# Patient Record
Sex: MALE | Race: WHITE | Employment: OTHER | RURAL
[De-identification: names, ages, dates, MRNs, and addresses within clinical notes are randomized per-mention and may not be internally consistent; named-entity substitution may affect disease eponyms.]

---

## 2017-01-20 DIAGNOSIS — K21.00 GASTROESOPHAGEAL REFLUX DISEASE WITH ESOPHAGITIS: ICD-10-CM

## 2017-01-20 RX ORDER — PANTOPRAZOLE SODIUM 40 MG/1
TABLET, DELAYED RELEASE ORAL
Qty: 30 TAB | Refills: 11 | Status: SHIPPED | OUTPATIENT
Start: 2017-01-20 | End: 2017-01-24 | Stop reason: SDUPTHER

## 2017-01-24 DIAGNOSIS — K21.00 GASTROESOPHAGEAL REFLUX DISEASE WITH ESOPHAGITIS: ICD-10-CM

## 2017-01-24 RX ORDER — PANTOPRAZOLE SODIUM 40 MG/1
TABLET, DELAYED RELEASE ORAL
Qty: 30 TAB | Refills: 11 | Status: SHIPPED | OUTPATIENT
Start: 2017-01-24 | End: 2018-02-20 | Stop reason: SDUPTHER

## 2017-02-23 ENCOUNTER — TELEPHONE (OUTPATIENT)
Dept: FAMILY MEDICINE CLINIC | Age: 72
End: 2017-02-23

## 2017-02-23 NOTE — TELEPHONE ENCOUNTER
Attempted to reach patient on mobile(3 times) and home number(1 time), no answer. With Blood Pressure readings that high he needs to be seen. Unfortunately, we do not have any openings today, he can go to the ER or to an urgent care facility. With BP readings that high he is at risk of having a stroke.

## 2017-02-23 NOTE — TELEPHONE ENCOUNTER
Patient called back. Patient states that his BP was 188/100 when he went to the dentist with his wife. Advised patient that I could double book an afternoon slot with the nurse practiconer, patient stated he was still in [place] and would not be able to make it in time. The only opening that we have for tomorrow is at 1:25pm. ASked patient if he has any headaches, SOB, chest pain, numbness -- patient avoided the question & replied, \"those things usually go hand in hand with high blood pressure. \" Advised patient that I did not think it would be wise to wait until tomorrow afternoon to be seen since his pressure has been elevated for 3 days. Advised patient to go to the ER. Patient refused and asked that I let Dr. Misa Earl know about his elevated pressure since that is usually who he sees.

## 2017-02-24 ENCOUNTER — HOSPITAL ENCOUNTER (EMERGENCY)
Age: 72
Discharge: HOME | End: 2017-02-24
Attending: EMERGENCY MEDICINE
Payer: MEDICARE

## 2017-02-24 VITALS
RESPIRATION RATE: 14 BRPM | SYSTOLIC BLOOD PRESSURE: 162 MMHG | WEIGHT: 209.44 LBS | BODY MASS INDEX: 28.37 KG/M2 | TEMPERATURE: 97.2 F | DIASTOLIC BLOOD PRESSURE: 70 MMHG | HEART RATE: 51 BPM | OXYGEN SATURATION: 97 % | HEIGHT: 72 IN

## 2017-02-24 DIAGNOSIS — I10 ESSENTIAL HYPERTENSION: Primary | ICD-10-CM

## 2017-02-24 LAB
ANION GAP BLD CALC-SCNC: 17 MMOL/L (ref 5–15)
ATRIAL RATE: 47 BPM
BUN BLD-MCNC: 21 MG/DL (ref 9–20)
CA-I BLD-MCNC: 1.22 MMOL/L (ref 1.12–1.32)
CALCULATED P AXIS, ECG09: 23 DEGREES
CALCULATED R AXIS, ECG10: 4 DEGREES
CALCULATED T AXIS, ECG11: 61 DEGREES
CHLORIDE BLD-SCNC: 101 MMOL/L (ref 98–107)
CO2 BLD-SCNC: 27 MMOL/L (ref 21–32)
CREAT BLD-MCNC: 1 MG/DL (ref 0.6–1.3)
DIAGNOSIS, 93000: NORMAL
GLUCOSE BLD STRIP.AUTO-MCNC: 102 MG/DL (ref 65–100)
GLUCOSE BLD-MCNC: 112 MG/DL (ref 75–110)
HCT VFR BLD CALC: 38 % (ref 36.6–50.3)
HGB BLD-MCNC: 12.9 GM/DL (ref 12.1–17)
P-R INTERVAL, ECG05: 166 MS
POTASSIUM BLD-SCNC: 4.1 MMOL/L (ref 3.5–5.1)
Q-T INTERVAL, ECG07: 520 MS
QRS DURATION, ECG06: 100 MS
QTC CALCULATION (BEZET), ECG08: 460 MS
SERVICE CMNT-IMP: ABNORMAL
SERVICE CMNT-IMP: ABNORMAL
SODIUM BLD-SCNC: 139 MMOL/L (ref 136–145)
TROPONIN I BLD-MCNC: <0.04 NG/ML (ref 0–0.08)
VENTRICULAR RATE, ECG03: 47 BPM

## 2017-02-24 PROCEDURE — 82962 GLUCOSE BLOOD TEST: CPT

## 2017-02-24 PROCEDURE — 84484 ASSAY OF TROPONIN QUANT: CPT

## 2017-02-24 PROCEDURE — 74011250636 HC RX REV CODE- 250/636: Performed by: EMERGENCY MEDICINE

## 2017-02-24 PROCEDURE — 99285 EMERGENCY DEPT VISIT HI MDM: CPT

## 2017-02-24 PROCEDURE — 96374 THER/PROPH/DIAG INJ IV PUSH: CPT

## 2017-02-24 PROCEDURE — 93005 ELECTROCARDIOGRAM TRACING: CPT

## 2017-02-24 PROCEDURE — 80047 BASIC METABLC PNL IONIZED CA: CPT

## 2017-02-24 RX ORDER — HYDRALAZINE HYDROCHLORIDE 20 MG/ML
20 INJECTION INTRAMUSCULAR; INTRAVENOUS
Status: COMPLETED | OUTPATIENT
Start: 2017-02-24 | End: 2017-02-24

## 2017-02-24 RX ADMIN — HYDRALAZINE HYDROCHLORIDE 20 MG: 20 INJECTION INTRAMUSCULAR; INTRAVENOUS at 09:37

## 2017-02-24 NOTE — TELEPHONE ENCOUNTER
Received message from Dr. Fausto Hernandes:   Looks like I will be out tomorrow. He needs to go to ER for stat labs and evaluation if BP's that high. Dr. Fausto Hernandes       Patient made aware and advised to go to [address] [place] [address]. Patient verbalized understanding.

## 2017-02-24 NOTE — ED PROVIDER NOTES
HPI Comments: 66 y.o. male with past medical history significant for HTN, hyperlipidemia, diabetes, GERD, kidney stone, lumbar DJD, vertigo, urinary frequency, CAD, OA, anxiety, prostate CA, L-eye amaurosis fugax, and nodular goiter who presents from home with chief complaint of hypertension. Pt reports to the ED c/o high BP readings he has had recently because his PCP is out of the office today. Pt has a machine that reads \"error\" when systolic BP is above 110, which he states has been happening recently. Pt notes that he has a mild forehead discomfort and \"feels weak\" when he has these hypertensive readings, but he notes that he otherwise feels completely normal. Pt has recently been taking Aleve, which he has been instructed not to take. Pt regularly takes Lisinopril (20 OR 40 BID) and Atenolol. Pt has no Hx of MI. Pt lists no pertinent negative symptoms. There are no other acute medical concerns at this time. PCP: Mitzy Ludwig MD    Note written by Mara Almonte, as dictated by Moreno Vazquez MD 8:53 AM      The history is provided by the patient. Past Medical History:   Diagnosis Date    Amaurosis fugax of left eye 5/6/2012    Arthritis     OSTEO    CAD (coronary artery disease) 2012    CAROTID LEFT     Cancer ([facility] [address] 2013    PROSTATE    Chronic pain     DJD lumbar    Diabetes ([facility] [address] 5/22/2010    Frequent nocturnal awakening     urinary frequency    GERD (gastroesophageal reflux disease) 5/22/2010    Hyperlipidemia 5/22/2010    Hypertension 5/22/2010    Kidney stone 5/22/2010    Nodular goiter     1.3 cm right , FNA 6/15    Obesity (BMI 30-39. 9)     Psychiatric disorder     ANXIETY    Vertigo        Past Surgical History:   Procedure Laterality Date    COLONOSCOPY  3/3/2016         EGD  3/3/2016         HX HEENT      tonsillectomy    HX MOHS PROCEDURES  2010    right    HX ORTHOPAEDIC      coccyx removed    HX UROLOGICAL  2010    left lithotripsy. basket extraction,ureteral stent    HX VASECTOMY      NEUROLOGICAL PROCEDURE UNLISTED  2011    Steroid injections in epidural    VASCULAR SURGERY PROCEDURE UNLIST  2012    LEFT CAROTID         Family History:   Problem Relation Age of Onset    Diabetes Mother     Obesity Mother     Heart Disease Mother     Stroke Father     Heart Disease Sister     Obesity Sister     Diabetes Sister        Social History     Social History    Marital status:      Spouse name: N/A    Number of children: N/A    Years of education: N/A     Occupational History    Not on file. Social History Main Topics    Smoking status: Former Smoker     Packs/day: 0.50     Years: 4.00     Quit date: 4/16/1966    Smokeless tobacco: Never Used    Alcohol use No    Drug use: No    Sexual activity: Yes     Partners: Female     Other Topics Concern    Not on file     Social History Narrative         ALLERGIES: Bee sting [sting, bee]; Vytorin 10-10 [ezetimibe-simvastatin]; Amlodipine; and Lipitor [atorvastatin]    Review of Systems   Constitutional: Negative for appetite change, fever and unexpected weight change. HENT: Negative for ear pain, hearing loss, nosebleeds, rhinorrhea, sore throat and trouble swallowing. Respiratory: Negative. Negative for cough, chest tightness and shortness of breath. Cardiovascular: Negative. Negative for chest pain and palpitations. Gastrointestinal: Negative. Negative for abdominal distention, abdominal pain, blood in stool and vomiting. Endocrine: Negative. Genitourinary: Negative for dysuria and hematuria. Musculoskeletal: Negative. Negative for back pain and myalgias. Skin: Negative. Negative for rash. Allergic/Immunologic: Negative. Neurological: Positive for weakness and headaches (\"forehead discomfort\"). Negative for dizziness, syncope and numbness. Hematological: Negative. Psychiatric/Behavioral: Negative.     All other systems reviewed and are negative. Vitals:    02/24/17 0848   BP: (!) 204/95   Pulse: (!) 48   Resp: 18   Temp: 97.2 °F (36.2 °C)   SpO2: 98%   Weight: 95 kg (209 lb 7 oz)   Height: 5' 11.5\" (1.816 m)            Physical Exam   Constitutional: He is oriented to person, place, and time. He appears well-developed and well-nourished. No distress. HENT:   Head: Normocephalic and atraumatic. Right Ear: External ear normal.   Left Ear: External ear normal.   Nose: Nose normal.   Mouth/Throat: Oropharynx is clear and moist.   Eyes: Conjunctivae and EOM are normal. Pupils are equal, round, and reactive to light. Neck: Normal range of motion. Neck supple. No JVD present. No thyromegaly present. Cardiovascular: Normal rate, regular rhythm, normal heart sounds and intact distal pulses. No murmur heard. Pulmonary/Chest: Effort normal and breath sounds normal. No respiratory distress. He has no wheezes. He has no rales. Abdominal: Soft. Bowel sounds are normal. He exhibits no distension. There is no tenderness. Musculoskeletal: Normal range of motion. He exhibits no edema. Neurological: He is alert and oriented to person, place, and time. No cranial nerve deficit. Skin: Skin is warm and dry. No rash noted. Psychiatric: He has a normal mood and affect. His behavior is normal. Thought content normal.    Note written by Devon Nowak. Liseth Faustin, as dictated by Zhou Charles MD 8:53 AM      [facility]  ED Course       Procedures    ASSESSMENT & PLAN: Aquiles Kate is unremarkable, troponin is negative, BG is 102, and BP is down to 166/80. Assessment is acute on chronic HTN. No change in medication regimen is indicated at this time. Plan is to have pt follow-up with PCP on Monday for recheck and possible medication adjustment. ED EKG interpretation:  Rhythm: sinus bradycardia; Rate (approx.): 47 bpm; Axis: normal; Normal Intervals; ST/T wave: normal; No ectopy. Note written by Devon Nowak.  Liseth Faustin, as dictated by Zhou Charles MD 9:19 AM

## 2017-02-24 NOTE — ED NOTES
Assumed care of pt. Pt resting in stretcher in low/locked position and call bell within reach. Introduced self as primary nurse. Discussed plan of care with patient. Opportunity to ask questions given. Patient advised that medical information will be discussed and it is their own responsibility to let nurse know if such conversation should not take place in presence of visitors. Pt verbalizes understanding.

## 2017-02-24 NOTE — ED NOTES
Patient given discharge instructions by provider. Patient verbalized understanding and denies questions.

## 2017-02-24 NOTE — DISCHARGE INSTRUCTIONS
High Blood Pressure: Care Instructions  Your Care Instructions  If your blood pressure is usually above 140/90, you have high blood pressure, or hypertension. That means the top number is 140 or higher or the bottom number is 90 or higher, or both. Despite what a lot of people think, high blood pressure usually doesn't cause headaches or make you feel dizzy or lightheaded. It usually has no symptoms. But it does increase your risk for heart attack, stroke, and kidney or eye damage. The higher your blood pressure, the more your risk increases. Your doctor will give you a goal for your blood pressure. Your goal will be based on your health and your age. An example of a goal is to keep your blood pressure below 140/90. Lifestyle changes, such as eating healthy and being active, are always important to help lower blood pressure. You might also take medicine to reach your blood pressure goal.  Follow-up care is a key part of your treatment and safety. Be sure to make and go to all appointments, and call your doctor if you are having problems. It's also a good idea to know your test results and keep a list of the medicines you take. How can you care for yourself at home? Medical treatment  · If you stop taking your medicine, your blood pressure will go back up. You may take one or more types of medicine to lower your blood pressure. Be safe with medicines. Take your medicine exactly as prescribed. Call your doctor if you think you are having a problem with your medicine. · Talk to your doctor before you start taking aspirin every day. Aspirin can help certain people lower their risk of a heart attack or stroke. But taking aspirin isn't right for everyone, because it can cause serious bleeding. · See your doctor regularly. You may need to see the doctor more often at first or until your blood pressure comes down.   · If you are taking blood pressure medicine, talk to your doctor before you take decongestants or anti-inflammatory medicine, such as ibuprofen. Some of these medicines can raise blood pressure. · Learn how to check your blood pressure at home. Lifestyle changes  · Stay at a healthy weight. This is especially important if you put on weight around the waist. Losing even 10 pounds can help you lower your blood pressure. · If your doctor recommends it, get more exercise. Walking is a good choice. Bit by bit, increase the amount you walk every day. Try for at least 30 minutes on most days of the week. You also may want to swim, bike, or do other activities. · Avoid or limit alcohol. Talk to your doctor about whether you can drink any alcohol. · Try to limit how much sodium you eat to less than 2,300 milligrams (mg) a day. Your doctor may ask you to try to eat less than 1,500 mg a day. · Eat plenty of fruits (such as bananas and oranges), vegetables, legumes, whole grains, and low-fat dairy products. · Lower the amount of saturated fat in your diet. Saturated fat is found in animal products such as milk, cheese, and meat. Limiting these foods may help you lose weight and also lower your risk for heart disease. · Do not smoke. Smoking increases your risk for heart attack and stroke. If you need help quitting, talk to your doctor about stop-smoking programs and medicines. These can increase your chances of quitting for good. When should you call for help? Call 911 anytime you think you may need emergency care. This may mean having symptoms that suggest that your blood pressure is causing a serious heart or blood vessel problem. Your blood pressure may be over 180/110. For example, call 911 if:  · You have symptoms of a heart attack. These may include:  ¨ Chest pain or pressure, or a strange feeling in the chest.  ¨ Sweating. ¨ Shortness of breath. ¨ Nausea or vomiting. ¨ Pain, pressure, or a strange feeling in the back, neck, jaw, or upper belly or in one or both shoulders or arms.   ¨ Lightheadedness or sudden weakness. ¨ A fast or irregular heartbeat. · You have symptoms of a stroke. These may include:  ¨ Sudden numbness, tingling, weakness, or loss of movement in your face, arm, or leg, especially on only one side of your body. ¨ Sudden vision changes. ¨ Sudden trouble speaking. ¨ Sudden confusion or trouble understanding simple statements. ¨ Sudden problems with walking or balance. ¨ A sudden, severe headache that is different from past headaches. · You have severe back or belly pain. Do not wait until your blood pressure comes down on its own. Get help right away. Call your doctor now or seek immediate care if:  · Your blood pressure is much higher than normal (such as 180/110 or higher), but you don't have symptoms. · You think high blood pressure is causing symptoms, such as:  ¨ Severe headache. ¨ Blurry vision. Watch closely for changes in your health, and be sure to contact your doctor if:  · Your blood pressure measures 140/90 or higher at least 2 times. That means the top number is 140 or higher or the bottom number is 90 or higher, or both. · You think you may be having side effects from your blood pressure medicine. · Your blood pressure is usually normal, but it goes above normal at least 2 times. Where can you learn more? Go to http://elizabeth-antonieta.info/. Enter W850 in the search box to learn more about \"High Blood Pressure: Care Instructions. \"  Current as of: August 8, 2016  Content Version: 11.1  © 5180-2527 [employer]. Care instructions adapted under license by [employer] (which disclaims liability or warranty for this information). If you have questions about a medical condition or this instruction, always ask your healthcare professional. [place] [address] any warranty or liability for your use of this information. We hope that we have addressed all of your medical concerns.  The examination and treatment you received in the Emergency Department were for an emergent problem and were not intended as complete care. It is important that you follow up with your healthcare provider(s) for ongoing care. If your symptoms worsen or do not improve as expected, and you are unable to reach your usual health care provider(s), you should return to the Emergency Department. Today's healthcare is undergoing tremendous change, and patient satisfaction surveys are one of the many tools to assess the quality of medical care. You may receive a survey from the [employer] regarding your experience in the Emergency Department. I hope that your experience has been completely positive, particularly the medical care that I provided. As such, please participate in the survey; anything less than excellent does not meet my expectations or intentions. [facility] and [facility] participate in nationally recognized quality of care measures. If your blood pressure is greater than 120/80, as reported below, we urge that you seek medical care to address the potential of high blood pressure, commonly known as hypertension. Hypertension can be hereditary or can be caused by certain medical conditions, pain, stress, or \"white coat syndrome. \"       Please make an appointment with your health care provider(s) for follow up of your Emergency Department visit. VITALS:   Patient Vitals for the past 8 hrs:   Temp Pulse Resp BP SpO2   02/24/17 1015 - (!) 50 13 166/80 96 %   02/24/17 1000 - (!) 51 10 171/87 98 %   02/24/17 0945 - (!) 48 14 179/84 98 %   02/24/17 0930 - (!) 45 15 192/86 95 %   02/24/17 0900 - (!) 48 12 (!) 201/102 98 %   02/24/17 0848 97.2 °F (36.2 °C) (!) 48 18 (!) 204/95 98 %          Thank you for allowing us to provide you with medical care today. We realize that you have many choices for your emergency care needs.   Please choose us in the future for any continued health care needs. Regards,           Fernando K. Noris Duron, [facility].   Office: [phone]            Recent Results (from the past 24 hour(s))   EKG, 12 LEAD, INITIAL    Collection Time: 02/24/17  9:19 AM   Result Value Ref Range    Ventricular Rate 47 BPM    Atrial Rate 47 BPM    P-R Interval 166 ms    QRS Duration 100 ms    Q-T Interval 520 ms    QTC Calculation (Bezet) 460 ms    Calculated P Axis 23 degrees    Calculated R Axis 4 degrees    Calculated T Axis 61 degrees    Diagnosis       Sinus bradycardia  Otherwise normal ECG  When compared with ECG of 14-OCT-2010 10:41,  No significant change was found  Confirmed by Yana Barrientos ([id number]) on 2/24/2017 9:32:57 AM     POC CHEM8    Collection Time: 02/24/17  9:20 AM   Result Value Ref Range    Calcium, ionized (POC) 1.22 1.12 - 1.32 MMOL/L    Sodium (POC) 139 136 - 145 MMOL/L    Potassium (POC) 4.1 3.5 - 5.1 MMOL/L    Chloride (POC) 101 98 - 107 MMOL/L    CO2 (POC) 27 21 - 32 MMOL/L    Anion gap (POC) 17 (H) 5 - 15 mmol/L    Glucose (POC) 112 (H) 75 - 110 MG/DL    BUN (POC) 21 (H) 9 - 20 MG/DL    Creatinine (POC) 1.0 0.6 - 1.3 MG/DL    GFR-AA (POC) >60 >60 ml/min/1.73m2    GFR, non-AA (POC) >60 >60 ml/min/1.73m2    Hemoglobin (POC) 12.9 12.1 - 17.0 GM/DL    Hematocrit (POC) 38 36.6 - 50.3 %    Comment Comment Not Indicated. POC TROPONIN-I    Collection Time: 02/24/17  9:36 AM   Result Value Ref Range    Troponin-I (POC) <0.04 0.00 - 0.08 ng/mL   GLUCOSE, POC    Collection Time: 02/24/17 10:04 AM   Result Value Ref Range    Glucose (POC) 102 (H) 65 - 100 mg/dL    Performed by Elle Handler (PCT)        No results found.

## 2017-02-27 ENCOUNTER — OFFICE VISIT (OUTPATIENT)
Dept: FAMILY MEDICINE CLINIC | Age: 72
End: 2017-02-27

## 2017-02-27 ENCOUNTER — PATIENT OUTREACH (OUTPATIENT)
Dept: FAMILY MEDICINE CLINIC | Age: 72
End: 2017-02-27

## 2017-02-27 VITALS
HEART RATE: 45 BPM | RESPIRATION RATE: 16 BRPM | OXYGEN SATURATION: 97 % | HEIGHT: 72 IN | TEMPERATURE: 97.9 F | DIASTOLIC BLOOD PRESSURE: 78 MMHG | SYSTOLIC BLOOD PRESSURE: 175 MMHG | WEIGHT: 209.8 LBS | BODY MASS INDEX: 28.42 KG/M2

## 2017-02-27 DIAGNOSIS — E78.00 PURE HYPERCHOLESTEROLEMIA: Chronic | ICD-10-CM

## 2017-02-27 DIAGNOSIS — E11.9 CONTROLLED TYPE 2 DIABETES MELLITUS WITHOUT COMPLICATION, WITHOUT LONG-TERM CURRENT USE OF INSULIN (HCC): Chronic | ICD-10-CM

## 2017-02-27 DIAGNOSIS — I10 ACCELERATED HYPERTENSION: Primary | ICD-10-CM

## 2017-02-27 RX ORDER — HYDRALAZINE HYDROCHLORIDE 25 MG/1
25 TABLET, FILM COATED ORAL 3 TIMES DAILY
Qty: 90 TAB | Refills: 3 | Status: SHIPPED | OUTPATIENT
Start: 2017-02-27 | End: 2017-03-06 | Stop reason: SDUPTHER

## 2017-02-27 NOTE — PROGRESS NOTES
NNTOCED: [facility] 2/24/2017: Hypertension: history significant for HTN, hyperlipidemia, diabetes, GERD, kidney stone, lumbar DJD, vertigo, urinary frequency, CAD, OA, anxiety, prostate CA, L-eye amaurosis fugax, and nodular goiter. MDM: Ty Reyes MD  ED Course      BP: (!) 204/95   Pulse: (!) 48          Procedures     ASSESSMENT & PLAN: Chem8 is unremarkable, troponin is negative, BG is 102, and BP is down to 166/80. Assessment is acute on chronic HTN. No change in medication regimen is indicated at this time. Plan is to have pt follow-up with PCP on Monday for recheck and possible medication adjustment.     ED EKG interpretation:  Rhythm: sinus bradycardia; Rate (approx.): 47 bpm; Axis: normal; Normal Intervals; ST/T wave: normal; No ectopy. Note written by Salma Rubi. Yves Osgood, as dictated by Ty Reyes MD 9:19 AM  ______________________________________________  Patient into visit with PCP today:/[id number]   Please note PCP documentation and plan of care. Patient is to return in one week. Decrease atenolol to 25 mg. Start hydralazine 25mg three times daily. NN spoke with patient ,please note Functional Assessment. PCP reviewed medication regimen and changes. Hg A1c= 5.9  NN spoke with patient in depth regarding hidden salt in foods and found patient was making soup with V8 tomato juice,eating some chips,diet soda. Patient agrees to adhere to a low sodium diet for the next week and begin his new medication regimen. Patient will continue to document his BP and pulse daily. We reviewed Red Flags and when to return to the ED. Red Flags: Fever,chills,Nausea,Vomiting,Diarrhea, unable to take medications,pain,SOB,chest pain,unusual sensations,elevated BP,BFAST. Patient will contact NN with questions or concerns.

## 2017-02-27 NOTE — PROGRESS NOTES
Progress Note    Patient: Fermin Griffith MRN: [id number]  SSN: [id number]    YOB: 1945  Age: 66 y.o. Sex: male        Chief Complaint   Patient presents with    Hypertension    Cholesterol Problem    Pain (Chronic)         Subjective:     Encounter Diagnoses   Name Primary?  Accelerated hypertension: He said his blood pressure has been elevated for about a week. He noticed significant ankle and foot edema after he took four alleve the same day. I warned him not to take Alleve. I'm afraid this probably triggered his extremely high blood pressure. His edema has gone back to normal now after visiting the emergency room this weekend. No changes were made at his visit in the emergency room -he was just told to follow up with me. Associated with these high readings at home his had heart rates as low as 45. In reviewing the chart I find that he was supposed to come in times blood pressure checked two weeks after his last visit. Yes    Pure hypercholesterolemia:  Cardiovascular risks for him are: LDL goal is under 100  hypertension  hyperlipidemia. Currently he takes Pravachol (pravastatin) , 40 MG. This is a stronger statin that he can tolerate  Lab Results   Component Value Date/Time    Cholesterol, total 234 11/23/2016 11:04 AM    HDL Cholesterol 63 11/23/2016 11:04 AM    LDL, calculated 146 11/23/2016 11:04 AM    Triglyceride 126 11/23/2016 11:04 AM    CHOL/HDL Ratio 3.5 10/04/2010 11:30 AM     Lab Results   Component Value Date/Time    ALT (SGPT) 12 06/17/2016 08:59 AM    AST (SGOT) 19 06/17/2016 08:59 AM    Alk.  phosphatase 55 06/17/2016 08:59 AM    Bilirubin, total 0.4 06/17/2016 08:59 AM      Myalgias: No   Fatigue: No   Other side effects: no  Wt Readings from Last 3 Encounters:   02/27/17 209 lb 12.8 oz (95.2 kg)   02/24/17 209 lb 7 oz (95 kg)   11/23/16 211 lb 6.4 oz (95.9 kg)     The patient is aware of our goal to reduce or eliminate the long term problems (such as strokes and heart attacks) related to poorly controlled hyperlipidemia.  Controlled type 2 diabetes mellitus without complication, without long-term current use of insulin ([address]): This patient is managed under a comprehensive plan of care for Diabetes. Overall the patient feels well with good energy level. Pertinent Labs:   Lab Results   Component Value Date/Time    Hemoglobin A1c 5.9 11/23/2016 11:04 AM    Hemoglobin A1c 6.3 06/17/2016 08:59 AM    Hemoglobin A1c 6.3 12/03/2015 08:40 AM      Body mass index is 28.85 kg/(m^2). Lab Results   Component Value Date/Time    LDL, calculated 146 11/23/2016 11:04 AM         Lab Results   Component Value Date/Time    Sodium 142 06/17/2016 08:59 AM    Potassium 4.6 06/17/2016 08:59 AM    Chloride 103 06/17/2016 08:59 AM    CO2 23 06/17/2016 08:59 AM    Anion gap 8 04/24/2013 03:08 AM    Glucose 125 06/17/2016 08:59 AM    BUN 21 06/17/2016 08:59 AM    Creatinine 0.86 06/17/2016 08:59 AM    BUN/Creatinine ratio 24 06/17/2016 08:59 AM    GFR est AA 99 06/17/2016 08:59 AM    GFR est non-AA 86 06/17/2016 08:59 AM    Calcium 9.2 06/17/2016 08:59 AM    AST (SGOT) 19 06/17/2016 08:59 AM    Alk. phosphatase 55 06/17/2016 08:59 AM    Protein, total 6.6 06/17/2016 08:59 AM    Albumin 4.6 06/17/2016 08:59 AM    Globulin 2.6 04/16/2013 01:58 PM    A-G Ratio 2.3 06/17/2016 08:59 AM    ALT (SGPT) 12 06/17/2016 08:59 AM     Lab Results   Component Value Date/Time    Microalbumin/Creat ratio (mg/g creat) 8 03/12/2009 09:00 AM    Microalb/Creat ratio (ug/mg creat.) 11.9 11/23/2016 11:04 AM    Microalbumin,urine random 1.47 03/12/2009 09:00 AM      Frequency of home glucose testing:No readings available today. Blood Sugar range at home:    Last eye exam: He is overdue for eye exam.   Last foot exam: This year.    Polyuria, polyphagia or polydipsia: No   Retinopathy: No   Neuropathy SX: No    Low blood sugar symptoms: No   Dietary compliance: Good   Medication compliance:Good   On ASA: Yes   Depression: No   CKD:no     Wt Readings from Last 3 Encounters:   02/27/17 209 lb 12.8 oz (95.2 kg)   02/24/17 209 lb 7 oz (95 kg)   11/23/16 211 lb 6.4 oz (95.9 kg)        History   Smoking Status    Former Smoker    Packs/day: 0.50    Years: 4.00    Quit date: 4/16/1966   Smokeless Tobacco    Never Used     All the patient's questions regarding medications, diet and exercise were answered. Goal of A1C of less than 7.5% is our goal.   Our overall goal is to reduce or eliminate the long term consequences of poorly controlled diabetes. Current and past medical information:    Current Medications after this visit[de-identified]   Current Outpatient Prescriptions   Medication Sig    hydrALAZINE (APRESOLINE) 25 mg tablet Take 1 Tab by mouth three (3) times daily. Indications: hypertension    pantoprazole (PROTONIX) 40 mg tablet TAKE ONE TABLET BY MOUTH EVERY DAY FOR: GASTROESOPHAGEAL REFLUX]    felodipine (PLENDIL SR) 10 mg 24 hr tablet Take 1 Tab by mouth daily. Indications: Hypertension    traMADol (ULTRAM) 50 mg tablet TAKE ONE TABLET BY MOUTH EVERY 6 HOURS AS NEEDED FOR PAIN max daily AMOUNT 200mg    aspirin delayed-release 81 mg tablet Take 1 Tab by mouth daily.  metFORMIN ER (GLUCOPHAGE XR) 500 mg tablet TAKE ONE TABLET BY MOUTH THREE TIMES DAILY AFTER MEALS    lisinopril (PRINIVIL, ZESTRIL) 40 mg tablet TAKE ONE TABLET BY MOUTH TWICE DAILY    furosemide (LASIX) 20 mg tablet TAKE ONE TABLET BY MOUTH EVERY DAY    pravastatin (PRAVACHOL) 40 mg tablet TAKE ONE TABLET BY MOUTH EVERY NIGHT FOR cholesterol    omega-3 fatty acids-vitamin e (FISH OIL) 1,000 mg cap Take 1 Cap by mouth.  coenzyme q10 (CO Q-10) 100 mg Cap Take 100 mg by mouth daily.  diclofenac (VOLTAREN) 1 % gel Apply 4 g to affected area four (4) times daily. Painful shoulder.  EPINEPHrine (EPIPEN 2-KARL) 0.3 mg/0.3 mL injection 0.3 mL by IntraMUSCular route once as needed for up to 1 dose.  Take with 50 mg Benadryl and call 911.    ranitidine (ZANTAC) 300 mg tablet TAKE ONE TABLET BY MOUTH EVERY DAY    atenolol (TENORMIN) 50 mg tablet Take 1 tablet by mouth daily.  Peak Flow Meter eric Use prior and post nebulizer treatment    albuterol (PROVENTIL VENTOLIN) 2.5 mg /3 mL (0.083 %) nebulizer solution 3 mL by Nebulization route every six (6) hours as needed for Wheezing or Shortness of Breath. No current facility-administered medications for this visit. Patient Active Problem List    Diagnosis Date Noted    Bone spur 10/04/2010     Priority: 1 - One    Hypertension 05/22/2010     Priority: 1 - One    Hyperlipidemia 05/22/2010     Priority: 1 - One    GERD (gastroesophageal reflux disease) 05/22/2010     Priority: 1 - One    Kidney stone 05/22/2010     Priority: 1 - One    Diabetes mellitus type 2, controlled, without complications ([facility] 14/86/9761    Prostate cancer ([facility] 08/23/2013    Prostate nodule without urinary obstruction 02/11/2013    S/P carotid endarterectomy 12/05/2012    Vitamin D deficiency 12/05/2012    Amaurosis fugax of left eye 05/06/2012    Carotid artery obstruction 04/17/2012    Allergy to bee sting 04/13/2012       Past Medical History:   Diagnosis Date    Amaurosis fugax of left eye 5/6/2012    Arthritis     OSTEO    CAD (coronary artery disease) 2012    CAROTID LEFT     Cancer ([facility] 2013    PROSTATE    Chronic pain     DJD lumbar    Diabetes ([facility] 5/22/2010    Frequent nocturnal awakening     urinary frequency    GERD (gastroesophageal reflux disease) 5/22/2010    Hyperlipidemia 5/22/2010    Hypertension 5/22/2010    Kidney stone 5/22/2010    Nodular goiter     1.3 cm right , FNA 6/15    Obesity (BMI 30-39. 9)     Psychiatric disorder     ANXIETY    Vertigo        Allergies   Allergen Reactions    Bee Sting [Sting, Bee] Anaphylaxis    Vytorin 10-10 [Ezetimibe-Simvastatin] Myalgia    Amlodipine Other (comments)     Creepy crawly sensation, twitches    Lipitor [Atorvastatin] Myalgia       Past Surgical History:   Procedure Laterality Date    COLONOSCOPY  3/3/2016         EGD  3/3/2016         HX HEENT      tonsillectomy    HX MOHS PROCEDURES  2010    right    HX ORTHOPAEDIC      coccyx removed    HX UROLOGICAL  2010    left lithotripsy. basket  extraction,ureteral stent    HX VASECTOMY      NEUROLOGICAL PROCEDURE UNLISTED  2011    Steroid injections in epidural    VASCULAR SURGERY PROCEDURE UNLIST  2012    LEFT CAROTID       Social History     Social History    Marital status:      Spouse name: N/A    Number of children: N/A    Years of education: N/A     Social History Main Topics    Smoking status: Former Smoker     Packs/day: 0.50     Years: 4.00     Quit date: 4/16/1966    Smokeless tobacco: Never Used    Alcohol use No    Drug use: No    Sexual activity: Yes     Partners: Female     Other Topics Concern    None     Social History Narrative       Review of Systems   Constitutional: Negative. Negative for chills, fever, malaise/fatigue and weight loss. HENT: Negative. Negative for hearing loss. Eyes: Negative. Negative for blurred vision and double vision. Respiratory: Negative. Negative for cough, hemoptysis, sputum production and shortness of breath. Cardiovascular: Positive for leg swelling. Negative for chest pain, palpitations and orthopnea. His leg swelling has resolved. Gastrointestinal: Negative. Negative for abdominal pain, blood in stool, heartburn, nausea and vomiting. Genitourinary: Negative. Negative for dysuria, frequency and urgency. Musculoskeletal: Negative. Negative for back pain, myalgias and neck pain. Skin: Negative. Negative for rash. Neurological: Negative. Negative for dizziness, tingling, tremors, weakness and headaches. He has had no encephalopathic symptoms with his high blood pressures. Endo/Heme/Allergies: Negative. Psychiatric/Behavioral: Negative.   Negative for depression. Objective:     Vitals:    02/27/17 0809   BP: 175/78   Pulse: (!) 45   Resp: 16   Temp: 97.9 °F (36.6 °C)   TempSrc: Oral   SpO2: 97%   Weight: 209 lb 12.8 oz (95.2 kg)   Height: 5' 11.5\" (1.816 m)      Body mass index is 28.85 kg/(m^2). Physical Exam   Constitutional: He is oriented to person, place, and time and well-developed, well-nourished, and in no distress. HENT:   Head: Normocephalic and atraumatic. Mouth/Throat: Oropharynx is clear and moist.   Eyes: Right eye exhibits no discharge. Left eye exhibits no discharge. No scleral icterus. Neck: No tracheal deviation present. No thyromegaly present. No bruit. Cardiovascular: Normal rate, regular rhythm and normal heart sounds. Pulmonary/Chest: Effort normal and breath sounds normal.   Abdominal: Soft. Neurological: He is alert and oriented to person, place, and time. Skin: No rash noted. No erythema. Psychiatric: Mood and affect normal.   Nursing note and vitals reviewed. Health Maintenance Due   Topic Date Due    EYE EXAM RETINAL OR DILATED Q1  08/14/2015    MEDICARE YEARLY EXAM  10/14/2015    GLAUCOMA SCREENING Q2Y  02/18/2016         Assessment and orders:       ICD-10-CM ICD-9-CM    1. Accelerated hypertension   I10 401.0 1- CUT ATENOLOL IN HALF=25 MG.  2-ADD HYDRALAZINE 25 MG TID  3- NO ALLEVE, VOLTAREN CREAM IS OK UNLESS SWELLING OCCURS. 2. Pure hypercholesterolemia E78.00 272.0      3. Controlled type 2 diabetes mellitus without complication, without long-term current use of insulin (HCC) E11.9 250.00          Plan of care:  Discussed diagnoses in detail with patient. Medication risks/benefits/side effects discussed with patient. All of the patient's questions were addressed. The patient understands and agrees with our plan of care. The patient knows to call back if they are unsure of or forget any changes we discussed today or if the symptoms change.      The patient received an After-Visit Summary which contains VS, orders, medication list and allergy list. This can be used as a \"mini-medical record\" should they have to seek medical care while out of town. Patient Care Team:  Yvon Beltrán MD as PCP - General  Viky Chi MD (General and Vascular Surgery)  Reena Downey MD (Neurology)  Dyllan Ballard MD as Surgeon (Urology)  Maicol Wing MD (Endocrinology)  Jodie Whittaker RN as Ambulatory Care Navigator    Follow-up Disposition:  Return in about 1 week (around 3/6/2017) for bp check.     Future Appointments  Date Time Provider Department Center   3/6/2017 8:00 AM Yvon Beltrán MD [facility] SCHED       Signed By: Yvon Beltrán MD     February 27, 2017

## 2017-02-27 NOTE — ACP (ADVANCE CARE PLANNING)
NN initiated conversation regarding Advanced Directive. Patient is aware he may talk with PCP or NN when he is ready to discuss further.

## 2017-02-27 NOTE — MR AVS SNAPSHOT
Visit Information Date & Time Provider Department Dept. Phone Encounter #  
 2/27/2017  8:00 AM Jeff Hansen MD [address] [place] [address] [phone] [id number] Follow-up Instructions Return in about 1 week (around 3/6/2017) for bp check. Upcoming Health Maintenance Date Due  
 EYE EXAM RETINAL OR DILATED Q1 8/14/2015 MEDICARE YEARLY EXAM 10/14/2015 GLAUCOMA SCREENING Q2Y 2/18/2016 HEMOGLOBIN A1C Q6M 5/23/2017 FOOT EXAM Q1 6/17/2017 Pneumococcal 65+ High/Highest Risk (2 of 2 - PPSV23) 9/21/2017 MICROALBUMIN Q1 11/23/2017 LIPID PANEL Q1 11/23/2017 DTaP/Tdap/Td series (2 - Td) 6/23/2021 COLONOSCOPY 3/3/2026 Allergies as of 2/27/2017  Review Complete On: 2/27/2017 By: Monika Johnson LPN Severity Noted Reaction Type Reactions Bee Sting [Sting, Bee] High 05/22/2010    Anaphylaxis Vytorin 10-10 [Ezetimibe-simvastatin] Medium 05/22/2010    Myalgia Amlodipine  11/23/2016    Other (comments) Creepy crawly sensation, twitches Lipitor [Atorvastatin]  05/22/2010    Myalgia Current Immunizations  Reviewed on 4/1/2014 Name Date Influenza Vaccine 11/9/2015, 10/13/2014 Influenza Vaccine (Quad) PF 11/23/2016 Influenza Vaccine Split 12/5/2012, 12/5/2011, 10/4/2010 Influenza Vaccine Whole 9/15/2009 Pneumococcal Vaccine (Unspecified Type) 9/21/2012 TD Vaccine 8/22/2000 TDAP Vaccine 6/23/2011 Not reviewed this visit You Were Diagnosed With   
  
 Codes Comments Accelerated hypertension    -  Primary ICD-10-CM: I10 
ICD-9-CM: 401.0 Pure hypercholesterolemia     ICD-10-CM: E78.00 ICD-9-CM: 272.0 Controlled type 2 diabetes mellitus without complication, without long-term current use of insulin ([facility]     ICD-10-CM: E11.9 ICD-9-CM: 250.00 Vitals BP  
  
  
  
  
  
 175/78 (BP 1 Location: Left arm, BP Patient Position: Sitting) BMI and BSA Data Body Mass Index Body Surface Area  
 28.85 kg/m 2 2.19 m 2 Preferred Pharmacy Pharmacy Name Phone [facility] [address] [phone] Your Updated Medication List  
  
   
This list is accurate as of: 2/27/17  8:23 AM.  Always use your most recent med list.  
  
  
  
  
 albuterol 2.5 mg /3 mL (0.083 %) nebulizer solution Commonly known as:  PROVENTIL VENTOLIN  
3 mL by Nebulization route every six (6) hours as needed for Wheezing or Shortness of Breath. aspirin delayed-release 81 mg tablet Take 1 Tab by mouth daily. atenolol 50 mg tablet Commonly known as:  TENORMIN Take 1 tablet by mouth daily. CO Q-10 100 mg capsule Generic drug:  co-enzyme Q-10 Take 100 mg by mouth daily. diclofenac 1 % Gel Commonly known as:  VOLTAREN Apply 4 g to affected area four (4) times daily. Painful shoulder. EPINEPHrine 0.3 mg/0.3 mL injection Commonly known as:  EPIPEN 2-KARL  
0.3 mL by IntraMUSCular route once as needed for up to 1 dose. Take with 50 mg Benadryl and call 911.  
  
 felodipine 10 mg 24 hr tablet Commonly known as:  PLENDIL SR Take 1 Tab by mouth daily. Indications: Hypertension FISH OIL 1,000 mg Cap Generic drug:  omega-3 fatty acids-vitamin e Take 1 Cap by mouth. furosemide 20 mg tablet Commonly known as:  LASIX TAKE ONE TABLET BY MOUTH EVERY DAY  
  
 hydrALAZINE 25 mg tablet Commonly known as:  APRESOLINE Take 1 Tab by mouth three (3) times daily. Indications: hypertension  
  
 lisinopril 40 mg tablet Commonly known as:  PRINIVIL, ZESTRIL  
TAKE ONE TABLET BY MOUTH TWICE DAILY  
  
 metFORMIN  mg tablet Commonly known as:  GLUCOPHAGE XR  
TAKE ONE TABLET BY MOUTH THREE TIMES DAILY AFTER MEALS  
  
 pantoprazole 40 mg tablet Commonly known as:  PROTONIX  
TAKE ONE TABLET BY MOUTH EVERY DAY FOR: GASTROESOPHAGEAL REFLUX] Peak Flow Meter Sol Mccarthy Use prior and post nebulizer treatment  
  
 pravastatin 40 mg tablet Commonly known as:  PRAVACHOL  
TAKE ONE TABLET BY MOUTH EVERY NIGHT FOR cholesterol  
  
 raNITIdine 300 mg tablet Commonly known as:  ZANTAC TAKE ONE TABLET BY MOUTH EVERY DAY  
  
 traMADol 50 mg tablet Commonly known as:  ULTRAM  
TAKE ONE TABLET BY MOUTH EVERY 6 HOURS AS NEEDED FOR PAIN max daily AMOUNT 200mg Prescriptions Sent to Pharmacy Refills  
 hydrALAZINE (APRESOLINE) 25 mg tablet 3 Sig: Take 1 Tab by mouth three (3) times daily. Indications: hypertension Class: Normal  
 Pharmacy: [facility], [address] [place] [address] #: [phone] Route: Oral  
  
Follow-up Instructions Return in about 1 week (around 3/6/2017) for bp check. Patient Instructions Decrease atenolol to 25 mg. Start hydralazine 25mg three times daily. Introducing [facility]! Dear Braden Alonso: Thank you for requesting a [employer] account. Our records indicate that you already have an active [employer] account. You can access your account anytime at [email or web address] Did you know that you can access your hospital and ER discharge instructions at any time in [employer]? You can also review all of your test results from your hospital stay or ER visit. Additional Information If you have questions, please visit the Frequently Asked Questions section of the [employer] website at [email or web address] Remember, [employer] is NOT to be used for urgent needs. For medical emergencies, dial 911. Now available from your iPhone and Android! Please provide this summary of care documentation to your next provider. Your primary care clinician is listed as [address]. If you have any questions after today's visit, please call [phone].

## 2017-02-27 NOTE — PROGRESS NOTES
Chief Complaint   Patient presents with    Hypertension     Body mass index is 28.85 kg/(m^2).     Reviewed record in preparation for visit and have necessary documentation  Pt did not bring medication to office visit for review  Information was given to pt on Advanced Directives, Living Will  Information was given on Shingles Vaccine  Opportunity was given for questions  Goals that were addressed and/or need to be completed after this appointment include:     Health Maintenance Due   Topic Date Due    EYE EXAM RETINAL OR DILATED Q1  08/14/2015    MEDICARE YEARLY EXAM  10/14/2015    GLAUCOMA SCREENING Q2Y  02/18/2016

## 2017-02-27 NOTE — PATIENT INSTRUCTIONS

## 2017-03-06 ENCOUNTER — CLINICAL SUPPORT (OUTPATIENT)
Dept: FAMILY MEDICINE CLINIC | Age: 72
End: 2017-03-06

## 2017-03-06 VITALS
TEMPERATURE: 97.8 F | WEIGHT: 207.4 LBS | BODY MASS INDEX: 28.09 KG/M2 | HEIGHT: 72 IN | HEART RATE: 50 BPM | DIASTOLIC BLOOD PRESSURE: 68 MMHG | OXYGEN SATURATION: 98 % | SYSTOLIC BLOOD PRESSURE: 144 MMHG | RESPIRATION RATE: 16 BRPM

## 2017-03-06 DIAGNOSIS — I10 ACCELERATED HYPERTENSION: ICD-10-CM

## 2017-03-06 DIAGNOSIS — E11.9 CONTROLLED TYPE 2 DIABETES MELLITUS WITHOUT COMPLICATION, WITHOUT LONG-TERM CURRENT USE OF INSULIN (HCC): Chronic | ICD-10-CM

## 2017-03-06 DIAGNOSIS — E78.00 PURE HYPERCHOLESTEROLEMIA: Chronic | ICD-10-CM

## 2017-03-06 DIAGNOSIS — M19.90 ARTHRITIS: ICD-10-CM

## 2017-03-06 DIAGNOSIS — I10 HTN (HYPERTENSION), MALIGNANT: Primary | ICD-10-CM

## 2017-03-06 RX ORDER — ATENOLOL 50 MG/1
12.5 TABLET ORAL DAILY
Qty: 15 TAB | Refills: 11 | Status: SHIPPED | OUTPATIENT
Start: 2017-03-06 | End: 2017-03-21 | Stop reason: SDUPTHER

## 2017-03-06 RX ORDER — HYDRALAZINE HYDROCHLORIDE 25 MG/1
25 TABLET, FILM COATED ORAL 4 TIMES DAILY
Qty: 120 TAB | Refills: 3 | Status: SHIPPED | OUTPATIENT
Start: 2017-03-06 | End: 2017-03-27 | Stop reason: SDUPTHER

## 2017-03-06 NOTE — PROGRESS NOTES
Chief Complaint   Patient presents with    Blood Pressure Check    Other     Bone Spur     Body mass index is 28.52 kg/(m^2).       Reviewed record in preparation for visit and have necessary documentation  Pt did not bring medication to office visit for review  Information was given to pt on Advanced Directives, Living Will  Information was given on Shingles Vaccine  Opportunity was given for questions  Goals that were addressed and/or need to be completed after this appointment include:     Health Maintenance Due   Topic Date Due    EYE EXAM RETINAL OR DILATED Q1  08/14/2015    MEDICARE YEARLY EXAM  10/14/2015    GLAUCOMA SCREENING Q2Y  02/18/2016

## 2017-03-06 NOTE — PATIENT INSTRUCTIONS

## 2017-03-06 NOTE — MR AVS SNAPSHOT
Visit Information Date & Time Provider Department Dept. Phone Encounter #  
 3/6/2017  8:00 AM Fortunato Juárez MD [address] [place] [address] [phone] [id number] Follow-up Instructions Return in about 3 weeks (around 3/27/2017) for bp check. Upcoming Health Maintenance Date Due  
 EYE EXAM RETINAL OR DILATED Q1 8/14/2015 MEDICARE YEARLY EXAM 10/14/2015 GLAUCOMA SCREENING Q2Y 2/18/2016 HEMOGLOBIN A1C Q6M 5/23/2017 FOOT EXAM Q1 6/17/2017 Pneumococcal 65+ High/Highest Risk (2 of 2 - PPSV23) 9/21/2017 MICROALBUMIN Q1 11/23/2017 LIPID PANEL Q1 11/23/2017 DTaP/Tdap/Td series (2 - Td) 6/23/2021 COLONOSCOPY 3/3/2026 Allergies as of 3/6/2017  Review Complete On: 2/27/2017 By: Fortunato Juárez MD  
  
 Severity Noted Reaction Type Reactions Bee Sting [Sting, Bee] High 05/22/2010    Anaphylaxis Vytorin 10-10 [Ezetimibe-simvastatin] Medium 05/22/2010    Myalgia Amlodipine  11/23/2016    Other (comments) Creepy crawly sensation, twitches Lipitor [Atorvastatin]  05/22/2010    Myalgia Current Immunizations  Reviewed on 4/1/2014 Name Date Influenza Vaccine 11/9/2015, 10/13/2014 Influenza Vaccine (Quad) PF 11/23/2016 Influenza Vaccine Split 12/5/2012, 12/5/2011, 10/4/2010 Influenza Vaccine Whole 9/15/2009 Pneumococcal Vaccine (Unspecified Type) 9/21/2012 TD Vaccine 8/22/2000 TDAP Vaccine 6/23/2011 Not reviewed this visit You Were Diagnosed With   
  
 Codes Comments HTN (hypertension), malignant    -  Primary ICD-10-CM: I10 
ICD-9-CM: 401.0 Controlled type 2 diabetes mellitus without complication, without long-term current use of insulin ([facility]     ICD-10-CM: E11.9 ICD-9-CM: 250.00 Pure hypercholesterolemia     ICD-10-CM: E78.00 ICD-9-CM: 272.0 Arthritis     ICD-10-CM: M19.90 ICD-9-CM: 716.90 Accelerated hypertension     ICD-10-CM: I10 
ICD-9-CM: 401.0 Vitals BP Pulse Temp Resp Height(growth percentile) Weight(growth percentile) 148/80 (BP 1 Location: Left arm, BP Patient Position: Sitting) (!) 50 97.8 °F (36.6 °C) (Oral) 16 5' 11.5\" (1.816 m) 207 lb 6.4 oz (94.1 kg) SpO2 BMI Smoking Status 98% 28.52 kg/m2 Former Smoker BMI and BSA Data Body Mass Index Body Surface Area 28.52 kg/m 2 2.18 m 2 Preferred Pharmacy Pharmacy Name Phone [facility] [address] [phone] Your Updated Medication List  
  
   
This list is accurate as of: 3/6/17  8:29 AM.  Always use your most recent med list.  
  
  
  
  
 albuterol 2.5 mg /3 mL (0.083 %) nebulizer solution Commonly known as:  PROVENTIL VENTOLIN  
3 mL by Nebulization route every six (6) hours as needed for Wheezing or Shortness of Breath. aspirin delayed-release 81 mg tablet Take 1 Tab by mouth daily. atenolol 50 mg tablet Commonly known as:  TENORMIN Take 0.5 Tabs by mouth daily. CO Q-10 100 mg capsule Generic drug:  co-enzyme Q-10 Take 100 mg by mouth daily. diclofenac 1 % Gel Commonly known as:  VOLTAREN Apply 4 g to affected area four (4) times daily. Painful shoulder. EPINEPHrine 0.3 mg/0.3 mL injection Commonly known as:  EPIPEN 2-KARL  
0.3 mL by IntraMUSCular route once as needed for up to 1 dose. Take with 50 mg Benadryl and call 911.  
  
 felodipine 10 mg 24 hr tablet Commonly known as:  PLENDIL SR Take 1 Tab by mouth daily. Indications: Hypertension FISH OIL 1,000 mg Cap Generic drug:  omega-3 fatty acids-vitamin e Take 1 Cap by mouth. furosemide 20 mg tablet Commonly known as:  LASIX TAKE ONE TABLET BY MOUTH EVERY DAY  
  
 hydrALAZINE 25 mg tablet Commonly known as:  APRESOLINE Take 1 Tab by mouth four (4) times daily. Indications: hypertension  
  
 lisinopril 40 mg tablet Commonly known as:  Robertha Rouwellington  
 TAKE ONE TABLET BY MOUTH TWICE DAILY  
  
 metFORMIN  mg tablet Commonly known as:  GLUCOPHAGE XR  
TAKE ONE TABLET BY MOUTH THREE TIMES DAILY AFTER MEALS  
  
 pantoprazole 40 mg tablet Commonly known as:  PROTONIX  
TAKE ONE TABLET BY MOUTH EVERY DAY FOR: GASTROESOPHAGEAL REFLUX] Peak Flow Meter Torrie Deiters Use prior and post nebulizer treatment  
  
 pravastatin 40 mg tablet Commonly known as:  PRAVACHOL  
TAKE ONE TABLET BY MOUTH EVERY NIGHT FOR cholesterol  
  
 raNITIdine 300 mg tablet Commonly known as:  ZANTAC TAKE ONE TABLET BY MOUTH EVERY DAY  
  
 traMADol 50 mg tablet Commonly known as:  ULTRAM  
TAKE ONE TABLET BY MOUTH EVERY 6 HOURS AS NEEDED FOR PAIN max daily AMOUNT 200mg Prescriptions Sent to Pharmacy Refills  
 atenolol (TENORMIN) 50 mg tablet 11 Sig: Take 0.5 Tabs by mouth daily. Class: Normal  
 Pharmacy: [address] Ph #: [phone] Route: Oral  
 hydrALAZINE (APRESOLINE) 25 mg tablet 3 Sig: Take 1 Tab by mouth four (4) times daily. Indications: hypertension Class: Normal  
 Pharmacy: [address] Ph #: [phone] Route: Oral  
  
We Performed the Following HEMOGLOBIN A1C WITH EAG [84865 CPT(R)] LIPID PANEL [21441 CPT(R)] METABOLIC PANEL, COMPREHENSIVE [64538 CPT(R)] URIC ACID A8329483 CPT(R)] Follow-up Instructions Return in about 3 weeks (around 3/27/2017) for bp check. Patient Instructions High Blood Pressure: Care Instructions Your Care Instructions If your blood pressure is usually above 140/90, you have high blood pressure, or hypertension. That means the top number is 140 or higher or the bottom number is 90 or higher, or both. Despite what a lot of people think, high blood pressure usually doesn't cause headaches or make you feel dizzy or lightheaded.  It usually has no symptoms. But it does increase your risk for heart attack, stroke, and kidney or eye damage. The higher your blood pressure, the more your risk increases. Your doctor will give you a goal for your blood pressure. Your goal will be based on your health and your age. An example of a goal is to keep your blood pressure below 140/90. Lifestyle changes, such as eating healthy and being active, are always important to help lower blood pressure. You might also take medicine to reach your blood pressure goal. 
Follow-up care is a key part of your treatment and safety. Be sure to make and go to all appointments, and call your doctor if you are having problems. It's also a good idea to know your test results and keep a list of the medicines you take. How can you care for yourself at home? Medical treatment · If you stop taking your medicine, your blood pressure will go back up. You may take one or more types of medicine to lower your blood pressure. Be safe with medicines. Take your medicine exactly as prescribed. Call your doctor if you think you are having a problem with your medicine. · Talk to your doctor before you start taking aspirin every day. Aspirin can help certain people lower their risk of a heart attack or stroke. But taking aspirin isn't right for everyone, because it can cause serious bleeding. · See your doctor regularly. You may need to see the doctor more often at first or until your blood pressure comes down. · If you are taking blood pressure medicine, talk to your doctor before you take decongestants or anti-inflammatory medicine, such as ibuprofen. Some of these medicines can raise blood pressure. · Learn how to check your blood pressure at home. Lifestyle changes · Stay at a healthy weight. This is especially important if you put on weight around the waist. Losing even 10 pounds can help you lower your blood pressure. · If your doctor recommends it, get more exercise.  Walking is a good choice. Bit by bit, increase the amount you walk every day. Try for at least 30 minutes on most days of the week. You also may want to swim, bike, or do other activities. · Avoid or limit alcohol. Talk to your doctor about whether you can drink any alcohol. · Try to limit how much sodium you eat to less than 2,300 milligrams (mg) a day. Your doctor may ask you to try to eat less than 1,500 mg a day. · Eat plenty of fruits (such as bananas and oranges), vegetables, legumes, whole grains, and low-fat dairy products. · Lower the amount of saturated fat in your diet. Saturated fat is found in animal products such as milk, cheese, and meat. Limiting these foods may help you lose weight and also lower your risk for heart disease. · Do not smoke. Smoking increases your risk for heart attack and stroke. If you need help quitting, talk to your doctor about stop-smoking programs and medicines. These can increase your chances of quitting for good. When should you call for help? Call 911 anytime you think you may need emergency care. This may mean having symptoms that suggest that your blood pressure is causing a serious heart or blood vessel problem. Your blood pressure may be over 180/110. For example, call 911 if: 
· You have symptoms of a heart attack. These may include: ¨ Chest pain or pressure, or a strange feeling in the chest. 
¨ Sweating. ¨ Shortness of breath. ¨ Nausea or vomiting. ¨ Pain, pressure, or a strange feeling in the back, neck, jaw, or upper belly or in one or both shoulders or arms. ¨ Lightheadedness or sudden weakness. ¨ A fast or irregular heartbeat. · You have symptoms of a stroke. These may include: 
¨ Sudden numbness, tingling, weakness, or loss of movement in your face, arm, or leg, especially on only one side of your body. ¨ Sudden vision changes. ¨ Sudden trouble speaking. ¨ Sudden confusion or trouble understanding simple statements. ¨ Sudden problems with walking or balance. ¨ A sudden, severe headache that is different from past headaches. · You have severe back or belly pain. Do not wait until your blood pressure comes down on its own. Get help right away. Call your doctor now or seek immediate care if: 
· Your blood pressure is much higher than normal (such as 180/110 or higher), but you don't have symptoms. · You think high blood pressure is causing symptoms, such as: ¨ Severe headache. ¨ Blurry vision. Watch closely for changes in your health, and be sure to contact your doctor if: 
· Your blood pressure measures 140/90 or higher at least 2 times. That means the top number is 140 or higher or the bottom number is 90 or higher, or both. · You think you may be having side effects from your blood pressure medicine. · Your blood pressure is usually normal, but it goes above normal at least 2 times. Where can you learn more? Go to http://elizabeth-antonieta.info/. Enter Z332 in the search box to learn more about \"High Blood Pressure: Care Instructions. \" Current as of: August 8, 2016 Content Version: 11.1 © 6620-9246 [employer]. Care instructions adapted under license by [employer] (which disclaims liability or warranty for this information). If you have questions about a medical condition or this instruction, always ask your healthcare professional. [address] any warranty or liability for your use of this information. Introducing [facility]! Dear Eyad Palacios: Thank you for requesting a [employer] account. Our records indicate that you already have an active [employer] account. You can access your account anytime at [email or web address] Did you know that you can access your hospital and ER discharge instructions at any time in [employer]? You can also review all of your test results from your hospital stay or ER visit. Additional Information If you have questions, please visit the Frequently Asked Questions section of the [employer] website at [email or web address] Remember, [employer] is NOT to be used for urgent needs. For medical emergencies, dial 911. Now available from your iPhone and Android! Please provide this summary of care documentation to your next provider. Your primary care clinician is listed as [address]. If you have any questions after today's visit, please call [phone].

## 2017-03-06 NOTE — PROGRESS NOTES
Progress Note    Patient: Ramona Coubrn MRN: [id number]  SSN: [id number]    YOB: 1945  Age: 66 y.o. Sex: male        Chief Complaint   Patient presents with    Blood Pressure Check    Other     Bone Spur         Subjective:     Encounter Diagnoses   Name Primary?  HTN (hypertension), malignant; his cuff is accurate and still spiking to 165 at home. Will increase his hydralazine to QID. Some pulses at home as low as 45 but no sx. Cut back already on atenolol to 25 mg daily. May need to stop altogether. No sx when pulse is low. BP Readings from Last 3 Encounters:   03/06/17 144/68   02/27/17 175/78   02/24/17 162/70     The patient reports:  taking medications as instructed, no medication side effects noted, no TIA's, no chest pain on exertion, no dyspnea on exertion, no swelling of ankles. Lab Results   Component Value Date/Time    Sodium 142 06/17/2016 08:59 AM    Potassium 4.6 06/17/2016 08:59 AM    Chloride 103 06/17/2016 08:59 AM    CO2 23 06/17/2016 08:59 AM    Anion gap 8 04/24/2013 03:08 AM    Glucose 125 06/17/2016 08:59 AM    BUN 21 06/17/2016 08:59 AM    Creatinine 0.86 06/17/2016 08:59 AM    BUN/Creatinine ratio 24 06/17/2016 08:59 AM    GFR est AA 99 06/17/2016 08:59 AM    GFR est non-AA 86 06/17/2016 08:59 AM    Calcium 9.2 06/17/2016 08:59 AM    Bilirubin, total 0.4 06/17/2016 08:59 AM    AST (SGOT) 19 06/17/2016 08:59 AM    Alk. phosphatase 55 06/17/2016 08:59 AM    Protein, total 6.6 06/17/2016 08:59 AM    Albumin 4.6 06/17/2016 08:59 AM    Globulin 2.6 04/16/2013 01:58 PM    A-G Ratio 2.3 06/17/2016 08:59 AM    ALT (SGPT) 12 06/17/2016 08:59 AM     Our goal is to normalize the blood pressure to decrease the risks of strokes and heart attacks. The patient is in agreement with the plan. Yes    Controlled type 2 diabetes mellitus without complication, without long-term current use of insulin ([facility] [address]):    This patient is managed under a comprehensive plan of care for Diabetes. Overall the patient feels well with good energy level. Pertinent Labs:   Lab Results   Component Value Date/Time    Hemoglobin A1c 5.9 11/23/2016 11:04 AM    Hemoglobin A1c 6.3 06/17/2016 08:59 AM    Hemoglobin A1c 6.3 12/03/2015 08:40 AM      Body mass index is 28.52 kg/(m^2). Lab Results   Component Value Date/Time    LDL, calculated 146 11/23/2016 11:04 AM         Lab Results   Component Value Date/Time    Sodium 142 06/17/2016 08:59 AM    Potassium 4.6 06/17/2016 08:59 AM    Chloride 103 06/17/2016 08:59 AM    CO2 23 06/17/2016 08:59 AM    Anion gap 8 04/24/2013 03:08 AM    Glucose 125 06/17/2016 08:59 AM    BUN 21 06/17/2016 08:59 AM    Creatinine 0.86 06/17/2016 08:59 AM    BUN/Creatinine ratio 24 06/17/2016 08:59 AM    GFR est AA 99 06/17/2016 08:59 AM    GFR est non-AA 86 06/17/2016 08:59 AM    Calcium 9.2 06/17/2016 08:59 AM    AST (SGOT) 19 06/17/2016 08:59 AM    Alk. phosphatase 55 06/17/2016 08:59 AM    Protein, total 6.6 06/17/2016 08:59 AM    Albumin 4.6 06/17/2016 08:59 AM    Globulin 2.6 04/16/2013 01:58 PM    A-G Ratio 2.3 06/17/2016 08:59 AM    ALT (SGPT) 12 06/17/2016 08:59 AM     Lab Results   Component Value Date/Time    Microalbumin/Creat ratio (mg/g creat) 8 03/12/2009 09:00 AM    Microalb/Creat ratio (ug/mg creat.) 11.9 11/23/2016 11:04 AM    Microalbumin,urine random 1.47 03/12/2009 09:00 AM        Wt Readings from Last 3 Encounters:   03/06/17 207 lb 6.4 oz (94.1 kg)   02/27/17 209 lb 12.8 oz (95.2 kg)   02/24/17 209 lb 7 oz (95 kg)        History   Smoking Status    Former Smoker    Packs/day: 0.50    Years: 4.00    Quit date: 4/16/1966   Smokeless Tobacco    Never Used     All the patient's questions regarding medications, diet and exercise were answered. Goal of A1C of less than 7.5% is our goal.   Our overall goal is to reduce or eliminate the long term consequences of poorly controlled diabetes.        Pure hypercholesterolemia:  Lab Results   Component Value Date/Time    Cholesterol, total 234 11/23/2016 11:04 AM    HDL Cholesterol 63 11/23/2016 11:04 AM    LDL, calculated 146 11/23/2016 11:04 AM    Triglyceride 126 11/23/2016 11:04 AM    CHOL/HDL Ratio 3.5 10/04/2010 11:30 AM     Lab Results   Component Value Date/Time    ALT (SGPT) 12 06/17/2016 08:59 AM    AST (SGOT) 19 06/17/2016 08:59 AM    Alk. phosphatase 55 06/17/2016 08:59 AM    Bilirubin, total 0.4 06/17/2016 08:59 AM      Myalgias: No   Fatigue: No   Other side effects: no  Wt Readings from Last 3 Encounters:   03/06/17 207 lb 6.4 oz (94.1 kg)   02/27/17 209 lb 12.8 oz (95.2 kg)   02/24/17 209 lb 7 oz (95 kg)     The patient is aware of our goal to reduce or eliminate the long term problems (such as strokes and heart attacks) related to poorly controlled hyperlipidemia.  Arthritis:Genersalized. No change. Still golfing.  Accelerated hypertension: see above,               Current and past medical information:    Current Medications after this visit[de-identified]   Current Outpatient Prescriptions   Medication Sig    atenolol (TENORMIN) 50 mg tablet Take 0.5 Tabs by mouth daily.  hydrALAZINE (APRESOLINE) 25 mg tablet Take 1 Tab by mouth four (4) times daily. Indications: hypertension    pantoprazole (PROTONIX) 40 mg tablet TAKE ONE TABLET BY MOUTH EVERY DAY FOR: GASTROESOPHAGEAL REFLUX]    felodipine (PLENDIL SR) 10 mg 24 hr tablet Take 1 Tab by mouth daily. Indications: Hypertension    traMADol (ULTRAM) 50 mg tablet TAKE ONE TABLET BY MOUTH EVERY 6 HOURS AS NEEDED FOR PAIN max daily AMOUNT 200mg    diclofenac (VOLTAREN) 1 % gel Apply 4 g to affected area four (4) times daily. Painful shoulder.  EPINEPHrine (EPIPEN 2-KARL) 0.3 mg/0.3 mL injection 0.3 mL by IntraMUSCular route once as needed for up to 1 dose. Take with 50 mg Benadryl and call 911.  aspirin delayed-release 81 mg tablet Take 1 Tab by mouth daily.     ranitidine (ZANTAC) 300 mg tablet TAKE ONE TABLET BY MOUTH EVERY DAY    metFORMIN ER (GLUCOPHAGE XR) 500 mg tablet TAKE ONE TABLET BY MOUTH THREE TIMES DAILY AFTER MEALS    lisinopril (PRINIVIL, ZESTRIL) 40 mg tablet TAKE ONE TABLET BY MOUTH TWICE DAILY    furosemide (LASIX) 20 mg tablet TAKE ONE TABLET BY MOUTH EVERY DAY    pravastatin (PRAVACHOL) 40 mg tablet TAKE ONE TABLET BY MOUTH EVERY NIGHT FOR cholesterol    Peak Flow Meter eric Use prior and post nebulizer treatment    albuterol (PROVENTIL VENTOLIN) 2.5 mg /3 mL (0.083 %) nebulizer solution 3 mL by Nebulization route every six (6) hours as needed for Wheezing or Shortness of Breath.  omega-3 fatty acids-vitamin e (FISH OIL) 1,000 mg cap Take 1 Cap by mouth.  coenzyme q10 (CO Q-10) 100 mg Cap Take 100 mg by mouth daily. No current facility-administered medications for this visit.         Patient Active Problem List    Diagnosis Date Noted    Bone spur 10/04/2010     Priority: 1 - One    Hypertension 05/22/2010     Priority: 1 - One    Hyperlipidemia 05/22/2010     Priority: 1 - One    GERD (gastroesophageal reflux disease) 05/22/2010     Priority: 1 - One    Kidney stone 05/22/2010     Priority: 1 - One    Diabetes mellitus type 2, controlled, without complications ([facility] [address] 13/57/6672    Prostate cancer ([facility] [address] 08/23/2013    Prostate nodule without urinary obstruction 02/11/2013    S/P carotid endarterectomy 12/05/2012    Vitamin D deficiency 12/05/2012    Amaurosis fugax of left eye 05/06/2012    Carotid artery obstruction 04/17/2012    Allergy to bee sting 04/13/2012       Past Medical History:   Diagnosis Date    Amaurosis fugax of left eye 5/6/2012    Arthritis     OSTEO    CAD (coronary artery disease) 2012    CAROTID LEFT     Cancer ([address] 2013    PROSTATE    Chronic pain     DJD lumbar    Diabetes ([address] 5/22/2010    Frequent nocturnal awakening     urinary frequency    GERD (gastroesophageal reflux disease) 5/22/2010    Hyperlipidemia 5/22/2010    Hypertension 5/22/2010    Kidney stone 5/22/2010    Nodular goiter     1.3 cm right , FNA 6/15    Obesity (BMI 30-39. 9)     Psychiatric disorder     ANXIETY    Vertigo        Allergies   Allergen Reactions    Bee Sting [Sting, Bee] Anaphylaxis    Vytorin 10-10 [Ezetimibe-Simvastatin] Myalgia    Amlodipine Other (comments)     Creepy crawly sensation, twitches    Lipitor [Atorvastatin] Myalgia       Past Surgical History:   Procedure Laterality Date    COLONOSCOPY  3/3/2016         EGD  3/3/2016         HX HEENT      tonsillectomy    HX MOHS PROCEDURES  2010    right    HX ORTHOPAEDIC      coccyx removed    HX UROLOGICAL  2010    left lithotripsy. basket  extraction,ureteral stent    HX VASECTOMY      NEUROLOGICAL PROCEDURE UNLISTED  2011    Steroid injections in epidural    VASCULAR SURGERY PROCEDURE UNLIST  2012    LEFT CAROTID       Social History     Social History    Marital status:      Spouse name: N/A    Number of children: N/A    Years of education: N/A     Social History Main Topics    Smoking status: Former Smoker     Packs/day: 0.50     Years: 4.00     Quit date: 4/16/1966    Smokeless tobacco: Never Used    Alcohol use No    Drug use: No    Sexual activity: Yes     Partners: Female     Other Topics Concern    None     Social History Narrative       Review of Systems   Constitutional: Negative. Negative for chills, fever, malaise/fatigue and weight loss. HENT: Negative. Negative for hearing loss. Eyes: Negative. Negative for blurred vision and double vision. Respiratory: Negative. Negative for cough, hemoptysis, sputum production and shortness of breath. Cardiovascular: Negative. Negative for chest pain, palpitations and orthopnea. Gastrointestinal: Negative. Negative for abdominal pain, blood in stool, heartburn, nausea and vomiting. Genitourinary: Negative. Negative for dysuria, frequency and urgency. Musculoskeletal: Positive for joint pain. Negative for back pain, falls, myalgias and neck pain. Skin: Negative. Negative for rash. Neurological: Negative. Negative for dizziness, tingling, tremors, weakness and headaches. Endo/Heme/Allergies: Negative. Psychiatric/Behavioral: Negative. Negative for depression. Objective:     Vitals:    03/06/17 0810 03/06/17 0836   BP: 148/80 144/68   Pulse: (!) 50    Resp: 16    Temp: 97.8 °F (36.6 °C)    TempSrc: Oral    SpO2: 98%    Weight: 207 lb 6.4 oz (94.1 kg)    Height: 5' 11.5\" (1.816 m)       Body mass index is 28.52 kg/(m^2). Physical Exam   Constitutional: He is oriented to person, place, and time and well-developed, well-nourished, and in no distress. HENT:   Head: Normocephalic and atraumatic. Mouth/Throat: Oropharynx is clear and moist.   Eyes: Right eye exhibits no discharge. Left eye exhibits no discharge. No scleral icterus. Neck: No tracheal deviation present. No thyromegaly present. No bruit. Cardiovascular: Normal rate, regular rhythm and normal heart sounds. Pulmonary/Chest: Effort normal and breath sounds normal.   Abdominal: Soft. Neurological: He is alert and oriented to person, place, and time. Skin: No rash noted. No erythema. Psychiatric: Mood and affect normal.   Nursing note and vitals reviewed. Health Maintenance Due   Topic Date Due    EYE EXAM RETINAL OR DILATED Q1  08/14/2015    MEDICARE YEARLY EXAM  10/14/2015    GLAUCOMA SCREENING Q2Y  02/18/2016         Assessment and orders:       ICD-10-CM ICD-9-CM    1. HTN (hypertension), malignant- see above, increase hydralazine I10 401.0 LIPID PANEL      METABOLIC PANEL, COMPREHENSIVE      HEMOGLOBIN A1C WITH EAG      URIC ACID   2. Controlled type 2 diabetes mellitus without complication, without long-term current use of insulin ([facility] [address]- due for labs. Q59.8 528.48 METABOLIC PANEL, COMPREHENSIVE      HEMOGLOBIN A1C WITH EAG   3. Pure hypercholesterolemia: retest E78.00 272.0 LIPID PANEL      METABOLIC PANEL, COMPREHENSIVE   4.  Arthritis: off alleve now.   M19.90 716.90    5. Accelerated hypertension- see above. I10 401.0 hydrALAZINE (APRESOLINE) 25 mg tablet         Plan of care:  Discussed diagnoses in detail with patient. Medication risks/benefits/side effects discussed with patient. All of the patient's questions were addressed. The patient understands and agrees with our plan of care. The patient knows to call back if they are unsure of or forget any changes we discussed today or if the symptoms change. The patient received an After-Visit Summary which contains VS, orders, medication list and allergy list. This can be used as a \"mini-medical record\" should they have to seek medical care while out of town. Patient Care Team:  Gia Zhao MD as PCP - General  Carlene Grande MD (General and Vascular Surgery)  Loyda Rodriguez MD (Neurology)  Bg Chung MD as Surgeon (Urology)  Svetlana Rubio MD (Endocrinology)  Sonam Villaseñor, RN as Ambulatory Care Navigator    Follow-up Disposition:  Return in about 3 weeks (around 3/27/2017) for bp check.     Future Appointments  Date Time Provider Department Center   3/27/2017 8:00 AM Gia Zhao MD [facility] SCHED       Signed By: Gia Zhao MD     March 6, 2017

## 2017-03-17 LAB
ALBUMIN SERPL-MCNC: 4.3 G/DL (ref 3.5–4.8)
ALBUMIN/GLOB SERPL: 2 {RATIO} (ref 1.2–2.2)
ALP SERPL-CCNC: 56 IU/L (ref 39–117)
ALT SERPL-CCNC: 11 IU/L (ref 0–44)
AST SERPL-CCNC: 22 IU/L (ref 0–40)
BILIRUB SERPL-MCNC: 0.2 MG/DL (ref 0–1.2)
BUN SERPL-MCNC: 18 MG/DL (ref 8–27)
BUN/CREAT SERPL: 17 (ref 10–22)
CALCIUM SERPL-MCNC: 9.4 MG/DL (ref 8.6–10.2)
CHLORIDE SERPL-SCNC: 100 MMOL/L (ref 96–106)
CHOLEST SERPL-MCNC: 226 MG/DL (ref 100–199)
CO2 SERPL-SCNC: 25 MMOL/L (ref 18–29)
CREAT SERPL-MCNC: 1.08 MG/DL (ref 0.76–1.27)
EST. AVERAGE GLUCOSE BLD GHB EST-MCNC: 126 MG/DL
GLOBULIN SER CALC-MCNC: 2.1 G/DL (ref 1.5–4.5)
GLUCOSE SERPL-MCNC: 120 MG/DL (ref 65–99)
HBA1C MFR BLD: 6 % (ref 4.8–5.6)
HDLC SERPL-MCNC: 59 MG/DL
LDLC SERPL CALC-MCNC: 132 MG/DL (ref 0–99)
POTASSIUM SERPL-SCNC: 4.4 MMOL/L (ref 3.5–5.2)
PROT SERPL-MCNC: 6.4 G/DL (ref 6–8.5)
SODIUM SERPL-SCNC: 142 MMOL/L (ref 134–144)
TRIGL SERPL-MCNC: 174 MG/DL (ref 0–149)
URATE SERPL-MCNC: 5.9 MG/DL (ref 3.7–8.6)
VLDLC SERPL CALC-MCNC: 35 MG/DL (ref 5–40)

## 2017-03-27 ENCOUNTER — CLINICAL SUPPORT (OUTPATIENT)
Dept: FAMILY MEDICINE CLINIC | Age: 72
End: 2017-03-27

## 2017-03-27 ENCOUNTER — OFFICE VISIT (OUTPATIENT)
Dept: FAMILY MEDICINE CLINIC | Age: 72
End: 2017-03-27

## 2017-03-27 VITALS
BODY MASS INDEX: 28.55 KG/M2 | HEART RATE: 48 BPM | RESPIRATION RATE: 20 BRPM | OXYGEN SATURATION: 97 % | DIASTOLIC BLOOD PRESSURE: 77 MMHG | HEIGHT: 72 IN | TEMPERATURE: 98.7 F | WEIGHT: 210.8 LBS | SYSTOLIC BLOOD PRESSURE: 172 MMHG

## 2017-03-27 VITALS
HEIGHT: 72 IN | BODY MASS INDEX: 28.44 KG/M2 | WEIGHT: 210 LBS | HEART RATE: 47 BPM | DIASTOLIC BLOOD PRESSURE: 81 MMHG | SYSTOLIC BLOOD PRESSURE: 178 MMHG

## 2017-03-27 DIAGNOSIS — M51.36 DDD (DEGENERATIVE DISC DISEASE), LUMBAR: ICD-10-CM

## 2017-03-27 DIAGNOSIS — E55.9 VITAMIN D DEFICIENCY: ICD-10-CM

## 2017-03-27 DIAGNOSIS — Z71.89 ADVANCED DIRECTIVES, COUNSELING/DISCUSSION: ICD-10-CM

## 2017-03-27 DIAGNOSIS — Z71.82 EXERCISE COUNSELING: ICD-10-CM

## 2017-03-27 DIAGNOSIS — K21.00 GASTROESOPHAGEAL REFLUX DISEASE WITH ESOPHAGITIS: ICD-10-CM

## 2017-03-27 DIAGNOSIS — Z00.00 ROUTINE GENERAL MEDICAL EXAMINATION AT A HEALTH CARE FACILITY: ICD-10-CM

## 2017-03-27 DIAGNOSIS — Z13.39 SCREENING FOR ALCOHOLISM: ICD-10-CM

## 2017-03-27 DIAGNOSIS — I10 HTN (HYPERTENSION), MALIGNANT: ICD-10-CM

## 2017-03-27 DIAGNOSIS — E78.00 PURE HYPERCHOLESTEROLEMIA: ICD-10-CM

## 2017-03-27 DIAGNOSIS — E11.9 CONTROLLED TYPE 2 DIABETES MELLITUS WITHOUT COMPLICATION, WITHOUT LONG-TERM CURRENT USE OF INSULIN (HCC): Primary | ICD-10-CM

## 2017-03-27 DIAGNOSIS — M19.90 ARTHRITIS: ICD-10-CM

## 2017-03-27 DIAGNOSIS — Z71.3 DIETARY COUNSELING AND SURVEILLANCE: ICD-10-CM

## 2017-03-27 DIAGNOSIS — I10 ESSENTIAL HYPERTENSION: Primary | ICD-10-CM

## 2017-03-27 DIAGNOSIS — I10 ACCELERATED HYPERTENSION: ICD-10-CM

## 2017-03-27 RX ORDER — HYDRALAZINE HYDROCHLORIDE 50 MG/1
50 TABLET, FILM COATED ORAL 3 TIMES DAILY
Qty: 90 TAB | Refills: 5 | Status: SHIPPED | OUTPATIENT
Start: 2017-03-27 | End: 2017-09-25 | Stop reason: SDUPTHER

## 2017-03-27 NOTE — PATIENT INSTRUCTIONS
Arthritis: Care Instructions  Your Care Instructions  Arthritis, also called osteoarthritis, is a breakdown of the cartilage that cushions your joints. When the cartilage wears down, your bones rub against each other. This causes pain and stiffness. Many people have some arthritis as they age. Arthritis most often affects the joints of the spine, hands, hips, knees, or feet. You can take simple measures to protect your joints, ease your pain, and help you stay active. Follow-up care is a key part of your treatment and safety. Be sure to make and go to all appointments, and call your doctor if you are having problems. It's also a good idea to know your test results and keep a list of the medicines you take. How can you care for yourself at home? · Stay at a healthy weight. Being overweight puts extra strain on your joints. · Talk to your doctor or physical therapist about exercises that will help ease joint pain. ¨ Stretch. You may enjoy gentle forms of yoga to help keep your joints and muscles flexible. ¨ Walk instead of jog. Other types of exercise that are less stressful on the joints include riding a bicycle, swimming, or water exercise. ¨ Lift weights. Strong muscles help reduce stress on your joints. Stronger thigh muscles, for example, take some of the stress off of the knees and hips. Learn the right way to lift weights so you do not make joint pain worse. · Take your medicines exactly as prescribed. Call your doctor if you think you are having a problem with your medicine. · Take pain medicines exactly as directed. ¨ If the doctor gave you a prescription medicine for pain, take it as prescribed. ¨ If you are not taking a prescription pain medicine, ask your doctor if you can take an over-the-counter medicine. · Use a cane, crutch, walker, or another device if you need help to get around. These can help rest your joints.  You also can use other things to make life easier, such as a higher toilet seat and padded handles on kitchen utensils. · Do not sit in low chairs, which can make it hard to get up. · Put heat or cold on your sore joints as needed. Use whichever helps you most. You also can take turns with hot and cold packs. ¨ Apply heat 2 or 3 times a day for 20 to 30 minutes--using a heating pad, hot shower, or hot pack--to relieve pain and stiffness. ¨ Put ice or a cold pack on your sore joint for 10 to 20 minutes at a time. Put a thin cloth between the ice and your skin. When should you call for help? Call your doctor now or seek immediate medical care if:  · You have sudden swelling, warmth, or pain in any joint. · You have joint pain and a fever or rash. · You have such bad pain that you cannot use a joint. Watch closely for changes in your health, and be sure to contact your doctor if:  · You have mild joint symptoms that continue even with more than 6 weeks of care at home. · You have stomach pain or other problems with your medicine. Where can you learn more? Go to http://elizabethYoviaantonieta.info/. Enter W946 in the search box to learn more about \"Arthritis: Care Instructions. \"  Current as of: February 24, 2016  Content Version: 11.1  © 3470-0149 [employer]. Care instructions adapted under license by [employer] (which disclaims liability or warranty for this information). If you have questions about a medical condition or this instruction, always ask your healthcare professional. [place] [address] any warranty or liability for your use of this information. Advance Directives: Care Instructions  Your Care Instructions  An advance directive is a legal way to state your wishes at the end of your life. It tells your family and your doctor what to do if you can no longer say what you want. There are two main types of advance directives. You can change them any time that your wishes change.   · A living will tells your family and your doctor your wishes about life support and other treatment. · A medical power of  lets you name a person to make treatment decisions for you when you can't speak for yourself. This person is called a health care agent. If you do not have an advance directive, decisions about your medical care may be made by a doctor or a  who doesn't know you. It may help to think of an advance directive as a gift to the people who care for you. If you have one, they won't have to make tough decisions by themselves. Follow-up care is a key part of your treatment and safety. Be sure to make and go to all appointments, and call your doctor if you are having problems. It's also a good idea to know your test results and keep a list of the medicines you take. How can you care for yourself at home? · Discuss your wishes with your loved ones and your doctor. This way, there are no surprises. · Many states have a unique form. Or you might use a universal form that has been approved by many states. This kind of form can sometimes be completed and stored online. Your electronic copy will then be available wherever you have a connection to the Internet. In most cases, doctors will respect your wishes even if you have a form from a different state. · You don't need a  to do an advance directive. But you may want to get legal advice. · Think about these questions when you prepare an advance directive:  ¨ Who do you want to make decisions about your medical care if you are not able to? Many people choose a family member, close friend, or doctor. ¨ Do you know enough about life support methods that might be used? If not, talk to your doctor so you understand. ¨ What are you most afraid of that might happen? You might be afraid of having pain, losing your independence, or being kept alive by machines. ¨ Where would you prefer to die? Choices include your home, a hospital, or a nursing home.   ¨ Would you like to have information about hospice care to support you and your family? ¨ Do you want to donate organs when you die? ¨ Do you want certain Taoist practices performed before you die? If so, put your wishes in the advance directive. · Read your advance directive every year, and make changes as needed. When should you call for help? Be sure to contact your doctor if you have any questions. Where can you learn more? Go to http://elizabeth-antonieta.info/. Enter R264 in the search box to learn more about \"Advance Directives: Care Instructions. \"  Current as of: February 24, 2016  Content Version: 11.1  © 6197-9188 [employer]. Care instructions adapted under license by [employer] (which disclaims liability or warranty for this information). If you have questions about a medical condition or this instruction, always ask your healthcare professional. [address] any warranty or liability for your use of this information. Eating Healthy Foods: Care Instructions  Your Care Instructions  Eating healthy foods can help lower your risk for disease. Healthy food gives you energy and keeps your heart strong, your brain active, your muscles working, and your bones strong. A healthy diet includes a variety of foods from the basic food groups: grains, vegetables, fruits, milk and milk products, and meat and beans. Some people may eat more of their favorite foods from only one food group and, as a result, miss getting the nutrients they need. So, it is important to pay attention not only to what you eat but also to what you are missing from your diet. You can eat a healthy, balanced diet by making a few small changes. Follow-up care is a key part of your treatment and safety. Be sure to make and go to all appointments, and call your doctor if you are having problems.  Its also a good idea to know your test results and keep a list of the medicines you take.  How can you care for yourself at home? Look at what you eat  · Keep a food diary for a week or two and record everything you eat or drink. Track the number of servings you eat from each food group. · For a balanced diet every day, eat a variety of:  ¨ 6 or more ounce-equivalents of grains, such as cereals, breads, crackers, rice, or pasta, every day. An ounce-equivalent is 1 slice of bread, 1 cup of ready-to-eat cereal, or ½ cup of cooked rice, cooked pasta, or cooked cereal.  ¨ 2½ cups of vegetables, especially:  § Dark-green vegetables such as broccoli and spinach. § Orange vegetables such as carrots and sweet potatoes. § Dry beans (such as turk and kidney beans) and peas (such as lentils). ¨ 2 cups of fresh, frozen, or canned fruit. A small apple or 1 banana or orange equals 1 cup. ¨ 3 cups of nonfat or low-fat milk, yogurt, or other milk products. ¨ 5½ ounces of meat and beans, such as chicken, fish, lean meat, beans, nuts, and seeds. One egg, 1 tablespoon of peanut butter, ½ ounce nuts or seeds, or ¼ cup of cooked beans equals 1 ounce of meat. · Learn how to read food labels for serving sizes and ingredients. Fast-food and convenience-food meals often contain few or no fruits or vegetables. Make sure you eat some fruits and vegetables to make the meal more nutritious. · Look at your food diary. For each food group, add up what you have eaten and then divide the total by the number of days. This will give you an idea of how much you are eating from each food group. See if you can find some ways to change your diet to make it more healthy. Start small  · Do not try to make dramatic changes to your diet all at once. You might feel that you are missing out on your favorite foods and then be more likely to fail. · Start slowly, and gradually change your habits. Try some of the following:  ¨ Use whole wheat bread instead of white bread. ¨ Use nonfat or low-fat milk instead of whole milk.   ¨ Eat brown rice instead of white rice, and eat whole wheat pasta instead of white-flour pasta. ¨ Try low-fat cheeses and low-fat yogurt. ¨ Add more fruits and vegetables to meals and have them for snacks. ¨ Add lettuce, tomato, cucumber, and onion to sandwiches. ¨ Add fruit to yogurt and cereal.  Enjoy food  · You can still eat your favorite foods. You just may need to eat less of them. If your favorite foods are high in fat, salt, and sugar, limit how often you eat them, but do not cut them out entirely. · Eat a wide variety of foods. Make healthy choices when eating out  · The type of restaurant you choose can help you make healthy choices. Even fast-food chains are now offering more low-fat or healthier choices on the menu. · Choose smaller portions, or take half of your meal home. · When eating out, try:  ¨ A veggie pizza with a whole wheat crust or grilled chicken (instead of sausage or pepperoni). ¨ Pasta with roasted vegetables, grilled chicken, or marinara sauce instead of cream sauce. ¨ A vegetable wrap or grilled chicken wrap. ¨ Broiled or poached food instead of fried or breaded items. Make healthy choices easy  · Buy packaged, prewashed, ready-to-eat fresh vegetables and fruits, such as baby carrots, salad mixes, and chopped or shredded broccoli and cauliflower. · Buy packaged, presliced fruits, such as melon or pineapple. · Choose 100% fruit or vegetable juice instead of soda. Limit juice intake to 4 to 6 oz (½ to ¾ cup) a day. · Blend low-fat yogurt, fruit juice, and canned or frozen fruit to make a smoothie for breakfast or a snack. Where can you learn more? Go to http://elizabeth-antonieta.info/. Enter T756 in the search box to learn more about \"Eating Healthy Foods: Care Instructions. \"  Current as of: November 20, 2015  Content Version: 11.1  © 2160-2645 [employer].  Care instructions adapted under license by [employer] (which disclaims liability or warranty for this information). If you have questions about a medical condition or this instruction, always ask your healthcare professional. [address] any warranty or liability for your use of this information. Learning About Dietary Guidelines  What are the Dietary Guidelines for Americans? Dietary Guidelines for Americans provide tips for eating well and staying healthy. This helps reduce the risk for long-term (chronic) diseases. These adult guidelines from the [place] recommend that you:  · Eat lots of fruits, vegetables, whole grains, and low-fat or nonfat dairy products. · Try to balance your eating with your activity. This helps you stay at a healthy weight. · Drink alcohol in moderation, if at all. · Limit foods high in salt, saturated fat, trans fat, and added sugar. What is MyPlate? MyPlate is the [place] government's food guide. It can help you make your own well-balanced eating plan. A balanced eating plan means that you eat enough, but not too much, and that your food gives you the nutrients you need to stay healthy. MyPlate focuses on eating plenty of whole grains, fruits, and vegetables, and on limiting fat and sugar. It is available online at www. ChooseMyPlate.gov. How can you get started? MyPlate suggests that most adults eat certain amounts from the different food groups:  Grains  Eat 5 to 8 ounces of grains each day. Half of those should be whole grains. Choose whole-grain breads, cold and cooked cereals and grains, pasta (without creamy sauces), hard rolls, or low-fat or fat-free crackers. Vegetables  Eat 2 to 3 cups of vegetables every day. They contain little if any fat. And they have lots of nutrients that help protect against heart disease. Fruits  Eat 1½ to 2 cups of fruits every day. Fruits contain very little fat but lots of nutrients. Protein foods  Most adults need 5 to 6½ ounces each day.  Choose fish and lean poultry more often. Eat red meat and fried meats less often. Dried beans, tofu, and nuts are also good sources of protein. Dairy  Most adults need 3 cups of milk and milk products a day. Choose low-fat or fat-free products from this food group. If you have problems digesting milk, try eating cheese or yogurt instead. Limit fats and oils, including those used in cooking. When you do use fats, choose oils that are liquid at room temperature (unsaturated fats). These include canola oil and olive oil. Avoid foods with trans fats, such as many fried foods, cookies, and snack foods. Where can you learn more? Go to http://elizabethMarlborough Softwareantonieta.info/. Enter D056 in the search box to learn more about \"Learning About Dietary Guidelines. \"  Current as of: July 26, 2016  Content Version: 11.1  © 6411-6337 [employer]. Care instructions adapted under license by [employer] (which disclaims liability or warranty for this information). If you have questions about a medical condition or this instruction, always ask your healthcare professional. [place] [address] any warranty or liability for your use of this information. A Healthy Lifestyle: Care Instructions  Your Care Instructions  A healthy lifestyle can help you feel good, stay at a healthy weight, and have plenty of energy for both work and play. A healthy lifestyle is something you can share with your whole family. A healthy lifestyle also can lower your risk for serious health problems, such as high blood pressure, heart disease, and diabetes. You can follow a few steps listed below to improve your health and the health of your family. Follow-up care is a key part of your treatment and safety. Be sure to make and go to all appointments, and call your doctor if you are having problems. Its also a good idea to know your test results and keep a list of the medicines you take. How can you care for yourself at home?   · Do not eat too much sugar, fat, or fast foods. You can still have dessert and treats now and then. The goal is moderation. · Start small to improve your eating habits. Pay attention to portion sizes, drink less juice and soda pop, and eat more fruits and vegetables. ¨ Eat a healthy amount of food. A 3-ounce serving of meat, for example, is about the size of a deck of cards. Fill the rest of your plate with vegetables and whole grains. ¨ Limit the amount of soda and sports drinks you have every day. Drink more water when you are thirsty. ¨ Eat at least 5 servings of fruits and vegetables every day. It may seem like a lot, but it is not hard to reach this goal. A serving or helping is 1 piece of fruit, 1 cup of vegetables, or 2 cups of leafy, raw vegetables. Have an apple or some carrot sticks as an afternoon snack instead of a candy bar. Try to have fruits and/or vegetables at every meal.  · Make exercise part of your daily routine. You may want to start with simple activities, such as walking, bicycling, or slow swimming. Try to be active 30 to 60 minutes every day. You do not need to do all 30 to 60 minutes all at once. For example, you can exercise 3 times a day for 10 or 20 minutes. Moderate exercise is safe for most people, but it is always a good idea to talk to your doctor before starting an exercise program.  · Keep moving. [place] Bun the lawn, work in the garden, or WorkThink. Take the stairs instead of the elevator at work. · If you smoke, quit. People who smoke have an increased risk for heart attack, stroke, cancer, and other lung illnesses. Quitting is hard, but there are ways to boost your chance of quitting tobacco for good. ¨ Use nicotine gum, patches, or lozenges. ¨ Ask your doctor about stop-smoking programs and medicines. ¨ Keep trying.   In addition to reducing your risk of diseases in the future, you will notice some benefits soon after you stop using tobacco. If you have shortness of breath or asthma symptoms, they will likely get better within a few weeks after you quit. · Limit how much alcohol you drink. Moderate amounts of alcohol (up to 2 drinks a day for men, 1 drink a day for women) are okay. But drinking too much can lead to liver problems, high blood pressure, and other health problems. Family health  If you have a family, there are many things you can do together to improve your health. · Eat meals together as a family as often as possible. · Eat healthy foods. This includes fruits, vegetables, lean meats and dairy, and whole grains. · Include your family in your fitness plan. Most people think of activities such as jogging or tennis as the way to fitness, but there are many ways you and your family can be more active. Anything that makes you breathe hard and gets your heart pumping is exercise. Here are some tips:  ¨ Walk to do errands or to take your child to school or the bus. ¨ Go for a family bike ride after dinner instead of watching TV. Where can you learn more? Go to http://elizabeth-antonieta.info/. Enter W873 in the search box to learn more about \"A Healthy Lifestyle: Care Instructions. \"  Current as of: July 26, 2016  Content Version: 11.1  © 3205-3289 [employer]. Care instructions adapted under license by [employer] (which disclaims liability or warranty for this information). If you have questions about a medical condition or this instruction, always ask your healthcare professional. [place] [address] any warranty or liability for your use of this information.

## 2017-03-27 NOTE — PROGRESS NOTES
Progress Note    Patient: Ramona Coburn MRN: [id number]  SSN: [id number]    YOB: 1945  Age: 66 y.o. Sex: male        Chief Complaint   Patient presents with    Annual Wellness Visit         Subjective:   Diabetes Mellitus:  He has diabetes mellitus, and  diabetes, hypertension, hyperlipidemia and coronary artery disease. Diabetic ROS - medication compliance: compliant all of the time, diabetic diet compliance: compliant all of the time, home glucose monitoring: is performed sporadically. Lab review: orders written for new lab studies as appropriate; see orders. Cardiovascular Review:  The patient has diabetes, hypertension, hyperlipidemia and coronary artery disease and bradycardia. Diet and Lifestyle: generally follows a low fat low cholesterol diet, exercises regularly, nonsmoker, caffeine intake occassional, alcohol intake denies  Home BP Monitoring: is well controlled at home, ranging 150's/80's, Reviewed blood pressure diary with patient at todays visit. Matti Segovia Pertinent ROS: taking medications as instructed, no medication side effects noted, home BP monitoring in range of 337'L systolic over 94'H diastolic, no TIA's, no chest pain on exertion, no dyspnea on exertion, no swelling of ankles, no orthostatic dizziness or lightheadedness, no orthopnea or paroxysmal nocturnal dyspnea, no palpitations, no muscle aches or pain. Patient states that he is checking his BP at home along with his pulse. Has cut his BP medications in half since his pulse has gone so low. Patient states that he is not having any side-effects from the low pulse at this visit. Denies any dizziness, cp, SOB. GERD  Patient complains of gastroesophageal reflux with heartburn and midepigastric pain. Symptoms have been present for several years. He denies dysphagia. He  has not lost weight. He denies melena, hematochezia, hematemesis, and coffee ground emesis. This has been associated with heartburn.   He denies choking on food, dysphagia, hematemesis, melena and nausea. Medical therapy in the past has included proton pump inhibitors, Zantac. Osteoarthritis and Chronic Pain:  Patient has osteoarthritis, primarily affecting the back and joints. Symptoms onset: problem is longstanding. Rheumatological ROS: stable, mild-to-moderate joint symptoms intermittently, reasonably well controlled by PRN meds. Response to treatment plan: well controlled. Encounter Diagnoses   Name Primary?  Controlled type 2 diabetes mellitus without complication, without long-term current use of insulin (HCC) Yes    HTN (hypertension), malignant     Gastroesophageal reflux disease with esophagitis     Pure hypercholesterolemia     Arthritis     Vitamin D deficiency     DDD (degenerative disc disease), lumbar     Exercise counseling     Dietary counseling and surveillance     Normal body mass index (BMI)      Current and past medical information:    Current Medications after this visit[de-identified]     Current Outpatient Prescriptions   Medication Sig    atenolol (TENORMIN) 50 mg tablet TAKE ONE TABLET BY MOUTH ONCE DAILY    traMADol (ULTRAM) 50 mg tablet TAKE ONE TABLET BY MOUTH EVERY 6 HOURS AS NEEDED FOR PAIN. MAX OF FOUR PER DAY.  lisinopril (PRINIVIL, ZESTRIL) 40 mg tablet TAKE ONE TABLET BY MOUTH TWICE DAILY    furosemide (LASIX) 20 mg tablet TAKE ONE TABLET BY MOUTH EVERY DAY    hydrALAZINE (APRESOLINE) 25 mg tablet Take 1 Tab by mouth four (4) times daily. Indications: hypertension    pantoprazole (PROTONIX) 40 mg tablet TAKE ONE TABLET BY MOUTH EVERY DAY FOR: GASTROESOPHAGEAL REFLUX]    felodipine (PLENDIL SR) 10 mg 24 hr tablet Take 1 Tab by mouth daily. Indications: Hypertension    diclofenac (VOLTAREN) 1 % gel Apply 4 g to affected area four (4) times daily. Painful shoulder.  EPINEPHrine (EPIPEN 2-KARL) 0.3 mg/0.3 mL injection 0.3 mL by IntraMUSCular route once as needed for up to 1 dose.  Take with 50 mg Benadryl and call 911.  aspirin delayed-release 81 mg tablet Take 1 Tab by mouth daily.  ranitidine (ZANTAC) 300 mg tablet TAKE ONE TABLET BY MOUTH EVERY DAY    metFORMIN ER (GLUCOPHAGE XR) 500 mg tablet TAKE ONE TABLET BY MOUTH THREE TIMES DAILY AFTER MEALS    pravastatin (PRAVACHOL) 40 mg tablet TAKE ONE TABLET BY MOUTH EVERY NIGHT FOR cholesterol    Peak Flow Meter eric Use prior and post nebulizer treatment    albuterol (PROVENTIL VENTOLIN) 2.5 mg /3 mL (0.083 %) nebulizer solution 3 mL by Nebulization route every six (6) hours as needed for Wheezing or Shortness of Breath.  omega-3 fatty acids-vitamin e (FISH OIL) 1,000 mg cap Take 1 Cap by mouth.  coenzyme q10 (CO Q-10) 100 mg Cap Take 100 mg by mouth daily. No current facility-administered medications for this visit. Patient Active Problem List    Diagnosis Date Noted    Diabetes mellitus type 2, controlled, without complications ([facility] [address] 94/40/3761    Prostate cancer ([facility] [address] 08/23/2013    Prostate nodule without urinary obstruction 02/11/2013    S/P carotid endarterectomy 12/05/2012    Vitamin D deficiency 12/05/2012    Amaurosis fugax of left eye 05/06/2012    Carotid artery obstruction 04/17/2012    Allergy to bee sting 04/13/2012    Bone spur 10/04/2010    Hypertension 05/22/2010    Hyperlipidemia 05/22/2010    GERD (gastroesophageal reflux disease) 05/22/2010    Kidney stone 05/22/2010       Past Medical History:   Diagnosis Date    Amaurosis fugax of left eye 5/6/2012    Arthritis     OSTEO    CAD (coronary artery disease) 2012    CAROTID LEFT     Cancer ([facility] [address] 2013    PROSTATE    Chronic pain     DJD lumbar    Diabetes ([facility] [address] 5/22/2010    Frequent nocturnal awakening     urinary frequency    GERD (gastroesophageal reflux disease) 5/22/2010    Hyperlipidemia 5/22/2010    Hypertension 5/22/2010    Kidney stone 5/22/2010    Nodular goiter     1.3 cm right , FNA 6/15    Obesity (BMI 30-39. 9)     Psychiatric disorder     ANXIETY    Vertigo        Allergies   Allergen Reactions    Bee Sting [Sting, Bee] Anaphylaxis    Vytorin 10-10 [Ezetimibe-Simvastatin] Myalgia    Amlodipine Other (comments)     Creepy crawly sensation, twitches    Lipitor [Atorvastatin] Myalgia       Past Surgical History:   Procedure Laterality Date    COLONOSCOPY  3/3/2016         EGD  3/3/2016         HX HEENT      tonsillectomy    HX MOHS PROCEDURES  2010    right    HX ORTHOPAEDIC      coccyx removed    HX UROLOGICAL  2010    left lithotripsy. basket  extraction,ureteral stent    HX VASECTOMY      NEUROLOGICAL PROCEDURE UNLISTED  2011    Steroid injections in epidural    VASCULAR SURGERY PROCEDURE UNLIST  2012    LEFT CAROTID       Social History     Social History    Marital status:      Spouse name: N/A    Number of children: N/A    Years of education: N/A     Social History Main Topics    Smoking status: Former Smoker     Packs/day: 0.50     Years: 4.00     Quit date: 4/16/1966    Smokeless tobacco: Never Used    Alcohol use No    Drug use: No    Sexual activity: Yes     Partners: Female     Other Topics Concern    None     Social History Narrative       Review of Systems   Constitutional: Negative. Negative for chills, diaphoresis, fever, malaise/fatigue and weight loss. HENT: Negative. Negative for congestion, hearing loss and sore throat. Eyes: Negative. Negative for blurred vision and double vision. Respiratory: Negative. Negative for cough, hemoptysis, sputum production, shortness of breath and wheezing. Cardiovascular: Negative. Negative for chest pain, palpitations, orthopnea, claudication, leg swelling and PND. Gastrointestinal: Negative for abdominal pain, blood in stool, constipation, diarrhea, heartburn, melena, nausea and vomiting. Genitourinary: Negative. Negative for dysuria, frequency and urgency. Musculoskeletal: Positive for joint pain.  Negative for back pain, falls, myalgias and neck pain. Skin: Negative. Negative for itching and rash. Neurological: Negative. Negative for dizziness, tingling, tremors, sensory change, speech change, focal weakness, seizures, loss of consciousness, weakness and headaches. Endo/Heme/Allergies: Negative. Negative for environmental allergies and polydipsia. Does not bruise/bleed easily. Psychiatric/Behavioral: Negative. Negative for depression, hallucinations, memory loss, substance abuse and suicidal ideas. The patient is not nervous/anxious and does not have insomnia. Objective:     Vitals:    03/27/17 0831   BP: 172/77   Pulse: (!) 48   Resp: 20   Temp: 98.7 °F (37.1 °C)   TempSrc: Oral   SpO2: 97%   Weight: 210 lb 12.8 oz (95.6 kg)   Height: 5' 11.5\" (1.816 m)      Body mass index is 28.99 kg/(m^2). Physical Exam   Constitutional: He is oriented to person, place, and time and well-developed, well-nourished, and in no distress. No distress. HENT:   Head: Normocephalic and atraumatic. Right Ear: External ear normal.   Left Ear: External ear normal.   Nose: Nose normal.   Mouth/Throat: Oropharynx is clear and moist. No oropharyngeal exudate. Eyes: Conjunctivae are normal. Pupils are equal, round, and reactive to light. Right eye exhibits no discharge. Left eye exhibits no discharge. No scleral icterus. Neck: Normal range of motion. Neck supple. No tracheal deviation present. No thyromegaly present. No bruit. Cardiovascular: Regular rhythm, S1 normal, S2 normal, normal heart sounds, intact distal pulses and normal pulses. Bradycardia present. Exam reveals no gallop, no distant heart sounds and no friction rub. No murmur heard. Pulmonary/Chest: Effort normal and breath sounds normal. No respiratory distress. He has no wheezes. He has no rales. He exhibits no tenderness. Abdominal: Soft. Bowel sounds are normal. He exhibits no distension and no mass. There is no tenderness. There is no rebound and no guarding. Musculoskeletal: He exhibits no edema or deformity. Lumbar back: He exhibits decreased range of motion and tenderness. Neurological: He is alert and oriented to person, place, and time. He has normal reflexes. He displays normal reflexes. No cranial nerve deficit. He exhibits normal muscle tone. Gait normal. Coordination normal. GCS score is 15. Skin: Skin is warm and dry. No rash noted. He is not diaphoretic. No erythema. No pallor. Psychiatric: Mood, memory, affect and judgment normal.   Nursing note and vitals reviewed. Health Maintenance Due   Topic Date Due    EYE EXAM RETINAL OR DILATED Q1  08/14/2015    MEDICARE YEARLY EXAM  10/14/2015    GLAUCOMA SCREENING Q2Y  02/18/2016         Assessment and orders:       ICD-10-CM ICD-9-CM    1. Controlled type 2 diabetes mellitus without complication, without long-term current use of insulin ([facility]) E11.9 250.00 Patient to check  glucose at least daily. Instructions on foot care discussed with patient with information printed and given to patient for review. Dietary guidelines along with the need to get exercise discussed with patient to help with getting blood sugar down along with the A1c. Patient verbalized understanding. Carbohydrate counting reinforced. Patient to continue to work on compliance with medications and preventive appointments. Reduce risk of comorbid complications related to diabetes (renal disease, heart disease, amputation, and retinopathy). To decrease or maintain patient's biometrics:  HgA1c <7 mg/dL, /80 or less. LDL of less than 100 and/or less than 70 in a patient with CAD. Verbalized understanding. 2. HTN (hypertension), malignant I10 401.0 Self management goals of living with hypertension include:   A. Taking medicine as prescribed,  B. Monitoring blood pressure response to therapy  C.  Adopting lifestyle recommendations--increasing exercise, decreasing salt intake, and increasing fruits and vegetable consumption. Our goal is to normalize the blood pressure to decrease the risks of strokes and heart attacks. The patient is in agreement with the plan. Information printed and given to the patient for review. 3. Gastroesophageal reflux disease with esophagitis K21.0 530.11 Patient doing well on medications. Discussion with patient about side-effects of the continued use of the PPI. Patient is aware and understands. Information printed and given to patient for review. 4. Pure hypercholesterolemia E78.00 272.0 The patient is aware of our goal to reduce or eliminate the long term problems (such as strokes and heart attacks) related to poorly controlled hyperlipidemia. Discussion about strict diet modification along with as much exercise as possible. Information printed and given to the patient for review. 5. Arthritis M19.90 716.90 Stable at this visit. Patient to continue with his current treatment as prescribed. 6. Vitamin D deficiency E55.9 268.9    7. DDD (degenerative disc disease), lumbar M51.36 722.52 Stable at this visit. 8. Exercise counseling Z71.89 V65.41 Physical activity information given to patient and printed for review. 9. Dietary counseling and surveillance Z71.3 V65.3 Dietary information discussed with patient and printed for review. 10. Normal body mass index (BMI) Z78.9 V49.89 Stable at this visit. Plan of care:  Discussed diagnoses in detail with patient. Medication risks/benefits/side effects discussed with patient. All of the patient's questions were addressed. The patient understands and agrees with our plan of care. The patient knows to call back if they are unsure of or forget any changes we discussed today or if the symptoms change.      The patient received an After-Visit Summary which contains VS, orders, medication list and allergy list. This can be used as a \"mini-medical record\" should they have to seek medical care while out of [facility].    Patient Care Team:  Mitzy Ludwig MD as PCP - Jeannie Max MD (General and Vascular Surgery)  Floyd Saunders MD (Neurology)  Saritha Shelton MD as Surgeon (Urology)  Estuardo Fajardo MD (Endocrinology)  Jessica Blanco, ZURDO as Ambulatory Care Navigator    Follow-up Disposition: Not on File    No future appointments. Signed By: Molinda Peabody, NP     March 27, 2017            This is a Subsequent Medicare Annual Wellness Visit providing Personalized Prevention Plan Services (PPPS) (Performed 12 months after initial AWV and PPPS )    I have reviewed the patient's medical history in detail and updated the computerized patient record. History     Past Medical History:   Diagnosis Date    Amaurosis fugax of left eye 5/6/2012    Arthritis     OSTEO    CAD (coronary artery disease) 2012    CAROTID LEFT     Cancer ([facility] [address] 2013    PROSTATE    Chronic pain     DJD lumbar    Diabetes ([facility] [address] 5/22/2010    Frequent nocturnal awakening     urinary frequency    GERD (gastroesophageal reflux disease) 5/22/2010    Hyperlipidemia 5/22/2010    Hypertension 5/22/2010    Kidney stone 5/22/2010    Nodular goiter     1.3 cm right , FNA 6/15    Obesity (BMI 30-39. 9)     Psychiatric disorder     ANXIETY    Vertigo       Past Surgical History:   Procedure Laterality Date    COLONOSCOPY  3/3/2016         EGD  3/3/2016         HX HEENT      tonsillectomy    HX MOHS PROCEDURES  2010    right    HX ORTHOPAEDIC      coccyx removed    HX UROLOGICAL  2010    left lithotripsy. basket  extraction,ureteral stent    HX VASECTOMY      NEUROLOGICAL PROCEDURE UNLISTED  2011    Steroid injections in epidural    VASCULAR SURGERY PROCEDURE UNLIST  2012    LEFT CAROTID     Current Outpatient Prescriptions   Medication Sig Dispense Refill    atenolol (TENORMIN) 50 mg tablet TAKE ONE TABLET BY MOUTH ONCE DAILY 30 Tab 11    traMADol (ULTRAM) 50 mg tablet TAKE ONE TABLET BY MOUTH EVERY 6 HOURS AS NEEDED FOR PAIN. MAX OF FOUR PER DAY. 120 Tab 11    lisinopril (PRINIVIL, ZESTRIL) 40 mg tablet TAKE ONE TABLET BY MOUTH TWICE DAILY 60 Tab 11    furosemide (LASIX) 20 mg tablet TAKE ONE TABLET BY MOUTH EVERY DAY 30 Tab 11    pantoprazole (PROTONIX) 40 mg tablet TAKE ONE TABLET BY MOUTH EVERY DAY FOR: GASTROESOPHAGEAL REFLUX] 30 Tab 11    felodipine (PLENDIL SR) 10 mg 24 hr tablet Take 1 Tab by mouth daily. Indications: Hypertension 30 Tab 5    diclofenac (VOLTAREN) 1 % gel Apply 4 g to affected area four (4) times daily. Painful shoulder. 100 g 3    EPINEPHrine (EPIPEN 2-KARL) 0.3 mg/0.3 mL injection 0.3 mL by IntraMUSCular route once as needed for up to 1 dose. Take with 50 mg Benadryl and call 911. 2 Syringe 3    aspirin delayed-release 81 mg tablet Take 1 Tab by mouth daily. 30 Tab 0    ranitidine (ZANTAC) 300 mg tablet TAKE ONE TABLET BY MOUTH EVERY DAY 30 Tab 11    metFORMIN ER (GLUCOPHAGE XR) 500 mg tablet TAKE ONE TABLET BY MOUTH THREE TIMES DAILY AFTER MEALS 90 Tab 11    pravastatin (PRAVACHOL) 40 mg tablet TAKE ONE TABLET BY MOUTH EVERY NIGHT FOR cholesterol 30 Tab 11    Peak Flow Meter eric Use prior and post nebulizer treatment 1 Device 0    albuterol (PROVENTIL VENTOLIN) 2.5 mg /3 mL (0.083 %) nebulizer solution 3 mL by Nebulization route every six (6) hours as needed for Wheezing or Shortness of Breath. 24 Each 5    omega-3 fatty acids-vitamin e (FISH OIL) 1,000 mg cap Take 1 Cap by mouth.  coenzyme q10 (CO Q-10) 100 mg Cap Take 100 mg by mouth daily.  hydrALAZINE (APRESOLINE) 50 mg tablet Take 1 Tab by mouth three (3) times daily.  Indications: hypertension 90 Tab 5     Allergies   Allergen Reactions    Bee Sting [Sting, Bee] Anaphylaxis    Vytorin 10-10 [Ezetimibe-Simvastatin] Myalgia    Amlodipine Other (comments)     Creepy crawly sensation, twitches    Lipitor [Atorvastatin] Myalgia     Family History   Problem Relation Age of Onset    Diabetes Mother     Obesity Mother  Heart Disease Mother     Stroke Father     Heart Disease Sister     Obesity Sister     Diabetes Sister      Social History   Substance Use Topics    Smoking status: Former Smoker     Packs/day: 0.50     Years: 4.00     Quit date: 4/16/1966    Smokeless tobacco: Never Used    Alcohol use No     Patient Active Problem List   Diagnosis Code    Hypertension I10    Hyperlipidemia E78.5    GERD (gastroesophageal reflux disease) K21.9    Kidney stone N20.0    Bone spur M77.9    Allergy to bee sting Z91.038    Carotid artery obstruction I65.29    Amaurosis fugax of left eye G45.3    S/P carotid endarterectomy Z98.890    Vitamin D deficiency E55.9    Prostate nodule without urinary obstruction N40.2    Prostate cancer ([facility] [address] C61    Diabetes mellitus type 2, controlled, without complications ([facility] [address] Z17.8       Depression Risk Factor Screening:     PHQ 2 / 9, over the last two weeks 3/27/2017   Little interest or pleasure in doing things Not at all   Feeling down, depressed or hopeless Not at all   Total Score PHQ 2 0     Alcohol Risk Factor Screening: On any occasion during the past 3 months, have you had more than 4 drinks containing alcohol? No    Do you average more than 14 drinks per week? No      Functional Ability and Level of Safety:     Hearing Loss   mild    Activities of Daily Living   Self-care. Requires assistance with: no ADLs    Fall Risk     Fall Risk Assessment, last 12 mths 3/27/2017   Able to walk? Yes   Fall in past 12 months? No     Abuse Screen   Patient is not abused    Review of Systems   see next note    Physical Examination     Evaluation of Cognitive Function:  Mood/affect:  happy  Appearance: age appropriate and casually dressed  Family member/caregiver input: None    No exam performed today, see next note.     Patient Care Team:  Renae Au MD as PCP - General  Juice Gutierrez MD (General and Vascular Surgery)  Jocelyn Awad MD (Neurology)  Shasta Carrion MD as Surgeon (Urology)  Maverick Mcpherson MD (Endocrinology)  Yolanda Austin, RN as Ambulatory Care Navigator    Advice/Referrals/Counseling   Education and counseling provided:  Are appropriate based on today's review and evaluation  End-of-Life planning (with patient's consent)      Assessment/Plan       ICD-10-CM ICD-9-CM    1. Controlled type 2 diabetes mellitus without complication, without long-term current use of insulin (HCC) E11.9 250.00    2. HTN (hypertension), malignant I10 401.0    3. Gastroesophageal reflux disease with esophagitis K21.0 530.11    4. Pure hypercholesterolemia E78.00 272.0    5. Arthritis M19.90 716.90    6. Vitamin D deficiency E55.9 268.9    7. DDD (degenerative disc disease), lumbar M51.36 722.52    8. Exercise counseling Z71.89 V65.41    9. Dietary counseling and surveillance Z71.3 V65.3    10. Normal body mass index (BMI) Z78.9 V49.89    .

## 2017-03-27 NOTE — MR AVS SNAPSHOT
Visit Information Date & Time Provider Department Dept. Phone Encounter #  
 3/27/2017  8:20 AM [id number] Beatriz Hanna NP [address] [place] [phone] [id number] Follow-up Instructions Return in about 4 weeks (around 4/24/2017), or if symptoms worsen or fail to improve. Upcoming Health Maintenance Date Due  
 EYE EXAM RETINAL OR DILATED Q1 8/14/2015 MEDICARE YEARLY EXAM 10/14/2015 GLAUCOMA SCREENING Q2Y 2/18/2016 FOOT EXAM Q1 6/17/2017 HEMOGLOBIN A1C Q6M 9/16/2017 Pneumococcal 65+ High/Highest Risk (2 of 2 - PPSV23) 9/21/2017 MICROALBUMIN Q1 11/23/2017 LIPID PANEL Q1 3/16/2018 DTaP/Tdap/Td series (2 - Td) 6/23/2021 COLONOSCOPY 3/3/2026 Allergies as of 3/27/2017  Review Complete On: 3/27/2017 By: Bethany Raymundo NP Severity Noted Reaction Type Reactions Bee Sting [Sting, Bee] High 05/22/2010    Anaphylaxis Vytorin 10-10 [Ezetimibe-simvastatin] Medium 05/22/2010    Myalgia Amlodipine  11/23/2016    Other (comments) Creepy crawly sensation, twitches Lipitor [Atorvastatin]  05/22/2010    Myalgia Current Immunizations  Reviewed on 4/1/2014 Name Date Influenza Vaccine 11/9/2015, 10/13/2014 Influenza Vaccine (Quad) PF 11/23/2016 Influenza Vaccine Split 12/5/2012, 12/5/2011, 10/4/2010 Influenza Vaccine Whole 9/15/2009 Pneumococcal Vaccine (Unspecified Type) 9/21/2012 TD Vaccine 8/22/2000 TDAP Vaccine 6/23/2011 Not reviewed this visit You Were Diagnosed With   
  
 Codes Comments Controlled type 2 diabetes mellitus without complication, without long-term current use of insulin ([facility]    -  Primary ICD-10-CM: E11.9 ICD-9-CM: 250.00 HTN (hypertension), malignant     ICD-10-CM: I10 
ICD-9-CM: 401.0 Gastroesophageal reflux disease with esophagitis     ICD-10-CM: K21.0 ICD-9-CM: 530.11 Pure hypercholesterolemia     ICD-10-CM: E78.00 ICD-9-CM: 272.0 Arthritis     ICD-10-CM: M19.90 ICD-9-CM: 716.90 Vitamin D deficiency     ICD-10-CM: E55.9 ICD-9-CM: 268.9 DDD (degenerative disc disease), lumbar     ICD-10-CM: M51.36 
ICD-9-CM: 722.52 Exercise counseling     ICD-10-CM: Z71.89 ICD-9-CM: V65.41 Dietary counseling and surveillance     ICD-10-CM: Z71.3 ICD-9-CM: V65.3 Normal body mass index (BMI)     ICD-10-CM: Z78.9 ICD-9-CM: V49.89 Vitals BP Pulse Temp Resp Height(growth percentile) Weight(growth percentile) 172/77 (!) 48 98.7 °F (37.1 °C) (Oral) 20 5' 11.5\" (1.816 m) 210 lb 12.8 oz (95.6 kg) SpO2 BMI Smoking Status 97% 28.99 kg/m2 Former Smoker Vitals History BMI and BSA Data Body Mass Index Body Surface Area  
 28.99 kg/m 2 2.2 m 2 Preferred Pharmacy Pharmacy Name Phone [facility] [phone] Your Updated Medication List  
  
   
This list is accurate as of: 3/27/17  9:15 AM.  Always use your most recent med list.  
  
  
  
  
 albuterol 2.5 mg /3 mL (0.083 %) nebulizer solution Commonly known as:  PROVENTIL VENTOLIN  
3 mL by Nebulization route every six (6) hours as needed for Wheezing or Shortness of Breath. aspirin delayed-release 81 mg tablet Take 1 Tab by mouth daily. atenolol 50 mg tablet Commonly known as:  TENORMIN  
TAKE ONE TABLET BY MOUTH ONCE DAILY  
  
 CO Q-10 100 mg capsule Generic drug:  co-enzyme Q-10 Take 100 mg by mouth daily. diclofenac 1 % Gel Commonly known as:  VOLTAREN Apply 4 g to affected area four (4) times daily. Painful shoulder. EPINEPHrine 0.3 mg/0.3 mL injection Commonly known as:  EPIPEN 2-KARL  
0.3 mL by IntraMUSCular route once as needed for up to 1 dose. Take with 50 mg Benadryl and call 911.  
  
 felodipine 10 mg 24 hr tablet Commonly known as:  PLENDIL SR Take 1 Tab by mouth daily. Indications: Hypertension FISH OIL 1,000 mg Cap Generic drug:  omega-3 fatty acids-vitamin e Take 1 Cap by mouth. furosemide 20 mg tablet Commonly known as:  LASIX TAKE ONE TABLET BY MOUTH EVERY DAY  
  
 hydrALAZINE 25 mg tablet Commonly known as:  APRESOLINE Take 1 Tab by mouth four (4) times daily. Indications: hypertension  
  
 lisinopril 40 mg tablet Commonly known as:  PRINIVIL, ZESTRIL  
TAKE ONE TABLET BY MOUTH TWICE DAILY  
  
 metFORMIN  mg tablet Commonly known as:  GLUCOPHAGE XR  
TAKE ONE TABLET BY MOUTH THREE TIMES DAILY AFTER MEALS  
  
 pantoprazole 40 mg tablet Commonly known as:  PROTONIX  
TAKE ONE TABLET BY MOUTH EVERY DAY FOR: GASTROESOPHAGEAL REFLUX] Peak Flow Meter Excell Drape Use prior and post nebulizer treatment  
  
 pravastatin 40 mg tablet Commonly known as:  PRAVACHOL  
TAKE ONE TABLET BY MOUTH EVERY NIGHT FOR cholesterol  
  
 raNITIdine 300 mg tablet Commonly known as:  ZANTAC TAKE ONE TABLET BY MOUTH EVERY DAY  
  
 traMADol 50 mg tablet Commonly known as:  ULTRAM  
TAKE ONE TABLET BY MOUTH EVERY 6 HOURS AS NEEDED FOR PAIN. MAX OF FOUR PER DAY. Follow-up Instructions Return in about 4 weeks (around 4/24/2017), or if symptoms worsen or fail to improve. Patient Instructions Arthritis: Care Instructions Your Care Instructions Arthritis, also called osteoarthritis, is a breakdown of the cartilage that cushions your joints. When the cartilage wears down, your bones rub against each other. This causes pain and stiffness. Many people have some arthritis as they age. Arthritis most often affects the joints of the spine, hands, hips, knees, or feet. You can take simple measures to protect your joints, ease your pain, and help you stay active. Follow-up care is a key part of your treatment and safety.  Be sure to make and go to all appointments, and call your doctor if you are having problems. It's also a good idea to know your test results and keep a list of the medicines you take. How can you care for yourself at home? · Stay at a healthy weight. Being overweight puts extra strain on your joints. · Talk to your doctor or physical therapist about exercises that will help ease joint pain. ¨ Stretch. You may enjoy gentle forms of yoga to help keep your joints and muscles flexible. ¨ Walk instead of jog. Other types of exercise that are less stressful on the joints include riding a bicycle, swimming, or water exercise. ¨ Lift weights. Strong muscles help reduce stress on your joints. Stronger thigh muscles, for example, take some of the stress off of the knees and hips. Learn the right way to lift weights so you do not make joint pain worse. · Take your medicines exactly as prescribed. Call your doctor if you think you are having a problem with your medicine. · Take pain medicines exactly as directed. ¨ If the doctor gave you a prescription medicine for pain, take it as prescribed. ¨ If you are not taking a prescription pain medicine, ask your doctor if you can take an over-the-counter medicine. · Use a cane, crutch, walker, or another device if you need help to get around. These can help rest your joints. You also can use other things to make life easier, such as a higher toilet seat and padded handles on kitchen utensils. · Do not sit in low chairs, which can make it hard to get up. · Put heat or cold on your sore joints as needed. Use whichever helps you most. You also can take turns with hot and cold packs. ¨ Apply heat 2 or 3 times a day for 20 to 30 minutesusing a heating pad, hot shower, or hot packto relieve pain and stiffness. ¨ Put ice or a cold pack on your sore joint for 10 to 20 minutes at a time. Put a thin cloth between the ice and your skin. When should you call for help? Call your doctor now or seek immediate medical care if: · You have sudden swelling, warmth, or pain in any joint. · You have joint pain and a fever or rash. · You have such bad pain that you cannot use a joint. Watch closely for changes in your health, and be sure to contact your doctor if: 
· You have mild joint symptoms that continue even with more than 6 weeks of care at home. · You have stomach pain or other problems with your medicine. Where can you learn more? Go to http://elizabeth-antonieta.info/. Enter M355 in the search box to learn more about \"Arthritis: Care Instructions. \" Current as of: February 24, 2016 Content Version: 11.1 © 5599-1376 [employer]. Care instructions adapted under license by [employer] (which disclaims liability or warranty for this information). If you have questions about a medical condition or this instruction, always ask your healthcare professional. [place] [address] any warranty or liability for your use of this information. Advance Directives: Care Instructions Your Care Instructions An advance directive is a legal way to state your wishes at the end of your life. It tells your family and your doctor what to do if you can no longer say what you want. There are two main types of advance directives. You can change them any time that your wishes change. · A living will tells your family and your doctor your wishes about life support and other treatment. · A medical power of  lets you name a person to make treatment decisions for you when you can't speak for yourself. This person is called a health care agent. If you do not have an advance directive, decisions about your medical care may be made by a doctor or a  who doesn't know you. It may help to think of an advance directive as a gift to the people who care for you. If you have one, they won't have to make tough decisions by themselves. Follow-up care is a key part of your treatment and safety. Be sure to make and go to all appointments, and call your doctor if you are having problems. It's also a good idea to know your test results and keep a list of the medicines you take. How can you care for yourself at home? · Discuss your wishes with your loved ones and your doctor. This way, there are no surprises. · Many states have a unique form. Or you might use a universal form that has been approved by many states. This kind of form can sometimes be completed and stored online. Your electronic copy will then be available wherever you have a connection to the Internet. In most cases, doctors will respect your wishes even if you have a form from a different state. · You don't need a  to do an advance directive. But you may want to get legal advice. · Think about these questions when you prepare an advance directive: ¨ Who do you want to make decisions about your medical care if you are not able to? Many people choose a family member, close friend, or doctor. ¨ Do you know enough about life support methods that might be used? If not, talk to your doctor so you understand. ¨ What are you most afraid of that might happen? You might be afraid of having pain, losing your independence, or being kept alive by machines. ¨ Where would you prefer to die? Choices include your home, a hospital, or a nursing home. ¨ Would you like to have information about hospice care to support you and your family? ¨ Do you want to donate organs when you die? ¨ Do you want certain [facility] practices performed before you die? If so, put your wishes in the advance directive. · Read your advance directive every year, and make changes as needed. When should you call for help? Be sure to contact your doctor if you have any questions. Where can you learn more? Go to http://elizabeth-antonieta.info/. Enter R264 in the search box to learn more about \"Advance Directives: Care Instructions. \" Current as of: February 24, 2016 Content Version: 11.1 © 6834-1955 [employer]. Care instructions adapted under license by [employer] (which disclaims liability or warranty for this information). If you have questions about a medical condition or this instruction, always ask your healthcare professional. [address] any warranty or liability for your use of this information. Eating Healthy Foods: Care Instructions Your Care Instructions Eating healthy foods can help lower your risk for disease. Healthy food gives you energy and keeps your heart strong, your brain active, your muscles working, and your bones strong. A healthy diet includes a variety of foods from the basic food groups: grains, vegetables, fruits, milk and milk products, and meat and beans. Some people may eat more of their favorite foods from only one food group and, as a result, miss getting the nutrients they need. So, it is important to pay attention not only to what you eat but also to what you are missing from your diet. You can eat a healthy, balanced diet by making a few small changes. Follow-up care is a key part of your treatment and safety. Be sure to make and go to all appointments, and call your doctor if you are having problems. Its also a good idea to know your test results and keep a list of the medicines you take. How can you care for yourself at home? Look at what you eat · Keep a food diary for a week or two and record everything you eat or drink. Track the number of servings you eat from each food group. · For a balanced diet every day, eat a variety of: ¨ 6 or more ounce-equivalents of grains, such as cereals, breads, crackers, rice, or pasta, every day.  An ounce-equivalent is 1 slice of bread, 1 cup of ready-to-eat cereal, or ½ cup of cooked rice, cooked pasta, or cooked cereal. 
¨ 2½ cups of vegetables, especially: § Dark-green vegetables such as broccoli and spinach. § Orange vegetables such as carrots and sweet potatoes. § Dry beans (such as turk and kidney beans) and peas (such as lentils). ¨ 2 cups of fresh, frozen, or canned fruit. A small apple or 1 banana or orange equals 1 cup. ¨ 3 cups of nonfat or low-fat milk, yogurt, or other milk products. ¨ 5½ ounces of meat and beans, such as chicken, fish, lean meat, beans, nuts, and seeds. One egg, 1 tablespoon of peanut butter, ½ ounce nuts or seeds, or ¼ cup of cooked beans equals 1 ounce of meat. · Learn how to read food labels for serving sizes and ingredients. Fast-food and convenience-food meals often contain few or no fruits or vegetables. Make sure you eat some fruits and vegetables to make the meal more nutritious. · Look at your food diary. For each food group, add up what you have eaten and then divide the total by the number of days. This will give you an idea of how much you are eating from each food group. See if you can find some ways to change your diet to make it more healthy. Start small · Do not try to make dramatic changes to your diet all at once. You might feel that you are missing out on your favorite foods and then be more likely to fail. · Start slowly, and gradually change your habits. Try some of the following: ¨ Use whole wheat bread instead of white bread. ¨ Use nonfat or low-fat milk instead of whole milk. ¨ Eat brown rice instead of white rice, and eat whole wheat pasta instead of white-flour pasta. ¨ Try low-fat cheeses and low-fat yogurt. ¨ Add more fruits and vegetables to meals and have them for snacks. ¨ Add lettuce, tomato, cucumber, and onion to sandwiches. ¨ Add fruit to yogurt and cereal. 
Enjoy food · You can still eat your favorite foods. You just may need to eat less of them.  If your favorite foods are high in fat, salt, and sugar, limit how often you eat them, but do not cut them out entirely. · Eat a wide variety of foods. Make healthy choices when eating out · The type of restaurant you choose can help you make healthy choices. Even fast-food chains are now offering more low-fat or healthier choices on the menu. · Choose smaller portions, or take half of your meal home. · When eating out, try: ¨ A veggie pizza with a whole wheat crust or grilled chicken (instead of sausage or pepperoni). ¨ Pasta with roasted vegetables, grilled chicken, or marinara sauce instead of cream sauce. ¨ A vegetable wrap or grilled chicken wrap. ¨ Broiled or poached food instead of fried or breaded items. Make healthy choices easy · Buy packaged, prewashed, ready-to-eat fresh vegetables and fruits, such as baby carrots, salad mixes, and chopped or shredded broccoli and cauliflower. · Buy packaged, presliced fruits, such as melon or pineapple. · Choose 100% fruit or vegetable juice instead of soda. Limit juice intake to 4 to 6 oz (½ to ¾ cup) a day. · Blend low-fat yogurt, fruit juice, and canned or frozen fruit to make a smoothie for breakfast or a snack. Where can you learn more? Go to http://elizabeth-antonieta.info/. Enter T756 in the search box to learn more about \"Eating Healthy Foods: Care Instructions. \" Current as of: November 20, 2015 Content Version: 11.1 © 7879-6388 [employer]deal. Care instructions adapted under license by [employer] (which disclaims liability or warranty for this information). If you have questions about a medical condition or this instruction, always ask your healthcare professional. [place] [address] any warranty or liability for your use of this information. Learning About Dietary Guidelines What are the Dietary Guidelines for Americans?  
 
Dietary Guidelines for Americans provide tips for eating well and staying healthy. This helps reduce the risk for long-term (chronic) diseases. These adult guidelines from the [place] recommend that you: 
· Eat lots of fruits, vegetables, whole grains, and low-fat or nonfat dairy products. · Try to balance your eating with your activity. This helps you stay at a healthy weight. · Drink alcohol in moderation, if at all. · Limit foods high in salt, saturated fat, trans fat, and added sugar. What is MyPlate? MyPlate is the [place] government's food guide. It can help you make your own well-balanced eating plan. A balanced eating plan means that you eat enough, but not too much, and that your food gives you the nutrients you need to stay healthy. MyPlate focuses on eating plenty of whole grains, fruits, and vegetables, and on limiting fat and sugar. It is available online at www. ChooseMyPlate.gov. How can you get started? MyPlate suggests that most adults eat certain amounts from the different food groups: 
Grains Eat 5 to 8 ounces of grains each day. Half of those should be whole grains. Choose whole-grain breads, cold and cooked cereals and grains, pasta (without creamy sauces), hard rolls, or low-fat or fat-free crackers. Vegetables Eat 2 to 3 cups of vegetables every day. They contain little if any fat. And they have lots of nutrients that help protect against heart disease. Fruits Eat 1½ to 2 cups of fruits every day. Fruits contain very little fat but lots of nutrients. Protein foods Most adults need 5 to 6½ ounces each day. Choose fish and lean poultry more often. Eat red meat and fried meats less often. Dried beans, tofu, and nuts are also good sources of protein. Dairy Most adults need 3 cups of milk and milk products a day. Choose low-fat or fat-free products from this food group. If you have problems digesting milk, try eating cheese or yogurt instead. Limit fats and oils, including those used in cooking.  When you do use fats, choose oils that are liquid at room temperature (unsaturated fats). These include canola oil and olive oil. Avoid foods with trans fats, such as many fried foods, cookies, and snack foods. Where can you learn more? Go to http://elizabeth-antonieta.info/. Enter S129 in the search box to learn more about \"Learning About Dietary Guidelines. \" Current as of: July 26, 2016 Content Version: 11.1 © 9778-9759 [employer]weetwall. Care instructions adapted under license by [employer] (which disclaims liability or warranty for this information). If you have questions about a medical condition or this instruction, always ask your healthcare professional. [place] [address] any warranty or liability for your use of this information. A Healthy Lifestyle: Care Instructions Your Care Instructions A healthy lifestyle can help you feel good, stay at a healthy weight, and have plenty of energy for both work and play. A healthy lifestyle is something you can share with your whole family. A healthy lifestyle also can lower your risk for serious health problems, such as high blood pressure, heart disease, and diabetes. You can follow a few steps listed below to improve your health and the health of your family. Follow-up care is a key part of your treatment and safety. Be sure to make and go to all appointments, and call your doctor if you are having problems. Its also a good idea to know your test results and keep a list of the medicines you take. How can you care for yourself at home? · Do not eat too much sugar, fat, or fast foods. You can still have dessert and treats now and then. The goal is moderation. · Start small to improve your eating habits. Pay attention to portion sizes, drink less juice and soda pop, and eat more fruits and vegetables. ¨ Eat a healthy amount of food.  A 3-ounce serving of meat, for example, is about the size of a deck of cards. Fill the rest of your plate with vegetables and whole grains. ¨ Limit the amount of soda and sports drinks you have every day. Drink more water when you are thirsty. ¨ Eat at least 5 servings of fruits and vegetables every day. It may seem like a lot, but it is not hard to reach this goal. A serving or helping is 1 piece of fruit, 1 cup of vegetables, or 2 cups of leafy, raw vegetables. Have an apple or some carrot sticks as an afternoon snack instead of a candy bar. Try to have fruits and/or vegetables at every meal. 
· Make exercise part of your daily routine. You may want to start with simple activities, such as walking, bicycling, or slow swimming. Try to be active 30 to 60 minutes every day. You do not need to do all 30 to 60 minutes all at once. For example, you can exercise 3 times a day for 10 or 20 minutes. Moderate exercise is safe for most people, but it is always a good idea to talk to your doctor before starting an exercise program. 
· Keep moving. Mariah Denneys the lawn, work in the garden, or [employer]. Take the stairs instead of the elevator at work. · If you smoke, quit. People who smoke have an increased risk for heart attack, stroke, cancer, and other lung illnesses. Quitting is hard, but there are ways to boost your chance of quitting tobacco for good. ¨ Use nicotine gum, patches, or lozenges. ¨ Ask your doctor about stop-smoking programs and medicines. ¨ Keep trying. In addition to reducing your risk of diseases in the future, you will notice some benefits soon after you stop using tobacco. If you have shortness of breath or asthma symptoms, they will likely get better within a few weeks after you quit. · Limit how much alcohol you drink. Moderate amounts of alcohol (up to 2 drinks a day for men, 1 drink a day for women) are okay. But drinking too much can lead to liver problems, high blood pressure, and other health problems. Family health If you have a family, there are many things you can do together to improve your health. · Eat meals together as a family as often as possible. · Eat healthy foods. This includes fruits, vegetables, lean meats and dairy, and whole grains. · Include your family in your fitness plan. Most people think of activities such as jogging or tennis as the way to fitness, but there are many ways you and your family can be more active. Anything that makes you breathe hard and gets your heart pumping is exercise. Here are some tips: 
¨ Walk to do errands or to take your child to school or the bus. ¨ Go for a family bike ride after dinner instead of watching TV. Where can you learn more? Go to http://elizabeth-antonieta.info/. Enter A539 in the search box to learn more about \"A Healthy Lifestyle: Care Instructions. \" Current as of: July 26, 2016 Content Version: 11.1 © 8472-4571 Airphrame. Care instructions adapted under license by [employer] (which disclaims liability or warranty for this information). If you have questions about a medical condition or this instruction, always ask your healthcare professional. [place] [address] any warranty or liability for your use of this information. Introducing [facility]! Dear Braden Alonso: Thank you for requesting a [employer] It's what's in store account. Our records indicate that you already have an active [employer] It[employer] what's in store account. You can access your account anytime at [email or web address] Did you know that you can access your hospital and ER discharge instructions at any time in [employer] It's what's in store? You can also review all of your test results from your hospital stay or ER visit. Additional Information If you have questions, please visit the Frequently Asked Questions section of the [employer] website at [email or web address] Remember, [employer] is NOT to be used for urgent needs.  For medical emergencies, dial 911. Now available from your iPhone and Android! Please provide this summary of care documentation to your next provider. Your primary care clinician is listed as [address]. If you have any questions after today's visit, please call [phone].

## 2017-03-27 NOTE — PROGRESS NOTES
Health Maintenance Due   Topic Date Due    EYE EXAM RETINAL OR DILATED Q1  08/14/2015    MEDICARE YEARLY EXAM  10/14/2015    GLAUCOMA SCREENING Q2Y  02/18/2016       Diabetic Bundle:  LDL-132  A1C-6.0  BP-  Smoking?no  Anticoagulation medication?  yes  Eye exam dilated?due  Foot exam?good

## 2017-03-28 NOTE — ACP (ADVANCE CARE PLANNING)
Advance Care Planning    Advance Care Planning (ACP) Provider Note - Comprehensive     Date of ACP Conversation: 03/28/17  Persons included in Conversation:  patient  Length of ACP Conversation in minutes:  16 minutes    Authorized Decision Maker (if patient is incapable of making informed decisions): This person is: Other Legally Authorized Decision Maker (e.g. Next of Kin)          General ACP for ALL Patients with Decision Making Capacity:   Importance of advance care planning, including choosing a healthcare agent to communicate patient's healthcare decisions if patient lost the ability to make decisions, such as after a sudden illness or accident  Understanding of the healthcare agent role was assessed and information provided  Exploration of values, goals, and preferences if recovery is not expected, even with continued medical treatment in the event of: Imminent death  Severe, permanent brain injury  \"In these circumstances, what matters most to you? \"  Care focused more on comfort or quality of life. \"What, if any, treatments would you want to avoid? \" patient to discuss with family  Opportunity offered to explore how cultural, Restoration, spiritual, or personal beliefs would affect decisions for future care     Review of Existing Advance Directive:  information given to patient about advanced directives for him to take home and review with family. For Serious or Chronic Illness:  Understanding of medical condition    Understanding of CPR, goals and expected outcomes, benefits and burdens discussed. Information on CPR success rates provided (e.g. for CPR in hospital, survival to d/c at two weeks is 22%, for chronically ill or elderly/frail survival is less than 3%); Individual asked to communicate understanding of information in his/her own words.   Explored fears and concerns regarding CPR or possible outcomes    Interventions Provided:  Recommended completion of Advance Directive form after review of ACP materials and conversation with prospective healthcare agent

## 2017-03-28 NOTE — PROGRESS NOTES
He had a Medicare wellness exam today. His blood pressure problem was not addressed. Because he is still spiking over 170 we must increase his hydralazine to 50 mg TID. He will return two weeks and will recheck and reevaluate.

## 2017-04-12 ENCOUNTER — TELEPHONE (OUTPATIENT)
Dept: FAMILY MEDICINE CLINIC | Age: 72
End: 2017-04-12

## 2017-04-12 NOTE — TELEPHONE ENCOUNTER
Pt made aware and agrees  He still has 3 readings over 140. Take hydralazine 50 mg 3 times daily as discussed.  If he has side effects we will have cut back but we would like to try for optimal control.

## 2017-04-12 NOTE — TELEPHONE ENCOUNTER
Pt called with BP readings  4-6-17 1030am  142/64,51  4/7/17 230 pm  123/56,51  4/8/17 1035 am 144/71,43   4pm  139/69,48   10pm  141/63,53  4/9/17 945am  133/61,53     127/58,51  4/10/17 630pm 129/56,50  4/11/17 6am  145/69,49   5pm  134/62,45  Pt taking hydrALAZINE 25 mg three - four times a day    Please advise  He reports it was discussed for him to increase hydralazine to 50 mg 3x a day

## 2017-04-12 NOTE — TELEPHONE ENCOUNTER
Spoke with pt   He would like to give BP readings for Dr Maldonado Freeman to review and decide on medication dosage

## 2017-04-12 NOTE — TELEPHONE ENCOUNTER
----- Message from [employer] Cintron sent at 4/11/2017  5:06 PM EDT -----  Regarding: /Telephone  Pt is requesting A callback regarding his blood pressure as well as an appointment. Best contact number(H) [phone] ([phone].

## 2017-04-19 ENCOUNTER — TELEPHONE (OUTPATIENT)
Dept: FAMILY MEDICINE CLINIC | Age: 72
End: 2017-04-19

## 2017-04-19 DIAGNOSIS — E78.00 PURE HYPERCHOLESTEROLEMIA: Primary | Chronic | ICD-10-CM

## 2017-04-19 RX ORDER — ROSUVASTATIN CALCIUM 20 MG/1
20 TABLET, COATED ORAL
Qty: 30 TAB | Refills: 5 | Status: SHIPPED | OUTPATIENT
Start: 2017-04-19 | End: 2017-09-25 | Stop reason: SDUPTHER

## 2017-04-19 NOTE — TELEPHONE ENCOUNTER
Patient called stating that he will not have to make a co pay if he is prescribed Rosuvastatin. He would like for that medication to be sent in to [facility].

## 2017-04-20 NOTE — TELEPHONE ENCOUNTER
Phone call to patient. Spoke with patient and informed him per Dr. Nile Marie: Poli Client in. Call if he has Side Effects. See me in 5 Months or sooner if he has appt already.  Scheduled patient an appointment on 9/20/17 at 8:20AM.

## 2017-05-25 ENCOUNTER — OFFICE VISIT (OUTPATIENT)
Dept: FAMILY MEDICINE CLINIC | Age: 72
End: 2017-05-25

## 2017-05-25 VITALS
DIASTOLIC BLOOD PRESSURE: 76 MMHG | BODY MASS INDEX: 29.12 KG/M2 | SYSTOLIC BLOOD PRESSURE: 146 MMHG | WEIGHT: 208 LBS | HEART RATE: 49 BPM | TEMPERATURE: 97.7 F | HEIGHT: 71 IN

## 2017-05-25 DIAGNOSIS — Q79.9 BONY ABNORMALITY: Primary | ICD-10-CM

## 2017-05-25 DIAGNOSIS — E11.9 CONTROLLED TYPE 2 DIABETES MELLITUS WITHOUT COMPLICATION, WITHOUT LONG-TERM CURRENT USE OF INSULIN (HCC): Chronic | ICD-10-CM

## 2017-05-25 DIAGNOSIS — R00.1 BRADYCARDIA: ICD-10-CM

## 2017-05-25 NOTE — PATIENT INSTRUCTIONS
Learning About High Blood Pressure  What is high blood pressure? Blood pressure is a measure of how hard the blood pushes against the walls of your arteries. It's normal for blood pressure to go up and down throughout the day, but if it stays up, you have high blood pressure. Another name for high blood pressure is hypertension. Two numbers tell you your blood pressure. The first number is the systolic pressure. It shows how hard the blood pushes when your heart is pumping. The second number is the diastolic pressure. It shows how hard the blood pushes between heartbeats, when your heart is relaxed and filling with blood. A blood pressure of less than 120/80 (say \"120 over 80\") is ideal for an adult. High blood pressure is 140/90 or higher. You have high blood pressure if your top number is 140 or higher or your bottom number is 90 or higher, or both. Many people fall into the category in between, called prehypertension. People with prehypertension need to make lifestyle changes to bring their blood pressure down and help prevent or delay high blood pressure. What happens when you have high blood pressure? · Blood flows through your arteries with too much force. Over time, this damages the walls of your arteries. But you can't feel it. High blood pressure usually doesn't cause symptoms. · Fat and calcium start to build up in your arteries. This buildup is called plaque. Plaque makes your arteries narrower and stiffer. Blood can't flow through them as easily. · This lack of good blood flow starts to damage some of the organs in your body. This can lead to problems such as coronary artery disease and heart attack, heart failure, stroke, kidney failure, and eye damage. How can you prevent high blood pressure? · Stay at a healthy weight. · Try to limit how much sodium you eat to less than 2,300 milligrams (mg) a day.  If you limit your sodium to 1,500 mg a day, you can lower your blood pressure even more.  ¨ Buy foods that are labeled \"unsalted,\" \"sodium-free,\" or \"low-sodium. \" Foods labeled \"reduced-sodium\" and \"light sodium\" may still have too much sodium. ¨ Flavor your food with garlic, lemon juice, onion, vinegar, herbs, and spices instead of salt. Do not use soy sauce, steak sauce, onion salt, garlic salt, mustard, or ketchup on your food. ¨ Use less salt (or none) when recipes call for it. You can often use half the salt a recipe calls for without losing flavor. · Be physically active. Get at least 30 minutes of exercise on most days of the week. Walking is a good choice. You also may want to do other activities, such as running, swimming, cycling, or playing tennis or team sports. · Limit alcohol to 2 drinks a day for men and 1 drink a day for women. · Eat plenty of fruits, vegetables, and low-fat dairy products. Eat less saturated and total fats. How is high blood pressure treated? · Your doctor will suggest making lifestyle changes. For example, your doctor may ask you to eat healthy foods, quit smoking, lose extra weight, and be more active. · If lifestyle changes don't help enough or your blood pressure is very high, you will have to take medicine every day. Follow-up care is a key part of your treatment and safety. Be sure to make and go to all appointments, and call your doctor if you are having problems. It's also a good idea to know your test results and keep a list of the medicines you take. Where can you learn more? Go to http://elizabeth-antonieta.info/. Enter P501 in the search box to learn more about \"Learning About High Blood Pressure. \"  Current as of: March 23, 2016  Content Version: 11.2  © 1022-5145 [employer]. Care instructions adapted under license by [employer] (which disclaims liability or warranty for this information).  If you have questions about a medical condition or this instruction, always ask your healthcare professional. [employer] disclaims any warranty or liability for your use of this information. Sd Deluna with [facility]  [address] [place] [address],  [address] [place], [address] [place] [address]  [phone]    Monitor blood pressure outside the office several times weekly at different times during the day and evening. Bring the record to me in 3 weeks for review.     Blood Pressure Record     Patient Name:  ______________________ :  ______________________    Date/Time BP Reading Pulse

## 2017-05-25 NOTE — PROGRESS NOTES
Progress Note    Patient: Ap Calzada MRN: [id number]  SSN: [id number]    YOB: 1945  Age: 74 y.o. Sex: male        Chief Complaint   Patient presents with    Bone Problem     \"knot on my chest\"         Subjective:     Bony prominence of chest for 1 year. Lower sternum. Slow growing. Hasn't change recently. Not painful. Associated with weight loss, which was intentional.  Node on left finger, no other bony abnormality. Hx prostate cancer. PSA was 0 taken 3 months ago at urologists office. Current and past medical information:    Current Medications after this visit[de-identified]     Current Outpatient Prescriptions   Medication Sig    felodipine (PLENDIL SR) 10 mg 24 hr tablet TAKE ONE TABLET BY MOUTH EVERY DAY FOR HYPERTENSION    rosuvastatin (CRESTOR) 20 mg tablet Take 1 Tab by mouth nightly.  hydrALAZINE (APRESOLINE) 50 mg tablet Take 1 Tab by mouth three (3) times daily. Indications: hypertension    atenolol (TENORMIN) 50 mg tablet TAKE ONE TABLET BY MOUTH ONCE DAILY    traMADol (ULTRAM) 50 mg tablet TAKE ONE TABLET BY MOUTH EVERY 6 HOURS AS NEEDED FOR PAIN. MAX OF FOUR PER DAY.  lisinopril (PRINIVIL, ZESTRIL) 40 mg tablet TAKE ONE TABLET BY MOUTH TWICE DAILY    furosemide (LASIX) 20 mg tablet TAKE ONE TABLET BY MOUTH EVERY DAY    pantoprazole (PROTONIX) 40 mg tablet TAKE ONE TABLET BY MOUTH EVERY DAY FOR: GASTROESOPHAGEAL REFLUX]    diclofenac (VOLTAREN) 1 % gel Apply 4 g to affected area four (4) times daily. Painful shoulder.  EPINEPHrine (EPIPEN 2-KARL) 0.3 mg/0.3 mL injection 0.3 mL by IntraMUSCular route once as needed for up to 1 dose. Take with 50 mg Benadryl and call 911.  aspirin delayed-release 81 mg tablet Take 1 Tab by mouth daily.     ranitidine (ZANTAC) 300 mg tablet TAKE ONE TABLET BY MOUTH EVERY DAY    metFORMIN ER (GLUCOPHAGE XR) 500 mg tablet TAKE ONE TABLET BY MOUTH THREE TIMES DAILY AFTER MEALS    Peak Flow Meter eric Use prior and post nebulizer treatment    albuterol (PROVENTIL VENTOLIN) 2.5 mg /3 mL (0.083 %) nebulizer solution 3 mL by Nebulization route every six (6) hours as needed for Wheezing or Shortness of Breath.  omega-3 fatty acids-vitamin e (FISH OIL) 1,000 mg cap Take 1 Cap by mouth.  coenzyme q10 (CO Q-10) 100 mg Cap Take 100 mg by mouth daily. No current facility-administered medications for this visit. Patient Active Problem List    Diagnosis Date Noted    Diabetes mellitus type 2, controlled, without complications ([facility] 09/71/9898    [facility] 08/23/2013    Prostate nodule without urinary obstruction 02/11/2013    S/P carotid endarterectomy 12/05/2012    Vitamin D deficiency 12/05/2012    Amaurosis fugax of left eye 05/06/2012    Carotid artery obstruction 04/17/2012    Allergy to bee sting 04/13/2012    Bone spur 10/04/2010    Hypertension 05/22/2010    Hyperlipidemia 05/22/2010    GERD (gastroesophageal reflux disease) 05/22/2010    Kidney stone 05/22/2010       Past Medical History:   Diagnosis Date    Amaurosis fugax of left eye 5/6/2012    Arthritis     OSTEO    CAD (coronary artery disease) 2012    CAROTID LEFT     Cancer ([facility] 2013    PROSTATE    Chronic pain     DJD lumbar    Diabetes ([facility] 5/22/2010    Frequent nocturnal awakening     urinary frequency    GERD (gastroesophageal reflux disease) 5/22/2010    Hyperlipidemia 5/22/2010    Hypertension 5/22/2010    Kidney stone 5/22/2010    Nodular goiter     1.3 cm right , FNA 6/15    Obesity (BMI 30-39. 9)     Psychiatric disorder     ANXIETY    Vertigo        Allergies   Allergen Reactions    Bee Sting [Sting, Bee] Anaphylaxis    Vytorin 10-10 [Ezetimibe-Simvastatin] Myalgia    Amlodipine Other (comments)     Creepy crawly sensation, twitches    Lipitor [Atorvastatin] Myalgia       Past Surgical History:   Procedure Laterality Date    COLONOSCOPY  3/3/2016         EGD  3/3/2016         HX HEENT tonsillectomy    HX MOHS PROCEDURES  2010    right    HX ORTHOPAEDIC      coccyx removed    HX UROLOGICAL  2010    left lithotripsy. basket  extraction,ureteral stent    HX VASECTOMY      NEUROLOGICAL PROCEDURE UNLISTED  2011    Steroid injections in epidural    VASCULAR SURGERY PROCEDURE UNLIST  2012    LEFT CAROTID       Social History     Social History    Marital status:      Spouse name: N/A    Number of children: N/A    Years of education: N/A     Social History Main Topics    Smoking status: Former Smoker     Packs/day: 0.50     Years: 4.00     Quit date: 4/16/1966    Smokeless tobacco: Never Used    Alcohol use No    Drug use: No    Sexual activity: Yes     Partners: Female     Other Topics Concern    None     Social History Narrative       Review of Systems   Constitutional: Negative for chills and fever. Cardiovascular: Negative for chest pain and palpitations. Genitourinary: Negative for dysuria and hematuria. Skin: Negative for itching and rash. Psychiatric/Behavioral: Negative for depression and suicidal ideas. Objective:     Vitals:    05/25/17 1046 05/25/17 1048   BP: 148/72 146/76   Pulse: (!) 49    Temp: 97.7 °F (36.5 °C)    TempSrc: Oral    Weight: 208 lb (94.3 kg)    Height: 5' 11\" (1.803 m)       Body mass index is 29.01 kg/(m^2). Physical Exam   Constitutional: He is oriented to person, place, and time. He appears well-developed and well-nourished. No distress. Eyes: No scleral icterus. Cardiovascular: Normal rate and regular rhythm. Exam reveals no gallop. No murmur heard. Pulmonary/Chest: Effort normal. No respiratory distress. He has no wheezes. He has no rales. Abdominal: Soft. He exhibits no distension. There is no tenderness. Musculoskeletal:   Over area of xiphoid there is a large hard circular prominence, non tender to palpation   Neurological: He is alert and oriented to person, place, and time. Skin: He is not diaphoretic. Psychiatric: He has a normal mood and affect. His behavior is normal.     CXR 5/23/17   Review of lateral chest film shows normal tapering of xiphoid and no bony abnormality. Assessment and Plan:       ICD-10-CM ICD-9-CM    1. Bony abnormality Q79.9 756.9 XR CHEST PA LAT   2. Controlled type 2 diabetes mellitus without complication, without long-term current use of insulin (HCC) E11.9 250.00 AMB POC FUNDUS PHOTOGRAPHY WITH INTERP AND REPORT     There is palpable knot over xiphoid. Based on review of xray it appears to be soft tissue mass. It could certainly be the xiphoid given his weight loss recently, making the natural xiphoid more prominent as intraabdominal girth decreases. If it is a soft tissue mass, it is likely not malignant, not bony cancer based on xray. If it continues to worsen, we can obtain MRI to further characterize, as for now low suspicion for concerning mass. Consider reduction of atenolol. HR persistently in 40s low 50s. Asx at this time. Plan of care:  Discussed diagnoses in detail with patient. Medication risks/benefits/side effects discussed with patient. All of the patient's questions were addressed. The patient understands and agrees with our plan of care. The patient knows to call back if they are unsure of or forget any changes we discussed today or if the symptoms change. The patient received an After-Visit Summary which contains VS, orders, medication list and allergy list. This can be used as a \"mini-medical record\" should they have to seek medical care while out of town.     Patient Care Team:  Alessandro Chapa MD as PCP - Shefali Van MD (General and Vascular Surgery)  Josephine Hendrickson MD (Neurology)  Elise Bah MD as Surgeon (Urology)  Alma Ding MD (Endocrinology)  Dane Uriarte, RN as Ambulatory Care Navigator    Follow-up Disposition: Not on File    Future Appointments  Date Time Provider Hank Rubio 9/20/2017 8:20 AM Louis Berrios MD [facility]SE SCHED       Signed By: Vikram Villalpando MD     May 25, 2017

## 2017-05-25 NOTE — PROGRESS NOTES
Reviewed record in preparation for visit and have necessary documentation  Pt did not bring medication to office visit for review    Goals that were addressed and/or need to be completed during or after this appointment include   Health Maintenance Due   Topic Date Due    EYE EXAM RETINAL OR DILATED Q1  08/14/2015    GLAUCOMA SCREENING Q2Y  02/18/2016    FOOT EXAM Q1  06/17/2017

## 2017-05-31 ENCOUNTER — TELEPHONE (OUTPATIENT)
Dept: FAMILY MEDICINE CLINIC | Age: 72
End: 2017-05-31

## 2017-06-07 NOTE — TELEPHONE ENCOUNTER
Called patient with results of normal eye exam.  Advised that xiphoid was curving anteriorly and there may be some soft tissue irritation that has caused his sx. He was concerned that it had grown over a period of one year. I communicated that it was not a bony tumor, but beyond that it was hard to say without other testing. We agreed on the course of action to watch and get more tests if it grows further.

## 2017-06-23 RX ORDER — METFORMIN HYDROCHLORIDE 500 MG/1
TABLET, EXTENDED RELEASE ORAL
Qty: 90 TAB | Refills: 1 | Status: SHIPPED | OUTPATIENT
Start: 2017-06-23 | End: 2017-07-24 | Stop reason: SDUPTHER

## 2017-06-23 RX ORDER — RANITIDINE 300 MG/1
TABLET ORAL
Qty: 30 TAB | Refills: 3 | Status: SHIPPED | OUTPATIENT
Start: 2017-06-23 | End: 2017-09-25 | Stop reason: SDUPTHER

## 2017-07-21 RX ORDER — EPINEPHRINE 0.3 MG/.3ML
INJECTION INTRAMUSCULAR
Qty: 1 SYRINGE | Refills: 3 | Status: SHIPPED | OUTPATIENT
Start: 2017-07-21 | End: 2019-04-17 | Stop reason: SDUPTHER

## 2017-09-20 ENCOUNTER — TELEPHONE (OUTPATIENT)
Dept: FAMILY MEDICINE CLINIC | Age: 72
End: 2017-09-20

## 2017-09-20 ENCOUNTER — OFFICE VISIT (OUTPATIENT)
Dept: FAMILY MEDICINE CLINIC | Age: 72
End: 2017-09-20

## 2017-09-20 VITALS
OXYGEN SATURATION: 97 % | DIASTOLIC BLOOD PRESSURE: 68 MMHG | TEMPERATURE: 97.7 F | WEIGHT: 209 LBS | RESPIRATION RATE: 20 BRPM | HEART RATE: 51 BPM | HEIGHT: 71 IN | BODY MASS INDEX: 29.26 KG/M2 | SYSTOLIC BLOOD PRESSURE: 136 MMHG

## 2017-09-20 DIAGNOSIS — Z98.890 S/P CAROTID ENDARTERECTOMY: Chronic | ICD-10-CM

## 2017-09-20 DIAGNOSIS — I10 ESSENTIAL HYPERTENSION: Chronic | ICD-10-CM

## 2017-09-20 DIAGNOSIS — E11.9 CONTROLLED TYPE 2 DIABETES MELLITUS WITHOUT COMPLICATION, WITHOUT LONG-TERM CURRENT USE OF INSULIN (HCC): Primary | Chronic | ICD-10-CM

## 2017-09-20 DIAGNOSIS — E78.00 PURE HYPERCHOLESTEROLEMIA: Chronic | ICD-10-CM

## 2017-09-20 DIAGNOSIS — E55.9 VITAMIN D DEFICIENCY: Chronic | ICD-10-CM

## 2017-09-20 DIAGNOSIS — Z23 ENCOUNTER FOR IMMUNIZATION: ICD-10-CM

## 2017-09-20 DIAGNOSIS — Z85.46 HISTORY OF PROSTATE CANCER: Chronic | ICD-10-CM

## 2017-09-20 NOTE — TELEPHONE ENCOUNTER
The name of the medication that he is taking that ROSUVASTATIN (CRESTOR) 20 MG - It is on his [facility] List.    Dr. Fernando Kendall told him to call back with the name.

## 2017-09-20 NOTE — PATIENT INSTRUCTIONS
Your doctor has recommended that you have an eye exam done. You can contact one of the below offices to schedule your own appointment. Once you have the visit, please ask their office to send us a copy for your record here. Benito Flores                     [phone]  [facility]                          [phone]  Dr. Shruthi Wells                           [phone]  [address]             [phone]      Influenza (Flu) Vaccine (Inactivated or Recombinant): What You Need to Know  Why get vaccinated? Influenza (\"flu\") is a contagious disease that spreads around the [facility] every winter, usually between October and May. Flu is caused by influenza viruses and is spread mainly by coughing, sneezing, and close contact. Anyone can get flu. Flu strikes suddenly and can last several days. Symptoms vary by age, but can include:  · Fever/chills. · Sore throat. · Muscle aches. · Fatigue. · Cough. · Headache. · Runny or stuffy nose. Flu can also lead to pneumonia and blood infections, and cause diarrhea and seizures in children. If you have a medical condition, such as heart or lung disease, flu can make it worse. Flu is more dangerous for some people. Infants and young children, people 72years of age and older, pregnant women, and people with certain health conditions or a weakened immune system are at greatest risk. Each year thousands of people in the [facility] die from flu, and many more are hospitalized. Flu vaccine can:  · Keep you from getting flu. · Make flu less severe if you do get it. · Keep you from spreading flu to your family and other people. Inactivated and recombinant flu vaccines  A dose of flu vaccine is recommended every flu season. Children 6 months through 6years of age may need two doses during the same flu season.  Everyone else needs only one dose each flu season. Some inactivated flu vaccines contain a very small amount of a mercury-based preservative called thimerosal. Studies have not shown thimerosal in vaccines to be harmful, but flu vaccines that do not contain thimerosal are available. There is no live flu virus in flu shots. They cannot cause the flu. There are many flu viruses, and they are always changing. Each year a new flu vaccine is made to protect against three or four viruses that are likely to cause disease in the upcoming flu season. But even when the vaccine doesn't exactly match these viruses, it may still provide some protection. Flu vaccine cannot prevent:  · Flu that is caused by a virus not covered by the vaccine. · Illnesses that look like flu but are not. Some people should not get this vaccine  Tell the person who is giving you the vaccine:  · If you have any severe (life-threatening) allergies. If you ever had a life-threatening allergic reaction after a dose of flu vaccine, or have a severe allergy to any part of this vaccine, you may be advised not to get vaccinated. Most, but not all, types of flu vaccine contain a small amount of egg protein. · If you ever had Guillain-Barré syndrome (also called GBS) Some people with a history of GBS should not get this vaccine. This should be discussed with your doctor. · If you are not feeling well. It is usually okay to get flu vaccine when you have a mild illness, but you might be asked to come back when you feel better. Risks of a vaccine reaction  With any medicine, including vaccines, there is a chance of reactions. These are usually mild and go away on their own, but serious reactions are also possible. Most people who get a flu shot do not have any problems with it.   Minor problems following a flu shot include:  · Soreness, redness, or swelling where the shot was given  · Hoarseness  · Sore, red or itchy eyes  · Cough  · Fever  · Aches  · Headache  · Itching  · Fatigue  If these problems occur, they usually begin soon after the shot and last 1 or 2 days. More serious problems following a flu shot can include the following:  · There may be a small increased risk of Guillain-Barré Syndrome (GBS) after inactivated flu vaccine. This risk has been estimated at 1 or 2 additional cases per million people vaccinated. This is much lower than the risk of severe complications from flu, which can be prevented by flu vaccine. · Tory [place] children who get the flu shot along with pneumococcal vaccine (PCV13) and/or DTaP vaccine at the same time might be slightly more likely to have a seizure caused by fever. Ask your doctor for more information. Tell your doctor if a child who is getting flu vaccine has ever had a seizure  Problems that could happen after any injected vaccine:  · People sometimes faint after a medical procedure, including vaccination. Sitting or lying down for about 15 minutes can help prevent fainting, and injuries caused by a fall. Tell your doctor if you feel dizzy, or have vision changes or ringing in the ears. · Some people get severe pain in the shoulder and have difficulty moving the arm where a shot was given. This happens very rarely. · Any medication can cause a severe allergic reaction. Such reactions from a vaccine are very rare, estimated at about 1 in a million doses, and would happen within a few minutes to a few hours after the vaccination. As with any medicine, there is a very remote chance of a vaccine causing a serious injury or death. The safety of vaccines is always being monitored. For more information, visit: www.cdc.gov/vaccinesafety/. What if there is a serious reaction? What should I look for? · Look for anything that concerns you, such as signs of a severe allergic reaction, very high fever, or unusual behavior.   Signs of a severe allergic reaction can include hives, swelling of the face and throat, difficulty breathing, a fast heartbeat, dizziness, and weakness - usually within a few minutes to a few hours after the vaccination. What should I do? · If you think it is a severe allergic reaction or other emergency that can't wait, call 9-1-1 and get the person to the nearest hospital. Otherwise, call your doctor. · Reactions should be reported to the \"Vaccine Adverse Event Reporting System\" (VAERS). Your doctor should file this report, or you can do it yourself through the VAERS website at www.vaers. [facility].gov, or by calling 2-434.191.4493. VAERS does not give medical advice. The National Vaccine Injury Compensation Program  The National Vaccine Injury Compensation Program (VICP) is a federal program that was created to compensate people who may have been injured by certain vaccines. Persons who believe they may have been injured by a vaccine can learn about the program and about filing a claim by calling 7-522.171.2014 or visiting the [place] website at www.[facility].gov/vaccinecompensation. There is a time limit to file a claim for compensation. How can I learn more? · Ask your healthcare provider. He or she can give you the vaccine package insert or suggest other sources of information. · Call your local or state health department. · Contact the Centers for Disease Control and Prevention (CDC):  ¨ Call 3-129.577.9034 (1-800-CDC-INFO) or  ¨ Visit CDC's website at www.cdc.gov/flu  Vaccine Information Statement  Inactivated Influenza Vaccine  8/7/2015)  42 [facility] 698UL-82  Department of Health and Human Services  Centers for Disease Control and Prevention  Many Vaccine Information Statements are available in [place] and other languages. See www.immunize.org/vis. Muchas hojas de información sobre vacunas están disponibles en español y en otros idiomas. Visite www.immunize.org/vis. Care instructions adapted under license by [employer] (which disclaims liability or warranty for this information).  If you have questions about a medical condition or this instruction, always ask your healthcare professional. [place] [address] any warranty or liability for your use of this information.

## 2017-09-20 NOTE — PROGRESS NOTES
Progress Note    Patient: Zoran Feliciano MRN: [id number]  SSN: [id number]    YOB: 1945  Age: 74 y.o. Sex: male        Chief Complaint   Patient presents with    Hypertension    Immunization/Injection    Ringing in Ear         Subjective:     Encounter Diagnoses   Name Primary?  Controlled type 2 diabetes mellitus without complication, without long-term current use of insulin ([address]): This patient is managed under a comprehensive plan of care for Diabetes. Overall the patient feels well with good energy level. Pertinent Labs:   Lab Results   Component Value Date/Time    Hemoglobin A1c 6.0 03/16/2017 08:45 AM    Hemoglobin A1c 5.9 11/23/2016 11:04 AM    Hemoglobin A1c 6.3 06/17/2016 08:59 AM      Body mass index is 29.15 kg/(m^2). Lab Results   Component Value Date/Time    LDL, calculated 132 03/16/2017 08:45 AM         Lab Results   Component Value Date/Time    Sodium 142 03/16/2017 08:45 AM    Potassium 4.4 03/16/2017 08:45 AM    Chloride 100 03/16/2017 08:45 AM    CO2 25 03/16/2017 08:45 AM    Anion gap 8 04/24/2013 03:08 AM    Glucose 120 03/16/2017 08:45 AM    BUN 18 03/16/2017 08:45 AM    Creatinine 1.08 03/16/2017 08:45 AM    BUN/Creatinine ratio 17 03/16/2017 08:45 AM    GFR est AA 78 03/16/2017 08:45 AM    GFR est non-AA 68 03/16/2017 08:45 AM    Calcium 9.4 03/16/2017 08:45 AM    AST (SGOT) 22 03/16/2017 08:45 AM    Alk.  phosphatase 56 03/16/2017 08:45 AM    Protein, total 6.4 03/16/2017 08:45 AM    Albumin 4.3 03/16/2017 08:45 AM    Globulin 2.6 04/16/2013 01:58 PM    A-G Ratio 2.0 03/16/2017 08:45 AM    ALT (SGPT) 11 03/16/2017 08:45 AM     Lab Results   Component Value Date/Time    Microalbumin/Creat ratio (mg/g creat) 8 03/12/2009 09:00 AM    Microalb/Creat ratio (ug/mg creat.) 11.9 11/23/2016 11:04 AM    Microalbumin,urine random 1.47 03/12/2009 09:00 AM      Frequency of home glucose testing:no logs   Blood Sugar range at home:    Last eye exam: due   Last foot exam: today   Polyuria, polyphagia or polydipsia: No   Retinopathy: No   Neuropathy SX: No    Low blood sugar symptoms: No   Dietary compliance: Good   Medication compliance:Good   On ASA: Yes   Depression: No   CKD:no     Wt Readings from Last 3 Encounters:   09/20/17 209 lb (94.8 kg)   05/25/17 208 lb (94.3 kg)   03/27/17 210 lb (95.3 kg)        History   Smoking Status    Former Smoker    Packs/day: 0.50    Years: 4.00    Quit date: 4/16/1966   Smokeless Tobacco    Never Used         All the patient's questions regarding medications, diet and exercise were answered. Goal of A1C of less than 7.5% is our goal.   Our overall goal is to reduce or eliminate the long term consequences of poorly controlled diabetes. Yes    Essential hypertension: controlled  BP Readings from Last 3 Encounters:   09/20/17 136/68   05/25/17 146/76   03/27/17 178/81     The patient reports:  taking medications as instructed, no medication side effects noted, no TIA's, no chest pain on exertion, no dyspnea on exertion, no swelling of ankles. Lab Results   Component Value Date/Time    Sodium 142 03/16/2017 08:45 AM    Potassium 4.4 03/16/2017 08:45 AM    Chloride 100 03/16/2017 08:45 AM    CO2 25 03/16/2017 08:45 AM    Anion gap 8 04/24/2013 03:08 AM    Glucose 120 03/16/2017 08:45 AM    BUN 18 03/16/2017 08:45 AM    Creatinine 1.08 03/16/2017 08:45 AM    BUN/Creatinine ratio 17 03/16/2017 08:45 AM    GFR est AA 78 03/16/2017 08:45 AM    GFR est non-AA 68 03/16/2017 08:45 AM    Calcium 9.4 03/16/2017 08:45 AM    Bilirubin, total 0.2 03/16/2017 08:45 AM    AST (SGOT) 22 03/16/2017 08:45 AM    Alk. phosphatase 56 03/16/2017 08:45 AM    Protein, total 6.4 03/16/2017 08:45 AM    Albumin 4.3 03/16/2017 08:45 AM    Globulin 2.6 04/16/2013 01:58 PM    A-G Ratio 2.0 03/16/2017 08:45 AM    ALT (SGPT) 11 03/16/2017 08:45 AM     Our goal is to normalize the blood pressure to decrease the risks of strokes and heart attacks.  The patient is in agreement with the plan.  Pure hypercholesterolemia:  Cardiovascular risks for him are: LDL goal is under 80  diabetic  hypertension  hyperlipidemia. Currently he takes generic Crestor 20 mg  Lab Results   Component Value Date/Time    Cholesterol, total 226 03/16/2017 08:45 AM    HDL Cholesterol 59 03/16/2017 08:45 AM    LDL, calculated 132 03/16/2017 08:45 AM    Triglyceride 174 03/16/2017 08:45 AM    CHOL/HDL Ratio 3.5 10/04/2010 11:30 AM     Lab Results   Component Value Date/Time    ALT (SGPT) 11 03/16/2017 08:45 AM    AST (SGOT) 22 03/16/2017 08:45 AM    Alk. phosphatase 56 03/16/2017 08:45 AM    Bilirubin, total 0.2 03/16/2017 08:45 AM      Myalgias: No   Fatigue: No   Other side effects: no  Wt Readings from Last 3 Encounters:   09/20/17 209 lb (94.8 kg)   05/25/17 208 lb (94.3 kg)   03/27/17 210 lb (95.3 kg)     The patient is aware of our goal to reduce or eliminate the long term problems (such as strokes and heart attacks) related to poorly controlled hyperlipidemia.  History of prostate cancer: he is doing well. He has a follow-up in six months.  Vitamin D deficiency:  No sx. Due for testing. Lab Results   Component Value Date/Time    Vitamin D 25-Hydroxy 29 10/04/2010 11:30 AM    VITAMIN D, 25-HYDROXY 41.4 11/23/2016 11:04 AM            Encounter for immunization: flu shot given       S/P carotid endarterectomy: no symptoms of TIA, he is due to see Dr. Norma Delong soon. Current and past medical information:    Current Medications after this visit[de-identified]     Current Outpatient Prescriptions   Medication Sig    metFORMIN ER (GLUCOPHAGE XR) 500 mg tablet TAKE ONE TABLET BY MOUTH THREE TIMES DAILY AFTER MEALS    EPIPEN 2-KARL 0.3 mg/0.3 mL injection INJECT INTRAMUSCULARLY AS NEEDED FOR ONE DOSE. TAKE WITH 50MG OF BENADRYL AND CALL 911.     raNITIdine (ZANTAC) 300 mg tablet TAKE ONE TABLET BY MOUTH EVERY DAY    felodipine (PLENDIL SR) 10 mg 24 hr tablet TAKE ONE TABLET BY MOUTH EVERY DAY FOR HYPERTENSION    rosuvastatin (CRESTOR) 20 mg tablet Take 1 Tab by mouth nightly.  hydrALAZINE (APRESOLINE) 50 mg tablet Take 1 Tab by mouth three (3) times daily. Indications: hypertension    atenolol (TENORMIN) 50 mg tablet TAKE ONE TABLET BY MOUTH ONCE DAILY    traMADol (ULTRAM) 50 mg tablet TAKE ONE TABLET BY MOUTH EVERY 6 HOURS AS NEEDED FOR PAIN. MAX OF FOUR PER DAY.  lisinopril (PRINIVIL, ZESTRIL) 40 mg tablet TAKE ONE TABLET BY MOUTH TWICE DAILY    furosemide (LASIX) 20 mg tablet TAKE ONE TABLET BY MOUTH EVERY DAY    pantoprazole (PROTONIX) 40 mg tablet TAKE ONE TABLET BY MOUTH EVERY DAY FOR: GASTROESOPHAGEAL REFLUX]    diclofenac (VOLTAREN) 1 % gel Apply 4 g to affected area four (4) times daily. Painful shoulder.  aspirin delayed-release 81 mg tablet Take 1 Tab by mouth daily.  Peak Flow Meter eric Use prior and post nebulizer treatment    albuterol (PROVENTIL VENTOLIN) 2.5 mg /3 mL (0.083 %) nebulizer solution 3 mL by Nebulization route every six (6) hours as needed for Wheezing or Shortness of Breath.  omega-3 fatty acids-vitamin e (FISH OIL) 1,000 mg cap Take 1 Cap by mouth.  coenzyme q10 (CO Q-10) 100 mg Cap Take 100 mg by mouth daily. No current facility-administered medications for this visit.         Patient Active Problem List    Diagnosis Date Noted    Bone spur 10/04/2010     Priority: 1 - One    Hypertension 05/22/2010     Priority: 1 - One    Hyperlipidemia 05/22/2010     Priority: 1 - One    GERD (gastroesophageal reflux disease) 05/22/2010     Priority: 1 - One    Kidney stone 05/22/2010     Priority: 1 - One    Diabetes mellitus type 2, controlled, without complications ([address] 58/53/1102    Prostate cancer ([address] 08/23/2013    Prostate nodule without urinary obstruction 02/11/2013    S/P carotid endarterectomy 12/05/2012    Vitamin D deficiency 12/05/2012    Amaurosis fugax of left eye 05/06/2012    Carotid artery obstruction 04/17/2012    Allergy to bee sting 04/13/2012       Past Medical History:   Diagnosis Date    Amaurosis fugax of left eye 5/6/2012    Arthritis     OSTEO    CAD (coronary artery disease) 2012    CAROTID LEFT     Cancer ([facility] 2013    PROSTATE    Chronic pain     DJD lumbar    Diabetes ([facility] [address] 5/22/2010    Frequent nocturnal awakening     urinary frequency    GERD (gastroesophageal reflux disease) 5/22/2010    Hyperlipidemia 5/22/2010    Hypertension 5/22/2010    Kidney stone 5/22/2010    Nodular goiter     1.3 cm right , FNA 6/15    Obesity (BMI 30-39. 9)     Psychiatric disorder     ANXIETY    Vertigo        Allergies   Allergen Reactions    Bee Sting [Sting, Bee] Anaphylaxis    Vytorin 10-10 [Ezetimibe-Simvastatin] Myalgia    Amlodipine Other (comments)     Creepy crawly sensation, twitches    Lipitor [Atorvastatin] Myalgia       Past Surgical History:   Procedure Laterality Date    COLONOSCOPY  3/3/2016         EGD  3/3/2016         HX HEENT      tonsillectomy    HX MOHS PROCEDURES  2010    right    HX ORTHOPAEDIC      coccyx removed    HX UROLOGICAL  2010    left lithotripsy. basket  extraction,ureteral stent    HX VASECTOMY      NEUROLOGICAL PROCEDURE UNLISTED  2011    Steroid injections in epidural    VASCULAR SURGERY PROCEDURE UNLIST  2012    LEFT CAROTID       Social History     Social History    Marital status:      Spouse name: N/A    Number of children: N/A    Years of education: N/A     Social History Main Topics    Smoking status: Former Smoker     Packs/day: 0.50     Years: 4.00     Quit date: 4/16/1966    Smokeless tobacco: Never Used    Alcohol use No    Drug use: No    Sexual activity: Yes     Partners: Female     Other Topics Concern    None     Social History Narrative       Review of Systems   Constitutional: Negative. Negative for chills, fever, malaise/fatigue and weight loss. He is very active and still working as and . HENT: Negative.   Negative for hearing loss. Eyes: Negative. Negative for blurred vision and double vision. Respiratory: Negative. Negative for cough, hemoptysis, sputum production and shortness of breath. Cardiovascular: Negative. Negative for chest pain, palpitations and orthopnea. Gastrointestinal: Negative. Negative for abdominal pain, blood in stool, heartburn, nausea and vomiting. Genitourinary: Negative. Negative for dysuria, frequency and urgency. Musculoskeletal: Positive for joint pain. Negative for back pain, myalgias and neck pain. Skin: Negative. Negative for rash. Neurological: Negative. Negative for dizziness, tingling, tremors, weakness and headaches. Endo/Heme/Allergies: Negative. Psychiatric/Behavioral: Negative. Negative for depression. Objective:     Vitals:    09/20/17 0827 09/20/17 0908   BP: 143/66 136/68   Pulse: (!) 48 (!) 51   Resp: 20    Temp: 97.7 °F (36.5 °C)    TempSrc: Oral    SpO2: 97%    Weight: 209 lb (94.8 kg)    Height: 5' 11\" (1.803 m)       Body mass index is 29.15 kg/(m^2). Physical Exam   Constitutional: He is oriented to person, place, and time and well-developed, well-nourished, and in no distress. HENT:   Head: Normocephalic and atraumatic. Mouth/Throat: Oropharynx is clear and moist.   Eyes: Right eye exhibits no discharge. Left eye exhibits no discharge. No scleral icterus. Neck: No tracheal deviation present. No thyromegaly present. No bruit. Cardiovascular: Normal rate, regular rhythm and normal heart sounds. No murmur heard. Pulmonary/Chest: Effort normal and breath sounds normal.   Abdominal: Soft. He exhibits no distension. There is no tenderness. Musculoskeletal:   Diabetic foot exam:they do tingle and burn sometimes. Sensation: normal to monofilament testing. Calluses or corns: no  Pulses: intact  Fungal nails:yes     Neurological: He is alert and oriented to person, place, and time. Skin: No rash noted. No erythema.    Psychiatric: Mood and affect normal.   Nursing note and vitals reviewed. Health Maintenance Due   Topic Date Due    GLAUCOMA SCREENING Q2Y  02/18/2016    FOOT EXAM Q1  06/17/2017    INFLUENZA AGE 9 TO ADULT  08/01/2017    HEMOGLOBIN A1C Q6M  09/16/2017         Assessment and orders:       ICD-10-CM ICD-9-CM    1. Controlled type 2 diabetes mellitus without complication, without long-term current use of insulin (HCC)-retest labs E11.9 250.00 HM DIABETES FOOT EXAM      HEMOGLOBIN A1C WITH EAG      LIPID PANEL      METABOLIC PANEL, COMPREHENSIVE   2. Essential hypertension-controlled K20 261.7 METABOLIC PANEL, COMPREHENSIVE      TSH 3RD GENERATION      HEMOGLOBIN   3. Pure hypercholesterolemia-retest E78.00 272.0 LIPID PANEL      METABOLIC PANEL, COMPREHENSIVE   4. History of prostate cancer-no symptoms, follow up with urology   Z85.46 V10.46    5. Vitamin D deficiency-corrected   E55.9 268.9    6. Encounter for immunization Z23 V03.89 INFLUENZA VIRUS VACCINE, HIGH DOSE SEASONAL, PRESERVATIVE FREE      ADMIN INFLUENZA VIRUS VAC   7. S/P carotid endarterectomy-no symptoms of TIA Z98.890 V45.89          Plan of care:  Discussed diagnoses in detail with patient. Medication risks/benefits/side effects discussed with patient. All of the patient's questions were addressed. The patient understands and agrees with our plan of care. The patient knows to call back if they are unsure of or forget any changes we discussed today or if the symptoms change. The patient received an After-Visit Summary which contains VS, orders, medication list and allergy list. This can be used as a \"mini-medical record\" should they have to seek medical care while out of town.     Patient Care Team:  Josefa Nino MD as PCP - Hiram Newman MD (General and Vascular Surgery)  Denver Ferrara., MD (Neurology)  Chris Gomez MD as Surgeon (Urology)  Renetta Neff MD (Endocrinology)  Gini Somers, RN as Ambulatory Care Navigator    Follow-up Disposition:  Return in about 4 months (around 1/20/2018). No future appointments.     Signed By: Miguel Verdin MD     September 20, 2017

## 2017-09-20 NOTE — MR AVS SNAPSHOT
Visit Information Date & Time Provider Department Dept. Phone Encounter #  
 9/20/2017  8:20 AM Sarah Wood MD  Benita Valrico [id number] Follow-up Instructions Return in about 4 months (around 1/20/2018). Upcoming Health Maintenance Date Due  
 GLAUCOMA SCREENING Q2Y 2/18/2016 FOOT EXAM Q1 6/17/2017 INFLUENZA AGE 9 TO ADULT 8/1/2017 HEMOGLOBIN A1C Q6M 9/16/2017 Pneumococcal 65+ High/Highest Risk (2 of 2 - PPSV23) 9/21/2017 MICROALBUMIN Q1 11/23/2017 LIPID PANEL Q1 3/16/2018 MEDICARE YEARLY EXAM 3/28/2018 EYE EXAM RETINAL OR DILATED Q1 5/25/2018 DTaP/Tdap/Td series (2 - Td) 6/23/2021 COLONOSCOPY 3/3/2026 Allergies as of 9/20/2017  Review Complete On: 9/20/2017 By: Sarah Wood MD  
  
 Severity Noted Reaction Type Reactions Bee Sting [Sting, Bee] High 05/22/2010    Anaphylaxis Vytorin 10-10 [Ezetimibe-simvastatin] Medium 05/22/2010    Myalgia Amlodipine  11/23/2016    Other (comments) Creepy crawly sensation, twitches Lipitor [Atorvastatin]  05/22/2010    Myalgia Current Immunizations  Reviewed on 4/1/2014 Name Date Influenza High Dose Vaccine PF  Incomplete Influenza Vaccine 11/9/2015, 10/13/2014 Influenza Vaccine (Quad) PF 11/23/2016 Influenza Vaccine Split 12/5/2012, 12/5/2011, 10/4/2010 Influenza Vaccine Whole 9/15/2009 TD Vaccine 8/22/2000 TDAP Vaccine 6/23/2011 ZZZ-RETIRED (DO NOT USE) Pneumococcal Vaccine (Unspecified Type) 9/21/2012 Not reviewed this visit You Were Diagnosed With   
  
 Codes Comments Controlled type 2 diabetes mellitus without complication, without long-term current use of insulin ([facility]    -  Primary ICD-10-CM: E11.9 ICD-9-CM: 250.00 Essential hypertension     ICD-10-CM: I10 
ICD-9-CM: 401.9 Pure hypercholesterolemia     ICD-10-CM: E78.00 ICD-9-CM: 272.0  History of prostate cancer     ICD-10-CM: Z85.46 
 ICD-9-CM: V10.46 Vitamin D deficiency     ICD-10-CM: E55.9 ICD-9-CM: 268.9 Encounter for immunization     ICD-10-CM: N70 ICD-9-CM: V03.89 S/P carotid endarterectomy     ICD-10-CM: P17.291 ICD-9-CM: V45.89 Vitals BP Pulse Temp Resp Height(growth percentile) Weight(growth percentile) 143/66 (BP 1 Location: Right arm, BP Patient Position: Sitting) (!) 48 97.7 °F (36.5 °C) (Oral) 20 5' 11\" (1.803 m) 209 lb (94.8 kg) SpO2 BMI Smoking Status 97% 29.15 kg/m2 Former Smoker Vitals History BMI and BSA Data Body Mass Index Body Surface Area  
 29.15 kg/m 2 2.18 m 2 Preferred Pharmacy Pharmacy Name Phone [facility] [address] [phone] Your Updated Medication List  
  
   
This list is accurate as of: 9/20/17  8:57 AM.  Always use your most recent med list.  
  
  
  
  
 albuterol 2.5 mg /3 mL (0.083 %) nebulizer solution Commonly known as:  PROVENTIL VENTOLIN  
3 mL by Nebulization route every six (6) hours as needed for Wheezing or Shortness of Breath. aspirin delayed-release 81 mg tablet Take 1 Tab by mouth daily. atenolol 50 mg tablet Commonly known as:  TENORMIN  
TAKE ONE TABLET BY MOUTH ONCE DAILY  
  
 CO Q-10 100 mg capsule Generic drug:  co-enzyme Q-10 Take 100 mg by mouth daily. diclofenac 1 % Gel Commonly known as:  VOLTAREN Apply 4 g to affected area four (4) times daily. Painful shoulder. EPIPEN 2-KARL 0.3 mg/0.3 mL injection Generic drug:  EPINEPHrine INJECT INTRAMUSCULARLY AS NEEDED FOR ONE DOSE. TAKE WITH 50MG OF BENADRYL AND CALL 911.  
  
 felodipine 10 mg 24 hr tablet Commonly known as:  PLENDIL SR  
TAKE ONE TABLET BY MOUTH EVERY DAY FOR HYPERTENSION  
  
 FISH OIL 1,000 mg Cap Generic drug:  omega-3 fatty acids-vitamin e Take 1 Cap by mouth. furosemide 20 mg tablet Commonly known as:  LASIX TAKE ONE TABLET BY MOUTH EVERY DAY  
  
 hydrALAZINE 50 mg tablet Commonly known as:  APRESOLINE Take 1 Tab by mouth three (3) times daily. Indications: hypertension  
  
 lisinopril 40 mg tablet Commonly known as:  PRINIVIL, ZESTRIL  
TAKE ONE TABLET BY MOUTH TWICE DAILY  
  
 metFORMIN  mg tablet Commonly known as:  GLUCOPHAGE XR  
TAKE ONE TABLET BY MOUTH THREE TIMES DAILY AFTER MEALS  
  
 pantoprazole 40 mg tablet Commonly known as:  PROTONIX  
TAKE ONE TABLET BY MOUTH EVERY DAY FOR: GASTROESOPHAGEAL REFLUX] Peak Flow Meter Paxton Magic Use prior and post nebulizer treatment  
  
 raNITIdine 300 mg tablet Commonly known as:  ZANTAC TAKE ONE TABLET BY MOUTH EVERY DAY  
  
 rosuvastatin 20 mg tablet Commonly known as:  CRESTOR Take 1 Tab by mouth nightly. traMADol 50 mg tablet Commonly known as:  ULTRAM  
TAKE ONE TABLET BY MOUTH EVERY 6 HOURS AS NEEDED FOR PAIN. MAX OF FOUR PER DAY. We Performed the Following ADMIN INFLUENZA VIRUS VAC [ HCPCS] HEMOGLOBIN A1C WITH EAG [11433 CPT(R)] HEMOGLOBIN [id number] CPT(R)]  DIABETES FOOT EXAM [7 Custom] INFLUENZA VIRUS VACCINE, HIGH DOSE SEASONAL, PRESERVATIVE FREE [19422 CPT(R)] LIPID PANEL [38091 CPT(R)] METABOLIC PANEL, COMPREHENSIVE [40744 CPT(R)] TSH 3RD GENERATION [55940 CPT(R)] Follow-up Instructions Return in about 4 months (around 1/20/2018). Patient Instructions Your doctor has recommended that you have an eye exam done. You can contact one of the below offices to schedule your own appointment. Once you have the visit, please ask their office to send us a copy for your record here. [address] [facility]                     [phone] Dr. Richar Lerner                          [phone] Dr. Kathryn Jeff                           [phone] [facility]                               [phone] [address] [facility]             [phone] Influenza (Flu) Vaccine (Inactivated or Recombinant): What You Need to Know Why get vaccinated? Influenza (\"flu\") is a contagious disease that spreads around the [place] every winter, usually between October and May. Flu is caused by influenza viruses and is spread mainly by coughing, sneezing, and close contact. Anyone can get flu. Flu strikes suddenly and can last several days. Symptoms vary by age, but can include: · Fever/chills. · Sore throat. · Muscle aches. · Fatigue. · Cough. · Headache. · Runny or stuffy nose. Flu can also lead to pneumonia and blood infections, and cause diarrhea and seizures in children. If you have a medical condition, such as heart or lung disease, flu can make it worse. Flu is more dangerous for some people. Infants and young children, people 72years of age and older, pregnant women, and people with certain health conditions or a weakened immune system are at greatest risk. Each year thousands of people in the [facility] die from flu, and many more are hospitalized. Flu vaccine can: · Keep you from getting flu. · Make flu less severe if you do get it. · Keep you from spreading flu to your family and other people. Inactivated and recombinant flu vaccines A dose of flu vaccine is recommended every flu season. Children 6 months through 6years of age may need two doses during the same flu season. Everyone else needs only one dose each flu season. Some inactivated flu vaccines contain a very small amount of a mercury-based preservative called thimerosal. Studies have not shown thimerosal in vaccines to be harmful, but flu vaccines that do not contain thimerosal are available. There is no live flu virus in flu shots. They cannot cause the flu. There are many flu viruses, and they are always changing.  Each year a new flu vaccine is made to protect against three or four viruses that are likely to cause disease in the upcoming flu season. But even when the vaccine doesn't exactly match these viruses, it may still provide some protection. Flu vaccine cannot prevent: · Flu that is caused by a virus not covered by the vaccine. · Illnesses that look like flu but are not. Some people should not get this vaccine Tell the person who is giving you the vaccine: · If you have any severe (life-threatening) allergies. If you ever had a life-threatening allergic reaction after a dose of flu vaccine, or have a severe allergy to any part of this vaccine, you may be advised not to get vaccinated. Most, but not all, types of flu vaccine contain a small amount of egg protein. · If you ever had Guillain-Barré syndrome (also called GBS) Some people with a history of GBS should not get this vaccine. This should be discussed with your doctor. · If you are not feeling well. It is usually okay to get flu vaccine when you have a mild illness, but you might be asked to come back when you feel better. Risks of a vaccine reaction With any medicine, including vaccines, there is a chance of reactions. These are usually mild and go away on their own, but serious reactions are also possible. Most people who get a flu shot do not have any problems with it. Minor problems following a flu shot include: · Soreness, redness, or swelling where the shot was given · Hoarseness · Sore, red or itchy eyes · Cough · Fever · Aches · Headache · Itching · Fatigue If these problems occur, they usually begin soon after the shot and last 1 or 2 days. More serious problems following a flu shot can include the following: · There may be a small increased risk of Guillain-Barré Syndrome (GBS) after inactivated flu vaccine. This risk has been estimated at 1 or 2 additional cases per million people vaccinated. This is much lower than the risk of severe complications from flu, which can be prevented by flu vaccine. · [facility] children who get the flu shot along with pneumococcal vaccine (PCV13) and/or DTaP vaccine at the same time might be slightly more likely to have a seizure caused by fever. Ask your doctor for more information. Tell your doctor if a child who is getting flu vaccine has ever had a seizure Problems that could happen after any injected vaccine: · People sometimes faint after a medical procedure, including vaccination. Sitting or lying down for about 15 minutes can help prevent fainting, and injuries caused by a fall. Tell your doctor if you feel dizzy, or have vision changes or ringing in the ears. · Some people get severe pain in the shoulder and have difficulty moving the arm where a shot was given. This happens very rarely. · Any medication can cause a severe allergic reaction. Such reactions from a vaccine are very rare, estimated at about 1 in a million doses, and would happen within a few minutes to a few hours after the vaccination. As with any medicine, there is a very remote chance of a vaccine causing a serious injury or death. The safety of vaccines is always being monitored. For more information, visit: www.cdc.gov/vaccinesafety/. What if there is a serious reaction? What should I look for? · Look for anything that concerns you, such as signs of a severe allergic reaction, very high fever, or unusual behavior. Signs of a severe allergic reaction can include hives, swelling of the face and throat, difficulty breathing, a fast heartbeat, dizziness, and weakness  usually within a few minutes to a few hours after the vaccination. What should I do? · If you think it is a severe allergic reaction or other emergency that can't wait, call 9-1-1 and get the person to the nearest hospital. Otherwise, call your doctor. · Reactions should be reported to the \"Vaccine Adverse Event Reporting System\" (VAERS).  Your doctor should file this report, or you can do it yourself through the VAERS website at www.vaers. [facility].gov, or by calling 3-656.859.7767. VAERS does not give medical advice. The National Vaccine Injury Compensation Program 
The National Vaccine Injury Compensation Program (VICP) is a federal program that was created to compensate people who may have been injured by certain vaccines. Persons who believe they may have been injured by a vaccine can learn about the program and about filing a claim by calling 9-347.501.8344 or visiting the [employer] website at www.[facility].gov/vaccinecompensation. There is a time limit to file a claim for compensation. How can I learn more? · Ask your healthcare provider. He or she can give you the vaccine package insert or suggest other sources of information. · Call your local or state health department. · Contact the Centers for Disease Control and Prevention (CDC): 
¨ Call 8-380.239.3724 (1-800-CDC-INFO) or ¨ Visit CDC's website at www.cdc.gov/flu Vaccine Information Statement Inactivated Influenza Vaccine 8/7/2015) 42 GABE Cramer 237JT-19 [facility] and [employer] Centers for Disease Control and Prevention Many Vaccine Information Statements are available in Burmese and other languages. See www.immunize.org/vis. Muchas hojas de información sobre vacunas están disponibles en español y en otros idiomas. Visite www.immunize.org/vis. Care instructions adapted under license by [employer] (which disclaims liability or warranty for this information). If you have questions about a medical condition or this instruction, always ask your healthcare professional. [address] any warranty or liability for your use of this information. Introducing [facility]! Dear Concha Gamez: Thank you for requesting a Power[employer] account. Our records indicate that you already have an active PowerFile account. You can access your account anytime at [email or web address] Did you know that you can access your hospital and ER discharge instructions at any time in Zite? You can also review all of your test results from your hospital stay or ER visit. Additional Information If you have questions, please visit the Frequently Asked Questions section of the Zite website at [email or web address] I've Been/. Remember, Zite is NOT to be used for urgent needs. For medical emergencies, dial 911. Now available from your iPhone and Android! Please provide this summary of care documentation to your next provider. Your primary care clinician is listed as [address]. If you have any questions after today's visit, please call [phone].

## 2017-09-20 NOTE — PROGRESS NOTES
Reviewed record in preparation for visit and have necessary documentation  Pt did not bring medication to office visit for review  Information was given to pt on Advanced Directives, Living Will  Information was given on Shingles Vaccine  opportunity was given for questions  Goals that were addressed and/or need to be completed during or after this appointment include   Health Maintenance Due   Topic Date Due    GLAUCOMA SCREENING Q2Y  02/18/2016    FOOT EXAM Q1  06/17/2017    INFLUENZA AGE 9 TO ADULT  08/01/2017    HEMOGLOBIN A1C Q6M  09/16/2017

## 2017-09-21 ENCOUNTER — TELEPHONE (OUTPATIENT)
Dept: FAMILY MEDICINE CLINIC | Age: 72
End: 2017-09-21

## 2017-09-21 DIAGNOSIS — D64.9 ANEMIA, UNSPECIFIED: Primary | ICD-10-CM

## 2017-09-21 LAB
ALBUMIN SERPL-MCNC: 4.5 G/DL (ref 3.5–4.8)
ALBUMIN/GLOB SERPL: 2.4 {RATIO} (ref 1.2–2.2)
ALP SERPL-CCNC: 46 IU/L (ref 39–117)
ALT SERPL-CCNC: 12 IU/L (ref 0–44)
AST SERPL-CCNC: 17 IU/L (ref 0–40)
BILIRUB SERPL-MCNC: 0.4 MG/DL (ref 0–1.2)
BUN SERPL-MCNC: 20 MG/DL (ref 8–27)
BUN/CREAT SERPL: 17 (ref 10–24)
CALCIUM SERPL-MCNC: 9.2 MG/DL (ref 8.6–10.2)
CHLORIDE SERPL-SCNC: 99 MMOL/L (ref 96–106)
CHOLEST SERPL-MCNC: 151 MG/DL (ref 100–199)
CO2 SERPL-SCNC: 27 MMOL/L (ref 18–29)
CREAT SERPL-MCNC: 1.15 MG/DL (ref 0.76–1.27)
EST. AVERAGE GLUCOSE BLD GHB EST-MCNC: 114 MG/DL
GLOBULIN SER CALC-MCNC: 1.9 G/DL (ref 1.5–4.5)
GLUCOSE SERPL-MCNC: 90 MG/DL (ref 65–99)
HBA1C MFR BLD: 5.6 % (ref 4.8–5.6)
HDLC SERPL-MCNC: 61 MG/DL
HGB BLD-MCNC: 11.5 G/DL (ref 12.6–17.7)
LDLC SERPL CALC-MCNC: 64 MG/DL (ref 0–99)
Lab: NORMAL
POTASSIUM SERPL-SCNC: 4.5 MMOL/L (ref 3.5–5.2)
PROT SERPL-MCNC: 6.4 G/DL (ref 6–8.5)
SODIUM SERPL-SCNC: 141 MMOL/L (ref 134–144)
TRIGL SERPL-MCNC: 130 MG/DL (ref 0–149)
TSH SERPL DL<=0.005 MIU/L-ACNC: 1.67 UIU/ML (ref 0.45–4.5)
VLDLC SERPL CALC-MCNC: 26 MG/DL (ref 5–40)

## 2017-09-21 NOTE — TELEPHONE ENCOUNTER
----- Message from Fausto Royal MD sent at 9/21/2017  2:26 PM EDT -----  Also his blood sugar averages normal.  He can decrease his metformin to 500 mg after supper.

## 2017-09-21 NOTE — TELEPHONE ENCOUNTER
Left message with patients wife informing her in detail of labs results. Per Dr. Francisco Roth:             Mild anemia. Iron profile and FIT test.     Also his blood sugar averages normal.  He can decrease his metformin to 500 mg after supper. Patients wife verbalized understanding and agreed to inform patient of above.

## 2017-09-22 ENCOUNTER — TELEPHONE (OUTPATIENT)
Dept: FAMILY MEDICINE CLINIC | Age: 72
End: 2017-09-22

## 2017-09-22 NOTE — TELEPHONE ENCOUNTER
Phone call to patient. Informed patient per Dr. Vandana Blackwood: His iron is borderline low.  Have him start ferrous sulfate 325 mg twice daily with meals.  Awaiting for results of FIT test.   Patient verbalized understanding.

## 2017-09-22 NOTE — TELEPHONE ENCOUNTER
----- Message from Miguel Verdin MD sent at 9/22/2017  2:51 PM EDT -----  His iron is borderline low. Have him start ferrous sulfate 325 mg twice daily with meals.   Awaiting for results of FIT test.  Dr. LuisM iguel Mckeon

## 2017-09-22 NOTE — PROGRESS NOTES
His iron is borderline low. Have him start ferrous sulfate 325 mg twice daily with meals.   Awaiting for results of FIT test.  Dr. Romina Higgins

## 2017-09-26 LAB
IRON SATN MFR SERPL: 19 % (ref 15–55)
IRON SERPL-MCNC: 60 UG/DL (ref 38–169)
SPECIMEN STATUS REPORT, ROLRST: NORMAL
TIBC SERPL-MCNC: 317 UG/DL (ref 250–450)
UIBC SERPL-MCNC: 257 UG/DL (ref 111–343)

## 2017-10-02 ENCOUNTER — TELEPHONE (OUTPATIENT)
Dept: FAMILY MEDICINE CLINIC | Age: 72
End: 2017-10-02

## 2017-10-02 NOTE — TELEPHONE ENCOUNTER
It should be ok for him to take TID for now. If his blood glucose is dropping under 110 then he should call the office and decrease his dosage.      José Miguel Houser MD

## 2017-10-02 NOTE — TELEPHONE ENCOUNTER
Phone call to patient. Spoke with patient and gave him the message from Dr. Koffi Espinal:    Message from Priscila Pedroza MD sent at 9/21/2017  2:26 PM EDT -----  Also his blood sugar averages normal.  He can decrease his metformin to 500 mg after supper. Patient verbalized understanding. He states that his blood sugar has been running in the 200's after suppertime while taking the Metformin 500 mg once a day. Patient states that his sugar may be running that high because he has been eating a lot of fried potatoes. Patient advised that Dr. Koffi Espinal is not in the office. Please advise if it is okay for him to continue the Metformin 500 MG at supper time only or if he should go back to Metformin 500 MG TID.

## 2017-10-02 NOTE — TELEPHONE ENCOUNTER
Phone call to patient, no answer. Per Dr. Albino Ocasio: It should be ok for him to take TID for now.  If his blood glucose is dropping under 110 then he should call the office and decrease his dosage.      Hanane Duncan MD

## 2017-10-02 NOTE — TELEPHONE ENCOUNTER
Pt in to the office requesting clarification  Pt reports taking metformin 500 mg three times a day  He reports received a message to decrease to 500 mg after supper   Please advise    He is aware that Dr Misa Earl is out of office this week

## 2017-10-05 NOTE — TELEPHONE ENCOUNTER
Phone call to patient, patient at the [employer] course -- left message with patients wife. Informed her per Dr. Yulisa Cabrera:      It should be ok for him to take TID for now. If his blood glucose is dropping under 110 then he should call the office and decrease his dosage.         Patients wife verbalized understanding and agreed to inform patient of above. She stated that patients sugar has been running in the 300's since he decreased his dose to once daily. Patients wife request that a message get sent to Dr. Segundo Brizuela to make him aware. She understands that he will not be in the office until next week.

## 2017-10-10 LAB — HEMOCCULT STL QL IA: NEGATIVE

## 2017-10-11 ENCOUNTER — TELEPHONE (OUTPATIENT)
Dept: FAMILY MEDICINE CLINIC | Age: 72
End: 2017-10-11

## 2017-10-11 ENCOUNTER — PATIENT OUTREACH (OUTPATIENT)
Dept: FAMILY MEDICINE CLINIC | Age: 72
End: 2017-10-11

## 2017-10-11 NOTE — PROGRESS NOTES
This episode closed: NNTOCED: SFM 2/24/2017: Hypertension: history significant for HTN, hyperlipidemia, diabetes, GERD, kidney stone, lumbar DJD, vertigo, urinary frequency, CAD, OA, anxiety, prostate CA, L-eye amaurosis fugax, and nodular goiter. Patient attended follow up appointments. Most recent appointment 9/20/2017. PCP please discuss Advance Directive.

## 2017-10-11 NOTE — TELEPHONE ENCOUNTER
----- Message from Kim Frazier MD sent at 10/11/2017 10:52 AM EDT -----  FIT test negative, stay on iron for 1 month and stop. No evidence of GI bleeding.

## 2017-10-17 DIAGNOSIS — Z02.83 ENCOUNTER FOR DRUG SCREENING: Primary | ICD-10-CM

## 2017-10-17 NOTE — TELEPHONE ENCOUNTER
Medication ready for  at the . Patient needs to sign a new pain contract and stop by the lab before he can  this medication.

## 2017-10-18 NOTE — TELEPHONE ENCOUNTER
Phone call to patient. Notified patient that his prescription is ready for  at the  and he will need to come by to sign a new CSC and stop by the lab. Patient states that he stopped taking his iron on 10/11/17 after talking with his wife. (His wife mistakenly misinformed him to stop taking it. We advised her to tell him to take it for one month and stop around 11/11/17.) He is c/o productive cough -- he states that is from working on an old house over the weekend, weight loss, no appetite and feeling tired. Patient did not want to make an appointment for tomorrow. He is asking if you think that he should go back on the iron?

## 2017-10-18 NOTE — TELEPHONE ENCOUNTER
Patient notified per Dr. Mohan Young:     A severe allergic reaction such as mold from underneath the house can cause fatigue syndrome.  You should be taking Flonase nasal spray and Zyrtec or Allegra to get you through the allergic reaction.  You can also begin taking his iron-that will only help build up his iron stores and certainly will not hurt you. You can start taking Metformin 500 mg after supper. However, if your sugars are still high after decreasing your Metformin to once a day, you can take it two times a day. If you start to take your Metformin two times a day and your sugars are still high, you can go back to taking the Metformin three times a day. Patient stated that he was driving and asked if we could send him a message on [employer].

## 2017-10-23 DIAGNOSIS — I10 ESSENTIAL HYPERTENSION: Chronic | ICD-10-CM

## 2017-10-24 LAB — DRUGS UR: NORMAL

## 2017-10-24 RX ORDER — FELODIPINE 10 MG/1
TABLET, EXTENDED RELEASE ORAL
Qty: 30 TAB | Refills: 5 | Status: SHIPPED | OUTPATIENT
Start: 2017-10-24 | End: 2018-04-23 | Stop reason: SDUPTHER

## 2017-10-24 RX ORDER — FELODIPINE 10 MG/1
TABLET, EXTENDED RELEASE ORAL
Qty: 30 TAB | OUTPATIENT
Start: 2017-10-24

## 2017-11-06 DIAGNOSIS — M19.90 CHRONIC ARTHRITIS: Chronic | ICD-10-CM

## 2017-11-06 DIAGNOSIS — G89.4 CHRONIC PAIN SYNDROME: Chronic | ICD-10-CM

## 2017-11-06 RX ORDER — TRAMADOL HYDROCHLORIDE 50 MG/1
TABLET ORAL
Qty: 120 TAB | Refills: 3 | Status: SHIPPED | OUTPATIENT
Start: 2017-11-06 | End: 2018-03-19 | Stop reason: SDUPTHER

## 2017-11-06 NOTE — TELEPHONE ENCOUNTER
Last office visit on 9/20/17 and last urine drug screen on 10/18/17. CSC on file, please advise. Thank you.

## 2017-12-22 ENCOUNTER — TELEPHONE (OUTPATIENT)
Dept: FAMILY MEDICINE CLINIC | Age: 72
End: 2017-12-22

## 2017-12-22 DIAGNOSIS — I10 ESSENTIAL HYPERTENSION: Primary | ICD-10-CM

## 2017-12-22 RX ORDER — ATENOLOL 25 MG/1
50 TABLET ORAL DAILY
Qty: 60 TAB | Refills: 0 | Status: SHIPPED | OUTPATIENT
Start: 2017-12-22 | End: 2018-02-19 | Stop reason: SDUPTHER

## 2017-12-22 NOTE — TELEPHONE ENCOUNTER
There is still a shortage of Atenolol. Ok per Dr. Juan Manuel Galarza for patient to take 2 of the 25mg tablets for a total dose of 50mg. Pharmacist aware and in agreement.

## 2017-12-22 NOTE — TELEPHONE ENCOUNTER
Pharmacy request new prescription for Atenolol 25mg tablets Qday due to the shortage of 50mg tablets. Thank you!

## 2018-02-20 DIAGNOSIS — K21.00 GASTROESOPHAGEAL REFLUX DISEASE WITH ESOPHAGITIS: ICD-10-CM

## 2018-02-20 RX ORDER — PANTOPRAZOLE SODIUM 40 MG/1
TABLET, DELAYED RELEASE ORAL
Qty: 30 TAB | Refills: 11 | Status: SHIPPED | OUTPATIENT
Start: 2018-02-20 | End: 2019-01-28 | Stop reason: SDUPTHER

## 2018-03-19 ENCOUNTER — OFFICE VISIT (OUTPATIENT)
Dept: FAMILY MEDICINE CLINIC | Age: 73
End: 2018-03-19

## 2018-03-19 VITALS
HEART RATE: 51 BPM | TEMPERATURE: 97.2 F | OXYGEN SATURATION: 96 % | HEIGHT: 71 IN | DIASTOLIC BLOOD PRESSURE: 71 MMHG | BODY MASS INDEX: 29.12 KG/M2 | RESPIRATION RATE: 18 BRPM | WEIGHT: 208 LBS | SYSTOLIC BLOOD PRESSURE: 152 MMHG

## 2018-03-19 DIAGNOSIS — H53.9 VISUAL DISTURBANCE: ICD-10-CM

## 2018-03-19 DIAGNOSIS — M19.90 CHRONIC ARTHRITIS: Chronic | ICD-10-CM

## 2018-03-19 DIAGNOSIS — I10 ESSENTIAL HYPERTENSION: Chronic | ICD-10-CM

## 2018-03-19 DIAGNOSIS — E78.00 PURE HYPERCHOLESTEROLEMIA: Chronic | ICD-10-CM

## 2018-03-19 DIAGNOSIS — C61 PROSTATE CANCER (HCC): Chronic | ICD-10-CM

## 2018-03-19 DIAGNOSIS — G89.4 CHRONIC PAIN SYNDROME: Chronic | ICD-10-CM

## 2018-03-19 DIAGNOSIS — E11.9 CONTROLLED TYPE 2 DIABETES MELLITUS WITHOUT COMPLICATION, WITHOUT LONG-TERM CURRENT USE OF INSULIN (HCC): Primary | Chronic | ICD-10-CM

## 2018-03-19 DIAGNOSIS — L57.0 AK (ACTINIC KERATOSIS): ICD-10-CM

## 2018-03-19 DIAGNOSIS — K21.00 GASTROESOPHAGEAL REFLUX DISEASE WITH ESOPHAGITIS: Chronic | ICD-10-CM

## 2018-03-19 RX ORDER — TRAMADOL HYDROCHLORIDE 50 MG/1
TABLET ORAL
Qty: 120 TAB | Refills: 3 | Status: SHIPPED | OUTPATIENT
Start: 2018-03-19 | End: 2018-06-29 | Stop reason: SDUPTHER

## 2018-03-19 NOTE — ASSESSMENT & PLAN NOTE
Stable, based on history, physical exam and review of pertinent labs, studies and medications; meds reconciled; continue current treatment plan. Key Antihyperglycemic Medications             metFORMIN ER (GLUCOPHAGE XR) 500 mg tablet  (Taking) TAKE ONE TABLET BY MOUTH UP TO THREE TIMES DAILY AS directed        Other Key Diabetic Medications             rosuvastatin (CRESTOR) 20 mg tablet  (Taking) TAKE ONE TABLET BY MOUTH NIGHTLY    lisinopril (PRINIVIL, ZESTRIL) 40 mg tablet  (Taking) TAKE ONE TABLET BY MOUTH TWICE DAILY    omega-3 fatty acids-vitamin e (FISH OIL) 1,000 mg cap  (Taking) Take 1 Cap by mouth.         Lab Results   Component Value Date/Time    Hemoglobin A1c 5.6 09/20/2017 12:07 PM    Glucose 90 09/20/2017 12:07 PM    Creatinine 1.15 09/20/2017 12:07 PM    Creatinine (POC) 1.0 02/24/2017 09:20 AM    Microalb/Creat ratio (ug/mg creat.) 11.9 11/23/2016 11:04 AM    Cholesterol, total 151 09/20/2017 12:07 PM    HDL Cholesterol 61 09/20/2017 12:07 PM    LDL, calculated 64 09/20/2017 12:07 PM    Triglyceride 130 09/20/2017 12:07 PM     Diabetic Foot and Eye Exam HM Status   Topic Date Due    Eye Exam  05/25/2018    Diabetic Foot Care  09/20/2018

## 2018-03-19 NOTE — ASSESSMENT & PLAN NOTE
This condition is managed by Specialist.  Last PSA was 0. Key Oncology Meds     Patient is on no Oncologic meds. Key Pain Meds             traMADol (ULTRAM) 50 mg tablet  (Taking) Take 2 tablet q12 hrs as needed. Indications: Pain, generalized osteoarthritis. Lab Results   Component Value Date/Time    WBC 6.5 11/23/2016 11:04 AM    ABS.  NEUTROPHILS 4.4 11/23/2016 11:04 AM    HGB 11.5 09/20/2017 12:07 PM    Hemoglobin (POC) 12.9 02/24/2017 09:20 AM    HCT 39.2 11/23/2016 11:04 AM    Hematocrit (POC) 38 02/24/2017 09:20 AM    PLATELET 986 39/22/0309 11:04 AM    Creatinine 1.15 09/20/2017 12:07 PM    Creatinine (POC) 1.0 02/24/2017 09:20 AM    BUN 20 09/20/2017 12:07 PM    BUN (POC) 21 02/24/2017 09:20 AM    ALT (SGPT) 12 09/20/2017 12:07 PM    AST (SGOT) 17 09/20/2017 12:07 PM    Albumin 4.5 09/20/2017 12:07 PM

## 2018-03-19 NOTE — PROGRESS NOTES
1. Have you been to the ER, urgent care clinic since your last visit? Hospitalized since your last visit? no  2. Have you seen or consulted any other health care providers outside of the [address] [place] [address] since your last visit? Including any pap smears or colon screening. No    Reviewed record in preparation for visit and have necessary documentation    Pt did not bring medication to office visit for review  Opportunity was given for questions    Goals that were addressed and/or need to be completed during or after this appointment include   Health Maintenance Due   Topic Date Due    GLAUCOMA SCREENING Q2Y  02/18/2016    Pneumococcal 65+ High/Highest Risk (2 of 2 - PPSV23) 09/21/2017    MICROALBUMIN Q1  11/23/2017    HEMOGLOBIN A1C Q6M  03/20/2018     Body mass index is 29.01 kg/(m^2). - check for functional glucose monitor and record keeping system  Pt was given BS record log to document home readings and return to office for review  Diabetic Bundle:  LDL-64  A1C-5.6  BP-  Smoking? no  Anticoagulation medication?  yes  Eye exam dilated? done  Foot exam? done

## 2018-03-19 NOTE — PROGRESS NOTES
Progress Note    Patient: Aroldo Grigsby MRN: [id number]  SSN: [id number]    YOB: 1945  Age: 74 y.o. Sex: male        Chief Complaint   Patient presents with    Labs    Hypertension     f/u    Diabetes     f/u         Subjective:     Encounter Diagnoses   Name Primary?  Controlled type 2 diabetes mellitus without complication, without long-term current use of insulin ([address]): This patient is managed under a comprehensive plan of care for Diabetes. Overall the patient feels well with good energy level. Pertinent Labs:   Lab Results   Component Value Date/Time    Hemoglobin A1c 5.6 09/20/2017 12:07 PM    Hemoglobin A1c 6.0 (H) 03/16/2017 08:45 AM    Hemoglobin A1c 5.9 (H) 11/23/2016 11:04 AM      Body mass index is 29.01 kg/(m^2). Lab Results   Component Value Date/Time    LDL, calculated 64 09/20/2017 12:07 PM         Lab Results   Component Value Date/Time    Sodium 141 09/20/2017 12:07 PM    Potassium 4.5 09/20/2017 12:07 PM    Chloride 99 09/20/2017 12:07 PM    CO2 27 09/20/2017 12:07 PM    Anion gap 8 04/24/2013 03:08 AM    Glucose 90 09/20/2017 12:07 PM    BUN 20 09/20/2017 12:07 PM    Creatinine 1.15 09/20/2017 12:07 PM    BUN/Creatinine ratio 17 09/20/2017 12:07 PM    GFR est AA 72 09/20/2017 12:07 PM    GFR est non-AA 62 09/20/2017 12:07 PM    Calcium 9.2 09/20/2017 12:07 PM    AST (SGOT) 17 09/20/2017 12:07 PM    Alk.  phosphatase 46 09/20/2017 12:07 PM    Protein, total 6.4 09/20/2017 12:07 PM    Albumin 4.5 09/20/2017 12:07 PM    Globulin 2.6 04/16/2013 01:58 PM    A-G Ratio 2.4 (H) 09/20/2017 12:07 PM    ALT (SGPT) 12 09/20/2017 12:07 PM     Lab Results   Component Value Date/Time    Microalbumin/Creat ratio (mg/g creat) 8 03/12/2009 09:00 AM    Microalb/Creat ratio (ug/mg creat.) 11.9 11/23/2016 11:04 AM    Microalbumin,urine random 1.47 03/12/2009 09:00 AM      Frequency of home glucose testing: None   Blood Sugar range at home:    Last eye exam: In past 12 months. Last foot exam: This year. Polyuria, polyphagia or polydipsia: No   Retinopathy: No   Neuropathy SX: No    Low blood sugar symptoms: No   Dietary compliance: Good   Medication compliance:Good   On ASA: Yes   Depression: No   CKD:no     Wt Readings from Last 3 Encounters:   03/19/18 208 lb (94.3 kg)   09/20/17 209 lb (94.8 kg)   05/25/17 208 lb (94.3 kg)        History   Smoking Status    Former Smoker    Packs/day: 0.50    Years: 4.00    Quit date: 4/16/1966   Smokeless Tobacco    Never Used   Diabetic Consultants: All the patient's questions regarding medications, diet and exercise were answered. Goal of A1C of less than 7.5% is our goal.   Our overall goal is to reduce or eliminate the long term consequences of poorly controlled diabetes. Yes    Essential hypertension: Elevated today after being normal last office. .  BP Readings from Last 3 Encounters:   03/19/18 152/71   09/20/17 136/68   05/25/17 146/76     The patient reports:  taking medications as instructed, no medication side effects noted, no TIA's, no chest pain on exertion, no dyspnea on exertion, no swelling of ankles. Lab Results   Component Value Date/Time    Sodium 141 09/20/2017 12:07 PM    Potassium 4.5 09/20/2017 12:07 PM    Chloride 99 09/20/2017 12:07 PM    CO2 27 09/20/2017 12:07 PM    Anion gap 8 04/24/2013 03:08 AM    Glucose 90 09/20/2017 12:07 PM    BUN 20 09/20/2017 12:07 PM    Creatinine 1.15 09/20/2017 12:07 PM    BUN/Creatinine ratio 17 09/20/2017 12:07 PM    GFR est AA 72 09/20/2017 12:07 PM    GFR est non-AA 62 09/20/2017 12:07 PM    Calcium 9.2 09/20/2017 12:07 PM    Bilirubin, total 0.4 09/20/2017 12:07 PM    AST (SGOT) 17 09/20/2017 12:07 PM    Alk.  phosphatase 46 09/20/2017 12:07 PM    Protein, total 6.4 09/20/2017 12:07 PM    Albumin 4.5 09/20/2017 12:07 PM    Globulin 2.6 04/16/2013 01:58 PM    A-G Ratio 2.4 (H) 09/20/2017 12:07 PM    ALT (SGPT) 12 09/20/2017 12:07 PM     Our goal is to normalize the blood pressure to decrease the risks of strokes and heart attacks. The patient is in agreement with the plan.  Pure hypercholesterolemia:  Cardiovascular risks for him are: LDL goal is under 80  diabetic  hypertension  hyperlipidemia. Currently he takes Crestor 20 mg  Lab Results   Component Value Date/Time    Cholesterol, total 151 09/20/2017 12:07 PM    HDL Cholesterol 61 09/20/2017 12:07 PM    LDL, calculated 64 09/20/2017 12:07 PM    Triglyceride 130 09/20/2017 12:07 PM    CHOL/HDL Ratio 3.5 10/04/2010 11:30 AM     Lab Results   Component Value Date/Time    ALT (SGPT) 12 09/20/2017 12:07 PM    AST (SGOT) 17 09/20/2017 12:07 PM    Alk. phosphatase 46 09/20/2017 12:07 PM    Bilirubin, total 0.4 09/20/2017 12:07 PM      Myalgias: No   Fatigue: No   Other side effects: no  Wt Readings from Last 3 Encounters:   03/19/18 208 lb (94.3 kg)   09/20/17 209 lb (94.8 kg)   05/25/17 208 lb (94.3 kg)     The patient is aware of our goal to reduce or eliminate the long term problems (such as strokes and heart attacks) related to poorly controlled hyperlipidemia.  Gastroesophageal reflux disease with esophagitis:  Current control of Symptoms: Occasional breakthrough  Hiatal Hernia:no  Current Medications: Protonix  The patient has no history melena or bright red blood in the stools. The patient avoids high dose aspirin and NSAID therapy. The patient is aware of diet changes needed, elevating the head of the bed and appropriate use of antacids.  Visual disturbance: Last week he has spelled of wavy vision which started in his left eye and then went to both eyes. This is unassociated with any kind of headache, nausea vomiting, neurologic symptoms and he did not feel bad. He had a previous episode like this when he had a medication reaction. That is been years ago. Last spell he had was 6 months ago and it was just in one eye and he did not mention it to me.   He is already had a carotid endarterectomy and was recently seen by Dr. Yariel Jackson who did not feel he had a clinically significant obstruction in his carotid. He thinks that the medication he was on previously the cause wavy vision was doxazosin. He is not on that now. We will start with referral to ophthalmology as soon as possible. Should this occur associated with neurologic symptoms he needs to go straight to the emergency room.  Chronic arthritis:  He has migratory polyarthritis and significant degenerative disc disease in his back. He still works part-time as an  and plays golf periodically. He takes 100 mg of tramadol twice daily and recently went 3 days without it and notices significant increase in his pain.  Chronic pain syndrome: Due to his degenerative disc disease in his back and degenerative arthritis.  AK (actinic keratosis): He has 2 new lesions. One is on the apex of the scalp. There is approximately 1 cm in diameter. It was frozen twice with liquid nitrogen 5 seconds each time. Timeout an adequate consent forms were obtained. The second lesion is on right posterior scalp. It was also frozen twice ×5 seconds. Current and past medical information:    Current Medications after this visit[de-identified]   Current Outpatient Prescriptions   Medication Sig    traMADol (ULTRAM) 50 mg tablet Take 2 tablet q12 hrs as needed. Indications: Pain, generalized osteoarthritis.     atenolol (TENORMIN) 25 mg tablet TAKE TWO TABLETS BY MOUTH ONCE A DAY    pantoprazole (PROTONIX) 40 mg tablet TAKE ONE TABLET BY MOUTH EVERY DAY FOR: GASTROESOPHAGEAL REFLUX]    metFORMIN ER (GLUCOPHAGE XR) 500 mg tablet TAKE ONE TABLET BY MOUTH UP TO THREE TIMES DAILY AS directed    raNITIdine (ZANTAC) 300 mg tablet TAKE ONE TABLET BY MOUTH EVERY DAY    felodipine (PLENDIL SR) 10 mg 24 hr tablet TAKE ONE TABLET BY MOUTH EVERY DAY FOR HYPERTENSION    hydrALAZINE (APRESOLINE) 50 mg tablet TAKE ONE TABLET BY MOUTH THREE TIMES DAILY FOR HYPERTENSION    rosuvastatin (CRESTOR) 20 mg tablet TAKE ONE TABLET BY MOUTH NIGHTLY    EPIPEN 2-KARL 0.3 mg/0.3 mL injection INJECT INTRAMUSCULARLY AS NEEDED FOR ONE DOSE. TAKE WITH 50MG OF BENADRYL AND CALL 911.  lisinopril (PRINIVIL, ZESTRIL) 40 mg tablet TAKE ONE TABLET BY MOUTH TWICE DAILY    furosemide (LASIX) 20 mg tablet TAKE ONE TABLET BY MOUTH EVERY DAY    diclofenac (VOLTAREN) 1 % gel Apply 4 g to affected area four (4) times daily. Painful shoulder.  aspirin delayed-release 81 mg tablet Take 1 Tab by mouth daily.  Peak Flow Meter eric Use prior and post nebulizer treatment    albuterol (PROVENTIL VENTOLIN) 2.5 mg /3 mL (0.083 %) nebulizer solution 3 mL by Nebulization route every six (6) hours as needed for Wheezing or Shortness of Breath.  omega-3 fatty acids-vitamin e (FISH OIL) 1,000 mg cap Take 1 Cap by mouth.  coenzyme q10 (CO Q-10) 100 mg Cap Take 100 mg by mouth daily. No current facility-administered medications for this visit.         Patient Active Problem List    Diagnosis Date Noted    Bone spur 10/04/2010     Priority: 1 - One    Hypertension 05/22/2010     Priority: 1 - One    Hyperlipidemia 05/22/2010     Priority: 1 - One    GERD (gastroesophageal reflux disease) 05/22/2010     Priority: 1 - One    Kidney stone 05/22/2010     Priority: 1 - One    Diabetes mellitus type 2, controlled, without complications ([facility] 57/10/2397    Prostate cancer ([facility] 08/23/2013    Prostate nodule without urinary obstruction 02/11/2013    S/P carotid endarterectomy 12/05/2012    Vitamin D deficiency 12/05/2012    Amaurosis fugax of left eye 05/06/2012    Carotid artery obstruction 04/17/2012    Allergy to bee sting 04/13/2012       Past Medical History:   Diagnosis Date    Amaurosis fugax of left eye 5/6/2012    Arthritis     OSTEO    CAD (coronary artery disease) 2012    CAROTID LEFT     Cancer ([facility] 2013    PROSTATE    Chronic pain     DJD lumbar    Diabetes ([facility] 5/22/2010    Frequent nocturnal awakening     urinary frequency    GERD (gastroesophageal reflux disease) 5/22/2010    Hyperlipidemia 5/22/2010    Hypertension 5/22/2010    Kidney stone 5/22/2010    Nodular goiter     1.3 cm right , FNA 6/15    Obesity (BMI 30-39. 9)     Psychiatric disorder     ANXIETY    Vertigo        Allergies   Allergen Reactions    Bee Sting [Sting, Bee] Anaphylaxis    Vytorin 10-10 [Ezetimibe-Simvastatin] Myalgia    Amlodipine Other (comments)     Creepy crawly sensation, twitches    Lipitor [Atorvastatin] Myalgia       Past Surgical History:   Procedure Laterality Date    COLONOSCOPY  3/3/2016         EGD  3/3/2016         HX HEENT      tonsillectomy    HX MOHS PROCEDURES  2010    right    HX ORTHOPAEDIC      coccyx removed    HX UROLOGICAL  2010    left lithotripsy. basket  extraction,ureteral stent    HX VASECTOMY      NEUROLOGICAL PROCEDURE UNLISTED  2011    Steroid injections in epidural    VASCULAR SURGERY PROCEDURE UNLIST  2012    LEFT CAROTID       Social History     Social History    Marital status:      Spouse name: N/A    Number of children: N/A    Years of education: N/A     Social History Main Topics    Smoking status: Former Smoker     Packs/day: 0.50     Years: 4.00     Quit date: 4/16/1966    Smokeless tobacco: Never Used    Alcohol use No    Drug use: No    Sexual activity: Yes     Partners: Female     Other Topics Concern    None     Social History Narrative       Review of Systems   Constitutional: Negative. Negative for chills, fever, malaise/fatigue and weight loss. HENT: Negative. Negative for hearing loss. Eyes: Positive for blurred vision. Visual disturbance-wavy vision   Respiratory: Negative. Negative for cough, hemoptysis, sputum production and shortness of breath. Cardiovascular: Negative. Negative for chest pain, palpitations and orthopnea. Gastrointestinal: Negative.   Negative for abdominal pain, blood in stool, heartburn, nausea and vomiting. Genitourinary: Negative. Negative for dysuria, frequency and urgency. Musculoskeletal: Positive for back pain and joint pain. Negative for myalgias and neck pain. Chronic arthritis pain. He cannot take an NSAID due to chronic hypertension. Skin: Positive for rash. 2 actinic keratoses identified and treated today. Neurological: Negative. Negative for dizziness, tingling, tremors, weakness and headaches. Endo/Heme/Allergies: Negative. Psychiatric/Behavioral: Negative. Negative for depression. Objective:     Vitals:    03/19/18 1107   BP: 152/71   Pulse: (!) 51   Resp: 18   Temp: 97.2 °F (36.2 °C)   TempSrc: Oral   SpO2: 96%   Weight: 208 lb (94.3 kg)   Height: 5' 11\" (1.803 m)      Body mass index is 29.01 kg/(m^2). Physical Exam   Constitutional: He is oriented to person, place, and time and well-developed, well-nourished, and in no distress. HENT:   Head: Normocephalic and atraumatic. Mouth/Throat: Oropharynx is clear and moist.   1 cm actinic keratosis in his ball spot at the apex of his scalp. 0.3 cm AK right posterior scalp location marked   Eyes: Right eye exhibits no discharge. Left eye exhibits no discharge. No scleral icterus. Neck: No tracheal deviation present. No thyromegaly present. No bruit. Cardiovascular: Normal rate, regular rhythm and normal heart sounds. Pulmonary/Chest: Effort normal and breath sounds normal.   Abdominal: Soft. Neurological: He is alert and oriented to person, place, and time. Skin: Rash noted. No erythema. See diagram.  Lesions were frozen and permission. Frozen as described after timeout, procedure description   Psychiatric: Mood and affect normal.   Nursing note and vitals reviewed.         Health Maintenance Due   Topic Date Due    GLAUCOMA SCREENING Q2Y  02/18/2016    Pneumococcal 65+ High/Highest Risk (2 of 2 - PPSV23) 09/21/2017    MICROALBUMIN Q1 11/23/2017    HEMOGLOBIN A1C Q6M  03/20/2018         Assessment and orders:     Encounter Diagnoses     ICD-10-CM ICD-9-CM   1. Controlled type 2 diabetes mellitus without complication, without long-term current use of insulin (HCC) E11.9 250.00   2. Essential hypertension I10 401.9   3. Pure hypercholesterolemia E78.00 272.0   4. Gastroesophageal reflux disease with esophagitis K21.0 530.11   5. Visual disturbance H53.9 368.9   6. Chronic arthritis M19.90 716.90   7. Chronic pain syndrome G89.4 338.4   8. AK (actinic keratosis) L57.0 702.0     Diagnoses and all orders for this visit:    1. Controlled type 2 diabetes mellitus without complication, without long-term current use of insulin ([facility])-he will return for fasting labs. -     HEMOGLOBIN A1C WITH EAG  -     LIPID PANEL  -     TSH 3RD GENERATION  -     MICROALBUMIN, UR, RAND W/ MICROALB/CREAT RATIO  -     METABOLIC PANEL, COMPREHENSIVE    2. Essential hypertension-he should check his blood pressures at home. He is already on Apresoline. Apresoline is not noted to have visual side effects.  -     LIPID PANEL  -     TSH 3RD GENERATION  -     MICROALBUMIN, UR, RAND W/ MICROALB/CREAT RATIO  -     METABOLIC PANEL, COMPREHENSIVE    3. Pure hypercholesterolemia-recheck fasting  -     LIPID PANEL  -     METABOLIC PANEL, COMPREHENSIVE    4. Gastroesophageal reflux disease with esophagitis-reminded him to use liquid and acid with simethicone for breakthrough symptoms    5. Visual disturbance  -     REFERRAL TO OPHTHALMOLOGY    6. Chronic arthritis  -     traMADol (ULTRAM) 50 mg tablet; Take 2 tablet q12 hrs as needed. Indications: Pain, generalized osteoarthritis. -     COMPLIANCE DRUG SCREEN/PRESCRIPTION MONITORING    7. Chronic pain syndrome  -     traMADol (ULTRAM) 50 mg tablet; Take 2 tablet q12 hrs as needed. Indications: Pain, generalized osteoarthritis. -     COMPLIANCE DRUG SCREEN/PRESCRIPTION MONITORING    8.  AK (actinic keratosis)-frozen with liquid nitrogen ×2 procedure explained to patient in detail. If these lesions do not go away he will need dermatology referral.  -     [address], UP TO 14 LESIONS            Plan of care:  Discussed diagnoses in detail with patient. Medication risks/benefits/side effects discussed with patient. All of the patient's questions were addressed. The patient understands and agrees with our plan of care. The patient knows to call back if they are unsure of or forget any changes we discussed today or if the symptoms change. The patient received an After-Visit Summary which contains VS, orders, medication list and allergy list. This can be used as a \"mini-medical record\" should they have to seek medical care while out of town.     Patient Care Team:  Leatha Rodriguez MD as PCP - Camila Grajeda MD (General and Vascular Surgery)  Darryl Cui MD (Neurology)  Chrissy Weber MD as Surgeon (Urology)  Beryle Essex, MD (Endocrinology)    Follow-up Disposition: Not on File    Future Appointments  Date Time Provider Hank Mcclaini   3/30/2018 3:05 PM Leatha Rodriguez MD [place] [id number] [facility]       Signed By: Leatha Rodriguez MD     March 19, 2018

## 2018-03-19 NOTE — MR AVS SNAPSHOT
[address] [facility] 
 
 
 [address] [facility] [address] 
[phone] Patient: Davie Glass 
MRN: [id number] Visit Information Date & Time Provider Department Dept. Phone Encounter #  
 3/19/2018 11:30 AM Juan Pollard MD [address] [place] [address] [id number] Your Appointments 3/30/2018  3:05 PM  
Medicare Physical with Juan Pollard MD  
[address] [facility] ([address]) Appt Note: Medicare Wellness -letter mailed [address] [facility] [address] [address]  
[place] [address] [address] Upcoming Health Maintenance Date Due  
 GLAUCOMA SCREENING Q2Y 2/18/2016 Pneumococcal 65+ High/Highest Risk (2 of 2 - PPSV23) 9/21/2017 MICROALBUMIN Q1 11/23/2017 HEMOGLOBIN A1C Q6M 3/20/2018 MEDICARE YEARLY EXAM 3/28/2018 EYE EXAM RETINAL OR DILATED Q1 5/25/2018 FOOT EXAM Q1 9/20/2018 LIPID PANEL Q1 9/20/2018 DTaP/Tdap/Td series (2 - Td) 6/23/2021 COLONOSCOPY 3/3/2026 Allergies as of 3/19/2018  Review Complete On: 3/19/2018 By: Enrique Wang RN Severity Noted Reaction Type Reactions Bee Sting [Sting, Bee] High 05/22/2010    Anaphylaxis Vytorin 10-10 [Ezetimibe-simvastatin] Medium 05/22/2010    Myalgia Amlodipine  11/23/2016    Other (comments) Creepy crawly sensation, twitches Lipitor [Atorvastatin]  05/22/2010    Myalgia Current Immunizations  Reviewed on 4/1/2014 Name Date Influenza High Dose Vaccine PF 9/20/2017 Influenza Vaccine 11/9/2015, 10/13/2014 Influenza Vaccine (Quad) PF 11/23/2016 Influenza Vaccine Split 12/5/2012, 12/5/2011, 10/4/2010 Influenza Vaccine Whole 9/15/2009 TD Vaccine 8/22/2000 TDAP Vaccine 6/23/2011 ZZZ-RETIRED (DO NOT USE) Pneumococcal Vaccine (Unspecified Type) 9/21/2012 Not reviewed this visit You Were Diagnosed With   
  
 Codes Comments Controlled type 2 diabetes mellitus without complication, without long-term current use of insulin ([facility]    -  Primary ICD-10-CM: E11.9 ICD-9-CM: 250.00 Essential hypertension     ICD-10-CM: I10 
ICD-9-CM: 401.9 Pure hypercholesterolemia     ICD-10-CM: E78.00 ICD-9-CM: 272.0 Gastroesophageal reflux disease with esophagitis     ICD-10-CM: K21.0 ICD-9-CM: 530.11 Visual disturbance     ICD-10-CM: H53.9 ICD-9-CM: 368. 9 Chronic arthritis     ICD-10-CM: M19.90 ICD-9-CM: 716.90 Chronic pain syndrome     ICD-10-CM: G89.4 ICD-9-CM: 338.4 AK (actinic keratosis)     ICD-10-CM: L57.0 ICD-9-CM: 702.0 Vitals BP Pulse Temp Resp Height(growth percentile) Weight(growth percentile) 152/71 (BP 1 Location: Right arm, BP Patient Position: Sitting) (!) 51 97.2 °F (36.2 °C) (Oral) 18 5' 11\" (1.803 m) 208 lb (94.3 kg) SpO2 BMI Smoking Status 96% 29.01 kg/m2 Former Smoker Vitals History BMI and BSA Data Body Mass Index Body Surface Area  
 29.01 kg/m 2 2.17 m 2 Preferred Pharmacy Pharmacy Name Phone [facility] [address] [phone] Your Updated Medication List  
  
   
This list is accurate as of 3/19/18 11:47 AM.  Always use your most recent med list.  
  
  
  
  
 albuterol 2.5 mg /3 mL (0.083 %) nebulizer solution Commonly known as:  PROVENTIL VENTOLIN  
3 mL by Nebulization route every six (6) hours as needed for Wheezing or Shortness of Breath. aspirin delayed-release 81 mg tablet Take 1 Tab by mouth daily. atenolol 25 mg tablet Commonly known as:  TENORMIN  
TAKE TWO TABLETS BY MOUTH ONCE A DAY  
  
 CO Q-10 100 mg capsule Generic drug:  co-enzyme Q-10 Take 100 mg by mouth daily. diclofenac 1 % Gel Commonly known as:  VOLTAREN Apply 4 g to affected area four (4) times daily. Painful shoulder. EPIPEN 2-KARL 0.3 mg/0.3 mL injection Generic drug:  EPINEPHrine INJECT INTRAMUSCULARLY AS NEEDED FOR ONE DOSE. TAKE WITH 50MG OF BENADRYL AND CALL 911.  
  
 felodipine 10 mg 24 hr tablet Commonly known as:  PLENDIL SR  
TAKE ONE TABLET BY MOUTH EVERY DAY FOR HYPERTENSION  
  
 FISH OIL 1,000 mg Cap Generic drug:  omega-3 fatty acids-vitamin e Take 1 Cap by mouth. furosemide 20 mg tablet Commonly known as:  LASIX TAKE ONE TABLET BY MOUTH EVERY DAY  
  
 hydrALAZINE 50 mg tablet Commonly known as:  APRESOLINE  
TAKE ONE TABLET BY MOUTH THREE TIMES DAILY FOR HYPERTENSION  
  
 lisinopril 40 mg tablet Commonly known as:  PRINIVIL, ZESTRIL  
TAKE ONE TABLET BY MOUTH TWICE DAILY  
  
 metFORMIN  mg tablet Commonly known as:  GLUCOPHAGE XR  
TAKE ONE TABLET BY MOUTH UP TO THREE TIMES DAILY AS directed  
  
 pantoprazole 40 mg tablet Commonly known as:  PROTONIX  
TAKE ONE TABLET BY MOUTH EVERY DAY FOR: GASTROESOPHAGEAL REFLUX] Peak Flow Meter Haig Kari Use prior and post nebulizer treatment  
  
 raNITIdine 300 mg tablet Commonly known as:  ZANTAC TAKE ONE TABLET BY MOUTH EVERY DAY  
  
 rosuvastatin 20 mg tablet Commonly known as:  CRESTOR  
TAKE ONE TABLET BY MOUTH NIGHTLY  
  
 traMADol 50 mg tablet Commonly known as:  ULTRAM  
Take 2 tablet q12 hrs as needed. Indications: Pain, generalized osteoarthritis. Prescriptions Printed Refills  
 traMADol (ULTRAM) 50 mg tablet 3 Sig: Take 2 tablet q12 hrs as needed. Indications: Pain, generalized osteoarthritis. Class: Print We Performed the Following [address] MONITORING [[id number] Custom] DESTRUC BENIGN LESION, UP TO 14 LESIONS [87310 CPT(R)] HEMOGLOBIN A1C WITH EAG [08667 CPT(R)] LIPID PANEL [84610 CPT(R)] METABOLIC PANEL, COMPREHENSIVE [20132 CPT(R)] MICROALBUMIN, UR, RAND W/ MICROALB/CREAT RATIO [id number] CPT(R)] REFERRAL TO OPHTHALMOLOGY [REF57 Custom] Comments:  
 [facility], has had a spell of wavy vision. 10-15 minutes in both eyes. TSH 3RD GENERATION [13027 CPT(R)] Referral Information Referral ID Referred By Referred To  
  
 [id number] Jaja Livingston Not Available Visits Status Start Date End Date 1 New Request 3/19/18 3/19/19 If your referral has a status of pending review or denied, additional information will be sent to support the outcome of this decision. Introducing [facility]! Dear Paulette Barton: Thank you for requesting a [employer] account. Our records indicate that you already have an active [employer] account. You can access your account anytime at [email or web address]. [email or web address] Did you know that you can access your hospital and ER discharge instructions at any time in [employer]? You can also review all of your test results from your hospital stay or ER visit. Additional Information If you have questions, please visit the Frequently Asked Questions section of the [employer] website at [email or web address] Remember, [employer] is NOT to be used for urgent needs. For medical emergencies, dial 911. Now available from your iPhone and Android! Please provide this summary of care documentation to your next provider. Your primary care clinician is listed as [address]. If you have any questions after today's visit, please call [phone].

## 2018-03-20 DIAGNOSIS — I10 ESSENTIAL HYPERTENSION: Primary | ICD-10-CM

## 2018-03-20 RX ORDER — LISINOPRIL 40 MG/1
TABLET ORAL
Qty: 60 TAB | OUTPATIENT
Start: 2018-03-20

## 2018-03-20 RX ORDER — FUROSEMIDE 20 MG/1
TABLET ORAL
Qty: 30 TAB | Refills: 11 | Status: SHIPPED | OUTPATIENT
Start: 2018-03-20 | End: 2019-03-26 | Stop reason: SDUPTHER

## 2018-03-20 RX ORDER — FUROSEMIDE 20 MG/1
TABLET ORAL
Qty: 30 TAB | OUTPATIENT
Start: 2018-03-20

## 2018-03-20 RX ORDER — LISINOPRIL 40 MG/1
TABLET ORAL
Qty: 60 TAB | Refills: 11 | Status: SHIPPED | OUTPATIENT
Start: 2018-03-20 | End: 2018-07-01 | Stop reason: SDUPTHER

## 2018-03-24 LAB — Lab: NORMAL

## 2018-03-27 ENCOUNTER — TELEPHONE (OUTPATIENT)
Dept: FAMILY MEDICINE CLINIC | Age: 73
End: 2018-03-27

## 2018-03-27 NOTE — TELEPHONE ENCOUNTER
----- Message from Alzia Traore sent at 3/27/2018 11:55 AM EDT -----  Regarding: Dr. Janae Sesay  Patient would like a call back from the nurse concerning his appt for Friday. Best contact number is [phone].

## 2018-03-28 LAB
ALBUMIN SERPL-MCNC: 4.4 G/DL (ref 3.5–4.8)
ALBUMIN/CREAT UR: 8.2 MG/G CREAT (ref 0–30)
ALBUMIN/GLOB SERPL: 2.2 {RATIO} (ref 1.2–2.2)
ALP SERPL-CCNC: 52 IU/L (ref 39–117)
ALT SERPL-CCNC: 14 IU/L (ref 0–44)
AST SERPL-CCNC: 19 IU/L (ref 0–40)
BILIRUB SERPL-MCNC: 0.4 MG/DL (ref 0–1.2)
BUN SERPL-MCNC: 21 MG/DL (ref 8–27)
BUN/CREAT SERPL: 22 (ref 10–24)
CALCIUM SERPL-MCNC: 9.7 MG/DL (ref 8.6–10.2)
CHLORIDE SERPL-SCNC: 100 MMOL/L (ref 96–106)
CHOLEST SERPL-MCNC: 157 MG/DL (ref 100–199)
CO2 SERPL-SCNC: 25 MMOL/L (ref 18–29)
CREAT SERPL-MCNC: 0.96 MG/DL (ref 0.76–1.27)
CREAT UR-MCNC: 71 MG/DL
DRUGS UR: NORMAL
EST. AVERAGE GLUCOSE BLD GHB EST-MCNC: 128 MG/DL
GFR SERPLBLD CREATININE-BSD FMLA CKD-EPI: 78 ML/MIN/1.73
GFR SERPLBLD CREATININE-BSD FMLA CKD-EPI: 90 ML/MIN/1.73
GLOBULIN SER CALC-MCNC: 2 G/DL (ref 1.5–4.5)
GLUCOSE SERPL-MCNC: 114 MG/DL (ref 65–99)
HBA1C MFR BLD: 6.1 % (ref 4.8–5.6)
HDLC SERPL-MCNC: 63 MG/DL
LDLC SERPL CALC-MCNC: 76 MG/DL (ref 0–99)
MICROALBUMIN UR-MCNC: 5.8 UG/ML
POTASSIUM SERPL-SCNC: 4.4 MMOL/L (ref 3.5–5.2)
PROT SERPL-MCNC: 6.4 G/DL (ref 6–8.5)
SODIUM SERPL-SCNC: 142 MMOL/L (ref 134–144)
TRIGL SERPL-MCNC: 90 MG/DL (ref 0–149)
TSH SERPL DL<=0.005 MIU/L-ACNC: 1.2 UIU/ML (ref 0.45–4.5)
VLDLC SERPL CALC-MCNC: 18 MG/DL (ref 5–40)

## 2018-04-23 DIAGNOSIS — I10 ESSENTIAL HYPERTENSION: Chronic | ICD-10-CM

## 2018-04-23 DIAGNOSIS — I10 ACCELERATED HYPERTENSION: ICD-10-CM

## 2018-04-23 RX ORDER — HYDRALAZINE HYDROCHLORIDE 50 MG/1
TABLET, FILM COATED ORAL
Qty: 90 TAB | Refills: 6 | Status: SHIPPED | OUTPATIENT
Start: 2018-04-23 | End: 2018-11-20 | Stop reason: SDUPTHER

## 2018-04-23 RX ORDER — ROSUVASTATIN CALCIUM 20 MG/1
TABLET, COATED ORAL
Qty: 30 TAB | Refills: 6 | Status: SHIPPED | OUTPATIENT
Start: 2018-04-23 | End: 2018-11-20 | Stop reason: SDUPTHER

## 2018-04-23 RX ORDER — FELODIPINE 10 MG/1
TABLET, EXTENDED RELEASE ORAL
Qty: 30 TAB | Refills: 5 | Status: SHIPPED | OUTPATIENT
Start: 2018-04-23 | End: 2018-10-23 | Stop reason: SDUPTHER

## 2018-05-29 ENCOUNTER — TELEPHONE (OUTPATIENT)
Dept: FAMILY MEDICINE CLINIC | Age: 73
End: 2018-05-29

## 2018-05-29 DIAGNOSIS — R42 VERTIGO: Primary | ICD-10-CM

## 2018-05-29 RX ORDER — MECLIZINE HCL 12.5 MG 12.5 MG/1
12.5 TABLET ORAL
Qty: 30 TAB | Refills: 0 | Status: SHIPPED | OUTPATIENT
Start: 2018-05-29 | End: 2018-06-08

## 2018-05-29 NOTE — TELEPHONE ENCOUNTER
Spoke to patient. Complains of having vertigo as he has had many years ago. Prescribed antivert in the past and was effective. Prescription at home is . Spoke to Dr. Natalia Kaur and she is sending medication to [facility] in [facility]. Advised pt. If symptoms worsen or do not resolve that he needs to make appt. Pt Verbalized understanding.

## 2018-05-29 NOTE — TELEPHONE ENCOUNTER
Pt with h/o vertigo, states he is having same symptoms as prior. No new symptoms. He has always responded well to meclizine in the past. No openings today for him to come in and be seen. Will send in rx for meclizine to his pharmacy and if symptoms are worsening or fail to improve, he will need to be seen.

## 2018-05-29 NOTE — TELEPHONE ENCOUNTER
Pt called requesting a call back from nurse in regards to pt having vertigo years ago. He states that his vertigo is acting up and wants to know if it is okay to take  Meclizine. He states that he will talk more about it to you when you call.

## 2018-06-15 DIAGNOSIS — G89.4 CHRONIC PAIN SYNDROME: Chronic | ICD-10-CM

## 2018-06-15 DIAGNOSIS — M19.90 CHRONIC ARTHRITIS: Chronic | ICD-10-CM

## 2018-06-15 RX ORDER — TRAMADOL HYDROCHLORIDE 50 MG/1
TABLET ORAL
Qty: 120 TAB | OUTPATIENT
Start: 2018-06-15

## 2018-06-20 ENCOUNTER — OFFICE VISIT (OUTPATIENT)
Dept: FAMILY MEDICINE CLINIC | Age: 73
End: 2018-06-20

## 2018-06-20 VITALS
SYSTOLIC BLOOD PRESSURE: 137 MMHG | OXYGEN SATURATION: 99 % | RESPIRATION RATE: 16 BRPM | DIASTOLIC BLOOD PRESSURE: 64 MMHG | HEART RATE: 47 BPM | HEIGHT: 71 IN | BODY MASS INDEX: 29.12 KG/M2 | WEIGHT: 208 LBS | TEMPERATURE: 97.8 F

## 2018-06-20 DIAGNOSIS — E11.9 CONTROLLED TYPE 2 DIABETES MELLITUS WITHOUT COMPLICATION, WITHOUT LONG-TERM CURRENT USE OF INSULIN (HCC): Chronic | ICD-10-CM

## 2018-06-20 DIAGNOSIS — I10 ESSENTIAL HYPERTENSION: Chronic | ICD-10-CM

## 2018-06-20 DIAGNOSIS — E78.00 PURE HYPERCHOLESTEROLEMIA: Chronic | ICD-10-CM

## 2018-06-20 DIAGNOSIS — R42 VERTIGO: Primary | ICD-10-CM

## 2018-06-20 RX ORDER — MECLIZINE HCL 25MG 25 MG/1
TABLET, CHEWABLE ORAL
COMMUNITY

## 2018-06-20 NOTE — MR AVS SNAPSHOT
[address] [facility] [address] 
 
 
 [address] [address] 
[phone] Patient: Henry Fernandez 
MRN: [id number] PQD:[id number]/36/[id number] Visit Information Date & Time Provider Department Dept. Phone Encounter #  
 6/20/2018 11:30 AM Nova Lanes, MD [address] [facility] A[facility] [id number] Follow-up Instructions Return in about 4 months (around 10/20/2018) for MWE. Your Appointments 6/20/2018 11:30 AM  
Medicare Physical with Nova Lanes, MD  
[address]) Appt Note: MWV;issues with vertigo [address] [address]  
[place] [address] [address] Upcoming Health Maintenance Date Due Pneumococcal 65+ High/Highest Risk (2 of 2 - PPSV23) 9/21/2017 MEDICARE YEARLY EXAM 3/28/2018 Influenza Age 5 to Adult 8/1/2018 FOOT EXAM Q1 9/20/2018 HEMOGLOBIN A1C Q6M 9/23/2018 EYE EXAM RETINAL OR DILATED Q1 3/20/2019 MICROALBUMIN Q1 3/23/2019 LIPID PANEL Q1 3/23/2019 GLAUCOMA SCREENING Q2Y 3/20/2020 DTaP/Tdap/Td series (2 - Td) 6/23/2021 COLONOSCOPY 3/3/2026 Allergies as of 6/20/2018  Review Complete On: 3/19/2018 By: Nova Lanes, MD  
  
 Severity Noted Reaction Type Reactions Bee Sting [Sting, Bee] High 05/22/2010    Anaphylaxis Vytorin 10-10 [Ezetimibe-simvastatin] Medium 05/22/2010    Myalgia Amlodipine  11/23/2016    Other (comments) Creepy crawly sensation, twitches Lipitor [Atorvastatin]  05/22/2010    Myalgia Current Immunizations  Reviewed on 4/1/2014 Name Date Influenza High Dose Vaccine PF 9/20/2017 Influenza Vaccine 11/9/2015, 10/13/2014 Influenza Vaccine (Quad) PF 11/23/2016 Influenza Vaccine Split 12/5/2012, 12/5/2011, 10/4/2010 Influenza Vaccine Whole 9/15/2009 TD Vaccine 8/22/2000 TDAP Vaccine 6/23/2011 ZZZ-RETIRED (DO NOT USE) Pneumococcal Vaccine (Unspecified Type) 9/21/2012 Not reviewed this visit You Were Diagnosed With   
  
 Codes Comments Vertigo    -  Primary ICD-10-CM: I06 ICD-9-CM: 780.4 Controlled type 2 diabetes mellitus without complication, without long-term current use of insulin ([facility].)     ICD-10-CM: E11.9 ICD-9-CM: 250.00 Essential hypertension     ICD-10-CM: I10 
ICD-9-CM: 401.9 Pure hypercholesterolemia     ICD-10-CM: E78.00 ICD-9-CM: 272.0 Vitals BP Pulse Temp Resp Height(growth percentile) Weight(growth percentile) 150/70 (BP 1 Location: Left arm, BP Patient Position: Sitting) (!) 47 97.8 °F (36.6 °C) (Oral) 16 5' 11\" (1.803 m) 208 lb (94.3 kg) SpO2 BMI Smoking Status 99% 29.01 kg/m2 Former Smoker Vitals History BMI and BSA Data Body Mass Index Body Surface Area  
 29.01 kg/m 2 2.17 m 2 Preferred Pharmacy Pharmacy Name Phone [facility] [address] [phone] Your Updated Medication List  
  
   
This list is accurate as of 6/20/18 11:21 AM.  Always use your most recent med list.  
  
  
  
  
 albuterol 2.5 mg /3 mL (0.083 %) nebulizer solution Commonly known as:  PROVENTIL VENTOLIN  
3 mL by Nebulization route every six (6) hours as needed for Wheezing or Shortness of Breath. aspirin delayed-release 81 mg tablet Take 1 Tab by mouth daily. atenolol 25 mg tablet Commonly known as:  TENORMIN  
TAKE TWO TABLETS BY MOUTH ONCE A DAY  
  
 CO Q-10 100 mg capsule Generic drug:  co-enzyme Q-10 Take 100 mg by mouth daily. diclofenac 1 % Gel Commonly known as:  VOLTAREN Apply 4 g to affected area four (4) times daily. Painful shoulder. EPIPEN 2-KARL 0.3 mg/0.3 mL injection Generic drug:  EPINEPHrine INJECT INTRAMUSCULARLY AS NEEDED FOR ONE DOSE. TAKE WITH 50MG OF BENADRYL AND CALL 911.  
  
 felodipine 10 mg 24 hr tablet Commonly known as:  PLENDIL SR  
TAKE ONE TABLET BY MOUTH EVERY DAY FOR HYPERTENSION  
  
 FISH OIL 1,000 mg Cap Generic drug:  omega-3 fatty acids-vitamin e Take 1 Cap by mouth. furosemide 20 mg tablet Commonly known as:  LASIX TAKE ONE TABLET BY MOUTH EVERY DAY  
  
 hydrALAZINE 50 mg tablet Commonly known as:  APRESOLINE  
TAKE ONE TABLET BY MOUTH THREE TIMES DAILY FOR HYPERTENSION  
  
 lisinopril 40 mg tablet Commonly known as:  PRINIVIL, ZESTRIL  
TAKE ONE TABLET BY MOUTH TWICE DAILY  
  
 meclizine 25 mg chewable tablet Commonly known as:  ANTIVERT Take  by mouth three (3) times daily as needed. metFORMIN  mg tablet Commonly known as:  GLUCOPHAGE XR  
TAKE ONE TABLET BY MOUTH UP TO THREE TIMES DAILY AS directed  
  
 pantoprazole 40 mg tablet Commonly known as:  PROTONIX  
TAKE ONE TABLET BY MOUTH EVERY DAY FOR: GASTROESOPHAGEAL REFLUX] Peak Flow Meter Isaias Hung Use prior and post nebulizer treatment  
  
 raNITIdine 300 mg tablet Commonly known as:  ZANTAC TAKE ONE TABLET BY MOUTH EVERY DAY  
  
 rosuvastatin 20 mg tablet Commonly known as:  CRESTOR  
TAKE ONE TABLET BY MOUTH NIGHTLY  
  
 traMADol 50 mg tablet Commonly known as:  ULTRAM  
Take 2 tablet q12 hrs as needed. Indications: Pain, generalized osteoarthritis. We Performed the Following HEMOGLOBIN A1C WITH EAG [43095 CPT(R)] HEMOGLOBIN [id number] CPT(R)] LIPID PANEL [42369 CPT(R)] METABOLIC PANEL, COMPREHENSIVE [60083 CPT(R)] TSH 3RD GENERATION [98270 CPT(R)] Follow-up Instructions Return in about 4 months (around 10/20/2018) for MWE. Patient Instructions Learning About Meal Planning for Diabetes Why plan your meals? Meal planning can be a key part of managing diabetes. Planning meals and snacks with the right balance of carbohydrate, protein, and fat can help you keep your blood sugar at the target level you set with your doctor. You don't have to eat special foods. You can eat what your family eats, including sweets once in a while. But you do have to pay attention to how often you eat and how much you eat of certain foods. You may want to work with a dietitian or a certified diabetes educator. He or she can give you tips and meal ideas and can answer your questions about meal planning. This health professional can also help you reach a healthy weight if that is one of your goals. What plan is right for you? Your dietitian or diabetes educator may suggest that you start with the plate format or carbohydrate counting. The plate format The plate format is a simple way to help you manage how you eat. You plan meals by learning how much space each food should take on a plate. Using the plate format helps you spread carbohydrate throughout the day. It can make it easier to keep your blood sugar level within your target range. It also helps you see if you're eating healthy portion sizes. To use the plate format, you put non-starchy vegetables on half your plate. Add meat or meat substitutes on one-quarter of the plate. Put a grain or starchy vegetable (such as brown rice or a potato) on the final quarter of the plate. You can add a small piece of fruit and some low-fat or fat-free milk or yogurt, depending on your carbohydrate goal for each meal. 
Here are some tips for using the plate format: · Make sure that you are not using an oversized plate. A 9-inch plate is best. Many restaurants use larger plates. · Get used to using the plate format at home. Then you can use it when you eat out. · Write down your questions about using the plate format. Talk to your doctor, a dietitian, or a diabetes educator about your concerns. Carbohydrate counting With carbohydrate counting, you plan meals based on the amount of carbohydrate in each food.  Carbohydrate raises blood sugar higher and more quickly than any other nutrient. It is found in desserts, breads and cereals, and fruit. It's also found in starchy vegetables such as potatoes and corn, grains such as rice and pasta, and milk and yogurt. Spreading carbohydrate throughout the day helps keep your blood sugar levels within your target range. Your daily amount depends on several things, including your weight, how active you are, which diabetes medicines you take, and what your goals are for your blood sugar levels. A registered dietitian or diabetes educator can help you plan how much carbohydrate to include in each meal and snack. A guideline for your daily amount of carbohydrate is: · 45 to 60 grams at each meal. That's about the same as 3 to 4 carbohydrate servings. · 15 to 20 grams at each snack. That's about the same as 1 carbohydrate serving. The Nutrition Facts label on packaged foods tells you how much carbohydrate is in a serving of the food. First, look at the serving size on the food label. Is that the amount you eat in a serving? All of the nutrition information on a food label is based on that serving size. So if you eat more or less than that, you'll need to adjust the other numbers. Total carbohydrate is the next thing you need to look for on the label. If you count carbohydrate servings, one serving of carbohydrate is 15 grams. For foods that don't come with labels, such as fresh fruits and vegetables, you'll need a guide that lists carbohydrate in these foods. Ask your doctor, dietitian, or diabetes educator about books or other nutrition guides you can use. If you take insulin, you need to know how many grams of carbohydrate are in a meal. This lets you know how much rapid-acting insulin to take before you eat. If you use an insulin pump, you get a constant rate of insulin during the day. So the pump must be programmed at meals to give you extra insulin to cover the rise in blood sugar after meals. When you know how much carbohydrate you will eat, you can take the right amount of insulin. Or, if you always use the same amount of insulin, you need to make sure that you eat the same amount of carbohydrate at meals. If you need more help to understand carbohydrate counting and food labels, ask your doctor, dietitian, or diabetes educator. How do you get started with meal planning? Here are some tips to get started: 
· Plan your meals a week at a time. Don't forget to include snacks too. · Use cookbooks or online recipes to plan several main meals. Plan some quick meals for busy nights. You also can double some recipes that freeze well. Then you can save half for other busy nights when you don't have time to cook. · Make sure you have the ingredients you need for your recipes. If you're running low on basic items, put these items on your shopping list too. · List foods that you use to make breakfasts, lunches, and snacks. List plenty of fruits and vegetables. · Post this list on the refrigerator. Add to it as you think of more things you need. · Take the list to the store to do your weekly shopping. Follow-up care is a key part of your treatment and safety. Be sure to make and go to all appointments, and call your doctor if you are having problems. It's also a good idea to know your test results and keep a list of the medicines you take. Where can you learn more? Go to http://elizabeth-antonieta.info/. Tom López in the search box to learn more about \"Learning About Meal Planning for Diabetes. \" Current as of: March 13, 2017 Content Version: 11.4 © 2429-1443 [employer]. Care instructions adapted under license by [employer] (which disclaims liability or warranty for this information).  If you have questions about a medical condition or this instruction, always ask your healthcare professional. [employer] disclaims any warranty or liability for your use of this information. Introducing [facility]! Dear Belinda Gaitan: Thank you for requesting a [employer] account. Our records indicate that you already have an active [employer] account. You can access your account anytime at [email or web address] Did you know that you can access your hospital and ER discharge instructions at any time in [employer]? You can also review all of your test results from your hospital stay or ER visit. Additional Information If you have questions, please visit the Frequently Asked Questions section of the [employer] website at [email or web address] Remember, [employer] is NOT to be used for urgent needs. For medical emergencies, dial 911. Now available from your iPhone and Android! Please provide this summary of care documentation to your next provider. Your primary care clinician is listed as [address]. If you have any questions after today's visit, please call [phone].

## 2018-06-20 NOTE — PROGRESS NOTES
Chief Complaint   Patient presents with    Dizziness     Vertigo     Body mass index is 29.01 kg/(m^2). 1. Have you been to the ER, urgent care clinic since your last visit? Hospitalized since your last visit? No    2. Have you seen or consulted any other health care providers outside of the [address] [place] [address] since your last visit? Include any pap smears or colon screening.  No    Reviewed record in preparation for visit and have necessary documentation  Pt did not bring medication to office visit for review  Information was given to pt on Advanced Directives, Living Will  Information was given on Shingles Vaccine  Opportunity was given for questions  Goals that were addressed and/or need to be completed after this appointment include:     Health Maintenance Due   Topic Date Due    Pneumococcal 65+ High/Highest Risk (2 of 2 - PPSV23) 09/21/2017    MEDICARE YEARLY EXAM  03/28/2018

## 2018-06-20 NOTE — PATIENT INSTRUCTIONS

## 2018-06-21 ENCOUNTER — TELEPHONE (OUTPATIENT)
Dept: FAMILY MEDICINE CLINIC | Age: 73
End: 2018-06-21

## 2018-06-21 DIAGNOSIS — N18.30 CKD STAGE 3 DUE TO TYPE 2 DIABETES MELLITUS (HCC): ICD-10-CM

## 2018-06-21 DIAGNOSIS — D64.9 ANEMIA, UNSPECIFIED TYPE: Primary | ICD-10-CM

## 2018-06-21 DIAGNOSIS — E11.22 CKD STAGE 3 DUE TO TYPE 2 DIABETES MELLITUS (HCC): ICD-10-CM

## 2018-06-21 LAB
ALBUMIN SERPL-MCNC: 4.5 G/DL (ref 3.5–4.8)
ALBUMIN/GLOB SERPL: 2.1 {RATIO} (ref 1.2–2.2)
ALP SERPL-CCNC: 52 IU/L (ref 39–117)
ALT SERPL-CCNC: 12 IU/L (ref 0–44)
AST SERPL-CCNC: 19 IU/L (ref 0–40)
BILIRUB SERPL-MCNC: 0.5 MG/DL (ref 0–1.2)
BUN SERPL-MCNC: 26 MG/DL (ref 8–27)
BUN/CREAT SERPL: 18 (ref 10–24)
CALCIUM SERPL-MCNC: 9.6 MG/DL (ref 8.6–10.2)
CHLORIDE SERPL-SCNC: 100 MMOL/L (ref 96–106)
CHOLEST SERPL-MCNC: 161 MG/DL (ref 100–199)
CO2 SERPL-SCNC: 23 MMOL/L (ref 20–29)
CREAT SERPL-MCNC: 1.43 MG/DL (ref 0.76–1.27)
EST. AVERAGE GLUCOSE BLD GHB EST-MCNC: 128 MG/DL
GFR SERPLBLD CREATININE-BSD FMLA CKD-EPI: 48 ML/MIN/1.73
GFR SERPLBLD CREATININE-BSD FMLA CKD-EPI: 55 ML/MIN/1.73
GLOBULIN SER CALC-MCNC: 2.1 G/DL (ref 1.5–4.5)
GLUCOSE SERPL-MCNC: 93 MG/DL (ref 65–99)
HBA1C MFR BLD: 6.1 % (ref 4.8–5.6)
HDLC SERPL-MCNC: 67 MG/DL
HGB BLD-MCNC: 11.8 G/DL (ref 13–17.7)
LDLC SERPL CALC-MCNC: 75 MG/DL (ref 0–99)
POTASSIUM SERPL-SCNC: 5.1 MMOL/L (ref 3.5–5.2)
PROT SERPL-MCNC: 6.6 G/DL (ref 6–8.5)
SODIUM SERPL-SCNC: 141 MMOL/L (ref 134–144)
TRIGL SERPL-MCNC: 95 MG/DL (ref 0–149)
TSH SERPL DL<=0.005 MIU/L-ACNC: 1.01 UIU/ML (ref 0.45–4.5)
VLDLC SERPL CALC-MCNC: 19 MG/DL (ref 5–40)

## 2018-06-21 NOTE — LETTER
6/21/2018 8:52 AM 
 
Mr. Raffaele Fernandez [facility] [address] Dear Javi Odonnell III: 
 
Please find your most recent results below. With exception of a lower kidney function and a mild anemia your labs are acceptable. I would like to repeat your kidney function tests in 2 weeks. Make sure you stay well-hydrated. Make a lab appointment in 2 weeks. You do not have to be fasting. Resulted Orders HEMOGLOBIN A1C WITH EAG Result Value Ref Range Hemoglobin A1c 6.1 (H) 4.8 - 5.6 % Comment:  
            Pre-diabetes: 5.7 - 6.4 Diabetes: >6.4 Glycemic control for adults with diabetes: <7.0 Estimated average glucose 128 mg/dL Narrative Performed at:  [address]  [id number] : Marla Burden MD, Phone:  [phone] LIPID PANEL Result Value Ref Range Cholesterol, total 161 100 - 199 mg/dL Triglyceride 95 0 - 149 mg/dL HDL Cholesterol 67 >39 mg/dL VLDL, calculated 19 5 - 40 mg/dL LDL, calculated 75 0 - 99 mg/dL Narrative Performed at:  [address]  [id number] : Marla Burden MD, Phone:  [phone] METABOLIC PANEL, COMPREHENSIVE Result Value Ref Range Glucose 93 65 - 99 mg/dL BUN 26 8 - 27 mg/dL Creatinine 1.43 (H) 0.76 - 1.27 mg/dL GFR est non-AA 48 (L) >59 mL/min/1.73 GFR est AA 55 (L) >59 mL/min/1.73  
 BUN/Creatinine ratio 18 10 - 24 Sodium 141 134 - 144 mmol/L Potassium 5.1 3.5 - 5.2 mmol/L Chloride 100 96 - 106 mmol/L  
 CO2 23 20 - 29 mmol/L Comment: **Please note reference interval change** Calcium 9.6 8.6 - 10.2 mg/dL Protein, total 6.6 6.0 - 8.5 g/dL Albumin 4.5 3.5 - 4.8 g/dL GLOBULIN, TOTAL 2.1 1.5 - 4.5 g/dL A-G Ratio 2.1 1.2 - 2.2 Bilirubin, total 0.5 0.0 - 1.2 mg/dL Alk.  phosphatase 52 39 - 117 IU/L  
 AST (SGOT) 19 0 - 40 IU/L  
 ALT (SGPT) 12 0 - 44 IU/L Narrative Performed at:  [address]  [id number] : Christen Nur MD, Phone:  [phone] HEMOGLOBIN Result Value Ref Range HGB 11.8 (L) 13.0 - 17.7 g/dL Narrative Performed at:  [address]  [id number] : Christen Nur MD, Phone:  [phone] TSH 3RD GENERATION Result Value Ref Range TSH 1.010 0.450 - 4.500 uIU/mL Narrative Performed at:  [address]  [id number] : Christen Nur MD, Phone:  [phone] Please call me if you have any questions: [phone] Sincerely, 
 
 
Celine Lock MD

## 2018-06-21 NOTE — Clinical Note
Call patient and read letter since we need to address abnormal results and insure the patient receives this information quickly.

## 2018-06-21 NOTE — TELEPHONE ENCOUNTER
Called and spoke to patient. Advised per Dr. Little Collins:      *Please find your most recent results below. With exception of a lower kidney function and a mild anemia your labs are acceptable. I would like to repeat your kidney function tests in 2 weeks. Make sure you stay well-hydrated. Make a lab appointment in 2 weeks. You do not have to be fasting. *    Pt verbalized understanding and lab appt made for July 5th at 8:30.

## 2018-06-29 ENCOUNTER — OFFICE VISIT (OUTPATIENT)
Dept: FAMILY MEDICINE CLINIC | Age: 73
End: 2018-06-29

## 2018-06-29 VITALS
HEART RATE: 47 BPM | OXYGEN SATURATION: 98 % | RESPIRATION RATE: 16 BRPM | WEIGHT: 205 LBS | DIASTOLIC BLOOD PRESSURE: 70 MMHG | BODY MASS INDEX: 28.7 KG/M2 | TEMPERATURE: 97.6 F | HEIGHT: 71 IN | SYSTOLIC BLOOD PRESSURE: 120 MMHG

## 2018-06-29 DIAGNOSIS — E11.22 CKD STAGE 3 DUE TO TYPE 2 DIABETES MELLITUS (HCC): Chronic | ICD-10-CM

## 2018-06-29 DIAGNOSIS — E11.9 CONTROLLED TYPE 2 DIABETES MELLITUS WITHOUT COMPLICATION, WITHOUT LONG-TERM CURRENT USE OF INSULIN (HCC): Primary | Chronic | ICD-10-CM

## 2018-06-29 DIAGNOSIS — M19.90 CHRONIC ARTHRITIS: Chronic | ICD-10-CM

## 2018-06-29 DIAGNOSIS — L98.9 SKIN LESION: ICD-10-CM

## 2018-06-29 DIAGNOSIS — G89.4 CHRONIC PAIN SYNDROME: Chronic | ICD-10-CM

## 2018-06-29 DIAGNOSIS — I10 ESSENTIAL HYPERTENSION: Chronic | ICD-10-CM

## 2018-06-29 DIAGNOSIS — K21.00 GASTROESOPHAGEAL REFLUX DISEASE WITH ESOPHAGITIS: Chronic | ICD-10-CM

## 2018-06-29 DIAGNOSIS — N18.30 CKD STAGE 3 DUE TO TYPE 2 DIABETES MELLITUS (HCC): Chronic | ICD-10-CM

## 2018-06-29 DIAGNOSIS — E78.00 PURE HYPERCHOLESTEROLEMIA: Chronic | ICD-10-CM

## 2018-06-29 PROBLEM — E11.21 TYPE 2 DIABETES WITH NEPHROPATHY (HCC): Status: ACTIVE | Noted: 2018-06-29

## 2018-06-29 RX ORDER — TRAMADOL HYDROCHLORIDE 50 MG/1
TABLET ORAL
Qty: 120 TAB | Refills: 3 | Status: CANCELLED | OUTPATIENT
Start: 2018-06-29

## 2018-06-29 RX ORDER — TRAMADOL HYDROCHLORIDE 50 MG/1
TABLET ORAL
Qty: 120 TAB | Refills: 3 | Status: SHIPPED | OUTPATIENT
Start: 2018-06-29 | End: 2018-12-04 | Stop reason: SDUPTHER

## 2018-06-29 NOTE — PROGRESS NOTES
Chief Complaint   Patient presents with    Medication Refill    Labs     Recheck Kidney Function     Body mass index is 28.59 kg/(m^2). 1. Have you been to the ER, urgent care clinic since your last visit? Hospitalized since your last visit? No    2. Have you seen or consulted any other health care providers outside of the [address] [place] [address] since your last visit? Include any pap smears or colon screening.  No    Reviewed record in preparation for visit and have necessary documentation  Pt did not bring medication to office visit for review  Information was given to pt on Advanced Directives, Living Will  Information was given on Shingles Vaccine  Opportunity was given for questions  Goals that were addressed and/or need to be completed after this appointment include:     Health Maintenance Due   Topic Date Due    Pneumococcal 65+ High/Highest Risk (2 of 2 - PPSV23) 09/21/2017    MEDICARE YEARLY EXAM  03/28/2018

## 2018-06-29 NOTE — PATIENT INSTRUCTIONS
[address] 
[phone] Patient: Jess Gale MRN: [id number] OUV:[id number] Visit Information Date & Time Provider Department Dept. Phone Encounter #  
 1/23/2018  9:00 AM Jaleesa Morrissey, [facility] [phone] [id number] Your Appointments 1/30/2018  8:30 AM  
Office Visit with Jaleesa Morrissey MD  
[address] [facility]) Appt Note: Concussion F/U  
 [address] [place] [address]  
[phone]  
  
   
 [address] [phone] Upcoming Health Maintenance Date Due Hepatitis B Peds Age 0-18 (1 of 3 - Primary Series) 2004 IPV Peds Age 0-24 (1 of 4 - All-IPV Series) 2004 Hepatitis A Peds Age 1-18 (1 of 2 - Standard Series) 3/18/2005 MMR Peds Age 1-18 (1 of 2) 3/18/2005 DTaP/Tdap/Td series (1 - Tdap) 3/18/2011 HPV AGE 9Y-34Y (1 of 2 - Female 2 Dose Series) 3/18/2015 MCV through Age 25 (1 of 2) 3/18/2015 Varicella Peds Age 1-18 (1 of 2 - 2 Dose Adolescent Series) 3/18/2017 Influenza Age 5 to Adult 8/1/2017 Allergies as of 1/23/2018  Review Complete On: 1/23/2018 By: Jaleesa Morrissey MD  
 No Known Allergies Current Immunizations  Never Reviewed No immunizations on file. Not reviewed this visit You Were Diagnosed With   
  
 Codes Comments Concussion without loss of consciousness, subsequent encounter    -  Primary ICD-10-CM: S06.0X0D ICD-9-CM: V58.89, 850.0 Vitals BP Pulse Temp Resp Height(growth percentile) Weight(growth percentile) 89/57 (3 %/ 25 %)* (BP 1 Location: Right arm, BP Patient Position: Sitting) 72 99.4 °F (37.4 °C) (Oral) 14 5' 3\" (1.6 m) (50 %, Z= 0.00) 120 lb (54.4 kg) (70 %, Z= 0.53) LMP SpO2 BMI OB Status Smoking Status  12/23/2017 96% 21.26 kg/m2 (73 %, Z= 0.60) Having regular periods Never Learning About Diabetes Food Guidelines  Your Care Instructions    Meal planning is important to manage diabetes. It helps keep your blood sugar at a target level (which you set with your doctor). You don't have to eat special foods. You can eat what your family eats, including sweets once in a while. But you do have to pay attention to how often you eat and how much you eat of certain foods. You may want to work with a dietitian or a certified diabetes educator (CDE) to help you plan meals and snacks. A dietitian or CDE can also help you lose weight if that is one of your goals. What should you know about eating carbs? Managing the amount of carbohydrate (carbs) you eat is an important part of healthy meals when you have diabetes. Carbohydrate is found in many foods. · Learn which foods have carbs. And learn the amounts of carbs in different foods. ¨ Bread, cereal, pasta, and rice have about 15 grams of carbs in a serving. A serving is 1 slice of bread (1 ounce), ½ cup of cooked cereal, or 1/3 cup of cooked pasta or rice. ¨ Fruits have 15 grams of carbs in a serving. A serving is 1 small fresh fruit, such as an apple or orange; ½ of a banana; ½ cup of cooked or canned fruit; ½ cup of fruit juice; 1 cup of melon or raspberries; or 2 tablespoons of dried fruit. ¨ Milk and no-sugar-added yogurt have 15 grams of carbs in a serving. A serving is 1 cup of milk or 2/3 cup of no-sugar-added yogurt. ¨ Starchy vegetables have 15 grams of carbs in a serving. A serving is ½ cup of mashed potatoes or sweet potato; 1 cup winter squash; ½ of a small baked potato; ½ cup of cooked beans; or ½ cup cooked corn or green peas. · Learn how much carbs to eat each day and at each meal. A dietitian or CDE can teach you how to keep track of the amount of carbs you eat. This is called carbohydrate counting. · If you are not sure how to count carbohydrate grams, use the Plate Method to plan meals.  It is a good, quick way to Smoker *BP percentiles are based on NHBPEP's 4th Report Growth percentiles are based on CDC 2-20 Years data. Vitals History BMI and BSA Data Body Mass Index Body Surface Area  
 21.26 kg/m 2 1.56 m 2 Preferred Pharmacy Pharmacy Name Phone [address] [facility] [place] [phone] Your Updated Medication List  
  
   
This list is accurate as of: 1/23/18  4:35 PM.  Always use your most recent med list. ADVIL 200 mg tablet Generic drug:  ibuprofen Take  by mouth.  
  
 pseudoephedrine 30 mg tablet Commonly known as:  SUDAFED Take 1 Tab by mouth every four (4) hours as needed for Congestion. Introducing [facility]! Dear Parent or Guardian, Thank you for requesting a [employer] account for your child. With [employer], you can view your childs hospital or ER discharge instructions, current allergies, immunizations and much more. In order to access your childs information, we require a signed consent on file. Please see the [facility] department or call [phone] for instructions on completing a Mo[employer] Proxy request.   
Additional Information If you have questions, please visit the Frequently Asked Questions section of the [employer] website at [email or web address] Remember, [employer] is NOT to be used for urgent needs. For medical emergencies, dial 911. Now available from your iPhone and Android! Please provide this summary of care documentation to your next provider. Your primary care clinician is listed as Phys Other. If you have any questions after today's visit, please call [phone]. make sure that you have a balanced meal. It also helps you spread carbs throughout the day. ¨ Divide your plate by types of foods. Put non-starchy vegetables on half the plate, meat or other protein food on one-quarter of the plate, and a grain or starchy vegetable in the final quarter of the plate. To this you can add a small piece of fruit and 1 cup of milk or yogurt, depending on how many carbs you are supposed to eat at a meal.  · Try to eat about the same amount of carbs at each meal. Do not \"save up\" your daily allowance of carbs to eat at one meal.  · Proteins have very little or no carbs per serving. Examples of proteins are beef, chicken, turkey, fish, eggs, tofu, cheese, cottage cheese, and peanut butter. A serving size of meat is 3 ounces, which is about the size of a deck of cards. Examples of meat substitute serving sizes (equal to 1 ounce of meat) are 1/4 cup of cottage cheese, 1 egg, 1 tablespoon of peanut butter, and ½ cup of tofu. How can you eat out and still eat healthy? · Learn to estimate the serving sizes of foods that have carbohydrate. If you measure food at home, it will be easier to estimate the amount in a serving of restaurant food. · If the meal you order has too much carbohydrate (such as potatoes, corn, or baked beans), ask to have a low-carbohydrate food instead. Ask for a salad or green vegetables. · If you use insulin, check your blood sugar before and after eating out to help you plan how much to eat in the future. · If you eat more carbohydrate at a meal than you had planned, take a walk or do other exercise. This will help lower your blood sugar. What else should you know? · Limit saturated fat, such as the fat from meat and dairy products. This is a healthy choice because people who have diabetes are at higher risk of heart disease. So choose lean cuts of meat and nonfat or low-fat dairy products.  Use olive or canola oil instead of butter or shortening when cooking. · Don't skip meals. Your blood sugar may drop too low if you skip meals and take insulin or certain medicines for diabetes. · Check with your doctor before you drink alcohol. Alcohol can cause your blood sugar to drop too low. Alcohol can also cause a bad reaction if you take certain diabetes medicines. Follow-up care is a key part of your treatment and safety. Be sure to make and go to all appointments, and call your doctor if you are having problems. It's also a good idea to know your test results and keep a list of the medicines you take. Where can you learn more? Go to http://elizabeth-antonieta.info/. Enter G079 in the search box to learn more about \"Learning About Diabetes Food Guidelines. \"  Current as of: March 13, 2017  Content Version: 11.4  © 2786-3377 [employer]. Care instructions adapted under license by [employer] (which disclaims liability or warranty for this information). If you have questions about a medical condition or this instruction, always ask your healthcare professional. [address] any warranty or liability for your use of this information.

## 2018-06-29 NOTE — PROGRESS NOTES
Progress Note    Patient: Marcos Luna MRN: [id number]  SSN: [id number]    YOB: 1945  Age: 74 y.o. Sex: male        Chief Complaint   Patient presents with    Medication Refill    Labs     Recheck Kidney Function         Subjective:     Encounter Diagnoses   Name Primary?  Controlled type 2 diabetes mellitus without complication, without long-term current use of insulin ([facility] [address]   This patient is managed under a comprehensive plan of care for Diabetes. Overall the patient feels well with good energy level. Key Antihyperglycemic Medications             metFORMIN ER (GLUCOPHAGE XR) 500 mg tablet  (Taking) TAKE ONE TABLET BY MOUTH UP TO THREE TIMES DAILY AS directed           Pertinent Labs:   Lab Results   Component Value Date/Time    Hemoglobin A1c 6.1 (H) 06/20/2018 02:58 PM    Hemoglobin A1c 6.1 (H) 03/23/2018 02:36 PM    Hemoglobin A1c 5.6 09/20/2017 12:07 PM      Body mass index is 28.59 kg/(m^2). Lab Results   Component Value Date/Time    LDL, calculated 75 06/20/2018 02:58 PM         Lab Results   Component Value Date/Time    Sodium 141 06/20/2018 02:58 PM    Potassium 5.1 06/20/2018 02:58 PM    Chloride 100 06/20/2018 02:58 PM    CO2 23 06/20/2018 02:58 PM    Anion gap 8 04/24/2013 03:08 AM    Glucose 93 06/20/2018 02:58 PM    BUN 26 06/20/2018 02:58 PM    Creatinine 1.43 (H) 06/20/2018 02:58 PM    BUN/Creatinine ratio 18 06/20/2018 02:58 PM    GFR est AA 55 (L) 06/20/2018 02:58 PM    GFR est non-AA 48 (L) 06/20/2018 02:58 PM    Calcium 9.6 06/20/2018 02:58 PM    AST (SGOT) 19 06/20/2018 02:58 PM    Alk.  phosphatase 52 06/20/2018 02:58 PM    Protein, total 6.6 06/20/2018 02:58 PM    Albumin 4.5 06/20/2018 02:58 PM    Globulin 2.6 04/16/2013 01:58 PM    A-G Ratio 2.1 06/20/2018 02:58 PM    ALT (SGPT) 12 06/20/2018 02:58 PM     Lab Results   Component Value Date/Time    Microalbumin/Creat ratio (mg/g creat) 8 03/12/2009 09:00 AM    Microalb/Creat ratio (ug/mg creat.) 8.2 03/23/2018 02:36 PM    Microalbumin,urine random 1.47 03/12/2009 09:00 AM      Frequency of home glucose testing: None   Blood Sugar range at home:    Last eye exam: In past 12 months. Last foot exam: This year. Polyuria, polyphagia or polydipsia: No   Retinopathy: No   Neuropathy SX: No    Low blood sugar symptoms: No   Dietary compliance: Good   Medication compliance:Good   On ASA: Yes   Depression: No   CKD: New-onset stage III     Wt Readings from Last 3 Encounters:   06/29/18 205 lb (93 kg)   06/20/18 208 lb (94.3 kg)   03/19/18 208 lb (94.3 kg)        History   Smoking Status    Former Smoker    Packs/day: 0.50    Years: 4.00    Quit date: 4/16/1966   Smokeless Tobacco    Never Used     Body mass index is 28.59 kg/(m^2). Diabetic Consultants: All the patient's questions regarding medications, diet and exercise were answered. Goal of A1C of less than 7.5% is our goal.   Our overall goal is to reduce or eliminate the long term consequences of poorly controlled diabetes. Yes    Chronic arthritis: Generalized osteoarthritis with chronic pain. On tramadol. Daily pain can be 6/10. He is still working as an .  Chronic pain syndrome: Due to chronic arthritis generalized.  Essential hypertension:  BP Readings from Last 3 Encounters:   06/29/18 120/70   06/20/18 137/64   03/19/18 152/71     The patient reports:  taking medications as instructed, no medication side effects noted, no TIA's, no chest pain on exertion, no dyspnea on exertion, no swelling of ankles.     Key CAD CHF Meds             rosuvastatin (CRESTOR) 20 mg tablet  (Taking) TAKE ONE TABLET BY MOUTH NIGHTLY    hydrALAZINE (APRESOLINE) 50 mg tablet  (Taking) TAKE ONE TABLET BY MOUTH THREE TIMES DAILY FOR HYPERTENSION    felodipine (PLENDIL SR) 10 mg 24 hr tablet  (Taking) TAKE ONE TABLET BY MOUTH EVERY DAY FOR HYPERTENSION    furosemide (LASIX) 20 mg tablet  (Taking) TAKE ONE TABLET BY MOUTH EVERY DAY    lisinopril (PRINIVIL, ZESTRIL) 40 mg tablet  (Taking) TAKE ONE TABLET BY MOUTH TWICE DAILY    atenolol (TENORMIN) 25 mg tablet  (Taking) TAKE TWO TABLETS BY MOUTH ONCE A DAY    aspirin delayed-release 81 mg tablet  (Taking) Take 1 Tab by mouth daily. omega-3 fatty acids-vitamin e (FISH OIL) 1,000 mg cap  (Taking) Take 1 Cap by mouth. Lab Results   Component Value Date/Time    Sodium 141 06/20/2018 02:58 PM    Potassium 5.1 06/20/2018 02:58 PM    Chloride 100 06/20/2018 02:58 PM    CO2 23 06/20/2018 02:58 PM    Anion gap 8 04/24/2013 03:08 AM    Glucose 93 06/20/2018 02:58 PM    BUN 26 06/20/2018 02:58 PM    Creatinine 1.43 (H) 06/20/2018 02:58 PM    BUN/Creatinine ratio 18 06/20/2018 02:58 PM    GFR est AA 55 (L) 06/20/2018 02:58 PM    GFR est non-AA 48 (L) 06/20/2018 02:58 PM    Calcium 9.6 06/20/2018 02:58 PM    Bilirubin, total 0.5 06/20/2018 02:58 PM    AST (SGOT) 19 06/20/2018 02:58 PM    Alk. phosphatase 52 06/20/2018 02:58 PM    Protein, total 6.6 06/20/2018 02:58 PM    Albumin 4.5 06/20/2018 02:58 PM    Globulin 2.6 04/16/2013 01:58 PM    A-G Ratio 2.1 06/20/2018 02:58 PM    ALT (SGPT) 12 06/20/2018 02:58 PM     Low salt diet? yes  Aerobic exercise? yes  Our goal is to normalize the blood pressure to decrease the risks of strokes and heart attacks. The patient is in agreement with the plan.  Pure hypercholesterolemia:  Cardiovascular risks for him are: LDL goal is under 80  diabetic  hypertension  hyperlipidemia. Key Antihyperlipidemia Meds             rosuvastatin (CRESTOR) 20 mg tablet  (Taking) TAKE ONE TABLET BY MOUTH NIGHTLY    omega-3 fatty acids-vitamin e (FISH OIL) 1,000 mg cap  (Taking) Take 1 Cap by mouth.         Lab Results   Component Value Date/Time    Cholesterol, total 161 06/20/2018 02:58 PM    HDL Cholesterol 67 06/20/2018 02:58 PM    LDL, calculated 75 06/20/2018 02:58 PM    Triglyceride 95 06/20/2018 02:58 PM    CHOL/HDL Ratio 3.5 10/04/2010 11:30 AM     Lab Results   Component Value Date/Time    ALT (SGPT) 12 06/20/2018 02:58 PM    AST (SGOT) 19 06/20/2018 02:58 PM    Alk. phosphatase 52 06/20/2018 02:58 PM    Bilirubin, total 0.5 06/20/2018 02:58 PM      Myalgias: No   Fatigue: No   Other side effects: no  Wt Readings from Last 3 Encounters:   06/29/18 205 lb (93 kg)   06/20/18 208 lb (94.3 kg)   03/19/18 208 lb (94.3 kg)     The patient is aware of our goal to reduce or eliminate the long term problems (such as strokes and heart attacks) related to poorly controlled hyperlipidemia.  Gastroesophageal reflux disease with esophagitis:  Current control of Symptoms:good  Hiatal Hernia:no  Current Medications:omeprazole  The patient has no history melena or bright red blood in the stools. The patient avoids high dose aspirin and NSAID therapy. The patient is aware of diet changes needed, elevating the head of the bed and appropriate use of antacids.  CKD stage 3 due to type 2 diabetes mellitus [facility]):  Nephrologist:   Lab Results   Component Value Date/Time    GFR est AA 55 (L) 06/20/2018 02:58 PM    GFR est non-AA 48 (L) 06/20/2018 02:58 PM    Creatinine (POC) 1.0 02/24/2017 09:20 AM    Creatinine 1.43 (H) 06/20/2018 02:58 PM    BUN 26 06/20/2018 02:58 PM    BUN (POC) 21 (H) 02/24/2017 09:20 AM    Sodium (POC) 139 02/24/2017 09:20 AM    Sodium 141 06/20/2018 02:58 PM    Potassium 5.1 06/20/2018 02:58 PM    Potassium (POC) 4.1 02/24/2017 09:20 AM    Chloride (POC) 101 02/24/2017 09:20 AM    Chloride 100 06/20/2018 02:58 PM    CO2 23 06/20/2018 02:58 PM            Skin lesion: Nodular in right posterior scalp. Needs to be seen by dermatologist.  Has been frozen once and did not go away. Current and past medical information:    Current Medications after this visit[de-identified]   Current Outpatient Prescriptions   Medication Sig    traMADol (ULTRAM) 50 mg tablet Take 2 tablet q12 hrs as needed. Indications: Pain, generalized osteoarthritis.     pneumococcal 13 rimma conj dip (PREVNAR 13, PF,) 0.5 mL syrg injection 0.5 mL by IntraMUSCular route once for 1 dose. To be administered at Pharmacy. Fax confirmation to me at [phone]. Thank You. Indications: PREVENTION OF STREPTOCOCCUS PNEUMONIAE INFECTION    meclizine (ANTIVERT) 25 mg chewable tablet Take  by mouth three (3) times daily as needed.  rosuvastatin (CRESTOR) 20 mg tablet TAKE ONE TABLET BY MOUTH NIGHTLY    hydrALAZINE (APRESOLINE) 50 mg tablet TAKE ONE TABLET BY MOUTH THREE TIMES DAILY FOR HYPERTENSION    felodipine (PLENDIL SR) 10 mg 24 hr tablet TAKE ONE TABLET BY MOUTH EVERY DAY FOR HYPERTENSION    furosemide (LASIX) 20 mg tablet TAKE ONE TABLET BY MOUTH EVERY DAY    lisinopril (PRINIVIL, ZESTRIL) 40 mg tablet TAKE ONE TABLET BY MOUTH TWICE DAILY    atenolol (TENORMIN) 25 mg tablet TAKE TWO TABLETS BY MOUTH ONCE A DAY    pantoprazole (PROTONIX) 40 mg tablet TAKE ONE TABLET BY MOUTH EVERY DAY FOR: GASTROESOPHAGEAL REFLUX]    metFORMIN ER (GLUCOPHAGE XR) 500 mg tablet TAKE ONE TABLET BY MOUTH UP TO THREE TIMES DAILY AS directed    raNITIdine (ZANTAC) 300 mg tablet TAKE ONE TABLET BY MOUTH EVERY DAY    EPIPEN 2-KARL 0.3 mg/0.3 mL injection INJECT INTRAMUSCULARLY AS NEEDED FOR ONE DOSE. TAKE WITH 50MG OF BENADRYL AND CALL 911.  diclofenac (VOLTAREN) 1 % gel Apply 4 g to affected area four (4) times daily. Painful shoulder.  aspirin delayed-release 81 mg tablet Take 1 Tab by mouth daily.  Peak Flow Meter eric Use prior and post nebulizer treatment    albuterol (PROVENTIL VENTOLIN) 2.5 mg /3 mL (0.083 %) nebulizer solution 3 mL by Nebulization route every six (6) hours as needed for Wheezing or Shortness of Breath.  omega-3 fatty acids-vitamin e (FISH OIL) 1,000 mg cap Take 1 Cap by mouth.  coenzyme q10 (CO Q-10) 100 mg Cap Take 100 mg by mouth daily. No current facility-administered medications for this visit.         Patient Active Problem List    Diagnosis Date Noted    Bone spur 10/04/2010 Priority: 1 - One    Hypertension 05/22/2010     Priority: 1 - One    Hyperlipidemia 05/22/2010     Priority: 1 - One    GERD (gastroesophageal reflux disease) 05/22/2010     Priority: 1 - One    Kidney stone 05/22/2010     Priority: 1 - One    Type 2 diabetes with nephropathy ([facility] [address] 06/29/2018    CKD stage 3 due to type 2 diabetes mellitus ([facility] [address] 06/29/2018    Diabetes mellitus type 2, controlled, without complications ([facility] [address] 83/79/4006    [facility]/23/2013    Prostate nodule without urinary obstruction 02/11/2013    S/P carotid endarterectomy 12/05/2012    Vitamin D deficiency 12/05/2012    Amaurosis fugax of left eye 05/06/2012    Carotid artery obstruction 04/17/2012    Allergy to bee sting 04/13/2012       Past Medical History:   Diagnosis Date    Amaurosis fugax of left eye 5/6/2012    Arthritis     OSTEO    CAD (coronary artery disease) 2012    CAROTID LEFT     [facility] 2013    PROSTATE    Chronic pain     DJD lumbar    Diabetes ([facility] [address] 5/22/2010    Frequent nocturnal awakening     urinary frequency    GERD (gastroesophageal reflux disease) 5/22/2010    Hyperlipidemia 5/22/2010    Hypertension 5/22/2010    Kidney stone 5/22/2010    Nodular goiter     1.3 cm right , FNA 6/15    Obesity (BMI 30-39. 9)     Psychiatric disorder     ANXIETY    Vertigo        Allergies   Allergen Reactions    Bee Sting [Sting, Bee] Anaphylaxis    Vytorin 10-10 [Ezetimibe-Simvastatin] Myalgia    Amlodipine Other (comments)     Creepy crawly sensation, twitches    Lipitor [Atorvastatin] Myalgia       Past Surgical History:   Procedure Laterality Date    COLONOSCOPY  3/3/2016         EGD  3/3/2016         HX HEENT      tonsillectomy    HX MOHS PROCEDURES  2010    right    HX ORTHOPAEDIC      coccyx removed    HX UROLOGICAL  2010    left lithotripsy. basket  extraction,ureteral stent    HX VASECTOMY      NEUROLOGICAL PROCEDURE UNLISTED  2011    Steroid injections in epidural    VASCULAR SURGERY PROCEDURE UNLIST  2012    LEFT CAROTID       Social History     Social History    Marital status:      Spouse name: N/A    Number of children: N/A    Years of education: N/A     Social History Main Topics    Smoking status: Former Smoker     Packs/day: 0.50     Years: 4.00     Quit date: 4/16/1966    Smokeless tobacco: Never Used    Alcohol use No    Drug use: No    Sexual activity: Yes     Partners: Female     Other Topics Concern    None     Social History Narrative       Review of Systems   Constitutional: Negative. Negative for chills, fever, malaise/fatigue and weight loss. HENT: Negative. Negative for hearing loss. Vertigo is better. Eyes: Negative. Negative for blurred vision and double vision. Respiratory: Negative. Negative for cough, hemoptysis, sputum production and shortness of breath. Cardiovascular: Negative. Negative for chest pain, palpitations and orthopnea. Gastrointestinal: Negative. Negative for abdominal pain, blood in stool, heartburn, nausea and vomiting. Genitourinary: Negative. Negative for dysuria, frequency and urgency. Musculoskeletal: Positive for back pain and joint pain. Negative for falls, myalgias and neck pain. Skin: Negative. Negative for rash. Skin lesion on posterior scalp   Neurological: Negative. Negative for dizziness, tingling, tremors, weakness and headaches. Endo/Heme/Allergies: Negative. Psychiatric/Behavioral: Negative. Negative for depression. Objective:     Vitals:    06/29/18 0804   BP: 120/70   Pulse: (!) 47   Resp: 16   Temp: 97.6 °F (36.4 °C)   TempSrc: Oral   SpO2: 98%   Weight: 205 lb (93 kg)   Height: 5' 11\" (1.803 m)      Body mass index is 28.59 kg/(m^2). Physical Exam   Constitutional: He is oriented to person, place, and time and well-developed, well-nourished, and in no distress. HENT:   Head: Normocephalic and atraumatic.    Mouth/Throat: Oropharynx is clear and moist. Eyes: Right eye exhibits no discharge. Left eye exhibits no discharge. No scleral icterus. Neck: No tracheal deviation present. No thyromegaly present. No bruit. Cardiovascular: Normal rate, regular rhythm and normal heart sounds. Pulmonary/Chest: Effort normal and breath sounds normal.   Abdominal: Soft. Neurological: He is alert and oriented to person, place, and time. Skin: No rash noted. No erythema. Tiny nodular lesion right posterior scalp. Psychiatric: Mood and affect normal.   Nursing note and vitals reviewed. Health Maintenance Due   Topic Date Due    Pneumococcal 65+ High/Highest Risk (2 of 2 - PPSV23) 09/21/2017    MEDICARE YEARLY EXAM  03/28/2018         Assessment and orders:     Encounter Diagnoses     ICD-10-CM ICD-9-CM   1. Controlled type 2 diabetes mellitus without complication, without long-term current use of insulin (HCC) E11.9 250.00   2. Chronic arthritis M19.90 716.90   3. Chronic pain syndrome G89.4 338.4   4. Essential hypertension I10 401.9   5. Pure hypercholesterolemia E78.00 272.0   6. Gastroesophageal reflux disease with esophagitis K21.0 530.11   7. CKD stage 3 due to type 2 diabetes mellitus (HCC) E11.22 250.40    N18.3 585.3   8. Skin lesion L98.9 709.9     Diagnoses and all orders for this visit:    1. Controlled type 2 diabetes mellitus without complication, without long-term current use of insulin (HCC)-controlled    2. Chronic arthritis-refill medication  -     traMADol (ULTRAM) 50 mg tablet; Take 2 tablet q12 hrs as needed. Indications: Pain, generalized osteoarthritis. 3. Chronic pain syndrome  -     traMADol (ULTRAM) 50 mg tablet; Take 2 tablet q12 hrs as needed. Indications: Pain, generalized osteoarthritis. 4. Essential hypertension-controlled    5. Pure hypercholesterolemia-acceptable    6. Gastroesophageal reflux disease with esophagitis-symptoms controlled    7.  CKD stage 3 due to type 2 diabetes mellitus (HCC)-new, we will repeat renal panel today. He also has a mild anemia which may be related to his CKD but we have to check a fit test and iron profile    8. Skin lesion  -     REFERRAL TO DERMATOLOGY    Preventive health:  -     pneumococcal 13 rimma conj dip (PREVNAR 13, PF,) 0.5 mL syrg injection; 0.5 mL by IntraMUSCular route once for 1 dose. To be administered at Pharmacy. Fax confirmation to me at [phone]. Thank You. Indications: PREVENTION OF STREPTOCOCCUS PNEUMONIAE INFECTION            Plan of care:  Discussed diagnoses in detail with patient. Medication risks/benefits/side effects discussed with patient. All of the patient's questions were addressed. The patient understands and agrees with our plan of care. The patient knows to call back if they are unsure of or forget any changes we discussed today or if the symptoms change. The patient received an After-Visit Summary which contains VS, orders, medication list and allergy list. This can be used as a \"mini-medical record\" should they have to seek medical care while out of town. Patient Care Team:  Federico Casas MD as PCP - Desi Combs MD (General and Vascular Surgery)  Steven Villalpando MD (Neurology)  Vivi Azar MD as Surgeon (Urology)  Nallely Pascal MD (Endocrinology)    Follow-up Disposition:  Return in about 3 months (around 9/29/2018).     Future Appointments  Date Time Provider Hank Rubio   7/5/2018 8:30 AM [facility] JOSE SCHED   10/22/2018 8:20 AM Federico Casas MD [facility] JOSE SCHED       Signed By: Federico Casas MD     June 29, 2018

## 2018-06-29 NOTE — MR AVS SNAPSHOT
[facility] 
 
 
 [address] [address] 
[phone] Patient: Lasha Fuller 
MRN: [id number] Visit Information Date & Time Provider Department Dept. Phone Encounter #  
 6/29/2018  8:40 AM Santiago Stevenson MD 7 [facility] [id number] Follow-up Instructions Return in about 3 months (around 9/29/2018). Your Appointments 6/29/2018  8:40 AM  
ROUTINE CARE with Santiago Stevenson MD  
[address] [place] [address]) Appt Note: f/u  
 [facility] [address]  
[phone]  
  
   
 [facility] [address]  
  
    
 7/5/2018  8:30 AM  
LAB with LAB [facility] [address] [place] [address] ([address] [place] [address]) Appt Note: repeat labs (kidney function test) [address] [place] [id number]  
  
    
 10/22/2018  8:20 AM  
ROUTINE CARE with Santiago Stevenson MD  
[address] [place] [address]) Appt Note: 4 mo f/u  
 [address] [address]  
[phone] Upcoming Health Maintenance Date Due Pneumococcal 65+ High/Highest Risk (2 of 2 - PPSV23) 9/21/2017 MEDICARE YEARLY EXAM 3/28/2018 Influenza Age 5 to Adult 8/1/2018 FOOT EXAM Q1 9/20/2018 HEMOGLOBIN A1C Q6M 12/20/2018 EYE EXAM RETINAL OR DILATED Q1 3/20/2019 MICROALBUMIN Q1 3/23/2019 LIPID PANEL Q1 6/20/2019 GLAUCOMA SCREENING Q2Y 3/20/2020 DTaP/Tdap/Td series (2 - Td) 6/23/2021 COLONOSCOPY 3/3/2026 Allergies as of 6/29/2018  Review Complete On: 6/20/2018 By: Santiago Stevenson MD  
  
 Severity Noted Reaction Type Reactions Bee Sting [Sting, Bee] High 05/22/2010    Anaphylaxis Vytorin 10-10 [Ezetimibe-simvastatin] Medium 05/22/2010    Myalgia Amlodipine  11/23/2016    Other (comments) Creepy crawly sensation, twitches Lipitor [Atorvastatin]  05/22/2010    Myalgia Current Immunizations  Reviewed on 4/1/2014 Name Date Influenza High Dose Vaccine PF 9/20/2017 Influenza Vaccine 11/9/2015, 10/13/2014 Influenza Vaccine (Quad) PF 11/23/2016 Influenza Vaccine Split 12/5/2012, 12/5/2011, 10/4/2010 Influenza Vaccine Whole 9/15/2009 TD Vaccine 8/22/2000 TDAP Vaccine 6/23/2011 ZZZ-RETIRED (DO NOT USE) Pneumococcal Vaccine (Unspecified Type) 9/21/2012 Not reviewed this visit You Were Diagnosed With   
  
 Codes Comments Controlled type 2 diabetes mellitus without complication, without long-term current use of insulin ([facility]    -  Primary ICD-10-CM: E11.9 ICD-9-CM: 250.00 Chronic arthritis     ICD-10-CM: M19.90 ICD-9-CM: 716.90 Chronic pain syndrome     ICD-10-CM: G89.4 ICD-9-CM: 338.4 Essential hypertension     ICD-10-CM: I10 
ICD-9-CM: 401.9 Pure hypercholesterolemia     ICD-10-CM: E78.00 ICD-9-CM: 272.0 Gastroesophageal reflux disease with esophagitis     ICD-10-CM: K21.0 ICD-9-CM: 530.11 CKD stage 3 due to type 2 diabetes mellitus (HCC)     ICD-10-CM: E11.22, N18.3 ICD-9-CM: 250.40, 585.3 Skin lesion     ICD-10-CM: L98.9 ICD-9-CM: 709.9 Vitals BP Pulse Temp Resp Height(growth percentile) Weight(growth percentile) 120/70 (!) 47 97.6 °F (36.4 °C) (Oral) 16 5' 11\" (1.803 m) 205 lb (93 kg) SpO2 BMI Smoking Status 98% 28.59 kg/m2 Former Smoker Vitals History BMI and BSA Data Body Mass Index Body Surface Area 28.59 kg/m 2 2.16 m 2 Preferred Pharmacy Pharmacy Name Phone [facility] [address] [phone] Your Updated Medication List  
  
   
This list is accurate as of 6/29/18  8:18 AM.  Always use your most recent med list.  
  
  
  
  
 albuterol 2.5 mg /3 mL (0.083 %) nebulizer solution Commonly known as:  PROVENTIL VENTOLIN  
3 mL by Nebulization route every six (6) hours as needed for Wheezing or Shortness of Breath. aspirin delayed-release 81 mg tablet Take 1 Tab by mouth daily. atenolol 25 mg tablet Commonly known as:  TENORMIN  
TAKE TWO TABLETS BY MOUTH ONCE A DAY  
  
 CO Q-10 100 mg capsule Generic drug:  co-enzyme Q-10 Take 100 mg by mouth daily. diclofenac 1 % Gel Commonly known as:  VOLTAREN Apply 4 g to affected area four (4) times daily. Painful shoulder. EPIPEN 2-KARL 0.3 mg/0.3 mL injection Generic drug:  EPINEPHrine INJECT INTRAMUSCULARLY AS NEEDED FOR ONE DOSE. TAKE WITH 50MG OF BENADRYL AND CALL 911.  
  
 felodipine 10 mg 24 hr tablet Commonly known as:  PLENDIL SR  
TAKE ONE TABLET BY MOUTH EVERY DAY FOR HYPERTENSION  
  
 FISH OIL 1,000 mg Cap Generic drug:  omega-3 fatty acids-vitamin e Take 1 Cap by mouth. furosemide 20 mg tablet Commonly known as:  LASIX TAKE ONE TABLET BY MOUTH EVERY DAY  
  
 hydrALAZINE 50 mg tablet Commonly known as:  APRESOLINE  
TAKE ONE TABLET BY MOUTH THREE TIMES DAILY FOR HYPERTENSION  
  
 lisinopril 40 mg tablet Commonly known as:  PRINIVIL, ZESTRIL  
TAKE ONE TABLET BY MOUTH TWICE DAILY  
  
 meclizine 25 mg chewable tablet Commonly known as:  ANTIVERT Take  by mouth three (3) times daily as needed. metFORMIN  mg tablet Commonly known as:  GLUCOPHAGE XR  
TAKE ONE TABLET BY MOUTH UP TO THREE TIMES DAILY AS directed  
  
 pantoprazole 40 mg tablet Commonly known as:  PROTONIX  
TAKE ONE TABLET BY MOUTH EVERY DAY FOR: GASTROESOPHAGEAL REFLUX] Peak Flow Meter Faye Bautista Use prior and post nebulizer treatment  
  
 pneumococcal 13 rimma conj dip 0.5 mL Syrg injection Commonly known as:  PREVNAR 13 (PF)  
0.5 mL by IntraMUSCular route once for 1 dose.  To be administered at Pharmacy. Fax confirmation to me at [phone]. Thank You. Indications: PREVENTION OF STREPTOCOCCUS PNEUMONIAE INFECTION  
  
 raNITIdine 300 mg tablet Commonly known as:  ZANTAC TAKE ONE TABLET BY MOUTH EVERY DAY  
  
 rosuvastatin 20 mg tablet Commonly known as:  CRESTOR  
TAKE ONE TABLET BY MOUTH NIGHTLY  
  
 traMADol 50 mg tablet Commonly known as:  ULTRAM  
Take 2 tablet q12 hrs as needed. Indications: Pain, generalized osteoarthritis. Prescriptions Printed Refills  
 traMADol (ULTRAM) 50 mg tablet 3 Sig: Take 2 tablet q12 hrs as needed. Indications: Pain, generalized osteoarthritis. Class: Print  
 pneumococcal 13 rimma conj dip (PREVNAR 13, PF,) 0.5 mL syrg injection 0 Si.5 mL by IntraMUSCular route once for 1 dose. To be administered at Pharmacy. Fax confirmation to me at [phone]. Thank You. Indications: PREVENTION OF STREPTOCOCCUS PNEUMONIAE INFECTION Class: Print Route: IntraMUSCular We Performed the Following REFERRAL TO DERMATOLOGY [REF19 Custom] Comments:  
 [facility], lesion in scalp. Follow-up Instructions Return in about 3 months (around 2018). Referral Information Referral ID Referred By Referred To  
  
 [id number] Mary Boyer Not Available Visits Status Start Date End Date 1 New Request 18 If your referral has a status of pending review or denied, additional information will be sent to support the outcome of this decision. Patient Instructions Learning About Diabetes Food Guidelines Your Care Instructions Meal planning is important to manage diabetes. It helps keep your blood sugar at a target level (which you set with your doctor). You don't have to eat special foods. You can eat what your family eats, including sweets once in a while. But you do have to pay attention to how often you eat and how much you eat of certain foods. You may want to work with a dietitian or a certified diabetes educator (CDE) to help you plan meals and snacks. A dietitian or CDE can also help you lose weight if that is one of your goals. What should you know about eating carbs? Managing the amount of carbohydrate (carbs) you eat is an important part of healthy meals when you have diabetes. Carbohydrate is found in many foods. · Learn which foods have carbs. And learn the amounts of carbs in different foods. ¨ Bread, cereal, pasta, and rice have about 15 grams of carbs in a serving. A serving is 1 slice of bread (1 ounce), ½ cup of cooked cereal, or 1/3 cup of cooked pasta or rice. ¨ Fruits have 15 grams of carbs in a serving. A serving is 1 small fresh fruit, such as an apple or orange; ½ of a banana; ½ cup of cooked or canned fruit; ½ cup of fruit juice; 1 cup of melon or raspberries; or 2 tablespoons of dried fruit. ¨ Milk and no-sugar-added yogurt have 15 grams of carbs in a serving. A serving is 1 cup of milk or 2/3 cup of no-sugar-added yogurt. ¨ Starchy vegetables have 15 grams of carbs in a serving. A serving is ½ cup of mashed potatoes or sweet potato; 1 cup winter squash; ½ of a small baked potato; ½ cup of cooked beans; or ½ cup cooked corn or green peas. · Learn how much carbs to eat each day and at each meal. A dietitian or CDE can teach you how to keep track of the amount of carbs you eat. This is called carbohydrate counting. · If you are not sure how to count carbohydrate grams, use the Plate Method to plan meals. It is a good, quick way to make sure that you have a balanced meal. It also helps you spread carbs throughout the day. ¨ Divide your plate by types of foods. Put non-starchy vegetables on half the plate, meat or other protein food on one-quarter of the plate, and a grain or starchy vegetable in the final quarter of the plate.  To this you can add a small piece of fruit and 1 cup of milk or yogurt, depending on how many carbs you are supposed to eat at a meal. 
· Try to eat about the same amount of carbs at each meal. Do not \"save up\" your daily allowance of carbs to eat at one meal. 
· Proteins have very little or no carbs per serving. Examples of proteins are beef, chicken, turkey, fish, eggs, tofu, cheese, cottage cheese, and peanut butter. A serving size of meat is 3 ounces, which is about the size of a deck of cards. Examples of meat substitute serving sizes (equal to 1 ounce of meat) are 1/4 cup of cottage cheese, 1 egg, 1 tablespoon of peanut butter, and ½ cup of tofu. How can you eat out and still eat healthy? · Learn to estimate the serving sizes of foods that have carbohydrate. If you measure food at home, it will be easier to estimate the amount in a serving of restaurant food. · If the meal you order has too much carbohydrate (such as potatoes, corn, or baked beans), ask to have a low-carbohydrate food instead. Ask for a salad or green vegetables. · If you use insulin, check your blood sugar before and after eating out to help you plan how much to eat in the future. · If you eat more carbohydrate at a meal than you had planned, take a walk or do other exercise. This will help lower your blood sugar. What else should you know? · Limit saturated fat, such as the fat from meat and dairy products. This is a healthy choice because people who have diabetes are at higher risk of heart disease. So choose lean cuts of meat and nonfat or low-fat dairy products. Use olive or canola oil instead of butter or shortening when cooking. · Don't skip meals. Your blood sugar may drop too low if you skip meals and take insulin or certain medicines for diabetes. · Check with your doctor before you drink alcohol. Alcohol can cause your blood sugar to drop too low. Alcohol can also cause a bad reaction if you take certain diabetes medicines. Follow-up care is a key part of your treatment and safety.  Be sure to make and go to all appointments, and call your doctor if you are having problems. It's also a good idea to know your test results and keep a list of the medicines you take. Where can you learn more? Go to http://elizabeth-antonieta.info/. Enter U079 in the search box to learn more about \"Learning About Diabetes Food Guidelines. \" Current as of: March 13, 2017 Content Version: 11.4 © 4687-5297 [employer]. Care instructions adapted under license by [employer] (which disclaims liability or warranty for this information). If you have questions about a medical condition or this instruction, always ask your healthcare professional. [address] any warranty or liability for your use of this information. Introducing [facility]! Dear Leonard Nugent: Thank you for requesting a [employer] account. Our records indicate that you already have an active [employer] account. You can access your account anytime at [email or web address]. [email or web address] Did you know that you can access your hospital and ER discharge instructions at any time in [employer]? You can also review all of your test results from your hospital stay or ER visit. Additional Information If you have questions, please visit the Frequently Asked Questions section of the [employer] website at [email or web address] Remember, [employer] is NOT to be used for urgent needs. For medical emergencies, dial 911. Now available from your iPhone and Android! Please provide this summary of care documentation to your next provider. Your primary care clinician is listed as [address]. If you have any questions after today's visit, please call [phone].

## 2018-06-30 LAB
ALBUMIN SERPL-MCNC: 4.5 G/DL (ref 3.5–4.8)
BASOPHILS # BLD AUTO: 0 X10E3/UL (ref 0–0.2)
BASOPHILS NFR BLD AUTO: 0 %
BUN SERPL-MCNC: 28 MG/DL (ref 8–27)
BUN/CREAT SERPL: 18 (ref 10–24)
CALCIUM SERPL-MCNC: 9.5 MG/DL (ref 8.6–10.2)
CHLORIDE SERPL-SCNC: 103 MMOL/L (ref 96–106)
CO2 SERPL-SCNC: 24 MMOL/L (ref 20–29)
CREAT SERPL-MCNC: 1.53 MG/DL (ref 0.76–1.27)
EOSINOPHIL # BLD AUTO: 0.2 X10E3/UL (ref 0–0.4)
EOSINOPHIL NFR BLD AUTO: 3 %
ERYTHROCYTE [DISTWIDTH] IN BLOOD BY AUTOMATED COUNT: 14.6 % (ref 12.3–15.4)
GLUCOSE SERPL-MCNC: 109 MG/DL (ref 65–99)
HCT VFR BLD AUTO: 37 % (ref 37.5–51)
HGB BLD-MCNC: 11.9 G/DL (ref 13–17.7)
IMM GRANULOCYTES # BLD: 0 X10E3/UL (ref 0–0.1)
IMM GRANULOCYTES NFR BLD: 0 %
INTERPRETATION: NORMAL
IRON SATN MFR SERPL: 20 % (ref 15–55)
IRON SERPL-MCNC: 59 UG/DL (ref 38–169)
LYMPHOCYTES # BLD AUTO: 1.8 X10E3/UL (ref 0.7–3.1)
LYMPHOCYTES NFR BLD AUTO: 21 %
Lab: NORMAL
MCH RBC QN AUTO: 28.6 PG (ref 26.6–33)
MCHC RBC AUTO-ENTMCNC: 32.2 G/DL (ref 31.5–35.7)
MCV RBC AUTO: 89 FL (ref 79–97)
MONOCYTES # BLD AUTO: 0.6 X10E3/UL (ref 0.1–0.9)
MONOCYTES NFR BLD AUTO: 7 %
NEUTROPHILS # BLD AUTO: 5.7 X10E3/UL (ref 1.4–7)
NEUTROPHILS NFR BLD AUTO: 69 %
PHOSPHATE SERPL-MCNC: 3.4 MG/DL (ref 2.5–4.5)
PLATELET # BLD AUTO: 214 X10E3/UL (ref 150–379)
POTASSIUM SERPL-SCNC: 4.7 MMOL/L (ref 3.5–5.2)
RBC # BLD AUTO: 4.16 X10E6/UL (ref 4.14–5.8)
SODIUM SERPL-SCNC: 143 MMOL/L (ref 134–144)
TIBC SERPL-MCNC: 302 UG/DL (ref 250–450)
UIBC SERPL-MCNC: 243 UG/DL (ref 111–343)
WBC # BLD AUTO: 8.3 X10E3/UL (ref 3.4–10.8)

## 2018-07-01 DIAGNOSIS — I10 ESSENTIAL HYPERTENSION: ICD-10-CM

## 2018-07-01 RX ORDER — LISINOPRIL 40 MG/1
40 TABLET ORAL DAILY
Qty: 30 TAB | Refills: 11
Start: 2018-07-01 | End: 2019-03-27 | Stop reason: SDUPTHER

## 2018-07-02 ENCOUNTER — TELEPHONE (OUTPATIENT)
Dept: FAMILY MEDICINE CLINIC | Age: 73
End: 2018-07-02

## 2018-07-02 NOTE — TELEPHONE ENCOUNTER
Patient called back and was informed per Dr. Raymond Self: \"Dear Danilo Guzman III:    Please find your most recent results below. Your kidney function has decreased in the last 3 months. decrease your lisinopril to 40 mg daily and monitor your blood pressure. See me in 3 weeks to make sure your kidney function stabilizes and call me if your blood pressure goes above 150/90. Stay well hydrated in the heat as we talked about. \"    An appointment was made for patient to come in on 7/23/18. Patient verbalized understanding.

## 2018-07-02 NOTE — TELEPHONE ENCOUNTER
Called patient. Unsuccessful. Voicemail left on home and mobile. Need to advise patient per Dr. Destinee Brush.:    *Your kidney function has decreased in the last 3 months. decrease your lisinopril to 40 mg daily and monitor your blood pressure. See me in 3 weeks to make sure your kidney function stabilizes and call me if your blood pressure goes above 150/90. Stay well hydrated in the heat as we talked about. *

## 2018-07-12 ENCOUNTER — TELEPHONE (OUTPATIENT)
Dept: FAMILY MEDICINE CLINIC | Age: 73
End: 2018-07-12

## 2018-07-12 LAB — HEMOCCULT STL QL IA: NEGATIVE

## 2018-07-12 NOTE — PROGRESS NOTES
Please call the patient. FIT test is negative again.   No evidence for GI bleeding  Thank you,  Dr. Fernando Kendall

## 2018-07-12 NOTE — TELEPHONE ENCOUNTER
Spoke with patient and informed him per Dr. Antonio Solorio:   \"FIT test is negative again. No evidence for GI bleeding\"    Patient verbalized understanding.

## 2018-07-23 ENCOUNTER — OFFICE VISIT (OUTPATIENT)
Dept: FAMILY MEDICINE CLINIC | Age: 73
End: 2018-07-23

## 2018-07-23 VITALS
DIASTOLIC BLOOD PRESSURE: 58 MMHG | BODY MASS INDEX: 29.01 KG/M2 | OXYGEN SATURATION: 97 % | TEMPERATURE: 98.2 F | RESPIRATION RATE: 20 BRPM | HEIGHT: 71 IN | HEART RATE: 46 BPM | WEIGHT: 207.2 LBS | SYSTOLIC BLOOD PRESSURE: 118 MMHG

## 2018-07-23 DIAGNOSIS — E78.00 PURE HYPERCHOLESTEROLEMIA: Chronic | ICD-10-CM

## 2018-07-23 DIAGNOSIS — E11.21 TYPE 2 DIABETES WITH NEPHROPATHY (HCC): Primary | ICD-10-CM

## 2018-07-23 DIAGNOSIS — I10 ESSENTIAL HYPERTENSION: Chronic | ICD-10-CM

## 2018-07-23 DIAGNOSIS — N18.30 CKD STAGE 3 DUE TO TYPE 2 DIABETES MELLITUS (HCC): ICD-10-CM

## 2018-07-23 DIAGNOSIS — N18.30 ANEMIA IN STAGE 3 CHRONIC KIDNEY DISEASE (HCC): ICD-10-CM

## 2018-07-23 DIAGNOSIS — E11.22 CKD STAGE 3 DUE TO TYPE 2 DIABETES MELLITUS (HCC): ICD-10-CM

## 2018-07-23 DIAGNOSIS — D63.1 ANEMIA IN STAGE 3 CHRONIC KIDNEY DISEASE (HCC): ICD-10-CM

## 2018-07-23 DIAGNOSIS — R01.1 HEART MURMUR: Chronic | ICD-10-CM

## 2018-07-23 NOTE — MR AVS SNAPSHOT
[address] [facility] 
 
 
 [address] [address] 
[phone] Patient: Marsha Navarrete 
MRN: [id number] Visit Information Date & Time Provider Department Dept. Phone Encounter #  
 7/23/2018  9:00 AM Izola Rubinstein,  [facility] [phone] [id number] Follow-up Instructions Return in about 2 months (around 9/23/2018) for fasting. Your Appointments 10/22/2018  8:20 AM  
ROUTINE CARE with Izola Rubinstein, MD  
704 [facility] 3651 [facility]) Appt Note: 4 mo f/u  
 [address] [address]  
[place] [address] [place] [id number] Upcoming Health Maintenance Date Due Pneumococcal 65+ High/Highest Risk (2 of 2 - PPSV23) 9/21/2017 MEDICARE YEARLY EXAM 3/28/2018 Influenza Age 5 to Adult 8/1/2018 FOOT EXAM Q1 9/20/2018 HEMOGLOBIN A1C Q6M 12/20/2018 EYE EXAM RETINAL OR DILATED Q1 3/20/2019 MICROALBUMIN Q1 3/23/2019 LIPID PANEL Q1 6/20/2019 GLAUCOMA SCREENING Q2Y 3/20/2020 DTaP/Tdap/Td series (2 - Td) 6/23/2021 COLONOSCOPY 3/3/2026 Allergies as of 7/23/2018  Review Complete On: 7/23/2018 By: Darrion Rendon LPN Severity Noted Reaction Type Reactions Bee Sting [Sting, Bee] High 05/22/2010    Anaphylaxis Vytorin 10-10 [Ezetimibe-simvastatin] Medium 05/22/2010    Myalgia Amlodipine  11/23/2016    Other (comments) Creepy crawly sensation, twitches Lipitor [Atorvastatin]  05/22/2010    Myalgia Current Immunizations  Reviewed on 4/1/2014 Name Date Influenza High Dose Vaccine PF 9/20/2017 Influenza Vaccine 11/9/2015, 10/13/2014 Influenza Vaccine (Quad) PF 11/23/2016 Influenza Vaccine Split 12/5/2012, 12/5/2011, 10/4/2010 Influenza Vaccine Whole 9/15/2009 TD Vaccine 8/22/2000 TDAP Vaccine 6/23/2011 ZZZ-RETIRED (DO NOT USE) Pneumococcal Vaccine (Unspecified Type) 9/21/2012 Not reviewed this visit You Were Diagnosed With   
  
 Codes Comments Type 2 diabetes with nephropathy (HCC)    -  Primary ICD-10-CM: E11.21 
ICD-9-CM: 250.40, 583.81   
 CKD stage 3 due to type 2 diabetes [facility] ([facility]     ICD-10-CM: E11.22, N18.3 ICD-9-CM: 250.40, 585.3 Anemia in stage 3 chronic kidney disease     ICD-10-CM: N18.3, D63.1 ICD-9-CM: 285.21, 585.3 Essential hypertension     ICD-10-CM: I10 
ICD-9-CM: 401.9 Pure hypercholesterolemia     ICD-10-CM: E78.00 ICD-9-CM: 272.0 Heart murmur     ICD-10-CM: R01.1 ICD-9-CM: 874. 2 Vitals BP Pulse Temp Resp Height(growth percentile) Weight(growth percentile) 118/58 (BP 1 Location: Left arm, BP Patient Position: Sitting) (!) 46 98.2 °F (36.8 °C) (Oral) 20 5' 11\" (1.803 m) 207 lb 3.2 oz (94 kg) SpO2 BMI Smoking Status 97% 28.9 kg/m2 Former Smoker Vitals History BMI and BSA Data Body Mass Index Body Surface Area  
 28.9 kg/m 2 2.17 m 2 Preferred Pharmacy Pharmacy Name Phone [facility] [address] [phone] Your Updated Medication List  
  
   
This list is accurate as of 7/23/18  9:25 AM.  Always use your most recent med list.  
  
  
  
  
 albuterol 2.5 mg /3 mL (0.083 %) nebulizer solution Commonly known as:  PROVENTIL VENTOLIN  
3 mL by Nebulization route every six (6) hours as needed for Wheezing or Shortness of Breath. aspirin delayed-release 81 mg tablet Take 1 Tab by mouth daily. atenolol 25 mg tablet Commonly known as:  TENORMIN  
TAKE TWO TABLETS BY MOUTH ONCE A DAY  
  
 CO Q-10 100 mg capsule Generic drug:  co-enzyme Q-10 Take 100 mg by mouth daily. diclofenac 1 % Gel Commonly known as:  VOLTAREN Apply 4 g to affected area four (4) times daily. Painful shoulder. EPIPEN 2-KARL 0.3 mg/0.3 mL injection Generic drug:  EPINEPHrine INJECT INTRAMUSCULARLY AS NEEDED FOR ONE DOSE. TAKE WITH 50MG OF BENADRYL AND CALL 911.  
  
 felodipine 10 mg 24 hr tablet Commonly known as:  PLENDIL SR  
TAKE ONE TABLET BY MOUTH EVERY DAY FOR HYPERTENSION  
  
 FISH OIL 1,000 mg Cap Generic drug:  omega-3 fatty acids-vitamin e Take 1 Cap by mouth. furosemide 20 mg tablet Commonly known as:  LASIX TAKE ONE TABLET BY MOUTH EVERY DAY  
  
 hydrALAZINE 50 mg tablet Commonly known as:  APRESOLINE  
TAKE ONE TABLET BY MOUTH THREE TIMES DAILY FOR HYPERTENSION  
  
 lisinopril 40 mg tablet Commonly known as:  Britton Lights Take 1 Tab by mouth daily. TAKE ONE TABLET BY MOUTH TWICE DAILY  Indications: hypertension  
  
 meclizine 25 mg chewable tablet Commonly known as:  ANTIVERT Take  by mouth three (3) times daily as needed. metFORMIN  mg tablet Commonly known as:  GLUCOPHAGE XR  
TAKE ONE TABLET BY MOUTH UP TO THREE TIMES DAILY AS directed  
  
 pantoprazole 40 mg tablet Commonly known as:  PROTONIX  
TAKE ONE TABLET BY MOUTH EVERY DAY FOR: GASTROESOPHAGEAL REFLUX] Peak Flow Meter Gattis Minium Use prior and post nebulizer treatment  
  
 raNITIdine 300 mg tablet Commonly known as:  ZANTAC TAKE ONE TABLET BY MOUTH EVERY DAY  
  
 rosuvastatin 20 mg tablet Commonly known as:  CRESTOR  
TAKE ONE TABLET BY MOUTH NIGHTLY  
  
 traMADol 50 mg tablet Commonly known as:  ULTRAM  
Take 2 tablet q12 hrs as needed. Indications: Pain, generalized osteoarthritis. We Performed the Following CBC WITH AUTOMATED DIFF [62214 CPT(R)] RENAL FUNCTION PANEL [73772 CPT(R)] Follow-up Instructions Return in about 2 months (around 9/23/2018) for fasting. To-Do List   
 07/23/2018 ECHO:  2D ECHO COMPLETE ADULT (TTE) W OR WO CONTR Patient Instructions Learning About Diabetes Food Guidelines Your Care Instructions Meal planning is important to manage diabetes. It helps keep your blood sugar at a target level (which you set with your doctor). You don't have to eat special foods. You can eat what your family eats, including sweets once in a while. But you do have to pay attention to how often you eat and how much you eat of certain foods. You may want to work with a dietitian or a certified diabetes educator (CDE) to help you plan meals and snacks. A dietitian or CDE can also help you lose weight if that is one of your goals. What should you know about eating carbs? Managing the amount of carbohydrate (carbs) you eat is an important part of healthy meals when you have diabetes. Carbohydrate is found in many foods. · Learn which foods have carbs. And learn the amounts of carbs in different foods. ¨ Bread, cereal, pasta, and rice have about 15 grams of carbs in a serving. A serving is 1 slice of bread (1 ounce), ½ cup of cooked cereal, or 1/3 cup of cooked pasta or rice. ¨ Fruits have 15 grams of carbs in a serving. A serving is 1 small fresh fruit, such as an apple or orange; ½ of a banana; ½ cup of cooked or canned fruit; ½ cup of fruit juice; 1 cup of melon or raspberries; or 2 tablespoons of dried fruit. ¨ Milk and no-sugar-added yogurt have 15 grams of carbs in a serving. A serving is 1 cup of milk or 2/3 cup of no-sugar-added yogurt. ¨ Starchy vegetables have 15 grams of carbs in a serving. A serving is ½ cup of mashed potatoes or sweet potato; 1 cup winter squash; ½ of a small baked potato; ½ cup of cooked beans; or ½ cup cooked corn or green peas. · Learn how much carbs to eat each day and at each meal. A dietitian or CDE can teach you how to keep track of the amount of carbs you eat. This is called carbohydrate counting. · If you are not sure how to count carbohydrate grams, use the Plate Method to plan meals.  It is a good, quick way to make sure that you have a balanced meal. It also helps you spread carbs throughout the day. ¨ Divide your plate by types of foods. Put non-starchy vegetables on half the plate, meat or other protein food on one-quarter of the plate, and a grain or starchy vegetable in the final quarter of the plate. To this you can add a small piece of fruit and 1 cup of milk or yogurt, depending on how many carbs you are supposed to eat at a meal. 
· Try to eat about the same amount of carbs at each meal. Do not \"save up\" your daily allowance of carbs to eat at one meal. 
· Proteins have very little or no carbs per serving. Examples of proteins are beef, chicken, turkey, fish, eggs, tofu, cheese, cottage cheese, and peanut butter. A serving size of meat is 3 ounces, which is about the size of a deck of cards. Examples of meat substitute serving sizes (equal to 1 ounce of meat) are 1/4 cup of cottage cheese, 1 egg, 1 tablespoon of peanut butter, and ½ cup of tofu. How can you eat out and still eat healthy? · Learn to estimate the serving sizes of foods that have carbohydrate. If you measure food at home, it will be easier to estimate the amount in a serving of restaurant food. · If the meal you order has too much carbohydrate (such as potatoes, corn, or baked beans), ask to have a low-carbohydrate food instead. Ask for a salad or green vegetables. · If you use insulin, check your blood sugar before and after eating out to help you plan how much to eat in the future. · If you eat more carbohydrate at a meal than you had planned, take a walk or do other exercise. This will help lower your blood sugar. What else should you know? · Limit saturated fat, such as the fat from meat and dairy products. This is a healthy choice because people who have diabetes are at higher risk of heart disease. So choose lean cuts of meat and nonfat or low-fat dairy products. Use olive or canola oil instead of butter or shortening when cooking. · Don't skip meals. Your blood sugar may drop too low if you skip meals and take insulin or certain medicines for diabetes. · Check with your doctor before you drink alcohol. Alcohol can cause your blood sugar to drop too low. Alcohol can also cause a bad reaction if you take certain diabetes medicines. Follow-up care is a key part of your treatment and safety. Be sure to make and go to all appointments, and call your doctor if you are having problems. It's also a good idea to know your test results and keep a list of the medicines you take. Where can you learn more? Go to http://elizabeth-antonieta.info/. Enter T854 in the search box to learn more about \"Learning About Diabetes Food Guidelines. \" Current as of: December 7, 2017 Content Version: 11.7 © 7592-2486 [employer]. Care instructions adapted under license by [employer] (which disclaims liability or warranty for this information). If you have questions about a medical condition or this instruction, always ask your healthcare professional. [place] [address] any warranty or liability for your use of this information. Introducing [facility]! Dear Eyad Palacios: Thank you for requesting a [employer] account. Our records indicate that you already have an active [employer] account. You can access your account anytime at [email or web address] Did you know that you can access your hospital and ER discharge instructions at any time in [employer]? You can also review all of your test results from your hospital stay or ER visit. Additional Information If you have questions, please visit the Frequently Asked Questions section of the [employer] website at [email or web address] Remember, [employer] is NOT to be used for urgent needs. For medical emergencies, dial 911. Now available from your iPhone and Android! Please provide this summary of care documentation to your next provider. Your primary care clinician is listed as [address]. If you have any questions after today's visit, please call [phone].

## 2018-07-23 NOTE — PATIENT INSTRUCTIONS

## 2018-07-23 NOTE — PROGRESS NOTES
Progress Note    Patient: Vandana Buckley MRN: [id number]  SSN: [id number]    YOB: 1945  Age: 74 y.o. Sex: male        Chief Complaint   Patient presents with    Hypertension     follow up     Labs     recheck for follow up          Subjective:     Encounter Diagnoses   Name Primary?  Type 2 diabetes with nephropathy ([facility] Yes    CKD stage 3 due to type 2 diabetes mellitus (HCC)     Anemia in stage 3 chronic kidney disease     Essential hypertension     Pure hypercholesterolemia     Heart murmur              Current and past medical information:    Current Medications after this visit[de-identified]   Current Outpatient Prescriptions   Medication Sig    lisinopril (PRINIVIL, ZESTRIL) 40 mg tablet Take 1 Tab by mouth daily. TAKE ONE TABLET BY MOUTH TWICE DAILY  Indications: hypertension    traMADol (ULTRAM) 50 mg tablet Take 2 tablet q12 hrs as needed. Indications: Pain, generalized osteoarthritis.  meclizine (ANTIVERT) 25 mg chewable tablet Take  by mouth three (3) times daily as needed.  rosuvastatin (CRESTOR) 20 mg tablet TAKE ONE TABLET BY MOUTH NIGHTLY    hydrALAZINE (APRESOLINE) 50 mg tablet TAKE ONE TABLET BY MOUTH THREE TIMES DAILY FOR HYPERTENSION    felodipine (PLENDIL SR) 10 mg 24 hr tablet TAKE ONE TABLET BY MOUTH EVERY DAY FOR HYPERTENSION    furosemide (LASIX) 20 mg tablet TAKE ONE TABLET BY MOUTH EVERY DAY    atenolol (TENORMIN) 25 mg tablet TAKE TWO TABLETS BY MOUTH ONCE A DAY    pantoprazole (PROTONIX) 40 mg tablet TAKE ONE TABLET BY MOUTH EVERY DAY FOR: GASTROESOPHAGEAL REFLUX]    metFORMIN ER (GLUCOPHAGE XR) 500 mg tablet TAKE ONE TABLET BY MOUTH UP TO THREE TIMES DAILY AS directed    raNITIdine (ZANTAC) 300 mg tablet TAKE ONE TABLET BY MOUTH EVERY DAY    diclofenac (VOLTAREN) 1 % gel Apply 4 g to affected area four (4) times daily. Painful shoulder.  aspirin delayed-release 81 mg tablet Take 1 Tab by mouth daily.     Peak Flow Meter eric Use prior and post nebulizer treatment    albuterol (PROVENTIL VENTOLIN) 2.5 mg /3 mL (0.083 %) nebulizer solution 3 mL by Nebulization route every six (6) hours as needed for Wheezing or Shortness of Breath.  omega-3 fatty acids-vitamin e (FISH OIL) 1,000 mg cap Take 1 Cap by mouth.  coenzyme q10 (CO Q-10) 100 mg Cap Take 100 mg by mouth daily.  EPIPEN 2-KARL 0.3 mg/0.3 mL injection INJECT INTRAMUSCULARLY AS NEEDED FOR ONE DOSE. TAKE WITH 50MG OF BENADRYL AND CALL 911. No current facility-administered medications for this visit. Patient Active Problem List    Diagnosis Date Noted    Bone spur 10/04/2010     Priority: 1 - One    Hypertension 05/22/2010     Priority: 1 - One    Hyperlipidemia 05/22/2010     Priority: 1 - One    GERD (gastroesophageal reflux disease) 05/22/2010     Priority: 1 - One    Kidney stone 05/22/2010     Priority: 1 - One    Heart murmur 07/23/2018    Type 2 diabetes with nephropathy ([address] 06/29/2018    CKD stage 3 due to type 2 diabetes mellitus ([address] 06/29/2018    Diabetes mellitus type 2, controlled, without complications ([address] 22/87/2982    Prostate cancer ([address] 08/23/2013    Prostate nodule without urinary obstruction 02/11/2013    S/P carotid endarterectomy 12/05/2012    Vitamin D deficiency 12/05/2012    Amaurosis fugax of left eye 05/06/2012    Carotid artery obstruction 04/17/2012    Allergy to bee sting 04/13/2012       Past Medical History:   Diagnosis Date    Amaurosis fugax of left eye 5/6/2012    Arthritis     OSTEO    CAD (coronary artery disease) 2012    CAROTID LEFT     Cancer ([address] 2013    PROSTATE    Chronic pain     DJD lumbar    Diabetes ([address] 5/22/2010    Frequent nocturnal awakening     urinary frequency    GERD (gastroesophageal reflux disease) 5/22/2010    Hyperlipidemia 5/22/2010    Hypertension 5/22/2010    Kidney stone 5/22/2010    Nodular goiter     1.3 cm right , FNA 6/15    Obesity (BMI 30-39. 9)     Psychiatric disorder     ANXIETY  Vertigo        Allergies   Allergen Reactions    Bee Sting [Sting, Bee] Anaphylaxis    Vytorin 10-10 [Ezetimibe-Simvastatin] Myalgia    Amlodipine Other (comments)     Creepy crawly sensation, twitches    Lipitor [Atorvastatin] Myalgia       Past Surgical History:   Procedure Laterality Date    COLONOSCOPY  3/3/2016         EGD  3/3/2016         HX HEENT      tonsillectomy    HX MOHS PROCEDURES  2010    right    HX ORTHOPAEDIC      coccyx removed    HX UROLOGICAL  2010    left lithotripsy. basket  extraction,ureteral stent    HX VASECTOMY      NEUROLOGICAL PROCEDURE UNLISTED  2011    Steroid injections in epidural    VASCULAR SURGERY PROCEDURE UNLIST  2012    LEFT CAROTID       Social History     Social History    Marital status:      Spouse name: N/A    Number of children: N/A    Years of education: N/A     Social History Main Topics    Smoking status: Former Smoker     Packs/day: 0.50     Years: 4.00     Quit date: 4/16/1966    Smokeless tobacco: Never Used    Alcohol use No    Drug use: No    Sexual activity: Yes     Partners: Female     Other Topics Concern    None     Social History Narrative       Review of Systems   Constitutional: Negative. Negative for chills, fever, malaise/fatigue and weight loss. HENT: Negative. Negative for hearing loss. Eyes: Negative. Negative for blurred vision and double vision. Respiratory: Negative. Negative for cough, hemoptysis, sputum production and shortness of breath. Cardiovascular: Negative. Negative for chest pain, palpitations and orthopnea. Gastrointestinal: Negative. Negative for abdominal pain, blood in stool, heartburn, nausea and vomiting. Genitourinary: Negative. Negative for dysuria, frequency and urgency. Musculoskeletal: Negative. Negative for back pain, myalgias and neck pain. Skin: Negative. Negative for rash. Neurological: Positive for dizziness.  Negative for tingling, tremors, weakness and headaches. He is still has signs of intermittent vertigo. Particularly when he lies down at night in bed. Endo/Heme/Allergies: Negative. Psychiatric/Behavioral: Negative. Negative for depression. Objective:     Vitals:    07/23/18 0847 07/23/18 0912   BP: 158/68 118/58   Pulse: (!) 48 (!) 46   Resp: 20    Temp: 98.2 °F (36.8 °C)    TempSrc: Oral    SpO2: 97%    Weight: 207 lb 3.2 oz (94 kg)    Height: 5' 11\" (1.803 m)       Body mass index is 28.9 kg/(m^2). Physical Exam   Constitutional: He is oriented to person, place, and time and well-developed, well-nourished, and in no distress. HENT:   Head: Normocephalic and atraumatic. Mouth/Throat: Oropharynx is clear and moist.   Eyes: Right eye exhibits no discharge. Left eye exhibits no discharge. No scleral icterus. Neck: No tracheal deviation present. No thyromegaly present. No bruit. Cardiovascular: Regular rhythm. Murmur heard. Bradycardia at 51. Pulmonary/Chest: Effort normal and breath sounds normal.   Abdominal: Soft. Neurological: He is alert and oriented to person, place, and time. Skin: No rash noted. No erythema. Psychiatric: Mood and affect normal.   Nursing note and vitals reviewed. Health Maintenance Due   Topic Date Due    Pneumococcal 65+ High/Highest Risk (2 of 2 - PPSV23) 09/21/2017    MEDICARE YEARLY EXAM  03/28/2018         Assessment and orders:     Encounter Diagnoses     ICD-10-CM ICD-9-CM   1. Type 2 diabetes with nephropathy (HCC) E11.21 250.40     583.81   2. CKD stage 3 due to type 2 diabetes mellitus (HCC) E11.22 250.40    N18.3 585.3   3. Anemia in stage 3 chronic kidney disease N18.3 285.21    D63.1 585.3   4. Essential hypertension I10 401.9   5. Pure hypercholesterolemia E78.00 272.0   6. Heart murmur R01.1 785. 2     Diagnoses and all orders for this visit:    1.  Type 2 diabetes with nephropathy (HCC)-well-controlled  Lab Results   Component Value Date/Time    Hemoglobin A1c 6.1 (H) 06/20/2018 02:58 PM    Hemoglobin A1c (POC) 5.8 04/20/2015 08:44 AM         2. CKD stage 3 due to type 2 diabetes mellitus (HCC)-relatively new problem. May need nephrology consult. -     CBC WITH AUTOMATED DIFF  -     RENAL FUNCTION PANEL    3. Anemia in stage 3 chronic kidney disease-anemia seems to be out of proportion to his CKD. Fit test ×2 negative. -     CBC WITH AUTOMATED DIFF    4. Essential hypertension-well-controlled. Pulse rate at rest is 51. Age 74 he is still working as an  part-time.  -     CBC WITH AUTOMATED DIFF    5. Pure hypercholesterolemia-LDL at goal  Lab Results   Component Value Date/Time    LDL, calculated 75 06/20/2018 02:58 PM       6. Heart murmur-new to my exam.  Grade 2/6 systolic murmur left upper precordium and left axilla. May be mitral regurg.  -     2D ECHO COMPLETE ADULT (TTE) W OR WO CONTR; Future        Plan of care:  Discussed diagnoses in detail with patient. Medication risks/benefits/side effects discussed with patient. All of the patient's questions were addressed. The patient understands and agrees with our plan of care. The patient knows to call back if they are unsure of or forget any changes we discussed today or if the symptoms change. The patient received an After-Visit Summary which contains VS, orders, medication list and allergy list. This can be used as a \"mini-medical record\" should they have to seek medical care while out of town. Patient Care Team:  Rony Perales MD as PCP - General  Germania Keith MD (General and Vascular Surgery)  Mirza Cleary MD (Neurology)  Hiram Mobley MD as Surgeon (Urology)  Guy Cisneros MD (Endocrinology)  Basim Aguillon MD (Dermatology)    Follow-up Disposition:  Return in about 2 months (around 9/23/2018) for fasting.     Future Appointments  Date Time Provider Hank Rubio   10/22/2018 8:20 Kourtney Alonso MD [facility]SE SCHED       Signed By: Rony Perales MD July 23, 2018

## 2018-07-23 NOTE — PROGRESS NOTES
1. Have you been to the ER, urgent care clinic since your last visit? Hospitalized since your last visit? No    2. Have you seen or consulted any other health care providers outside of the [facility] since your last visit? Include any pap smears or colon screening.  No  Reviewed record in preparation for visit and have necessary documentation  Pt did not bring medication to office visit for review  Goals that were addressed and/or need to be completed during or after this appointment include     Health Maintenance Due   Topic Date Due    Pneumococcal 65+ High/Highest Risk (2 of 2 - PPSV23) 09/21/2017    MEDICARE YEARLY EXAM  03/28/2018

## 2018-07-24 DIAGNOSIS — I10 ESSENTIAL HYPERTENSION: ICD-10-CM

## 2018-07-24 LAB
ALBUMIN SERPL-MCNC: 4.6 G/DL (ref 3.5–4.8)
BASOPHILS # BLD AUTO: 0 X10E3/UL (ref 0–0.2)
BASOPHILS NFR BLD AUTO: 0 %
BUN SERPL-MCNC: 21 MG/DL (ref 8–27)
BUN/CREAT SERPL: 19 (ref 10–24)
CALCIUM SERPL-MCNC: 9.6 MG/DL (ref 8.6–10.2)
CHLORIDE SERPL-SCNC: 103 MMOL/L (ref 96–106)
CO2 SERPL-SCNC: 26 MMOL/L (ref 20–29)
CREAT SERPL-MCNC: 1.13 MG/DL (ref 0.76–1.27)
EOSINOPHIL # BLD AUTO: 0.3 X10E3/UL (ref 0–0.4)
EOSINOPHIL NFR BLD AUTO: 4 %
ERYTHROCYTE [DISTWIDTH] IN BLOOD BY AUTOMATED COUNT: 14 % (ref 12.3–15.4)
GLUCOSE SERPL-MCNC: 112 MG/DL (ref 65–99)
HCT VFR BLD AUTO: 34.9 % (ref 37.5–51)
HGB BLD-MCNC: 11.8 G/DL (ref 13–17.7)
IMM GRANULOCYTES # BLD: 0 X10E3/UL (ref 0–0.1)
IMM GRANULOCYTES NFR BLD: 0 %
INTERPRETATION: NORMAL
LYMPHOCYTES # BLD AUTO: 1.5 X10E3/UL (ref 0.7–3.1)
LYMPHOCYTES NFR BLD AUTO: 22 %
Lab: NORMAL
MCH RBC QN AUTO: 30 PG (ref 26.6–33)
MCHC RBC AUTO-ENTMCNC: 33.8 G/DL (ref 31.5–35.7)
MCV RBC AUTO: 89 FL (ref 79–97)
MONOCYTES # BLD AUTO: 0.6 X10E3/UL (ref 0.1–0.9)
MONOCYTES NFR BLD AUTO: 9 %
NEUTROPHILS # BLD AUTO: 4.3 X10E3/UL (ref 1.4–7)
NEUTROPHILS NFR BLD AUTO: 65 %
PHOSPHATE SERPL-MCNC: 3.2 MG/DL (ref 2.5–4.5)
PLATELET # BLD AUTO: 207 X10E3/UL (ref 150–379)
POTASSIUM SERPL-SCNC: 4.5 MMOL/L (ref 3.5–5.2)
RBC # BLD AUTO: 3.93 X10E6/UL (ref 4.14–5.8)
SODIUM SERPL-SCNC: 144 MMOL/L (ref 134–144)
WBC # BLD AUTO: 6.7 X10E3/UL (ref 3.4–10.8)

## 2018-07-24 RX ORDER — ATENOLOL 25 MG/1
TABLET ORAL
Qty: 60 TAB | Refills: 11 | Status: SHIPPED | OUTPATIENT
Start: 2018-07-24 | End: 2018-07-30 | Stop reason: SDUPTHER

## 2018-07-24 RX ORDER — ATENOLOL 25 MG/1
TABLET ORAL
Qty: 60 TAB | OUTPATIENT
Start: 2018-07-24

## 2018-07-27 ENCOUNTER — TELEPHONE (OUTPATIENT)
Dept: FAMILY MEDICINE CLINIC | Age: 73
End: 2018-07-27

## 2018-07-30 DIAGNOSIS — I10 ESSENTIAL HYPERTENSION: ICD-10-CM

## 2018-07-30 RX ORDER — ATENOLOL 25 MG/1
TABLET ORAL
Qty: 60 TAB | Refills: 11 | Status: SHIPPED | OUTPATIENT
Start: 2018-07-30 | End: 2019-04-17 | Stop reason: SDUPTHER

## 2018-07-31 ENCOUNTER — HOSPITAL ENCOUNTER (OUTPATIENT)
Dept: NON INVASIVE DIAGNOSTICS | Age: 73
Discharge: HOME | End: 2018-07-31
Attending: FAMILY MEDICINE
Payer: MEDICARE

## 2018-07-31 DIAGNOSIS — R01.1 HEART MURMUR: Chronic | ICD-10-CM

## 2018-07-31 PROCEDURE — 93306 TTE W/DOPPLER COMPLETE: CPT

## 2018-08-17 RX ORDER — METFORMIN HYDROCHLORIDE 500 MG/1
TABLET, EXTENDED RELEASE ORAL
Qty: 90 TAB | OUTPATIENT
Start: 2018-08-17

## 2018-08-22 DIAGNOSIS — E11.9 CONTROLLED TYPE 2 DIABETES MELLITUS WITHOUT COMPLICATION, WITHOUT LONG-TERM CURRENT USE OF INSULIN (HCC): Primary | Chronic | ICD-10-CM

## 2018-08-22 RX ORDER — METFORMIN HYDROCHLORIDE 500 MG/1
500 TABLET, EXTENDED RELEASE ORAL 2 TIMES DAILY
Qty: 180 TAB | Refills: 3 | Status: SHIPPED | OUTPATIENT
Start: 2018-08-22 | End: 2019-09-03 | Stop reason: SDUPTHER

## 2018-10-17 ENCOUNTER — OFFICE VISIT (OUTPATIENT)
Dept: FAMILY MEDICINE CLINIC | Age: 73
End: 2018-10-17

## 2018-10-17 VITALS
WEIGHT: 208.6 LBS | DIASTOLIC BLOOD PRESSURE: 72 MMHG | BODY MASS INDEX: 29.2 KG/M2 | OXYGEN SATURATION: 97 % | RESPIRATION RATE: 16 BRPM | SYSTOLIC BLOOD PRESSURE: 154 MMHG | HEART RATE: 44 BPM | TEMPERATURE: 97.7 F | HEIGHT: 71 IN

## 2018-10-17 DIAGNOSIS — E11.22 CKD STAGE 3 DUE TO TYPE 2 DIABETES MELLITUS (HCC): ICD-10-CM

## 2018-10-17 DIAGNOSIS — E11.21 CONTROLLED TYPE 2 DIABETES MELLITUS WITH DIABETIC NEPHROPATHY, WITHOUT LONG-TERM CURRENT USE OF INSULIN (HCC): Primary | ICD-10-CM

## 2018-10-17 DIAGNOSIS — N18.30 CKD STAGE 3 DUE TO TYPE 2 DIABETES MELLITUS (HCC): ICD-10-CM

## 2018-10-17 DIAGNOSIS — E78.00 PURE HYPERCHOLESTEROLEMIA: Chronic | ICD-10-CM

## 2018-10-17 DIAGNOSIS — M19.019 ARTHRITIS, SHOULDER REGION: ICD-10-CM

## 2018-10-17 DIAGNOSIS — K21.00 GASTROESOPHAGEAL REFLUX DISEASE WITH ESOPHAGITIS: Chronic | ICD-10-CM

## 2018-10-17 DIAGNOSIS — E11.21 CONTROLLED TYPE 2 DIABETES MELLITUS WITH DIABETIC NEPHROPATHY, WITHOUT LONG-TERM CURRENT USE OF INSULIN (HCC): ICD-10-CM

## 2018-10-17 DIAGNOSIS — I10 ESSENTIAL HYPERTENSION: Chronic | ICD-10-CM

## 2018-10-17 DIAGNOSIS — Z23 ENCOUNTER FOR IMMUNIZATION: ICD-10-CM

## 2018-10-17 PROBLEM — H25.13 AGE-RELATED NUCLEAR CATARACT OF BOTH EYES: Status: ACTIVE | Noted: 2018-03-27

## 2018-10-17 PROBLEM — H43.812 PVD (POSTERIOR VITREOUS DETACHMENT), LEFT EYE: Status: ACTIVE | Noted: 2018-03-27

## 2018-10-17 RX ORDER — DICLOFENAC SODIUM 10 MG/G
4 GEL TOPICAL 4 TIMES DAILY
Qty: 100 G | Refills: 3 | Status: SHIPPED | OUTPATIENT
Start: 2018-10-17

## 2018-10-17 NOTE — PROGRESS NOTES
Progress Note    Patient: Palma Mcardle MRN: [id number]  SSN: [id number]    YOB: 1945  Age: 74 y.o. Sex: male        Chief Complaint   Patient presents with    Results     Discuss Echocardiogram    Labs     Fasting    Hypertension    Diabetes    Cough with sputum     Worse after he eats         Subjective: The patient presents today to address multiple chronic medical problems. Encounter Diagnoses   Name Primary?  Controlled type 2 diabetes mellitus with diabetic nephropathy, without long-term current use of insulin [facility]): Diabetes has been well controlled. This patient is managed under a comprehensive plan of care for Diabetes. Overall the patient feels well with good energy level. Key Antihyperglycemic Medications             metFORMIN ER (GLUCOPHAGE XR) 500 mg tablet  (Taking) Take 1 Tab by mouth two (2) times a day. Pertinent Labs:   Lab Results   Component Value Date/Time    Hemoglobin A1c 6.1 (H) 06/20/2018 02:58 PM    Hemoglobin A1c 6.1 (H) 03/23/2018 02:36 PM    Hemoglobin A1c 5.6 09/20/2017 12:07 PM      Body mass index is 29.09 kg/m². Lab Results   Component Value Date/Time    LDL, calculated 75 06/20/2018 02:58 PM         Lab Results   Component Value Date/Time    Sodium 144 07/23/2018 09:34 AM    Potassium 4.5 07/23/2018 09:34 AM    Chloride 103 07/23/2018 09:34 AM    CO2 26 07/23/2018 09:34 AM    Anion gap 8 04/24/2013 03:08 AM    Glucose 112 (H) 07/23/2018 09:34 AM    BUN 21 07/23/2018 09:34 AM    Creatinine 1.13 07/23/2018 09:34 AM    BUN/Creatinine ratio 19 07/23/2018 09:34 AM    GFR est AA 73 07/23/2018 09:34 AM    GFR est non-AA 63 07/23/2018 09:34 AM    Calcium 9.6 07/23/2018 09:34 AM    AST (SGOT) 19 06/20/2018 02:58 PM    Alk.  phosphatase 52 06/20/2018 02:58 PM    Protein, total 6.6 06/20/2018 02:58 PM    Albumin 4.6 07/23/2018 09:34 AM    Globulin 2.6 04/16/2013 01:58 PM    A-G Ratio 2.1 06/20/2018 02:58 PM    ALT (SGPT) 12 06/20/2018 02:58 PM Lab Results   Component Value Date/Time    Microalbumin/Creat ratio (mg/g creat) 8 2009 09:00 AM    Microalb/Creat ratio (ug/mg creat.) 8.2 2018 02:36 PM    Microalbumin,urine random 1.47 2009 09:00 AM      Frequency of home glucose testing: No logs   Blood Sugar range at home:    Last eye exam: In past 12 months. Last foot exam: Today   Polyuria, polyphagia or polydipsia: No   Retinopathy: No   Neuropathy SX: Early   Low blood sugar symptoms: No   Dietary compliance: Good   Medication compliance:Good   On ASA: Yes   Depression: No   CKD: Stage III     Wt Readings from Last 3 Encounters:   10/17/18 208 lb 9.6 oz (94.6 kg)   18 207 lb 3.2 oz (94 kg)   18 205 lb (93 kg)        Social History     Tobacco Use   Smoking Status Former Smoker    Packs/day: 0.50    Years: 4.00    Pack years: 2.00    Last attempt to quit: 1966    Years since quittin.5   Smokeless Tobacco Never Used     Body mass index is 29.09 kg/m². Diabetic Consultants: All the patient's questions regarding medications, diet and exercise were answered. Goal of A1C of less than 7.5% is our goal.   Our overall goal is to reduce or eliminate the long term consequences of poorly controlled diabetes. Yes    Arthritis, shoulder region: He has generalized arthritis. He still works full-time as an . Many times he does not have any help at all. The 2 other areas of active arthritis are his back and left CMC joint. He has mild sciatica with pain to his buttocks.  CKD stage 3 due to type 2 diabetes mellitus ([facility]): His last GFR had corrected itself.   Lab Results   Component Value Date/Time    GFR est AA 73 2018 09:34 AM    GFR est non-AA 63 2018 09:34 AM    Creatinine (POC) 1.0 2017 09:20 AM    Creatinine 1.13 2018 09:34 AM    BUN 21 2018 09:34 AM    BUN (POC) 21 (H) 2017 09:20 AM    Sodium (POC) 139 2017 09:20 AM    Sodium 144 2018 09:34 AM    Potassium 4.5 07/23/2018 09:34 AM    Potassium (POC) 4.1 02/24/2017 09:20 AM    Chloride (POC) 101 02/24/2017 09:20 AM    Chloride 103 07/23/2018 09:34 AM    CO2 26 07/23/2018 09:34 AM            Essential hypertension:  BP Readings from Last 3 Encounters:   10/17/18 154/72   07/23/18 118/58   06/29/18 120/70     The patient reports:  taking medications as instructed, no medication side effects noted, no TIA's, no chest pain on exertion, notes stable dyspnea on exertion, no change, no swelling of ankles. Key CAD CHF Meds             atenolol (TENORMIN) 25 mg tablet  (Taking) One tab twice daily. lisinopril (PRINIVIL, ZESTRIL) 40 mg tablet  (Taking) Take 1 Tab by mouth daily. TAKE ONE TABLET BY MOUTH TWICE DAILY  Indications: hypertension    rosuvastatin (CRESTOR) 20 mg tablet  (Taking) TAKE ONE TABLET BY MOUTH NIGHTLY    hydrALAZINE (APRESOLINE) 50 mg tablet  (Taking) TAKE ONE TABLET BY MOUTH THREE TIMES DAILY FOR HYPERTENSION    felodipine (PLENDIL SR) 10 mg 24 hr tablet  (Taking) TAKE ONE TABLET BY MOUTH EVERY DAY FOR HYPERTENSION    furosemide (LASIX) 20 mg tablet  (Taking) TAKE ONE TABLET BY MOUTH EVERY DAY    aspirin delayed-release 81 mg tablet  (Taking) Take 1 Tab by mouth daily. omega-3 fatty acids-vitamin e (FISH OIL) 1,000 mg cap  (Taking) Take 1 Cap by mouth. Lab Results   Component Value Date/Time    Sodium 144 07/23/2018 09:34 AM    Potassium 4.5 07/23/2018 09:34 AM    Chloride 103 07/23/2018 09:34 AM    CO2 26 07/23/2018 09:34 AM    Anion gap 8 04/24/2013 03:08 AM    Glucose 112 (H) 07/23/2018 09:34 AM    BUN 21 07/23/2018 09:34 AM    Creatinine 1.13 07/23/2018 09:34 AM    BUN/Creatinine ratio 19 07/23/2018 09:34 AM    GFR est AA 73 07/23/2018 09:34 AM    GFR est non-AA 63 07/23/2018 09:34 AM    Calcium 9.6 07/23/2018 09:34 AM    Bilirubin, total 0.5 06/20/2018 02:58 PM    AST (SGOT) 19 06/20/2018 02:58 PM    Alk.  phosphatase 52 06/20/2018 02:58 PM    Protein, total 6.6 06/20/2018 02:58 PM    Albumin 4.6 07/23/2018 09:34 AM    Globulin 2.6 04/16/2013 01:58 PM    A-G Ratio 2.1 06/20/2018 02:58 PM    ALT (SGPT) 12 06/20/2018 02:58 PM     Low salt diet? yes  Aerobic exercise? no except work. Our goal is to normalize the blood pressure to decrease the risks of strokes and heart attacks. The patient is in agreement with the plan.  Pure hypercholesterolemia:  Cardiovascular risks for him are: LDL goal is under 80  diabetic  hypertension  hyperlipidemia. Key Antihyperlipidemia Meds             rosuvastatin (CRESTOR) 20 mg tablet  (Taking) TAKE ONE TABLET BY MOUTH NIGHTLY    omega-3 fatty acids-vitamin e (FISH OIL) 1,000 mg cap  (Taking) Take 1 Cap by mouth. Lab Results   Component Value Date/Time    Cholesterol, total 161 06/20/2018 02:58 PM    HDL Cholesterol 67 06/20/2018 02:58 PM    LDL, calculated 75 06/20/2018 02:58 PM    Triglyceride 95 06/20/2018 02:58 PM    CHOL/HDL Ratio 3.5 10/04/2010 11:30 AM     Lab Results   Component Value Date/Time    ALT (SGPT) 12 06/20/2018 02:58 PM    AST (SGOT) 19 06/20/2018 02:58 PM    Alk. phosphatase 52 06/20/2018 02:58 PM    Bilirubin, total 0.5 06/20/2018 02:58 PM      Myalgias: No   Fatigue: No   Other side effects: no  Wt Readings from Last 3 Encounters:   10/17/18 208 lb 9.6 oz (94.6 kg)   07/23/18 207 lb 3.2 oz (94 kg)   06/29/18 205 lb (93 kg)     The patient is aware of our goal to reduce or eliminate the long term problems (such as strokes and heart attacks) related to poorly controlled hyperlipidemia.  Gastroesophageal reflux disease with esophagitis:  Current control of Symptoms:good  Hiatal Hernia:no  Current Medications: Pantoprazole  The patient has no history melena or bright red blood in the stools. The patient avoids high dose aspirin and NSAID therapy. The patient is aware of diet changes needed, elevating the head of the bed and appropriate use of antacids.             Encounter for immunization: Flu vaccine given. Shingrix ordered. Current and past medical information:    Current Medications after this visit[de-identified]     Current Outpatient Medications   Medication Sig    diclofenac (VOLTAREN) 1 % gel Apply 4 g to affected area four (4) times daily. Painful shoulder.  varicella-zoster recombinant, PF, (SHINGRIX, PF,) 50 mcg/0.5 mL susr injection 0.5 mL by IntraMUSCular route once for 1 dose. Repeat in 4-6 months    pneumococcal 13 rimma conj dip (PREVNAR-13) 0.5 mL syrg injection 0.5 mL by IntraMUSCular route once for 1 dose. To be administered at Pharmacy. Fax confirmation to me at [phone]. Thank You.  metFORMIN ER (GLUCOPHAGE XR) 500 mg tablet Take 1 Tab by mouth two (2) times a day.  atenolol (TENORMIN) 25 mg tablet One tab twice daily.  lisinopril (PRINIVIL, ZESTRIL) 40 mg tablet Take 1 Tab by mouth daily. TAKE ONE TABLET BY MOUTH TWICE DAILY  Indications: hypertension    traMADol (ULTRAM) 50 mg tablet Take 2 tablet q12 hrs as needed. Indications: Pain, generalized osteoarthritis.  meclizine (ANTIVERT) 25 mg chewable tablet Take  by mouth three (3) times daily as needed.  rosuvastatin (CRESTOR) 20 mg tablet TAKE ONE TABLET BY MOUTH NIGHTLY    hydrALAZINE (APRESOLINE) 50 mg tablet TAKE ONE TABLET BY MOUTH THREE TIMES DAILY FOR HYPERTENSION    felodipine (PLENDIL SR) 10 mg 24 hr tablet TAKE ONE TABLET BY MOUTH EVERY DAY FOR HYPERTENSION    furosemide (LASIX) 20 mg tablet TAKE ONE TABLET BY MOUTH EVERY DAY    pantoprazole (PROTONIX) 40 mg tablet TAKE ONE TABLET BY MOUTH EVERY DAY FOR: GASTROESOPHAGEAL REFLUX]    raNITIdine (ZANTAC) 300 mg tablet TAKE ONE TABLET BY MOUTH EVERY DAY    EPIPEN 2-KARL 0.3 mg/0.3 mL injection INJECT INTRAMUSCULARLY AS NEEDED FOR ONE DOSE. TAKE WITH 50MG OF BENADRYL AND CALL 911.  aspirin delayed-release 81 mg tablet Take 1 Tab by mouth daily.     Peak Flow Meter eric Use prior and post nebulizer treatment    albuterol (PROVENTIL VENTOLIN) 2.5 mg /3 mL (0.083 %) nebulizer solution 3 mL by Nebulization route every six (6) hours as needed for Wheezing or Shortness of Breath.  omega-3 fatty acids-vitamin e (FISH OIL) 1,000 mg cap Take 1 Cap by mouth.  coenzyme q10 (CO Q-10) 100 mg Cap Take 100 mg by mouth daily. No current facility-administered medications for this visit. Patient Active Problem List    Diagnosis Date Noted    Bone spur 10/04/2010     Priority: 1 - One    Hypertension 05/22/2010     Priority: 1 - One    Hyperlipidemia 05/22/2010     Priority: 1 - One    GERD (gastroesophageal reflux disease) 05/22/2010     Priority: 1 - One    Kidney stone 05/22/2010     Priority: 1 - One    Heart murmur 07/23/2018    Type 2 diabetes with nephropathy ([address] 06/29/2018    CKD stage 3 due to type 2 diabetes mellitus ([address] 06/29/2018    Age-related nuclear cataract of both eyes 03/27/2018    PVD (posterior vitreous detachment), left eye 03/27/2018    Controlled type 2 diabetes mellitus with diabetic nephropathy ([address] 12/03/2015    Prostate cancer ([facility] 08/23/2013    Prostate nodule without urinary obstruction 02/11/2013    S/P carotid endarterectomy 12/05/2012    Vitamin D deficiency 12/05/2012    Amaurosis fugax of left eye 05/06/2012    Carotid artery obstruction 04/17/2012    Allergy to bee sting 04/13/2012       Past Medical History:   Diagnosis Date    Amaurosis fugax of left eye 5/6/2012    Arthritis     OSTEO    CAD (coronary artery disease) 2012    CAROTID LEFT     Cancer ([address] 2013    PROSTATE    Chronic pain     DJD lumbar    Diabetes ([address] 5/22/2010    Frequent nocturnal awakening     urinary frequency    GERD (gastroesophageal reflux disease) 5/22/2010    Hyperlipidemia 5/22/2010    Hypertension 5/22/2010    Kidney stone 5/22/2010    Nodular goiter     1.3 cm right , FNA 6/15    Obesity (BMI 30-39. 9)     Psychiatric disorder     ANXIETY    Vertigo        Allergies   Allergen Reactions    Bee Sting [Sting, Bee] Anaphylaxis    Vytorin 10-10 [Ezetimibe-Simvastatin] Myalgia    Amlodipine Other (comments)     Creepy crawly sensation, twitches    Lipitor [Atorvastatin] Myalgia       Past Surgical History:   Procedure Laterality Date    COLONOSCOPY  3/3/2016         EGD  3/3/2016         HX HEENT      tonsillectomy    HX MOHS PROCEDURES  2010    right    HX ORTHOPAEDIC      coccyx removed    HX UROLOGICAL  2010    left lithotripsy. basket  extraction,ureteral stent    HX VASECTOMY      NEUROLOGICAL PROCEDURE UNLISTED      Steroid injections in epidural    VASCULAR SURGERY PROCEDURE UNLIST      LEFT CAROTID       Social History     Socioeconomic History    Marital status:      Spouse name: Not on file    Number of children: Not on file    Years of education: Not on file    Highest education level: Not on file   Social Needs    Financial resource strain: Not on file    Food insecurity - worry: Not on file    Food insecurity - inability: Not on file    Transportation needs - medical: Not on file   [employer] needs - non-medical: Not on file   Occupational History    Not on file   Tobacco Use    Smoking status: Former Smoker     Packs/day: 0.50     Years: 4.00     Pack years: 2.00     Last attempt to quit: 1966     Years since quittin.5    Smokeless tobacco: Never Used   Substance and Sexual Activity    Alcohol use: No    Drug use: No    Sexual activity: Yes     Partners: Female   Other Topics Concern    Not on file   Social History Narrative    Not on file       Review of Systems   Constitutional: Negative. Negative for chills, fever, malaise/fatigue and weight loss. HENT: Negative. Negative for hearing loss. Eyes: Negative. Negative for blurred vision and double vision. Respiratory: Positive for cough and sputum production. Negative for hemoptysis and shortness of breath. 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   He has not been wearing a mask. He obviously has bronchial irritation because he is coughing and coughing up sputum. His sputum production is declining however   Cardiovascular: Negative. Negative for chest pain, palpitations and orthopnea. Gastrointestinal: Negative. Negative for abdominal pain, blood in stool, heartburn, nausea and vomiting. Genitourinary: Negative. Negative for dysuria, frequency and urgency. Musculoskeletal: Positive for back pain and joint pain. Negative for falls, myalgias and neck pain. Left hip sciatica   Skin: Negative. Negative for rash. Neurological: Negative. Negative for dizziness, tingling, tremors, weakness and headaches. Endo/Heme/Allergies: Negative. Psychiatric/Behavioral: Negative. Negative for depression. Objective:     Vitals:    10/17/18 0804 10/17/18 0905   BP: 168/83 154/72   Pulse: (!) 40 (!) 44   Resp: 16    Temp: 97.7 °F (36.5 °C)    TempSrc: Oral    SpO2: 97%    Weight: 208 lb 9.6 oz (94.6 kg)    Height: 5' 11\" (1.803 m)       Body mass index is 29.09 kg/m². Physical Exam   Constitutional: He is oriented to person, place, and time and well-developed, well-nourished, and in no distress. HENT:   Head: Normocephalic and atraumatic. Mouth/Throat: Oropharynx is clear and moist.   Eyes: Right eye exhibits no discharge. Left eye exhibits no discharge. No scleral icterus. Neck: No thyromegaly present. No bruit. Cardiovascular: Normal rate, regular rhythm and normal heart sounds. Pulmonary/Chest: Effort normal and breath sounds normal.   Abdominal: Soft. Musculoskeletal: He exhibits tenderness. Left CMC arthritis with deformity. It is also tender. He is right-handed. Recommended splint when not active. Neurological: He is alert and oriented to person, place, and time.    Diabetic foot exam: Recommended he start seeing a podiatrist due to tinea unguium    Left Foot:   Visual Exam: callous -tinea unguium   Pulse DP: 2+ (normal)   Filament test: normal sensation    Vibratory sensation: diminished      Right Foot:   Visual Exam: callous -tinea unguium   Pulse DP: 2+ (normal)   Filament test: normal sensation    Vibratory sensation: diminished     Skin: No rash noted. No erythema. Psychiatric: Mood and affect normal.   Nursing note and vitals reviewed. Health Maintenance Due   Topic Date Due    Shingrix Vaccine Age 49> (1 of 2) 04/13/1993    Pneumococcal 65+ High/Highest Risk (2 of 2 - PPSV23) 09/21/2017    MEDICARE YEARLY EXAM  03/28/2018    Influenza Age 9 to Adult  08/01/2018    FOOT EXAM Q1  09/20/2018         Assessment and orders:     Encounter Diagnoses     ICD-10-CM ICD-9-CM   1. Controlled type 2 diabetes mellitus with diabetic nephropathy, without long-term current use of insulin ([facility]) E11.21 250.40     583.81   2. Arthritis, shoulder region M19.019 716.91   3. CKD stage 3 due to type 2 diabetes mellitus ([facility]) E11.22 250.40    N18.3 585.3   4. Essential hypertension I10 401.9   5. Pure hypercholesterolemia E78.00 272.0   6. Gastroesophageal reflux disease with esophagitis K21.0 530.11   7. Encounter for immunization Z23 V03.89     Diagnoses and all orders for this visit:    Controlled type 2 diabetes mellitus with diabetic nephropathy, without long-term current use of insulin ([facility])-recheck labs  -     METABOLIC PANEL, COMPREHENSIVE; Future  -     HEMOGLOBIN A1C WITH [facility]  -      DIABETES FOOT EXAM    Arthritis, shoulder region  -     diclofenac (VOLTAREN) 1 % gel; Apply 4 g to affected area four (4) times daily. Painful shoulder. CKD stage 3 due to type 2 diabetes mellitus ([facility])  -     METABOLIC PANEL, COMPREHENSIVE; Future    Essential hypertension-he says his blood pressures at home are much better and normal.  With his bradycardia we do not have to stop his atenolol and track his blood pressures. We may need to make additional medication changes after that but I do not want his pulse rate in the 40s.   - LIPID PANEL; Future  -     METABOLIC PANEL, COMPREHENSIVE; Future  -     T4, FREE; Future  -     HEMOGLOBIN; Future    Pure hypercholesterolemia-recheck labs  -     LIPID PANEL; Future  -     METABOLIC PANEL, COMPREHENSIVE; Future    Gastroesophageal reflux disease with esophagitis-symptoms controlled   -     HEMOGLOBIN; Future    Encounter for immunization  -     ADMIN INFLUENZA VIRUS VAC  -     INFLUENZA VACCINE INACTIVATED (IIV), SUBUNIT, ADJUVANTED, IM  -     varicella-zoster recombinant, PF, (SHINGRIX, PF,) 50 mcg/0.5 mL susr injection; 0.5 mL by IntraMUSCular route once for 1 dose. Repeat in 4-6 months  -     pneumococcal 13 rimma conj dip (PREVNAR-13) 0.5 mL syrg injection; 0.5 mL by IntraMUSCular route once for 1 dose. To be administered at Pharmacy. Fax confirmation to me at [phone]. Thank You. Plan of care:  Discussed diagnoses in detail with patient. Medication risks/benefits/side effects discussed with patient. All of the patient's questions were addressed. The patient understands and agrees with our plan of care. The patient knows to call back if they are unsure of or forget any changes we discussed today or if the symptoms change. The patient received an After-Visit Summary which contains VS, orders, medication list and allergy list. This can be used as a \"mini-medical record\" should they have to seek medical care while out of town. Patient Care Team:  Wendi Beck MD as PCP - Caitlin Segundo MD (General and Vascular Surgery)  Juliano Doss MD (Neurology)  Elaina Watters MD as Surgeon (Urology)  Denia Lux MD (Endocrinology)  Sugar Parrish MD (Dermatology)    Follow-up Disposition:  Return in about 3 months (around 1/17/2019) for MWE.     Future Appointments   Date Time Provider Hank Rubio   1/25/2019  8:40 AM Wendi Beck MD [facility] JOSE SCHED       Signed By: Ruslan Flowers MD     October 17, 2018

## 2018-10-17 NOTE — PATIENT INSTRUCTIONS
Influenza (Flu) Vaccine (Inactivated or Recombinant): What You Need to Know  Why get vaccinated? Influenza (\"flu\") is a contagious disease that spreads around the [place] every winter, usually between October and May. Flu is caused by influenza viruses and is spread mainly by coughing, sneezing, and close contact. Anyone can get flu. Flu strikes suddenly and can last several days. Symptoms vary by age, but can include:  · Fever/chills. · Sore throat. · Muscle aches. · Fatigue. · Cough. · Headache. · Runny or stuffy nose. Flu can also lead to pneumonia and blood infections, and cause diarrhea and seizures in children. If you have a medical condition, such as heart or lung disease, flu can make it worse. Flu is more dangerous for some people. Infants and young children, people 72years of age and older, pregnant women, and people with certain health conditions or a weakened immune system are at greatest risk. Each year thousands of people in the [facility] die from flu, and many more are hospitalized. Flu vaccine can:  · Keep you from getting flu. · Make flu less severe if you do get it. · Keep you from spreading flu to your family and other people. Inactivated and recombinant flu vaccines  A dose of flu vaccine is recommended every flu season. Children 6 months through 6years of age may need two doses during the same flu season. Everyone else needs only one dose each flu season. Some inactivated flu vaccines contain a very small amount of a mercury-based preservative called thimerosal. Studies have not shown thimerosal in vaccines to be harmful, but flu vaccines that do not contain thimerosal are available. There is no live flu virus in flu shots. They cannot cause the flu. There are many flu viruses, and they are always changing. Each year a new flu vaccine is made to protect against three or four viruses that are likely to cause disease in the upcoming flu season.  But even when the vaccine doesn't exactly match these viruses, it may still provide some protection. Flu vaccine cannot prevent:  · Flu that is caused by a virus not covered by the vaccine. · Illnesses that look like flu but are not. Some people should not get this vaccine  Tell the person who is giving you the vaccine:  · If you have any severe (life-threatening) allergies. If you ever had a life-threatening allergic reaction after a dose of flu vaccine, or have a severe allergy to any part of this vaccine, you may be advised not to get vaccinated. Most, but not all, types of flu vaccine contain a small amount of egg protein. · If you ever had Guillain-Barré syndrome (also called GBS) Some people with a history of GBS should not get this vaccine. This should be discussed with your doctor. · If you are not feeling well. It is usually okay to get flu vaccine when you have a mild illness, but you might be asked to come back when you feel better. Risks of a vaccine reaction  With any medicine, including vaccines, there is a chance of reactions. These are usually mild and go away on their own, but serious reactions are also possible. Most people who get a flu shot do not have any problems with it. Minor problems following a flu shot include:  · Soreness, redness, or swelling where the shot was given  · Hoarseness  · Sore, red or itchy eyes  · Cough  · Fever  · Aches  · Headache  · Itching  · Fatigue  If these problems occur, they usually begin soon after the shot and last 1 or 2 days. More serious problems following a flu shot can include the following:  · There may be a small increased risk of Guillain-Barré Syndrome (GBS) after inactivated flu vaccine. This risk has been estimated at 1 or 2 additional cases per million people vaccinated. This is much lower than the risk of severe complications from flu, which can be prevented by flu vaccine.   · [place] children who get the flu shot along with pneumococcal vaccine (PCV13) and/or DTaP vaccine at the same time might be slightly more likely to have a seizure caused by fever. Ask your doctor for more information. Tell your doctor if a child who is getting flu vaccine has ever had a seizure  Problems that could happen after any injected vaccine:  · People sometimes faint after a medical procedure, including vaccination. Sitting or lying down for about 15 minutes can help prevent fainting, and injuries caused by a fall. Tell your doctor if you feel dizzy, or have vision changes or ringing in the ears. · Some people get severe pain in the shoulder and have difficulty moving the arm where a shot was given. This happens very rarely. · Any medication can cause a severe allergic reaction. Such reactions from a vaccine are very rare, estimated at about 1 in a million doses, and would happen within a few minutes to a few hours after the vaccination. As with any medicine, there is a very remote chance of a vaccine causing a serious injury or death. The safety of vaccines is always being monitored. For more information, visit: www.cdc.gov/vaccinesafety/. What if there is a serious reaction? What should I look for? · Look for anything that concerns you, such as signs of a severe allergic reaction, very high fever, or unusual behavior. Signs of a severe allergic reaction can include hives, swelling of the face and throat, difficulty breathing, a fast heartbeat, dizziness, and weakness - usually within a few minutes to a few hours after the vaccination. What should I do? · If you think it is a severe allergic reaction or other emergency that can't wait, call 9-1-1 and get the person to the nearest hospital. Otherwise, call your doctor. · Reactions should be reported to the \"Vaccine Adverse Event Reporting System\" (VAERS). Your doctor should file this report, or you can do it yourself through the VAERS website at www.vaers. [facility].gov, or by calling 0-233.642.5187.   [employer] does not give medical advice. The National Vaccine Injury Compensation Program  The National Vaccine Injury Compensation Program (VICP) is a federal program that was created to compensate people who may have been injured by certain vaccines. Persons who believe they may have been injured by a vaccine can learn about the program and about filing a claim by calling 3-410.215.3923 or visiting the [facility] website at www.[facility].gov/vaccinecompensation. There is a time limit to file a claim for compensation. How can I learn more? · Ask your healthcare provider. He or she can give you the vaccine package insert or suggest other sources of information. · Call your local or state health department. · Contact the Centers for Disease Control and Prevention (CDC):  ? Call 4-364.476.1963 (1-800-CDC-INFO) or  ? Visit CDC's website at www.cdc.gov/flu  Vaccine Information Statement  Inactivated Influenza Vaccine  2015)  42 GABE [address]oria 262TU-14  Department of Health and Human Services  Centers for Disease Control and Prevention  Many Vaccine Information Statements are available in Pashto and other languages. See www.immunize.org/vis. Muchas hojas de información sobre vacunas están disponibles en español y en otros idiomas. Visite www.immunize.org/vis. Care instructions adapted under license by [employer] (which disclaims liability or warranty for this information). If you have questions about a medical condition or this instruction, always ask your healthcare professional. [address]  any warranty or liability for your use of this information. Sd Deluna with [facility]  [address] [place] [address], [address]  [phone]    Monitor blood pressure outside the office several times weekly at different times during the day and evening. Bring the record to me in 3 weeks for review.     Blood Pressure Record     Patient Name:  ______________________ : ______________________    Date/Time BP Reading Pulse

## 2018-10-17 NOTE — PROGRESS NOTES
Chief Complaint   Patient presents with    Results     Discuss Echocardiogram    Labs     Fasting    Hypertension    Diabetes    Cough with sputum     Worse after he eats     Body mass index is 29.09 kg/m². 1. Have you been to the ER, urgent care clinic since your last visit? Hospitalized since your last visit? No    2. Have you seen or consulted any other health care providers outside of the [address] [place] [address] since your last visit? Include any pap smears or colon screening.  No    Reviewed record in preparation for visit and have necessary documentation  Pt did not bring medication to office visit for review  Information was given to pt on Advanced Directives, Living Will  Information was given on Shingles Vaccine  Opportunity was given for questions  Goals that were addressed and/or need to be completed after this appointment include:     Health Maintenance Due   Topic Date Due    Shingrix Vaccine Age 49> (1 of 2) 04/13/1993    Pneumococcal 65+ High/Highest Risk (2 of 2 - PPSV23) 09/21/2017    MEDICARE YEARLY EXAM  03/28/2018    Influenza Age 9 to Adult  08/01/2018    FOOT EXAM Q1  09/20/2018

## 2018-10-18 LAB
ALBUMIN SERPL-MCNC: 4.4 G/DL (ref 3.5–4.8)
ALBUMIN/GLOB SERPL: 2 {RATIO} (ref 1.2–2.2)
ALP SERPL-CCNC: 51 IU/L (ref 39–117)
ALT SERPL-CCNC: 13 IU/L (ref 0–44)
AST SERPL-CCNC: 21 IU/L (ref 0–40)
BILIRUB SERPL-MCNC: 0.3 MG/DL (ref 0–1.2)
BUN SERPL-MCNC: 24 MG/DL (ref 8–27)
BUN/CREAT SERPL: 20 (ref 10–24)
CALCIUM SERPL-MCNC: 9.7 MG/DL (ref 8.6–10.2)
CHLORIDE SERPL-SCNC: 99 MMOL/L (ref 96–106)
CHOLEST SERPL-MCNC: 157 MG/DL (ref 100–199)
CO2 SERPL-SCNC: 26 MMOL/L (ref 20–29)
CREAT SERPL-MCNC: 1.21 MG/DL (ref 0.76–1.27)
EST. AVERAGE GLUCOSE BLD GHB EST-MCNC: 126 MG/DL
GLOBULIN SER CALC-MCNC: 2.2 G/DL (ref 1.5–4.5)
GLUCOSE SERPL-MCNC: 102 MG/DL (ref 65–99)
HBA1C MFR BLD: 6 % (ref 4.8–5.6)
HDLC SERPL-MCNC: 62 MG/DL
HGB BLD-MCNC: 11.9 G/DL (ref 13–17.7)
INTERPRETATION: NORMAL
LDLC SERPL CALC-MCNC: 69 MG/DL (ref 0–99)
Lab: NORMAL
POTASSIUM SERPL-SCNC: 4.7 MMOL/L (ref 3.5–5.2)
PROT SERPL-MCNC: 6.6 G/DL (ref 6–8.5)
SODIUM SERPL-SCNC: 140 MMOL/L (ref 134–144)
T4 FREE SERPL-MCNC: 1.28 NG/DL (ref 0.82–1.77)
TRIGL SERPL-MCNC: 132 MG/DL (ref 0–149)
VLDLC SERPL CALC-MCNC: 26 MG/DL (ref 5–40)

## 2018-10-23 DIAGNOSIS — I10 ESSENTIAL HYPERTENSION: Chronic | ICD-10-CM

## 2018-10-23 RX ORDER — RANITIDINE 300 MG/1
TABLET ORAL
Qty: 30 TAB | Refills: 11 | Status: SHIPPED | OUTPATIENT
Start: 2018-10-23 | End: 2019-10-23 | Stop reason: SDUPTHER

## 2018-10-23 RX ORDER — FELODIPINE 10 MG/1
TABLET, EXTENDED RELEASE ORAL
Qty: 30 TAB | Refills: 11 | Status: SHIPPED | OUTPATIENT
Start: 2018-10-23 | End: 2019-10-23 | Stop reason: SDUPTHER

## 2018-10-26 ENCOUNTER — TELEPHONE (OUTPATIENT)
Dept: FAMILY MEDICINE CLINIC | Age: 73
End: 2018-10-26

## 2018-10-26 NOTE — TELEPHONE ENCOUNTER
Spoke with pt   Pt reports chest pain and SOB x 3days  Pt instructed to seek emergency care  Pt states he lives \"directly behind rescue squad in [place]\"  Informed pt that Dr Nita Sullivan advised emergency care.   Pt agrees and will be evaluated by EMS

## 2018-11-03 DIAGNOSIS — G89.4 CHRONIC PAIN SYNDROME: Chronic | ICD-10-CM

## 2018-11-03 DIAGNOSIS — M19.90 CHRONIC ARTHRITIS: Chronic | ICD-10-CM

## 2018-11-06 RX ORDER — TRAMADOL HYDROCHLORIDE 50 MG/1
TABLET ORAL
Qty: 120 TAB | OUTPATIENT
Start: 2018-11-06

## 2018-11-06 NOTE — TELEPHONE ENCOUNTER
Rx request for tramadol received while pt's PCP out of the office.  shows that he just filled a 30 day supply 3 days ago. Will defer until PCP back in office.

## 2018-11-20 DIAGNOSIS — I10 ACCELERATED HYPERTENSION: ICD-10-CM

## 2018-11-21 RX ORDER — HYDRALAZINE HYDROCHLORIDE 50 MG/1
TABLET, FILM COATED ORAL
Qty: 90 TAB | Refills: 6 | Status: SHIPPED | OUTPATIENT
Start: 2018-11-21 | End: 2019-06-24 | Stop reason: SDUPTHER

## 2018-11-21 RX ORDER — ROSUVASTATIN CALCIUM 20 MG/1
TABLET, COATED ORAL
Qty: 30 TAB | Refills: 6 | Status: ON HOLD | OUTPATIENT
Start: 2018-11-21 | End: 2019-04-03 | Stop reason: CLARIF

## 2018-12-03 DIAGNOSIS — M19.90 CHRONIC ARTHRITIS: Chronic | ICD-10-CM

## 2018-12-03 DIAGNOSIS — G89.4 CHRONIC PAIN SYNDROME: Chronic | ICD-10-CM

## 2018-12-03 RX ORDER — TRAMADOL HYDROCHLORIDE 50 MG/1
TABLET ORAL
Qty: 120 TAB | OUTPATIENT
Start: 2018-12-03

## 2018-12-04 DIAGNOSIS — G89.4 CHRONIC PAIN SYNDROME: Chronic | ICD-10-CM

## 2018-12-04 DIAGNOSIS — M19.90 CHRONIC ARTHRITIS: Chronic | ICD-10-CM

## 2018-12-04 RX ORDER — TRAMADOL HYDROCHLORIDE 50 MG/1
TABLET ORAL
Qty: 120 TAB | Refills: 3 | Status: CANCELLED | OUTPATIENT
Start: 2018-12-04

## 2018-12-04 RX ORDER — TRAMADOL HYDROCHLORIDE 50 MG/1
TABLET ORAL
Qty: 120 TAB | Refills: 3 | Status: SHIPPED | OUTPATIENT
Start: 2018-12-04 | End: 2019-03-11 | Stop reason: SDUPTHER

## 2018-12-04 NOTE — TELEPHONE ENCOUNTER
Prescription was refused yesterday by Dr. Yanet Daniels.  Pt states that he would like for medication be sent to Dr. Castro Merchant for approval.

## 2019-01-25 ENCOUNTER — OFFICE VISIT (OUTPATIENT)
Dept: FAMILY MEDICINE CLINIC | Age: 74
End: 2019-01-25

## 2019-01-25 VITALS
OXYGEN SATURATION: 99 % | HEART RATE: 50 BPM | TEMPERATURE: 97.7 F | WEIGHT: 205 LBS | RESPIRATION RATE: 20 BRPM | SYSTOLIC BLOOD PRESSURE: 158 MMHG | HEIGHT: 71 IN | BODY MASS INDEX: 28.7 KG/M2 | DIASTOLIC BLOOD PRESSURE: 84 MMHG

## 2019-01-25 DIAGNOSIS — R00.1 SINUS BRADYCARDIA: ICD-10-CM

## 2019-01-25 DIAGNOSIS — I49.3 PVC (PREMATURE VENTRICULAR CONTRACTION): ICD-10-CM

## 2019-01-25 DIAGNOSIS — R07.89 ATYPICAL CHEST PAIN: Primary | ICD-10-CM

## 2019-01-25 DIAGNOSIS — E78.00 PURE HYPERCHOLESTEROLEMIA: Chronic | ICD-10-CM

## 2019-01-25 DIAGNOSIS — I10 ESSENTIAL HYPERTENSION: Chronic | ICD-10-CM

## 2019-01-25 DIAGNOSIS — E11.21 CONTROLLED TYPE 2 DIABETES MELLITUS WITH DIABETIC NEPHROPATHY, WITHOUT LONG-TERM CURRENT USE OF INSULIN (HCC): ICD-10-CM

## 2019-01-25 NOTE — PROGRESS NOTES
[facility]    Subjective:   Ilya Ramires III is a 74 y.o. male with history of HTN, Diabetes, CAD, GERD, HLD. CC: Chest Pain  History provided by patient and Records    HPI:  Patient was recently seen at [facility] for palpitation and chest pressure around November. Now reports occasionally having burning sensation through the chest bilaterally though worse on leftside that occurs at rest and activity. Denies worsening with activity, radiation, SOB, diaphoresis, Nausea/Vomitng. Occurs 2-3 times a week and last for seconds at a time. Not currently having chest pain. Still working as an  full time. PFSH:     Current Outpatient Medications on File Prior to Visit   Medication Sig Dispense Refill    traMADol (ULTRAM) 50 mg tablet Take 2 tablet q12 hrs as needed. 120 Tab 3    hydrALAZINE (APRESOLINE) 50 mg tablet TAKE ONE TABLET BY MOUTH THREE TIMES DAILY FOR HYPERTENSION 90 Tab 6    felodipine (PLENDIL SR) 10 mg 24 hr tablet TAKE ONE TABLET BY MOUTH EVERY DAY FOR HYPERTENSION 30 Tab 11    raNITIdine (ZANTAC) 300 mg tab TAKE ONE TABLET BY MOUTH EVERY DAY 30 Tab 11    metFORMIN ER (GLUCOPHAGE XR) 500 mg tablet Take 1 Tab by mouth two (2) times a day. 180 Tab 3    lisinopril (PRINIVIL, ZESTRIL) 40 mg tablet Take 1 Tab by mouth daily. TAKE ONE TABLET BY MOUTH TWICE DAILY  Indications: hypertension 30 Tab 11    meclizine (ANTIVERT) 25 mg chewable tablet Take  by mouth three (3) times daily as needed.  furosemide (LASIX) 20 mg tablet TAKE ONE TABLET BY MOUTH EVERY DAY 30 Tab 11    pantoprazole (PROTONIX) 40 mg tablet TAKE ONE TABLET BY MOUTH EVERY DAY FOR: GASTROESOPHAGEAL REFLUX] 30 Tab 11    aspirin delayed-release 81 mg tablet Take 1 Tab by mouth daily. 30 Tab 0    omega-3 fatty acids-vitamin e (FISH OIL) 1,000 mg cap Take 1 Cap by mouth.  coenzyme q10 (CO Q-10) 100 mg Cap Take 100 mg by mouth daily.       rosuvastatin (CRESTOR) 20 mg tablet TAKE ONE TABLET BY MOUTH NIGHTLY 30 Tab 6    diclofenac (VOLTAREN) 1 % gel Apply 4 g to affected area four (4) times daily. Painful shoulder. 100 g 3    atenolol (TENORMIN) 25 mg tablet One tab twice daily. 60 Tab 11    EPIPEN 2-KARL 0.3 mg/0.3 mL injection INJECT INTRAMUSCULARLY AS NEEDED FOR ONE DOSE. TAKE WITH 50MG OF BENADRYL AND CALL 911. 1 Syringe 3    Peak Flow Meter eric Use prior and post nebulizer treatment 1 Device 0    albuterol (PROVENTIL VENTOLIN) 2.5 mg /3 mL (0.083 %) nebulizer solution 3 mL by Nebulization route every six (6) hours as needed for Wheezing or Shortness of Breath. 24 Each 5     No current facility-administered medications on file prior to visit.         Patient Active Problem List   Diagnosis Code    Hypertension I10    Hyperlipidemia E78.5    GERD (gastroesophageal reflux disease) K21.9    Kidney stone N20.0    Bone spur M77.9    Allergy to bee sting Z91.030    Carotid artery obstruction I65.29    Amaurosis fugax of left eye G45.3    S/P carotid endarterectomy Z98.890    Vitamin D deficiency E55.9    Prostate nodule without urinary obstruction N40.2    Prostate cancer ([facility] [address] C61    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.21    Type 2 diabetes with nephropathy (HCC) E11.21    CKD stage 3 due to type 2 diabetes mellitus (HCC) E11.22, N18.3    Heart murmur R01.1    Age-related nuclear cataract of both eyes H25.13    PVD (posterior vitreous detachment), left eye H43.812       Social History     Socioeconomic History    Marital status:      Spouse name: Not on file    Number of children: Not on file    Years of education: Not on file    Highest education level: Not on file   Social Needs    Financial resource strain: Not on file    Food insecurity - worry: Not on file    Food insecurity - inability: Not on file   Relayr needs - medical: Not on file   Relayr needs - non-medical: Not on file   Occupational History  Not on file   Tobacco Use    Smoking status: Former Smoker     Packs/day: 0.50     Years: 4.00     Pack years: 2.00     Last attempt to quit: 1966     Years since quittin.8    Smokeless tobacco: Never Used   Substance and Sexual Activity    Alcohol use: No    Drug use: No    Sexual activity: Yes     Partners: Female   Other Topics Concern    Not on file   Social History Narrative    Not on file       Review of Systems   Constitutional: Negative for malaise/fatigue. Respiratory: Negative for cough. Cardiovascular: Negative for chest pain and palpitations. Gastrointestinal: Negative for abdominal pain, nausea and vomiting. Neurological: Negative for dizziness, loss of consciousness and headaches. Objective:     Visit Vitals  /84 (BP 1 Location: Left arm, BP Patient Position: Sitting)   Pulse (!) 50   Temp 97.7 °F (36.5 °C) (Oral)   Resp 20   Ht 5' 11\" (1.803 m)   Wt 205 lb (93 kg)   SpO2 99%   BMI 28.59 kg/m²          Physical Exam   Constitutional: He appears well-developed and well-nourished. No distress. HENT:   Head: Normocephalic and atraumatic. Cardiovascular: Normal rate, regular rhythm and intact distal pulses. Exam reveals no gallop and no friction rub. No murmur heard. Pulmonary/Chest: Effort normal and breath sounds normal.   Abdominal: Soft. Bowel sounds are normal. He exhibits no distension. There is no tenderness. Nursing note and vitals reviewed. Pertinent Labs/Studies:  EKG: Initial EKGs with interference. Follow up EKGs with Sinus bradycardia and non-specific T-wave changes. Assessment and orders:       ICD-10-CM ICD-9-CM    1. Atypical chest pain R07.89 786.59 AMB POC EKG ROUTINE W/ 12 LEADS, INTER & REP      REFERRAL TO CARDIOLOGY   2. Sinus bradycardia R00.1 427.89    3. PVC (premature ventricular contraction) I49.3 427.69    4.  Controlled type 2 diabetes mellitus with diabetic nephropathy, without long-term current use of insulin ([facility] [address] E11.21 250.40 HEMOGLOBIN A1C WITH EAG     583.81    5. Pure hypercholesterolemia E78.00 272.0 LIPID PANEL   6. Essential hypertension F79 364.5 METABOLIC PANEL, COMPREHENSIVE      CBC W/O DIFF      MAGNESIUM      PHOSPHORUS     Diagnoses and all orders for this visit:    1. Atypical chest pain/Sinus bradycardia/PVC (premature ventricular contraction): Atypical chest pain with Sinus bradycardia on EKG. Patient with multiple risk factors for Cardiac disease though, will get urgent Cardiology evaluation, may need to undergo cardiac stress testing. While awaiting will start Cardiac Event monitoring for 2 weeks, order sent by clinic. Strict ER precautions discussed. -     AMB POC EKG ROUTINE W/ 12 LEADS, INTER & REP  -     REFERRAL TO CARDIOLOGY    2. Controlled type 2 diabetes mellitus with diabetic nephropathy, without long-term current use of insulin ([address]): Labs  -     HEMOGLOBIN A1C WITH EAG    3. Pure hypercholesterolemia: Labs  -     LIPID PANEL    4. Essential hypertension: On repeat BP remains mildly elevated will follow up, basic labs today  -     METABOLIC PANEL, COMPREHENSIVE  -     CBC W/O DIFF  -     MAGNESIUM  -     PHOSPHORUS      Follow-up Disposition:  Return in about 2 weeks (around 2/8/2019) for -Due for a Medicare Wellness Visit. I have discussed the diagnosis with the patient and the intended plan as seen in the above orders. Social history, medical history, and labs were reviewed. The patient has received an after-visit summary and questions were answered concerning future plans. I have discussed medication side effects and warnings with the patient as well.     Marleen Montaño MD  Resident [facility]  01/25/19    Patient discussed and seen with Dr. Romina Higgins, Attending Physician

## 2019-01-25 NOTE — PROGRESS NOTES
1. Have you been to the ER, urgent care clinic since your last visit? Hospitalized since your last visit? No    2. Have you seen or consulted any other health care providers outside of the [address] [place] [address] since your last visit? Include any pap smears or colon screening.  No  Reviewed record in preparation for visit and have necessary documentation  Pt did not bring medication to office visit for review    Goals that were addressed and/or need to be completed during or after this appointment include   Health Maintenance Due   Topic Date Due    Shingrix Vaccine Age 49> (1 of 2) 04/13/1993    AAA Screening 73-69 YO Male Smoking Patients  04/13/2008    Pneumococcal 65+ Low/Medium Risk (2 of 2 - PPSV23) 09/21/2017    MEDICARE YEARLY EXAM  03/28/2018

## 2019-01-26 LAB
ALBUMIN SERPL-MCNC: 4.2 G/DL (ref 3.5–4.8)
ALBUMIN/GLOB SERPL: 1.7 {RATIO} (ref 1.2–2.2)
ALP SERPL-CCNC: 55 IU/L (ref 39–117)
ALT SERPL-CCNC: 11 IU/L (ref 0–44)
AST SERPL-CCNC: 19 IU/L (ref 0–40)
BILIRUB SERPL-MCNC: 0.3 MG/DL (ref 0–1.2)
BUN SERPL-MCNC: 20 MG/DL (ref 8–27)
BUN/CREAT SERPL: 17 (ref 10–24)
CALCIUM SERPL-MCNC: 9.5 MG/DL (ref 8.6–10.2)
CHLORIDE SERPL-SCNC: 100 MMOL/L (ref 96–106)
CHOLEST SERPL-MCNC: 162 MG/DL (ref 100–199)
CO2 SERPL-SCNC: 25 MMOL/L (ref 20–29)
CREAT SERPL-MCNC: 1.17 MG/DL (ref 0.76–1.27)
ERYTHROCYTE [DISTWIDTH] IN BLOOD BY AUTOMATED COUNT: 14.1 % (ref 12.3–15.4)
EST. AVERAGE GLUCOSE BLD GHB EST-MCNC: 126 MG/DL
GLOBULIN SER CALC-MCNC: 2.5 G/DL (ref 1.5–4.5)
GLUCOSE SERPL-MCNC: 100 MG/DL (ref 65–99)
HBA1C MFR BLD: 6 % (ref 4.8–5.6)
HCT VFR BLD AUTO: 35.8 % (ref 37.5–51)
HDLC SERPL-MCNC: 64 MG/DL
HGB BLD-MCNC: 12.1 G/DL (ref 13–17.7)
LDLC SERPL CALC-MCNC: 76 MG/DL (ref 0–99)
MAGNESIUM SERPL-MCNC: 1.7 MG/DL (ref 1.6–2.3)
MCH RBC QN AUTO: 29 PG (ref 26.6–33)
MCHC RBC AUTO-ENTMCNC: 33.8 G/DL (ref 31.5–35.7)
MCV RBC AUTO: 86 FL (ref 79–97)
PHOSPHATE SERPL-MCNC: 3.3 MG/DL (ref 2.5–4.5)
PLATELET # BLD AUTO: 221 X10E3/UL (ref 150–379)
POTASSIUM SERPL-SCNC: 5 MMOL/L (ref 3.5–5.2)
PROT SERPL-MCNC: 6.7 G/DL (ref 6–8.5)
RBC # BLD AUTO: 4.17 X10E6/UL (ref 4.14–5.8)
SODIUM SERPL-SCNC: 141 MMOL/L (ref 134–144)
TRIGL SERPL-MCNC: 111 MG/DL (ref 0–149)
VLDLC SERPL CALC-MCNC: 22 MG/DL (ref 5–40)
WBC # BLD AUTO: 6.4 X10E3/UL (ref 3.4–10.8)

## 2019-01-28 ENCOUNTER — OFFICE VISIT (OUTPATIENT)
Dept: CARDIOLOGY CLINIC | Age: 74
End: 2019-01-28

## 2019-01-28 ENCOUNTER — CLINICAL SUPPORT (OUTPATIENT)
Dept: CARDIOLOGY CLINIC | Age: 74
End: 2019-01-28

## 2019-01-28 VITALS
BODY MASS INDEX: 28.84 KG/M2 | HEART RATE: 62 BPM | OXYGEN SATURATION: 99 % | DIASTOLIC BLOOD PRESSURE: 78 MMHG | SYSTOLIC BLOOD PRESSURE: 140 MMHG | HEIGHT: 71 IN | WEIGHT: 206 LBS | RESPIRATION RATE: 16 BRPM

## 2019-01-28 DIAGNOSIS — R00.2 PALPITATIONS: Primary | ICD-10-CM

## 2019-01-28 DIAGNOSIS — R00.2 PALPITATIONS: ICD-10-CM

## 2019-01-28 DIAGNOSIS — K21.00 GASTROESOPHAGEAL REFLUX DISEASE WITH ESOPHAGITIS: ICD-10-CM

## 2019-01-28 DIAGNOSIS — R07.89 OTHER CHEST PAIN: Primary | ICD-10-CM

## 2019-01-28 NOTE — PROGRESS NOTES
A1C stable, CBC/CMP unremarkable overall though noting mild persistent anemia over the last year. Previous Negative FOBT, Colonoscopy in 2016. Mag and phos WNL.

## 2019-01-28 NOTE — PROGRESS NOTES
I saw and evaluated the patient with the resident, performing the key elements of the exam and service. I discussed the findings, assessment and plan with the resident and agree with the resident's findings and plan as documented in the resident's note. Labs reviewed. Francine Collins M.D.

## 2019-01-28 NOTE — PROGRESS NOTES
1. Have you been to the ER, urgent care clinic since your last visit? Hospitalized since your last visit? No    2. Have you seen or consulted any other health care providers outside of the [address] [place] [address] since your last visit? Include any pap smears or colon screening. No     Pt reports Med Rec. Completed. Chief Complaint   Patient presents with    Chest Pain     75 y/o male reports palpitations x3 wks. Pt states every once in a while burning sensation across chest. Pt states burning sensation is main concern.         Visit Vitals  /78 (BP 1 Location: Left arm, BP Patient Position: Sitting)   Pulse 62   Resp 16   Ht 5' 11\" (1.803 m)   Wt 206 lb (93.4 kg)   SpO2 99%   BMI 28.73 kg/m²

## 2019-01-28 NOTE — PROGRESS NOTES
[facility] [address] [place] [address], [address] [place] [address], [id number] [place] [address]    Office [phone],[facility] [phone]           Isamar Mejia III is a 74 y.o. male valuation of palpitations and chest discomfort. Consult requested by Bonnie Arnold MD        Assessment/Recommendations:    Palpitations-echocardiogram with mild left atrial dilation, will perform 7-day event monitor to assure that his symptoms are not related to atrial fibrillation, likely are PACs or PVCs. Will obtain monitor to assure that symptoms are not related to atrial fibrillation and will require stroke prophylaxis. Also described to patient that if he has having PACs or PVCs potentially restarting his beta-blocker can reduce the symptom burden. Addendum: Event monitor with rare PVCs and short 4 beat runs of ventricular tachycardia, frequent supraventricular ectopy involving 8.2% of his beats. No atrial fibrillation or atrial flutter. Chest discomfort-atypical symptoms, burning sensation. Not brought on by exertion.  -We will perform exercise myocardial perfusion imaging for further risk stratification given multiple risk factors  -If stress testing is negative may need a subsequent upper endoscopy to assure no gastrointestinal etiology of burning chest discomfort    Hypertension-mildly elevated today. May need to change regimen from atenolol to another agent due to bradycardia. Will defer to primary care    Diabetes-A1c 6%, per primary care. On statin therapy    Hyperlipidemia-LDL 76, on statin therapy    GERD-recommend trial of increasing his pantoprazole to twice daily to see if burning chest pain symptoms improve    Carotid artery lhciesdc-43-43% left internal carotid artery, 2012. Will rescreen carotid arteries at follow-up visit. On aspirin and statin      Addendum  Event monitor-1/31/2019 to 2/7/2019.  Rare ventricular ectopy, 6 supraventricular runs of ventricular tachycardia the longest being 4 beats. Supraventricular ectopy involving 8.2% of beats. 02/25/19  NUCLEAR CARDIAC STRESS TEST 02/27/2019 2/27/2019  Narrative  · Gated SPECT: Left ventricular function post-stress was normal. Calculated ejection fraction is 58%. There is no evidence of transient ischemic dilation (TID). · Baseline ECG: Normal sinus rhythm. · Positive stress electrocardiogram.· Stress test results correlate with an intermediate risk of inducible myocardial ischemia supported by the following factors: stress-induced symptoms concerning for angina. Further cardiac evaluation for ischemic heart disease could be considered, especially in the setting of progressive or typical angina. · Myocardial perfusion imaging defect 1: There is a defect that is moderate in size with a moderate reduction in uptake present in the apical inferior and apex location(s) that is reversible. There is normal wall motion in the defect area. Viability in the area is good. The defect appears to be ischemia. Perfusion defect was visually and quantitatively present. · Positive myocardial perfusion imaging. Myocardial perfusion imaging supports an intermediate risk stress test.    Called patient and discussed above findings. No further burning chest discomfort since last visit. Remains active without any further chest pain. Recommend restarting atenolol therapy and monitor symptoms. Discussed benefit of LHC v. Medical mangement. Patient wishes to restart bb and monitor symptoms. If he has any recurrent chest pain symptoms, we will proceed with LHC. Scheduled for f/u in a few weeks. Signed By: Binu Bourne DO     February 28, 2019            Primary Care Physician- Lindaann Severs, MD    Follow-up 2 months    Subjective:  77-year-old male with a history of hypertension diabetes hyperlipidemia strong family history of coronary disease presents for evaluation of chest discomfort and palpitations.   Was seen in the ED in [place] several weeks ago for palpitations. He describes as \"flip flopping\" within his chest that last a few seconds and spontaneously resolves. He has episodes several times per week. Symptoms are not particularly bothersome to him but he wishes to have them further evaluated. He also relates having burning chest pain for the last several weeks. He describes a sensation that feels like his chest is on fire last a few seconds and spontaneously resolved. The symptoms come on randomly. He does play golf and is physically active and does not get any kind of chest discomfort-like symptoms with exertion. He does have a history of GERD related to hiatal hernia. He states the symptoms feel different than his GERD. He self discontinued his beta-blocker therapy due to slowing of his heart rate. States that his blood pressures higher than normal recently. Past Medical History:   Diagnosis Date    Amaurosis fugax of left eye 5/6/2012    Arthritis     OSTEO    CAD (coronary artery disease) 2012    CAROTID LEFT     Cancer ([facility] [address] 2013    PROSTATE    Chronic pain     DJD lumbar    Diabetes ([facility] [address] 5/22/2010    Frequent nocturnal awakening     urinary frequency    GERD (gastroesophageal reflux disease) 5/22/2010    Hyperlipidemia 5/22/2010    Hypertension 5/22/2010    Kidney stone 5/22/2010    Nodular goiter     1.3 cm right , FNA 6/15    Obesity (BMI 30-39. 9)     Psychiatric disorder     ANXIETY    Vertigo         Past Surgical History:   Procedure Laterality Date    COLONOSCOPY  3/3/2016         EGD  3/3/2016         HX HEENT      tonsillectomy    HX MOHS PROCEDURES  2010    right    HX ORTHOPAEDIC      coccyx removed    HX UROLOGICAL  2010    left lithotripsy. basket  extraction,ureteral stent    HX VASECTOMY      NEUROLOGICAL PROCEDURE UNLISTED  2011    Steroid injections in epidural    VASCULAR SURGERY PROCEDURE UNLIST  2012    LEFT CAROTID         Current Outpatient Medications:     traMADol (ULTRAM) 50 mg tablet, Take 2 tablet q12 hrs as needed. , Disp: 120 Tab, Rfl: 3    rosuvastatin (CRESTOR) 20 mg tablet, TAKE ONE TABLET BY MOUTH NIGHTLY, Disp: 30 Tab, Rfl: 6    hydrALAZINE (APRESOLINE) 50 mg tablet, TAKE ONE TABLET BY MOUTH THREE TIMES DAILY FOR HYPERTENSION, Disp: 90 Tab, Rfl: 6    felodipine (PLENDIL SR) 10 mg 24 hr tablet, TAKE ONE TABLET BY MOUTH EVERY DAY FOR HYPERTENSION, Disp: 30 Tab, Rfl: 11    raNITIdine (ZANTAC) 300 mg tab, TAKE ONE TABLET BY MOUTH EVERY DAY, Disp: 30 Tab, Rfl: 11    diclofenac (VOLTAREN) 1 % gel, Apply 4 g to affected area four (4) times daily. Painful shoulder., Disp: 100 g, Rfl: 3    metFORMIN ER (GLUCOPHAGE XR) 500 mg tablet, Take 1 Tab by mouth two (2) times a day., Disp: 180 Tab, Rfl: 3    atenolol (TENORMIN) 25 mg tablet, One tab twice daily. , Disp: 60 Tab, Rfl: 11    lisinopril (PRINIVIL, ZESTRIL) 40 mg tablet, Take 1 Tab by mouth daily. TAKE ONE TABLET BY MOUTH TWICE DAILY  Indications: hypertension, Disp: 30 Tab, Rfl: 11    meclizine (ANTIVERT) 25 mg chewable tablet, Take  by mouth three (3) times daily as needed. , Disp: , Rfl:     furosemide (LASIX) 20 mg tablet, TAKE ONE TABLET BY MOUTH EVERY DAY, Disp: 30 Tab, Rfl: 11    pantoprazole (PROTONIX) 40 mg tablet, TAKE ONE TABLET BY MOUTH EVERY DAY FOR: GASTROESOPHAGEAL REFLUX], Disp: 30 Tab, Rfl: 11    EPIPEN 2-KARL 0.3 mg/0.3 mL injection, INJECT INTRAMUSCULARLY AS NEEDED FOR ONE DOSE. TAKE WITH 50MG OF BENADRYL AND CALL 911., Disp: 1 Syringe, Rfl: 3    aspirin delayed-release 81 mg tablet, Take 1 Tab by mouth daily. , Disp: 30 Tab, Rfl: 0    Peak Flow Meter eric, Use prior and post nebulizer treatment, Disp: 1 Device, Rfl: 0    albuterol (PROVENTIL VENTOLIN) 2.5 mg /3 mL (0.083 %) nebulizer solution, 3 mL by Nebulization route every six (6) hours as needed for Wheezing or Shortness of Breath., Disp: 24 Each, Rfl: 5    omega-3 fatty acids-vitamin e (FISH OIL) 1,000 mg cap, Take 1 Cap by mouth., Disp: , Rfl:    coenzyme q10 (CO Q-10) 100 mg Cap, Take 100 mg by mouth daily. , Disp: , Rfl:     Allergies   Allergen Reactions    Bee Sting [Sting, Bee] Anaphylaxis    Vytorin 10-10 [Ezetimibe-Simvastatin] Myalgia    Amlodipine Other (comments)     Creepy crawly sensation, twitches    Lipitor [Atorvastatin] Myalgia        Family History   Problem Relation Age of Onset    Diabetes Mother     Obesity Mother     Heart Disease Mother    Lasha Loja Stroke Father     Heart Disease Sister     Obesity Sister    Lasha Loja Diabetes Sister    Mother  of a heart attack at age 62  Father had a stroke in his 76s    Social History     Tobacco Use    Smoking status: Former Smoker     Packs/day: 0.50     Years: 4.00     Pack years: 2.00     Last attempt to quit: 1966     Years since quittin.8    Smokeless tobacco: Never Used   Substance Use Topics    Alcohol use: No    Drug use: No       Review of Symptoms:  Pertinent Positive: Chest pain, palpitations  Pertinent Negative: No shortness of breath orthopnea PND  All Other systems reviewed and are negative for a Comprehensive ROS (10+)    Physical Exam    Blood pressure 140/78, pulse 62, resp. rate 16, height 5' 11\" (1.803 m), weight 206 lb (93.4 kg), SpO2 99 %. Constitutional:  well-developed and well-nourished. No distress. HENT: Normocephalic. Eyes: No scleral icterus. Neck:  Neck supple. No JVD present. Pulmonary/Chest: Effort normal and breath sounds normal. No respiratory distress, wheezes or rales. Cardiovascular: Normal rate, regular rhythm, S1 S2 .  2/6 systolic murmur   extremities:  Normal muscle tone  Abdominal:   No abnormal distension. Neurological:  Moving all extremities, cranial nerves appear grossly intact. Skin: Skin is not cold. Not diaphoretic. No erythema. Psychiatric:  Grossly normal mood and affect. Intact insight.     Objective Data:    ECG: personally reviewed and  intrepreted  2019 sinus bradycardia nonspecific ST-T wave changes    Echo 2018: Normal LVEF, mild AS, mild MR/TR                Opal Guardado, DO

## 2019-01-30 RX ORDER — PANTOPRAZOLE SODIUM 40 MG/1
TABLET, DELAYED RELEASE ORAL
Qty: 30 TAB | Refills: 11 | Status: SHIPPED | OUTPATIENT
Start: 2019-01-30 | End: 2020-01-27

## 2019-02-01 ENCOUNTER — TELEPHONE (OUTPATIENT)
Dept: CARDIOLOGY CLINIC | Age: 74
End: 2019-02-01

## 2019-02-01 NOTE — TELEPHONE ENCOUNTER
Pt called about his Holter monitors. He stated he received 2 and he is asking what should he do and how long he should wear these.   Phone #[phone]  Thanks

## 2019-02-04 NOTE — TELEPHONE ENCOUNTER
Returned patient's call advised patient is to wear the monitor for 7 days per Dr. Perry Ortiz office visit on 1/28/19. Patient verbalized understanding.

## 2019-02-14 ENCOUNTER — TELEPHONE (OUTPATIENT)
Dept: CARDIOLOGY CLINIC | Age: 74
End: 2019-02-14

## 2019-02-26 ENCOUNTER — TELEPHONE (OUTPATIENT)
Dept: CARDIOLOGY CLINIC | Age: 74
End: 2019-02-26

## 2019-02-28 ENCOUNTER — DOCUMENTATION ONLY (OUTPATIENT)
Dept: CARDIOLOGY CLINIC | Age: 74
End: 2019-02-28

## 2019-02-28 NOTE — PROGRESS NOTES
Called patient and discussed holter/stress testing findings. See addendum to clinic note for plan of care.     Justo Fisher

## 2019-03-08 ENCOUNTER — OFFICE VISIT (OUTPATIENT)
Dept: FAMILY MEDICINE CLINIC | Age: 74
End: 2019-03-08

## 2019-03-08 VITALS
HEIGHT: 71 IN | BODY MASS INDEX: 29.4 KG/M2 | WEIGHT: 210 LBS | HEART RATE: 50 BPM | RESPIRATION RATE: 20 BRPM | OXYGEN SATURATION: 99 % | TEMPERATURE: 97.4 F | SYSTOLIC BLOOD PRESSURE: 167 MMHG | DIASTOLIC BLOOD PRESSURE: 82 MMHG

## 2019-03-08 DIAGNOSIS — R01.1 HEART MURMUR: ICD-10-CM

## 2019-03-08 DIAGNOSIS — R10.30 LOWER ABDOMINAL PAIN: ICD-10-CM

## 2019-03-08 DIAGNOSIS — N18.30 CKD STAGE 3 DUE TO TYPE 2 DIABETES MELLITUS (HCC): ICD-10-CM

## 2019-03-08 DIAGNOSIS — E11.21 TYPE 2 DIABETES WITH NEPHROPATHY (HCC): Primary | ICD-10-CM

## 2019-03-08 DIAGNOSIS — E11.22 CKD STAGE 3 DUE TO TYPE 2 DIABETES MELLITUS (HCC): ICD-10-CM

## 2019-03-08 DIAGNOSIS — I10 ESSENTIAL HYPERTENSION: ICD-10-CM

## 2019-03-08 DIAGNOSIS — E78.00 PURE HYPERCHOLESTEROLEMIA: ICD-10-CM

## 2019-03-08 DIAGNOSIS — I20.8 STABLE ANGINA PECTORIS (HCC): ICD-10-CM

## 2019-03-08 NOTE — PATIENT INSTRUCTIONS

## 2019-03-08 NOTE — PROGRESS NOTES
1. Have you been to the ER, urgent care clinic since your last visit? Hospitalized since your last visit? No    2. Have you seen or consulted any other health care providers outside of the [address] [place] [address] since your last visit? Include any pap smears or colon screening.  No  Reviewed record in preparation for visit and have necessary documentation  Pt did not bring medication to office visit for review    Goals that were addressed and/or need to be completed during or after this appointment include     Health Maintenance Due   Topic Date Due    Shingrix Vaccine Age 49> (1 of 2) 04/13/1993    AAA Screening 73-67 YO Male Smoking Patients  04/13/2008    Pneumococcal 65+ Low/Medium Risk (2 of 2 - PPSV23) 09/21/2017    MEDICARE YEARLY EXAM  03/28/2018    MICROALBUMIN Q1  03/23/2019

## 2019-03-09 LAB
25(OH)D3+25(OH)D2 SERPL-MCNC: 28.6 NG/ML (ref 30–100)
ALBUMIN SERPL-MCNC: 4.4 G/DL (ref 3.5–4.8)
ALBUMIN/CREAT UR: <5.6 MG/G CREAT (ref 0–30)
ALBUMIN/GLOB SERPL: 2.2 {RATIO} (ref 1.2–2.2)
ALP SERPL-CCNC: 54 IU/L (ref 39–117)
ALT SERPL-CCNC: 11 IU/L (ref 0–44)
APPEARANCE UR: CLEAR
AST SERPL-CCNC: 19 IU/L (ref 0–40)
BACTERIA #/AREA URNS HPF: NORMAL /[HPF]
BILIRUB SERPL-MCNC: 0.3 MG/DL (ref 0–1.2)
BILIRUB UR QL STRIP: NEGATIVE
BUN SERPL-MCNC: 22 MG/DL (ref 8–27)
BUN/CREAT SERPL: 18 (ref 10–24)
CALCIUM SERPL-MCNC: 9.8 MG/DL (ref 8.6–10.2)
CASTS URNS QL MICRO: NORMAL /LPF
CHLORIDE SERPL-SCNC: 102 MMOL/L (ref 96–106)
CO2 SERPL-SCNC: 26 MMOL/L (ref 20–29)
COLOR UR: YELLOW
CREAT SERPL-MCNC: 1.2 MG/DL (ref 0.76–1.27)
CREAT UR-MCNC: 53.2 MG/DL
EPI CELLS #/AREA URNS HPF: NORMAL /HPF
GLOBULIN SER CALC-MCNC: 2 G/DL (ref 1.5–4.5)
GLUCOSE SERPL-MCNC: 97 MG/DL (ref 65–99)
GLUCOSE UR QL: NEGATIVE
HGB UR QL STRIP: NEGATIVE
INTERPRETATION: NORMAL
KETONES UR QL STRIP: NEGATIVE
LEUKOCYTE ESTERASE UR QL STRIP: NEGATIVE
Lab: NORMAL
MICRO URNS: NORMAL
MICRO URNS: NORMAL
MICROALBUMIN UR-MCNC: <3 UG/ML
MUCOUS THREADS URNS QL MICRO: PRESENT
NITRITE UR QL STRIP: NEGATIVE
PH UR STRIP: 6 [PH] (ref 5–7.5)
PHOSPHATE SERPL-MCNC: 3.5 MG/DL (ref 2.5–4.5)
POTASSIUM SERPL-SCNC: 5.1 MMOL/L (ref 3.5–5.2)
PROT SERPL-MCNC: 6.4 G/DL (ref 6–8.5)
PROT UR QL STRIP: NEGATIVE
PROT UR-MCNC: 11.9 MG/DL
PROT/CREAT UR: 224 MG/G CREAT (ref 0–200)
PTH-INTACT SERPL-MCNC: 48 PG/ML (ref 15–65)
RBC #/AREA URNS HPF: NORMAL /HPF
SODIUM SERPL-SCNC: 143 MMOL/L (ref 134–144)
SP GR UR: 1.01 (ref 1–1.03)
UROBILINOGEN UR STRIP-MCNC: 0.2 MG/DL (ref 0.2–1)
WBC #/AREA URNS HPF: NORMAL /HPF

## 2019-03-26 DIAGNOSIS — I10 ESSENTIAL HYPERTENSION: ICD-10-CM

## 2019-03-27 RX ORDER — FUROSEMIDE 20 MG/1
TABLET ORAL
Qty: 30 TAB | Refills: 11 | Status: SHIPPED | OUTPATIENT
Start: 2019-03-27 | End: 2020-04-07 | Stop reason: SDUPTHER

## 2019-03-27 RX ORDER — LISINOPRIL 40 MG/1
TABLET ORAL
Qty: 60 TAB | Refills: 11 | Status: SHIPPED | OUTPATIENT
Start: 2019-03-27 | End: 2019-09-27

## 2019-03-29 ENCOUNTER — OFFICE VISIT (OUTPATIENT)
Dept: CARDIOLOGY CLINIC | Age: 74
End: 2019-03-29

## 2019-03-29 VITALS
DIASTOLIC BLOOD PRESSURE: 74 MMHG | OXYGEN SATURATION: 99 % | RESPIRATION RATE: 16 BRPM | BODY MASS INDEX: 29.26 KG/M2 | SYSTOLIC BLOOD PRESSURE: 130 MMHG | WEIGHT: 209 LBS | HEART RATE: 52 BPM | HEIGHT: 71 IN

## 2019-03-29 DIAGNOSIS — R09.89 CAROTID BRUIT, UNSPECIFIED LATERALITY: Primary | ICD-10-CM

## 2019-03-29 DIAGNOSIS — I25.118 CORONARY ARTERY DISEASE OF NATIVE ARTERY OF NATIVE HEART WITH STABLE ANGINA PECTORIS (HCC): ICD-10-CM

## 2019-03-29 NOTE — PROGRESS NOTES
[facility] [address] [place] [address], [address] [place] [address], [id number] [place] [address]    Office [phone],[facility] [phone]           Franklyn Arvizu III is a 74 y.o. male valuation of palpitations and chest discomfort. Consult requested by Cheo Mercado MD        Assessment/Recommendations:    Palpitations-symptomatic PVCs based on Holter monitor. Brief 4 beat run of ventricular tachycardia noted on monitor. Essentially resolved with restarting beta-blocker therapy. Continue atenolol therapy. Stable angina symptoms-patient with an intermediate risk myocardial perfusion imaging with ongoing angina symptoms. Discussed the benefit of invasive angiography to further understand his coronary anatomy. We will plan to left heart catheterization next week with possible coronary intervention based on findings. We will continue his aspirin statin and antianginal therapy. Risk and benefit of cardiac catheterization/PCI was described in detail to patient.  (risk of vascular access complication with potential surgical intervention for management, stroke, myocardial infarction, emergent open heart surgery and death). Patient wishes to proceed with coronary angiography with possible intervention. Hypertension-stable continue treatment per primary care    Diabetes-A1c 6%, per primary care. Hyperlipidemia-LDL 76, on statin therapy. On Crestor 20 consider increasing the Crestor 40      GERD-on PPI therapy      Carotid artery wctcvlus-59-05% left internal carotid artery, 2012. On aspirin and statin. Obtain carotid studies from Olivia Krueger MD office        Primary Care Physician- Cheo Mercado MD        Subjective:  40-year-old male with a history of carotid artery disease diabetes presents for follow-up visit for palpitations and chest discomfort. Echocardiogram did show evidence of PVCs with a short 4 beat run of ventricular tachycardia.   We restarted his atenolol therapy with improvement in his palpitations. He reports continued substernal chest pressure mainly with exertion. Does not specifically have substernal chest pressure with walking or everyday activities but will no significant chest heaviness or tightness he is doing significant exertion like raking leaves. He underwent stress testing that showed EKG changes with exercise and inferior apical perfusion defect. Past Medical History:   Diagnosis Date    Amaurosis fugax of left eye 5/6/2012    Arthritis     OSTEO    CAD (coronary artery disease) 2012    CAROTID LEFT     Cancer ([facility] [address] 2013    PROSTATE    Chronic pain     DJD lumbar    Diabetes ([facility] [address] 5/22/2010    Frequent nocturnal awakening     urinary frequency    GERD (gastroesophageal reflux disease) 5/22/2010    Hyperlipidemia 5/22/2010    Hypertension 5/22/2010    Kidney stone 5/22/2010    Nodular goiter     1.3 cm right , FNA 6/15    Obesity (BMI 30-39. 9)     Psychiatric disorder     ANXIETY    Vertigo         Past Surgical History:   Procedure Laterality Date    COLONOSCOPY  3/3/2016         EGD  3/3/2016         HX HEENT      tonsillectomy    HX MOHS PROCEDURES  2010    right    HX ORTHOPAEDIC      coccyx removed    HX UROLOGICAL  2010    left lithotripsy. basket  extraction,ureteral stent    HX VASECTOMY      NEUROLOGICAL PROCEDURE UNLISTED  2011    Steroid injections in epidural    VASCULAR SURGERY PROCEDURE UNLIST  2012    LEFT CAROTID         Current Outpatient Medications:     furosemide (LASIX) 20 mg tablet, TAKE ONE TABLET BY MOUTH EVERY DAY, Disp: 30 Tab, Rfl: 11    lisinopril (PRINIVIL, ZESTRIL) 40 mg tablet, TAKE ONE TABLET BY MOUTH TWICE DAILY, Disp: 60 Tab, Rfl: 11    traMADol (ULTRAM) 50 mg tablet, Take 2 Tabs by mouth every twelve (12) hours as needed for Pain for up to 30 days. Max Daily Amount: 200 mg.  TAKE TWO TABLETS BY MOUTH EVERY TWELVE HOURS AS NEEDED, Disp: 120 Tab, Rfl: 2    pantoprazole (PROTONIX) 40 mg tablet, TAKE ONE TABLET BY MOUTH EVERY DAY FOR: GASTROESOPHAGEAL REFLUX, Disp: 30 Tab, Rfl: 11    rosuvastatin (CRESTOR) 20 mg tablet, TAKE ONE TABLET BY MOUTH NIGHTLY, Disp: 30 Tab, Rfl: 6    hydrALAZINE (APRESOLINE) 50 mg tablet, TAKE ONE TABLET BY MOUTH THREE TIMES DAILY FOR HYPERTENSION, Disp: 90 Tab, Rfl: 6    felodipine (PLENDIL SR) 10 mg 24 hr tablet, TAKE ONE TABLET BY MOUTH EVERY DAY FOR HYPERTENSION, Disp: 30 Tab, Rfl: 11    raNITIdine (ZANTAC) 300 mg tab, TAKE ONE TABLET BY MOUTH EVERY DAY, Disp: 30 Tab, Rfl: 11    diclofenac (VOLTAREN) 1 % gel, Apply 4 g to affected area four (4) times daily. Painful shoulder., Disp: 100 g, Rfl: 3    metFORMIN ER (GLUCOPHAGE XR) 500 mg tablet, Take 1 Tab by mouth two (2) times a day., Disp: 180 Tab, Rfl: 3    atenolol (TENORMIN) 25 mg tablet, One tab twice daily. , Disp: 60 Tab, Rfl: 11    meclizine (ANTIVERT) 25 mg chewable tablet, Take  by mouth three (3) times daily as needed. , Disp: , Rfl:     aspirin delayed-release 81 mg tablet, Take 1 Tab by mouth daily. , Disp: 30 Tab, Rfl: 0    Peak Flow Meter eric, Use prior and post nebulizer treatment, Disp: 1 Device, Rfl: 0    albuterol (PROVENTIL VENTOLIN) 2.5 mg /3 mL (0.083 %) nebulizer solution, 3 mL by Nebulization route every six (6) hours as needed for Wheezing or Shortness of Breath., Disp: 24 Each, Rfl: 5    omega-3 fatty acids-vitamin e (FISH OIL) 1,000 mg cap, Take 1 Cap by mouth., Disp: , Rfl:     coenzyme q10 (CO Q-10) 100 mg Cap, Take 100 mg by mouth daily. , Disp: , Rfl:     EPIPEN 2-KARL 0.3 mg/0.3 mL injection, INJECT INTRAMUSCULARLY AS NEEDED FOR ONE DOSE. TAKE WITH 50MG OF BENADRYL AND CALL 911., Disp: 1 Syringe, Rfl: 3    Allergies   Allergen Reactions    Bee Sting [Sting, Bee] Anaphylaxis    Vytorin 10-10 [Ezetimibe-Simvastatin] Myalgia    Amlodipine Other (comments)     Creepy crawly sensation, twitches    Lipitor [Atorvastatin] Myalgia        Family History   Problem Relation Age of Onset  Diabetes Mother     Obesity Mother     Heart Disease Mother     Stroke Father     Heart Disease Sister     Obesity Sister    [facility] Diabetes Sister    Mother  of a heart attack at age 62  Father had a stroke in his 76s    Social History     Tobacco Use    Smoking status: Former Smoker     Packs/day: 0.50     Years: 4.00     Pack years: 2.00     Last attempt to quit: 1966     Years since quittin.9    Smokeless tobacco: Never Used   Substance Use Topics    Alcohol use: No    Drug use: No       Review of Symptoms:  Pertinent Positive: Chest pain  Pertinent Negative: No shortness of breath orthopnea PND. All Other systems reviewed and are negative for a Comprehensive ROS (10+)    Physical Exam    Blood pressure 130/74, pulse (!) 52, resp. rate 16, height 5' 11\" (1.803 m), weight 209 lb (94.8 kg), SpO2 99 %. Constitutional:  well-developed and well-nourished. No distress. HENT: Normocephalic. Eyes: No scleral icterus. Neck:  Neck supple. No JVD present. Pulmonary/Chest: Effort normal and breath sounds normal. No respiratory distress, wheezes or rales. Cardiovascular: Normal rate, regular rhythm, S1 S2 .  2/6 systolic murmur   extremities:  Normal muscle tone  Abdominal:   No abnormal distension. Neurological:  Moving all extremities, cranial nerves appear grossly intact. Skin: Skin is not cold. Not diaphoretic. No erythema. Psychiatric:  Grossly normal mood and affect. Intact insight. Objective Data:    ECG: personally reviewed and  intrepreted  2019 sinus bradycardia nonspecific ST-T wave changes    Echo 2018: Normal LVEF, mild AS, mild MR/TR       19   NUCLEAR CARDIAC STRESS TEST 2019 3/20/2019    Narrative · Gated SPECT: Left ventricular function post-stress was normal.   Calculated ejection fraction is 58%. There is no evidence of transient   ischemic dilation (TID). · Baseline ECG: Normal sinus rhythm.   · Positive stress electrocardiogram.  · Stress test results correlate with an intermediate risk of inducible   myocardial ischemia supported by the following factors: stress-induced   symptoms concerning for angina. Further cardiac evaluation for ischemic   heart disease could be considered, especially in the setting of   progressive or typical angina. · Myocardial perfusion imaging defect 1: There is a defect that is   moderate in size with a moderate reduction in uptake present in the apical   inferior and apex location(s) that is reversible. There is normal wall   motion in the defect area. Viability in the area is good. The defect   appears to be ischemia. Perfusion defect was visually and quantitatively   present. · Positive myocardial perfusion imaging.  Myocardial perfusion imaging   supports an intermediate risk stress test.        Signed by: DO Sky Mullen, DO

## 2019-03-29 NOTE — H&P (VIEW-ONLY)
[facility] [address], [address] [place], [address] [facility] Office [phone] Deepika Michaels is a 74 y.o. male valuation of palpitations and chest discomfort. Consult requested by Cuca Koch MD 
 
 
 
Assessment/Recommendations: 
 
Palpitations-symptomatic PVCs based on Holter monitor. Brief 4 beat run of ventricular tachycardia noted on monitor. Essentially resolved with restarting beta-blocker therapy. Continue atenolol therapy. Stable angina symptoms-patient with an intermediate risk myocardial perfusion imaging with ongoing angina symptoms. Discussed the benefit of invasive angiography to further understand his coronary anatomy. We will plan to left heart catheterization next week with possible coronary intervention based on findings. We will continue his aspirin statin and antianginal therapy. Risk and benefit of cardiac catheterization/PCI was described in detail to patient.  (risk of vascular access complication with potential surgical intervention for management, stroke, myocardial infarction, emergent open heart surgery and death). Patient wishes to proceed with coronary angiography with possible intervention. Hypertension-stable continue treatment per primary care Diabetes-A1c 6%, per primary care. Hyperlipidemia-LDL 76, on statin therapy. On Crestor 20 consider increasing the Crestor 40 GERD-on PPI therapy Carotid artery difckxpa-14-30% left internal carotid artery, 2012. On aspirin and statin. Obtain carotid studies from Mara Chery MD office Primary Care Physician- Cuca Koch MD 
 
 
 
Subjective: 
25-year-old male with a history of carotid artery disease diabetes presents for follow-up visit for palpitations and chest discomfort.   Echocardiogram did show evidence of PVCs with a short 4 beat run of ventricular tachycardia. We restarted his atenolol therapy with improvement in his palpitations. He reports continued substernal chest pressure mainly with exertion. Does not specifically have substernal chest pressure with walking or everyday activities but will no significant chest heaviness or tightness he is doing significant exertion like raking leaves. He underwent stress testing that showed EKG changes with exercise and inferior apical perfusion defect. Past Medical History:  
Diagnosis Date  Amaurosis fugax of left eye 5/6/2012  Arthritis OSTEO  
 CAD (coronary artery disease) 2012 CAROTID LEFT   
 Cancer [facility]) 2013 PROSTATE  Chronic pain DJD lumbar  Diabetes ([facility] [address] 5/22/2010  Frequent nocturnal awakening   
 urinary frequency  GERD (gastroesophageal reflux disease) 5/22/2010  Hyperlipidemia 5/22/2010  Hypertension 5/22/2010  Kidney stone 5/22/2010  Nodular goiter 1.3 cm right , FNA 6/15  Obesity (BMI 30-39. 9)  Psychiatric disorder ANXIETY  Vertigo Past Surgical History:  
Procedure Laterality Date  COLONOSCOPY  3/3/2016  EGD  3/3/2016  HX HEENT    
 tonsillectomy  HX MOHS PROCEDURES  2010  
 right  HX ORTHOPAEDIC    
 coccyx removed  HX UROLOGICAL  2010  
 left lithotripsy. basket  extraction,ureteral stent  HX VASECTOMY  NEUROLOGICAL PROCEDURE UNLISTED  2011 Steroid injections in epidural  
 VASCULAR SURGERY PROCEDURE UNLIST  2012 LEFT CAROTID Current Outpatient Medications:  
  furosemide (LASIX) 20 mg tablet, TAKE ONE TABLET BY MOUTH EVERY DAY, Disp: 30 Tab, Rfl: 11 
  lisinopril (PRINIVIL, ZESTRIL) 40 mg tablet, TAKE ONE TABLET BY MOUTH TWICE DAILY, Disp: 60 Tab, Rfl: 11 
  traMADol (ULTRAM) 50 mg tablet, Take 2 Tabs by mouth every twelve (12) hours as needed for Pain for up to 30 days. Max Daily Amount: 200 mg.  TAKE TWO TABLETS BY MOUTH EVERY TWELVE HOURS AS NEEDED, Disp: 120 Tab, Rfl: 2 
  pantoprazole (PROTONIX) 40 mg tablet, TAKE ONE TABLET BY MOUTH EVERY DAY FOR: GASTROESOPHAGEAL REFLUX, Disp: 30 Tab, Rfl: 11 
  rosuvastatin (CRESTOR) 20 mg tablet, TAKE ONE TABLET BY MOUTH NIGHTLY, Disp: 30 Tab, Rfl: 6 
  hydrALAZINE (APRESOLINE) 50 mg tablet, TAKE ONE TABLET BY MOUTH THREE TIMES DAILY FOR HYPERTENSION, Disp: 90 Tab, Rfl: 6 
  felodipine (PLENDIL SR) 10 mg 24 hr tablet, TAKE ONE TABLET BY MOUTH EVERY DAY FOR HYPERTENSION, Disp: 30 Tab, Rfl: 11 
  raNITIdine (ZANTAC) 300 mg tab, TAKE ONE TABLET BY MOUTH EVERY DAY, Disp: 30 Tab, Rfl: 11 
  diclofenac (VOLTAREN) 1 % gel, Apply 4 g to affected area four (4) times daily. Painful shoulder., Disp: 100 g, Rfl: 3 
  metFORMIN ER (GLUCOPHAGE XR) 500 mg tablet, Take 1 Tab by mouth two (2) times a day., Disp: 180 Tab, Rfl: 3 
  atenolol (TENORMIN) 25 mg tablet, One tab twice daily. , Disp: 60 Tab, Rfl: 11 
  meclizine (ANTIVERT) 25 mg chewable tablet, Take  by mouth three (3) times daily as needed. , Disp: , Rfl:  
  aspirin delayed-release 81 mg tablet, Take 1 Tab by mouth daily. , Disp: 30 Tab, Rfl: 0 
  Peak Flow Meter eric, Use prior and post nebulizer treatment, Disp: 1 Device, Rfl: 0 
  albuterol (PROVENTIL VENTOLIN) 2.5 mg /3 mL (0.083 %) nebulizer solution, 3 mL by Nebulization route every six (6) hours as needed for Wheezing or Shortness of Breath., Disp: 24 Each, Rfl: 5 
  omega-3 fatty acids-vitamin e (FISH OIL) 1,000 mg cap, Take 1 Cap by mouth., Disp: , Rfl:  
  coenzyme q10 (CO Q-10) 100 mg Cap, Take 100 mg by mouth daily. , Disp: , Rfl:  
  EPIPEN 2-KARL 0.3 mg/0.3 mL injection, INJECT INTRAMUSCULARLY AS NEEDED FOR ONE DOSE. TAKE WITH 50MG OF BENADRYL AND CALL 911., Disp: 1 Syringe, Rfl: 3 Allergies Allergen Reactions  Bee Sting [Sting, Bee] Anaphylaxis  Vytorin 10-10 [Ezetimibe-Simvastatin] Myalgia  Amlodipine Other (comments) Creepy crawly sensation, twitches  Lipitor [Atorvastatin] Myalgia Family History Problem Relation Age of Onset  Diabetes Mother  Obesity Mother  Heart Disease Mother  Stroke Father  Heart Disease Sister  Obesity Sister  Diabetes Sister Mother  of a heart attack at age 62 Father had a stroke in his 76s Social History Tobacco Use  Smoking status: Former Smoker Packs/day: 0.50 Years: 4.00 Pack years: 2.00 Last attempt to quit: 1966 Years since quittin.9  Smokeless tobacco: Never Used Substance Use Topics  Alcohol use: No  
 Drug use: No  
 
 
Review of Symptoms: 
Pertinent Positive: Chest pain Pertinent Negative: No shortness of breath orthopnea PND. All Other systems reviewed and are negative for a Comprehensive ROS (10+) Physical Exam 
 
Blood pressure 130/74, pulse (!) 52, resp. rate 16, height 5' 11\" (1.803 m), weight 209 lb (94.8 kg), SpO2 99 %. Constitutional:  well-developed and well-nourished. No distress. HENT: Normocephalic. Eyes: No scleral icterus. Neck:  Neck supple. No JVD present. Pulmonary/Chest: Effort normal and breath sounds normal. No respiratory distress, wheezes or rales. Cardiovascular: Normal rate, regular rhythm, S1 S2 .  2/6 systolic murmur  
extremities:  Normal muscle tone Abdominal:   No abnormal distension. Neurological:  Moving all extremities, cranial nerves appear grossly intact. Skin: Skin is not cold. Not diaphoretic. No erythema. Psychiatric:  Grossly normal mood and affect. Intact insight. Objective Data: 
 
ECG: personally reviewed and  intrepreted 2019 sinus bradycardia nonspecific ST-T wave changes Echo 2018: Normal LVEF, mild AS, mild MR/TR 
 
  
19 NUCLEAR CARDIAC STRESS TEST 2019 3/20/2019  Narrative · Gated SPECT: Left ventricular function post-stress was normal.  
 Calculated ejection fraction is 58%. There is no evidence of transient  
ischemic dilation (TID). · Baseline ECG: Normal sinus rhythm. · Positive stress electrocardiogram. 
· Stress test results correlate with an intermediate risk of inducible  
myocardial ischemia supported by the following factors: stress-induced  
symptoms concerning for angina. Further cardiac evaluation for ischemic  
heart disease could be considered, especially in the setting of  
progressive or typical angina. · Myocardial perfusion imaging defect 1: There is a defect that is  
moderate in size with a moderate reduction in uptake present in the apical  
inferior and apex location(s) that is reversible. There is normal wall  
motion in the defect area. Viability in the area is good. The defect  
appears to be ischemia. Perfusion defect was visually and quantitatively  
present. · Positive myocardial perfusion imaging. Myocardial perfusion imaging  
supports an intermediate risk stress test. 
   
  Signed by: DO Carlyn Daniels DO

## 2019-03-29 NOTE — PROGRESS NOTES
1. Have you been to the ER, urgent care clinic since your last visit? Hospitalized since your last visit? No    2. Have you seen or consulted any other health care providers outside of the [address] [place] [address] since your last visit? Include any pap smears or colon screening. No     Pt reports Med Rec. Completed. Chief Complaint   Patient presents with    Chest Pain     Follow up. Cardiac testing completed.      Palpitations     Visit Vitals  /74 (BP 1 Location: Right arm, BP Patient Position: Sitting)   Pulse (!) 52   Resp 16   Ht 5' 11\" (1.803 m)   Wt 209 lb (94.8 kg)   SpO2 99%   BMI 29.15 kg/m²

## 2019-03-30 LAB
BUN SERPL-MCNC: 26 MG/DL (ref 8–27)
BUN/CREAT SERPL: 20 (ref 10–24)
CALCIUM SERPL-MCNC: 9.4 MG/DL (ref 8.6–10.2)
CHLORIDE SERPL-SCNC: 101 MMOL/L (ref 96–106)
CO2 SERPL-SCNC: 26 MMOL/L (ref 20–29)
CREAT SERPL-MCNC: 1.29 MG/DL (ref 0.76–1.27)
ERYTHROCYTE [DISTWIDTH] IN BLOOD BY AUTOMATED COUNT: 14.3 % (ref 12.3–15.4)
GLUCOSE SERPL-MCNC: 127 MG/DL (ref 65–99)
HCT VFR BLD AUTO: 34.8 % (ref 37.5–51)
HGB BLD-MCNC: 11.5 G/DL (ref 13–17.7)
INTERPRETATION: NORMAL
MCH RBC QN AUTO: 28.9 PG (ref 26.6–33)
MCHC RBC AUTO-ENTMCNC: 33 G/DL (ref 31.5–35.7)
MCV RBC AUTO: 87 FL (ref 79–97)
PLATELET # BLD AUTO: 220 X10E3/UL (ref 150–379)
POTASSIUM SERPL-SCNC: 4.6 MMOL/L (ref 3.5–5.2)
RBC # BLD AUTO: 3.98 X10E6/UL (ref 4.14–5.8)
SODIUM SERPL-SCNC: 141 MMOL/L (ref 134–144)
WBC # BLD AUTO: 5.8 X10E3/UL (ref 3.4–10.8)

## 2019-04-02 DIAGNOSIS — R07.9 CHEST PAIN, UNSPECIFIED TYPE: Primary | ICD-10-CM

## 2019-04-02 RX ORDER — SODIUM CHLORIDE 0.9 % (FLUSH) 0.9 %
5-40 SYRINGE (ML) INJECTION AS NEEDED
Status: CANCELLED | OUTPATIENT
Start: 2019-04-02

## 2019-04-02 RX ORDER — SODIUM CHLORIDE 9 MG/ML
100 INJECTION, SOLUTION INTRAVENOUS CONTINUOUS
Status: CANCELLED | OUTPATIENT
Start: 2019-04-03

## 2019-04-02 RX ORDER — ASPIRIN 325 MG
325 TABLET ORAL DAILY
Status: CANCELLED | OUTPATIENT
Start: 2019-04-03

## 2019-04-02 RX ORDER — SODIUM CHLORIDE 0.9 % (FLUSH) 0.9 %
5-40 SYRINGE (ML) INJECTION EVERY 8 HOURS
Status: CANCELLED | OUTPATIENT
Start: 2019-04-02

## 2019-04-03 ENCOUNTER — HOSPITAL ENCOUNTER (OUTPATIENT)
Age: 74
Setting detail: OUTPATIENT SURGERY
Discharge: HOME | End: 2019-04-03
Attending: STUDENT IN AN ORGANIZED HEALTH CARE EDUCATION/TRAINING PROGRAM | Admitting: STUDENT IN AN ORGANIZED HEALTH CARE EDUCATION/TRAINING PROGRAM
Payer: MEDICARE

## 2019-04-03 VITALS
HEART RATE: 48 BPM | HEIGHT: 71 IN | DIASTOLIC BLOOD PRESSURE: 70 MMHG | OXYGEN SATURATION: 100 % | BODY MASS INDEX: 28.52 KG/M2 | TEMPERATURE: 97.8 F | RESPIRATION RATE: 15 BRPM | SYSTOLIC BLOOD PRESSURE: 188 MMHG | WEIGHT: 203.71 LBS

## 2019-04-03 DIAGNOSIS — R07.9 CHEST PAIN, UNSPECIFIED TYPE: ICD-10-CM

## 2019-04-03 LAB
GLUCOSE BLD STRIP.AUTO-MCNC: 120 MG/DL (ref 65–100)
SERVICE CMNT-IMP: ABNORMAL

## 2019-04-03 PROCEDURE — 77030008543 HC TBNG MON PRSS MRTM -A: Performed by: STUDENT IN AN ORGANIZED HEALTH CARE EDUCATION/TRAINING PROGRAM

## 2019-04-03 PROCEDURE — 74011250637 HC RX REV CODE- 250/637: Performed by: STUDENT IN AN ORGANIZED HEALTH CARE EDUCATION/TRAINING PROGRAM

## 2019-04-03 PROCEDURE — 82962 GLUCOSE BLOOD TEST: CPT

## 2019-04-03 PROCEDURE — 77030019569 HC BND COMPR RAD TERU -B: Performed by: STUDENT IN AN ORGANIZED HEALTH CARE EDUCATION/TRAINING PROGRAM

## 2019-04-03 PROCEDURE — 74011250636 HC RX REV CODE- 250/636

## 2019-04-03 PROCEDURE — 93571 IV DOP VEL&/PRESS C FLO 1ST: CPT | Performed by: STUDENT IN AN ORGANIZED HEALTH CARE EDUCATION/TRAINING PROGRAM

## 2019-04-03 PROCEDURE — 74011250636 HC RX REV CODE- 250/636: Performed by: STUDENT IN AN ORGANIZED HEALTH CARE EDUCATION/TRAINING PROGRAM

## 2019-04-03 PROCEDURE — C1769 GUIDE WIRE: HCPCS | Performed by: STUDENT IN AN ORGANIZED HEALTH CARE EDUCATION/TRAINING PROGRAM

## 2019-04-03 PROCEDURE — 77030004522 HC CATH ANGI DX EXPO BSC -A: Performed by: STUDENT IN AN ORGANIZED HEALTH CARE EDUCATION/TRAINING PROGRAM

## 2019-04-03 PROCEDURE — C1887 CATHETER, GUIDING: HCPCS | Performed by: STUDENT IN AN ORGANIZED HEALTH CARE EDUCATION/TRAINING PROGRAM

## 2019-04-03 PROCEDURE — 99153 MOD SED SAME PHYS/QHP EA: CPT | Performed by: STUDENT IN AN ORGANIZED HEALTH CARE EDUCATION/TRAINING PROGRAM

## 2019-04-03 PROCEDURE — 77030004532 HC CATH ANGI DX IMP BSC -A: Performed by: STUDENT IN AN ORGANIZED HEALTH CARE EDUCATION/TRAINING PROGRAM

## 2019-04-03 PROCEDURE — 99152 MOD SED SAME PHYS/QHP 5/>YRS: CPT | Performed by: STUDENT IN AN ORGANIZED HEALTH CARE EDUCATION/TRAINING PROGRAM

## 2019-04-03 PROCEDURE — 74011000250 HC RX REV CODE- 250: Performed by: STUDENT IN AN ORGANIZED HEALTH CARE EDUCATION/TRAINING PROGRAM

## 2019-04-03 PROCEDURE — C1894 INTRO/SHEATH, NON-LASER: HCPCS | Performed by: STUDENT IN AN ORGANIZED HEALTH CARE EDUCATION/TRAINING PROGRAM

## 2019-04-03 PROCEDURE — 77030012468 HC VLV BLEEDBK CNTRL ABBT -B: Performed by: STUDENT IN AN ORGANIZED HEALTH CARE EDUCATION/TRAINING PROGRAM

## 2019-04-03 PROCEDURE — 74011636320 HC RX REV CODE- 636/320: Performed by: STUDENT IN AN ORGANIZED HEALTH CARE EDUCATION/TRAINING PROGRAM

## 2019-04-03 PROCEDURE — 93458 L HRT ARTERY/VENTRICLE ANGIO: CPT | Performed by: STUDENT IN AN ORGANIZED HEALTH CARE EDUCATION/TRAINING PROGRAM

## 2019-04-03 PROCEDURE — 77030029065 HC DRSG HEMO QCLOT ZMED -B

## 2019-04-03 RX ORDER — VERAPAMIL HYDROCHLORIDE 2.5 MG/ML
INJECTION, SOLUTION INTRAVENOUS AS NEEDED
Status: DISCONTINUED | OUTPATIENT
Start: 2019-04-03 | End: 2019-04-03 | Stop reason: HOSPADM

## 2019-04-03 RX ORDER — SODIUM CHLORIDE 0.9 % (FLUSH) 0.9 %
5-40 SYRINGE (ML) INJECTION AS NEEDED
Status: DISCONTINUED | OUTPATIENT
Start: 2019-04-03 | End: 2019-04-03 | Stop reason: HOSPADM

## 2019-04-03 RX ORDER — HEPARIN SODIUM 1000 [USP'U]/ML
INJECTION, SOLUTION INTRAVENOUS; SUBCUTANEOUS AS NEEDED
Status: DISCONTINUED | OUTPATIENT
Start: 2019-04-03 | End: 2019-04-03 | Stop reason: HOSPADM

## 2019-04-03 RX ORDER — MIDAZOLAM HYDROCHLORIDE 1 MG/ML
INJECTION, SOLUTION INTRAMUSCULAR; INTRAVENOUS AS NEEDED
Status: DISCONTINUED | OUTPATIENT
Start: 2019-04-03 | End: 2019-04-03 | Stop reason: HOSPADM

## 2019-04-03 RX ORDER — ASPIRIN 325 MG
325 TABLET ORAL DAILY
Status: DISCONTINUED | OUTPATIENT
Start: 2019-04-03 | End: 2019-04-03

## 2019-04-03 RX ORDER — GUAIFENESIN 100 MG/5ML
81 LIQUID (ML) ORAL DAILY
Status: DISCONTINUED | OUTPATIENT
Start: 2019-04-03 | End: 2019-04-03

## 2019-04-03 RX ORDER — SODIUM CHLORIDE 0.9 % (FLUSH) 0.9 %
5-40 SYRINGE (ML) INJECTION EVERY 8 HOURS
Status: DISCONTINUED | OUTPATIENT
Start: 2019-04-03 | End: 2019-04-03 | Stop reason: HOSPADM

## 2019-04-03 RX ORDER — CLOPIDOGREL BISULFATE 75 MG/1
75 TABLET ORAL DAILY
Qty: 30 TAB | Refills: 5 | Status: SHIPPED | OUTPATIENT
Start: 2019-04-03 | End: 2019-04-26 | Stop reason: SDUPTHER

## 2019-04-03 RX ORDER — HEPARIN SODIUM 200 [USP'U]/100ML
INJECTION, SOLUTION INTRAVENOUS
Status: COMPLETED | OUTPATIENT
Start: 2019-04-03 | End: 2019-04-03

## 2019-04-03 RX ORDER — ISOSORBIDE MONONITRATE 60 MG/1
60 TABLET, EXTENDED RELEASE ORAL DAILY
Qty: 30 TAB | Refills: 5 | Status: SHIPPED | OUTPATIENT
Start: 2019-04-03 | End: 2019-08-23 | Stop reason: SDUPTHER

## 2019-04-03 RX ORDER — GUAIFENESIN 100 MG/5ML
81 LIQUID (ML) ORAL DAILY
Status: DISCONTINUED | OUTPATIENT
Start: 2019-04-03 | End: 2019-04-03 | Stop reason: HOSPADM

## 2019-04-03 RX ORDER — LIDOCAINE HYDROCHLORIDE 10 MG/ML
INJECTION INFILTRATION; PERINEURAL AS NEEDED
Status: DISCONTINUED | OUTPATIENT
Start: 2019-04-03 | End: 2019-04-03 | Stop reason: HOSPADM

## 2019-04-03 RX ORDER — GUAIFENESIN 100 MG/5ML
LIQUID (ML) ORAL
Status: DISCONTINUED
Start: 2019-04-03 | End: 2019-04-03 | Stop reason: HOSPADM

## 2019-04-03 RX ORDER — FENTANYL CITRATE 50 UG/ML
INJECTION, SOLUTION INTRAMUSCULAR; INTRAVENOUS AS NEEDED
Status: DISCONTINUED | OUTPATIENT
Start: 2019-04-03 | End: 2019-04-03 | Stop reason: HOSPADM

## 2019-04-03 RX ORDER — SODIUM CHLORIDE 9 MG/ML
100 INJECTION, SOLUTION INTRAVENOUS CONTINUOUS
Status: DISCONTINUED | OUTPATIENT
Start: 2019-04-03 | End: 2019-04-03 | Stop reason: HOSPADM

## 2019-04-03 RX ORDER — ISOSORBIDE MONONITRATE 30 MG/1
60 TABLET, EXTENDED RELEASE ORAL DAILY
Qty: 30 TAB | Refills: 5 | Status: SHIPPED | OUTPATIENT
Start: 2019-04-03 | End: 2019-04-03 | Stop reason: SDUPTHER

## 2019-04-03 RX ORDER — ADENOSINE 3 MG/ML
INJECTION, SOLUTION INTRAVENOUS
Status: COMPLETED | OUTPATIENT
Start: 2019-04-03 | End: 2019-04-03

## 2019-04-03 RX ORDER — ROSUVASTATIN CALCIUM 20 MG/1
20 TABLET, COATED ORAL
COMMUNITY
End: 2019-06-28 | Stop reason: SDUPTHER

## 2019-04-03 RX ADMIN — ASPIRIN 81 MG 81 MG: 81 TABLET ORAL at 08:15

## 2019-04-03 RX ADMIN — SODIUM CHLORIDE 100 ML/HR: 900 INJECTION, SOLUTION INTRAVENOUS at 08:16

## 2019-04-03 NOTE — INTERVAL H&P NOTE
H&P Update: 
Juan Carlos Camacho III was seen and examined. History and physical has been reviewed. The patient has been examined. There have been no significant clinical changes since the completion of the originally dated History and Physical. 
 
Risk and benefit of cardiac catheterization/PCI was described in detail to patient.  (risk of vascular access complication with potential surgical intervention for management, stroke, myocardial infarction, emergent open heart surgery and death). Patient wishes to proceed with coronary angiography with possible intervention. Signed By: Chito Guallpa DO  April 3, 2019 8:41 AM

## 2019-04-03 NOTE — DISCHARGE INSTRUCTIONS
Transradial Cardiac Catheterization/Angiography Discharge Instructions    It is normal to feel tired the first couple days. Take it easy and follow the physicians instructions. Wear wrist splint for first 24 hours to keep the wrist stable. No repetitive flexing of wrist.       CHECK THE CATHETER INSERTION SITE DAILY:  Remove the wrist dressing 24 hours after the procedure. You may shower 24 hours after the procedure. Wash with soap and water and pat dry. Gentle cleaning of the site with soap and water is sufficient, cover with a dry clean dressing or bandage. Do not apply creams or powders to the area. No soaking the wrist for 3 days. Leave the puncture site open to air after 24 hours post-procedure. CALL THE PHYSICIANS:   If you have signs of infection, such as:            - A fever  If the site becomes red, swollen or feels warm to the touch  If there is drainage or if there is unusual pain at the radial site. MONITOR FOR BLEEDING:    If there is any minor oozing, you may apply a band-aid and remove after 12 hours. If the bleeding continues or if there is a lot of bleeding hold pressure with the middle finger against the puncture site and the thumb against the back of the wrist,call 911 to be transported to the hospital.  DO NOT DRIVE YOURSELF, [facility] 699. ACTIVITY:   For the first 24 hours do not manipulate the wrist.  No lifting, pushing or pulling over 3-5 pounds with the affected wrist for 7 daysand no straining the insertion site. Do not life grocery bags or the garbage can, do not run the vacuum  or  for 7 days. Start with short walks as in the hospital and gradually increase as tolerated each day. It is recommended to walk 30 minutes 5-7 days per week. Follow your physicians instructions on activity. Avoid walking outside in extremes of heat or cold. Walk inside when it is cold and windy or hot and humid.      Things to keep in mind:  No driving for at least 24 hours, or as designated by your physician. Limit the number of times you go up and down the stairs  Take rests and pace yourself with activity. Be careful and do not strain with bowel movements. Diet:  Drink plenty of fluids for the next 24 - 48 hours to help your body flush out the dye. If you have kidney, heart, or liver disease and have to limit fluids, talk with your doctor before you increase the amount of fluids you drink. Keep eating a heart-healthy, low-fat diet that has lots of fruits, vegetables, and whole grains.

## 2019-04-03 NOTE — PROCEDURES
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 Specimens Removed : None    Closure Device: radial TR band    See full cath note. Complications: none    Findings:  1. Two vessel disease involving  of distal LAD and Lcx  2. Elevated lvedp    Plan: Will discuss revascularization options with patient, CABG v. PCI of Lcx with revascularization of LAD based on symptoms.     DO Carlyn Finley DO  [facility] [place] [address]                                       Office [phone],ARC [phone]

## 2019-04-03 NOTE — Clinical Note
TRANSFER - IN REPORT:  
 
Verbal report received from: Gamaliel Villagomez RN. Report consisted of patient's Situation, Background, Assessment and  
Recommendations(SBAR). Opportunity for questions and clarification was provided. Assessment completed upon patient's arrival to unit and care assumed. Patient transported with a Registered Nurse.

## 2019-04-08 ENCOUNTER — TELEPHONE (OUTPATIENT)
Dept: CARDIOLOGY CLINIC | Age: 74
End: 2019-04-08

## 2019-04-08 NOTE — TELEPHONE ENCOUNTER
Patient states that he is to have a cath on Thursday and he would like to know if he should continue taking both the baby aspirin and the blood thinner. He would also like to know if he will be staying over night for the procedure. Please advise.     Phone #: [phone]  Thanks

## 2019-04-09 NOTE — TELEPHONE ENCOUNTER
Returned patient's call advised to continue taking the Aspirin and Plavix as prescribed. Patient verbalized understanding.

## 2019-04-10 ENCOUNTER — TELEPHONE (OUTPATIENT)
Dept: FAMILY MEDICINE CLINIC | Age: 74
End: 2019-04-10

## 2019-04-10 DIAGNOSIS — R07.9 CHEST PAIN, UNSPECIFIED TYPE: Primary | ICD-10-CM

## 2019-04-10 RX ORDER — SODIUM CHLORIDE 9 MG/ML
100 INJECTION, SOLUTION INTRAVENOUS CONTINUOUS
Status: CANCELLED | OUTPATIENT
Start: 2019-04-11

## 2019-04-10 RX ORDER — SODIUM CHLORIDE 0.9 % (FLUSH) 0.9 %
5-40 SYRINGE (ML) INJECTION AS NEEDED
Status: CANCELLED | OUTPATIENT
Start: 2019-04-11

## 2019-04-10 RX ORDER — SODIUM CHLORIDE 0.9 % (FLUSH) 0.9 %
5-40 SYRINGE (ML) INJECTION EVERY 8 HOURS
Status: CANCELLED | OUTPATIENT
Start: 2019-04-11

## 2019-04-10 NOTE — TELEPHONE ENCOUNTER
Attempted to call, unsuccessful, message left. Need to inform patient he is due for medicare wellness appointment. Please assist patient in scheduling this appointment.

## 2019-04-11 ENCOUNTER — HOSPITAL ENCOUNTER (OUTPATIENT)
Age: 74
Setting detail: OBSERVATION
Discharge: HOME | End: 2019-04-12
Attending: STUDENT IN AN ORGANIZED HEALTH CARE EDUCATION/TRAINING PROGRAM | Admitting: STUDENT IN AN ORGANIZED HEALTH CARE EDUCATION/TRAINING PROGRAM
Payer: MEDICARE

## 2019-04-11 DIAGNOSIS — R07.9 CHEST PAIN, UNSPECIFIED TYPE: ICD-10-CM

## 2019-04-11 PROBLEM — I25.10 CAD (CORONARY ARTERY DISEASE): Status: ACTIVE | Noted: 2019-04-11

## 2019-04-11 PROBLEM — Z86.79 HISTORY OF ANGINA: Status: ACTIVE | Noted: 2019-04-11

## 2019-04-11 LAB
ACT BLD: 241 SECS (ref 79–138)
ACT BLD: 252 SECS (ref 79–138)
ANION GAP SERPL CALC-SCNC: 8 MMOL/L (ref 5–15)
ATRIAL RATE: 55 BPM
BUN SERPL-MCNC: 30 MG/DL (ref 6–20)
BUN/CREAT SERPL: 21 (ref 12–20)
CALCIUM SERPL-MCNC: 9.8 MG/DL (ref 8.5–10.1)
CALCULATED P AXIS, ECG09: 49 DEGREES
CALCULATED R AXIS, ECG10: 6 DEGREES
CALCULATED T AXIS, ECG11: 59 DEGREES
CHLORIDE SERPL-SCNC: 104 MMOL/L (ref 97–108)
CO2 SERPL-SCNC: 28 MMOL/L (ref 21–32)
CREAT SERPL-MCNC: 1.43 MG/DL (ref 0.7–1.3)
DIAGNOSIS, 93000: NORMAL
GLUCOSE BLD STRIP.AUTO-MCNC: 90 MG/DL (ref 65–100)
GLUCOSE SERPL-MCNC: 127 MG/DL (ref 65–100)
P-R INTERVAL, ECG05: 180 MS
POTASSIUM SERPL-SCNC: 3.9 MMOL/L (ref 3.5–5.1)
Q-T INTERVAL, ECG07: 484 MS
QRS DURATION, ECG06: 96 MS
QTC CALCULATION (BEZET), ECG08: 463 MS
SERVICE CMNT-IMP: NORMAL
SODIUM SERPL-SCNC: 140 MMOL/L (ref 136–145)
VENTRICULAR RATE, ECG03: 55 BPM

## 2019-04-11 PROCEDURE — C1769 GUIDE WIRE: HCPCS | Performed by: STUDENT IN AN ORGANIZED HEALTH CARE EDUCATION/TRAINING PROGRAM

## 2019-04-11 PROCEDURE — 93005 ELECTROCARDIOGRAM TRACING: CPT

## 2019-04-11 PROCEDURE — 74011250636 HC RX REV CODE- 250/636: Performed by: STUDENT IN AN ORGANIZED HEALTH CARE EDUCATION/TRAINING PROGRAM

## 2019-04-11 PROCEDURE — C1894 INTRO/SHEATH, NON-LASER: HCPCS | Performed by: STUDENT IN AN ORGANIZED HEALTH CARE EDUCATION/TRAINING PROGRAM

## 2019-04-11 PROCEDURE — C1887 CATHETER, GUIDING: HCPCS | Performed by: STUDENT IN AN ORGANIZED HEALTH CARE EDUCATION/TRAINING PROGRAM

## 2019-04-11 PROCEDURE — C1725 CATH, TRANSLUMIN NON-LASER: HCPCS | Performed by: STUDENT IN AN ORGANIZED HEALTH CARE EDUCATION/TRAINING PROGRAM

## 2019-04-11 PROCEDURE — 99153 MOD SED SAME PHYS/QHP EA: CPT | Performed by: STUDENT IN AN ORGANIZED HEALTH CARE EDUCATION/TRAINING PROGRAM

## 2019-04-11 PROCEDURE — 74011250637 HC RX REV CODE- 250/637: Performed by: STUDENT IN AN ORGANIZED HEALTH CARE EDUCATION/TRAINING PROGRAM

## 2019-04-11 PROCEDURE — 80048 BASIC METABOLIC PNL TOTAL CA: CPT

## 2019-04-11 PROCEDURE — 77030019569 HC BND COMPR RAD TERU -B: Performed by: STUDENT IN AN ORGANIZED HEALTH CARE EDUCATION/TRAINING PROGRAM

## 2019-04-11 PROCEDURE — 99218 HC RM OBSERVATION: CPT

## 2019-04-11 PROCEDURE — 82962 GLUCOSE BLOOD TEST: CPT

## 2019-04-11 PROCEDURE — 77030008543 HC TBNG MON PRSS MRTM -A: Performed by: STUDENT IN AN ORGANIZED HEALTH CARE EDUCATION/TRAINING PROGRAM

## 2019-04-11 PROCEDURE — 92928 PRQ TCAT PLMT NTRAC ST 1 LES: CPT | Performed by: STUDENT IN AN ORGANIZED HEALTH CARE EDUCATION/TRAINING PROGRAM

## 2019-04-11 PROCEDURE — 99152 MOD SED SAME PHYS/QHP 5/>YRS: CPT | Performed by: STUDENT IN AN ORGANIZED HEALTH CARE EDUCATION/TRAINING PROGRAM

## 2019-04-11 PROCEDURE — 77030013519 HC DEV INFL BASIX MRTM -B: Performed by: STUDENT IN AN ORGANIZED HEALTH CARE EDUCATION/TRAINING PROGRAM

## 2019-04-11 PROCEDURE — 74011000250 HC RX REV CODE- 250: Performed by: STUDENT IN AN ORGANIZED HEALTH CARE EDUCATION/TRAINING PROGRAM

## 2019-04-11 PROCEDURE — 77030012468 HC VLV BLEEDBK CNTRL ABBT -B: Performed by: STUDENT IN AN ORGANIZED HEALTH CARE EDUCATION/TRAINING PROGRAM

## 2019-04-11 PROCEDURE — 74011636320 HC RX REV CODE- 636/320: Performed by: STUDENT IN AN ORGANIZED HEALTH CARE EDUCATION/TRAINING PROGRAM

## 2019-04-11 PROCEDURE — 36415 COLL VENOUS BLD VENIPUNCTURE: CPT

## 2019-04-11 PROCEDURE — 85347 COAGULATION TIME ACTIVATED: CPT

## 2019-04-11 PROCEDURE — 74011250636 HC RX REV CODE- 250/636

## 2019-04-11 PROCEDURE — 77030029065 HC DRSG HEMO QCLOT ZMED -B

## 2019-04-11 PROCEDURE — 92929 HC PLC DE STNT +/-PTA MAJOR COR VESL/BRNCH  ADD LC: CPT | Performed by: STUDENT IN AN ORGANIZED HEALTH CARE EDUCATION/TRAINING PROGRAM

## 2019-04-11 PROCEDURE — 77030018729 HC ELECTRD DEFIB PAD CARD -B: Performed by: STUDENT IN AN ORGANIZED HEALTH CARE EDUCATION/TRAINING PROGRAM

## 2019-04-11 PROCEDURE — C1874 STENT, COATED/COV W/DEL SYS: HCPCS | Performed by: STUDENT IN AN ORGANIZED HEALTH CARE EDUCATION/TRAINING PROGRAM

## 2019-04-11 DEVICE — XIENCE SIERRA™ EVEROLIMUS ELUTING CORONARY STENT SYSTEM 3.00 MM X 12 MM / RAPID-EXCHANGE
Type: IMPLANTABLE DEVICE | Status: FUNCTIONAL
Brand: XIENCE SIERRA™

## 2019-04-11 DEVICE — XIENCE SIERRA™ EVEROLIMUS ELUTING CORONARY STENT SYSTEM 2.50 MM X 15 MM / RAPID-EXCHANGE
Type: IMPLANTABLE DEVICE | Status: FUNCTIONAL
Brand: XIENCE SIERRA™

## 2019-04-11 RX ORDER — FUROSEMIDE 20 MG/1
20 TABLET ORAL DAILY
Status: DISCONTINUED | OUTPATIENT
Start: 2019-04-11 | End: 2019-04-12 | Stop reason: HOSPADM

## 2019-04-11 RX ORDER — PANTOPRAZOLE SODIUM 40 MG/1
40 TABLET, DELAYED RELEASE ORAL
Status: DISCONTINUED | OUTPATIENT
Start: 2019-04-12 | End: 2019-04-12 | Stop reason: HOSPADM

## 2019-04-11 RX ORDER — SODIUM CHLORIDE 0.9 % (FLUSH) 0.9 %
5-40 SYRINGE (ML) INJECTION AS NEEDED
Status: DISCONTINUED | OUTPATIENT
Start: 2019-04-11 | End: 2019-04-12 | Stop reason: HOSPADM

## 2019-04-11 RX ORDER — VERAPAMIL HYDROCHLORIDE 2.5 MG/ML
INJECTION, SOLUTION INTRAVENOUS AS NEEDED
Status: DISCONTINUED | OUTPATIENT
Start: 2019-04-11 | End: 2019-04-11 | Stop reason: HOSPADM

## 2019-04-11 RX ORDER — ISOSORBIDE MONONITRATE 30 MG/1
60 TABLET, EXTENDED RELEASE ORAL DAILY
Status: DISCONTINUED | OUTPATIENT
Start: 2019-04-12 | End: 2019-04-12 | Stop reason: HOSPADM

## 2019-04-11 RX ORDER — FENTANYL CITRATE 50 UG/ML
INJECTION, SOLUTION INTRAMUSCULAR; INTRAVENOUS AS NEEDED
Status: DISCONTINUED | OUTPATIENT
Start: 2019-04-11 | End: 2019-04-11 | Stop reason: HOSPADM

## 2019-04-11 RX ORDER — MIDAZOLAM HYDROCHLORIDE 1 MG/ML
INJECTION, SOLUTION INTRAMUSCULAR; INTRAVENOUS AS NEEDED
Status: DISCONTINUED | OUTPATIENT
Start: 2019-04-11 | End: 2019-04-11 | Stop reason: HOSPADM

## 2019-04-11 RX ORDER — HYDRALAZINE HYDROCHLORIDE 25 MG/1
50 TABLET, FILM COATED ORAL 3 TIMES DAILY
Status: DISCONTINUED | OUTPATIENT
Start: 2019-04-11 | End: 2019-04-12 | Stop reason: HOSPADM

## 2019-04-11 RX ORDER — SODIUM CHLORIDE 0.9 % (FLUSH) 0.9 %
5-40 SYRINGE (ML) INJECTION EVERY 8 HOURS
Status: DISCONTINUED | OUTPATIENT
Start: 2019-04-11 | End: 2019-04-12 | Stop reason: HOSPADM

## 2019-04-11 RX ORDER — ATENOLOL 25 MG/1
25 TABLET ORAL DAILY
Status: DISCONTINUED | OUTPATIENT
Start: 2019-04-12 | End: 2019-04-12 | Stop reason: HOSPADM

## 2019-04-11 RX ORDER — DEXTROSE 50 % IN WATER (D50W) INTRAVENOUS SYRINGE
25-50 AS NEEDED
Status: DISCONTINUED | OUTPATIENT
Start: 2019-04-11 | End: 2019-04-12 | Stop reason: HOSPADM

## 2019-04-11 RX ORDER — LISINOPRIL 20 MG/1
40 TABLET ORAL DAILY
Status: DISCONTINUED | OUTPATIENT
Start: 2019-04-12 | End: 2019-04-12 | Stop reason: HOSPADM

## 2019-04-11 RX ORDER — LIDOCAINE HYDROCHLORIDE 10 MG/ML
INJECTION INFILTRATION; PERINEURAL AS NEEDED
Status: DISCONTINUED | OUTPATIENT
Start: 2019-04-11 | End: 2019-04-11 | Stop reason: HOSPADM

## 2019-04-11 RX ORDER — CHOLECALCIFEROL (VITAMIN D3) 125 MCG
100 CAPSULE ORAL DAILY
Status: DISCONTINUED | OUTPATIENT
Start: 2019-04-11 | End: 2019-04-12 | Stop reason: HOSPADM

## 2019-04-11 RX ORDER — MAGNESIUM SULFATE 100 %
4 CRYSTALS MISCELLANEOUS AS NEEDED
Status: DISCONTINUED | OUTPATIENT
Start: 2019-04-11 | End: 2019-04-12 | Stop reason: HOSPADM

## 2019-04-11 RX ORDER — CLOPIDOGREL BISULFATE 75 MG/1
75 TABLET ORAL DAILY
Status: DISCONTINUED | OUTPATIENT
Start: 2019-04-12 | End: 2019-04-12 | Stop reason: HOSPADM

## 2019-04-11 RX ORDER — SODIUM CHLORIDE 0.9 % (FLUSH) 0.9 %
5-40 SYRINGE (ML) INJECTION AS NEEDED
Status: DISCONTINUED | OUTPATIENT
Start: 2019-04-11 | End: 2019-04-11 | Stop reason: HOSPADM

## 2019-04-11 RX ORDER — INSULIN LISPRO 100 [IU]/ML
INJECTION, SOLUTION INTRAVENOUS; SUBCUTANEOUS
Status: DISCONTINUED | OUTPATIENT
Start: 2019-04-12 | End: 2019-04-12 | Stop reason: HOSPADM

## 2019-04-11 RX ORDER — HEPARIN SODIUM 1000 [USP'U]/ML
INJECTION, SOLUTION INTRAVENOUS; SUBCUTANEOUS AS NEEDED
Status: DISCONTINUED | OUTPATIENT
Start: 2019-04-11 | End: 2019-04-11 | Stop reason: HOSPADM

## 2019-04-11 RX ORDER — ASPIRIN 81 MG/1
81 TABLET ORAL DAILY
Status: DISCONTINUED | OUTPATIENT
Start: 2019-04-12 | End: 2019-04-12 | Stop reason: HOSPADM

## 2019-04-11 RX ORDER — ROSUVASTATIN CALCIUM 10 MG/1
20 TABLET, COATED ORAL
Status: DISCONTINUED | OUTPATIENT
Start: 2019-04-11 | End: 2019-04-12 | Stop reason: HOSPADM

## 2019-04-11 RX ORDER — FELODIPINE 5 MG/1
10 TABLET, EXTENDED RELEASE ORAL DAILY
Status: DISCONTINUED | OUTPATIENT
Start: 2019-04-11 | End: 2019-04-12 | Stop reason: HOSPADM

## 2019-04-11 RX ORDER — SODIUM CHLORIDE 9 MG/ML
100 INJECTION, SOLUTION INTRAVENOUS CONTINUOUS
Status: DISCONTINUED | OUTPATIENT
Start: 2019-04-11 | End: 2019-04-12 | Stop reason: HOSPADM

## 2019-04-11 RX ORDER — SODIUM CHLORIDE 0.9 % (FLUSH) 0.9 %
5-40 SYRINGE (ML) INJECTION EVERY 8 HOURS
Status: DISCONTINUED | OUTPATIENT
Start: 2019-04-11 | End: 2019-04-11 | Stop reason: HOSPADM

## 2019-04-11 RX ORDER — SODIUM CHLORIDE 9 MG/ML
100 INJECTION, SOLUTION INTRAVENOUS CONTINUOUS
Status: DISCONTINUED | OUTPATIENT
Start: 2019-04-11 | End: 2019-04-11 | Stop reason: HOSPADM

## 2019-04-11 RX ORDER — HEPARIN SODIUM 200 [USP'U]/100ML
INJECTION, SOLUTION INTRAVENOUS
Status: COMPLETED | OUTPATIENT
Start: 2019-04-11 | End: 2019-04-11

## 2019-04-11 RX ADMIN — Medication 100 MG: at 21:01

## 2019-04-11 RX ADMIN — SODIUM CHLORIDE 100 ML/HR: 900 INJECTION, SOLUTION INTRAVENOUS at 21:01

## 2019-04-11 RX ADMIN — Medication 10 ML: at 21:01

## 2019-04-11 RX ADMIN — FELODIPINE 10 MG: 5 TABLET, EXTENDED RELEASE ORAL at 21:01

## 2019-04-11 RX ADMIN — HYDRALAZINE HYDROCHLORIDE 50 MG: 25 TABLET, FILM COATED ORAL at 21:01

## 2019-04-11 RX ADMIN — FUROSEMIDE 20 MG: 20 TABLET ORAL at 21:01

## 2019-04-11 RX ADMIN — ROSUVASTATIN CALCIUM 20 MG: 10 TABLET, FILM COATED ORAL at 21:01

## 2019-04-11 NOTE — Clinical Note
Lesion: Located in the Mid Cx. Stent inserted. Multiple inflations used. First inflation pressure = 16 cesilia; inflation time: 35 sec. Second inflation pressure: 18 cesilia; inflation time: 7 sec.  STENT BALLOON USED FOR POST DILATION

## 2019-04-11 NOTE — PROGRESS NOTES
10:43 AM 
 
Patient arrived. ID and allergies verified verbally with patient. Pt voices understanding of procedure to be performed. Consent obtained. Pt prepped for procedure. Patient endorses chest tightness for last 2 days Substernal  
 
 
 
11:13 AM 
 
Daughter at bedside Albinaedmundomadalyndiallo 75 MD aware of CP and cr 
 
 
1:06 PM 
 
 
TRANSFER - OUT REPORT: 
 
Verbal report given to RT Sydni (name) on Tracy Fetch III  being transferred to Cath Lab (unit) for ordered procedure Report consisted of patients Situation, Background, Assessment and  
Recommendations(SBAR). Information from the following report(s) SBAR was reviewed with the receiving nurse. Lines:  
Peripheral IV 04/11/19 Right Antecubital (Active) Opportunity for questions and clarification was provided. Patient transported with: 
 Registered Nurse 3:11 PM 
 
TRANSFER - IN REPORT: 
 
Verbal report received from Luz Maria Butterfield RN (name) on Tracy Fetch III  being received from Cath Lab (unit) for routine progression of care Report consisted of patients Situation, Background, Assessment and  
Recommendations(SBAR). Information from the following report(s) Procedure Summary was reviewed with the receiving nurse. Opportunity for questions and clarification was provided. Assessment completed upon patients arrival to unit and care assumed. 3:45 PM 
 
MD at bedside Daughter at bedside 4:16 PM 
 
Daughter went home Patient sitting up in bed eating lunch No complaints 5:18 PM 
 
2 ml air released from TR Band. No bleeding or hematoma noted. Radial and Ulnar pulse on RIGHT wrist palpable. Pt tolerated well. Will continue to monitor. 5:25 PM 
 
2 ml air released from TR Band. No bleeding or hematoma noted. Radial and Ulnar pulse on RIGHT wrist palpable. Pt tolerated well. Will continue to monitor. 1730 
 
 
2 ml air released from TR Band. No bleeding or hematoma noted.  Radial and Ulnar pulse on RIGHT wrist palpable. Pt tolerated well. Will continue to monitor. 5:37 PM 
 
2 ml air released from TR Band. No bleeding or hematoma noted. Radial and Ulnar pulse on RIGHT wrist palpable. Pt tolerated well. Will continue to monitor. 5:43 PM 
 
3 ml air released from TR Band. No bleeding or hematoma noted. Radial and Ulnar pulse on RIGHT wrist palpable. Pt tolerated well. Will continue to monitor. 1748 
 
2 ml air released from TR Band. No bleeding or hematoma noted. Radial and Ulnar pulse on RIGHT wrist palpable. Pt tolerated well. Will continue to monitor. 5:53 PM 
 
Air release completed. TR Band removed from RIGHT wrist. No bleeding or  Hematoma. Dressing applied. Wrist immobilizer in place. Radial and ulnar pulse remain palpable on affected extremity. Pt tolerated well. Instructions given to pt regarding movement and activity restrictions. Pt voiced understanding. 6:19 PM 
 
TRANSFER - OUT REPORT: 
 
Verbal report given to Lizette Cobos RN (name) on Megan Domingo III  being transferred to [facility] (unit) for routine progression of care Report consisted of patients Situation, Background, Assessment and  
Recommendations(SBAR). Information from the following report(s) SBAR, Procedure Summary and Cardiac Rhythm SB sinus arrythmia was reviewed with the receiving nurse. Lines:  
Peripheral IV 04/11/19 Right Antecubital (Active) Site Assessment Clean, dry, & intact 4/11/2019  3:06 PM  
Phlebitis Assessment 0 4/11/2019  3:06 PM  
Infiltration Assessment 0 4/11/2019  3:06 PM  
Dressing Type Transparent 4/11/2019  3:06 PM  
Hub Color/Line Status Pink; Infusing 4/11/2019  3:06 PM  
  
 
Opportunity for questions and clarification was provided. Patient transported with: 
 Monitor Registered Nurse

## 2019-04-11 NOTE — Clinical Note
Lesion located in the Mid Cx. Balloon inserted. Balloon inflated using multiple inflations inflation technique. Pressure = 18 cesilia; Duration = 14 sec. Inflation 2: Pressure: 22 cesilia; Duration: 17 sec.

## 2019-04-11 NOTE — PROGRESS NOTES
1830 TRANSFER - IN REPORT: 
 
Verbal report received from Brianne (name) on Epstein Roles III  being received from cath lab (unit) for routine progression of care Report consisted of patients Situation, Background, Assessment and  
Recommendations(SBAR). Information from the following report(s) SBAR, Kardex, Procedure Summary, Intake/Output, MAR, Recent Results and Med Rec Status was reviewed with the receiving nurse. Opportunity for questions and clarification was provided. Assessment completed upon patients arrival to unit and care assumed. Primary Nurse Anitha Mora RN and Herschel Meckel, RN performed a dual skin assessment on this patient No impairment noted Alex score is Brianne was called that pt has no medications ordered. She contacted Md. Shukri Alexandra. Orders on the way.

## 2019-04-11 NOTE — Clinical Note
Lesion: Located in the Ostium Cx. Stent deployed. Stent catheter removed. First inflation pressure = 20 cesilia; inflation time: 9 sec. 3X8 XIENCE exp date: 12/16/19, lot number [id number].  Unable to scan stent product

## 2019-04-11 NOTE — Clinical Note
Lesion located in the Mid Cx. Balloon inserted. Balloon inflated using multiple inflations inflation technique. Pressure = 12 cesilia; Duration = 50 sec. Inflation 2: Pressure: 14 cesilia; Duration: 6 sec. Duration: 15 sec. Inflation 4: Pressure: 16 cesilia; Duration: 24 sec. Inflation 5: Pressure: 16 cesilia; Duration: 20 sec.

## 2019-04-11 NOTE — Clinical Note
Lesion located in the Proximal Cx. Balloon inflated using multiple inflations inflation technique. Pressure = 12 cesilia; Duration = 5 sec. Inflation 2: Pressure: 15 cesilia; Duration: 10 sec.

## 2019-04-11 NOTE — Clinical Note
TRANSFER - OUT REPORT:  
 
Verbal report given to: Yony [facility]  Report consisted of patient's Situation, Background, Assessment and  
Recommendations(SBAR). Opportunity for questions and clarification was provided. Patient transported with a Registered Nurse. Patient transported to: Kendra Prater.

## 2019-04-11 NOTE — Clinical Note
TRANSFER - IN REPORT:  
 
Verbal report received from: CLPO. Report consisted of patient's Situation, Background, Assessment and  
Recommendations(SBAR). Opportunity for questions and clarification was provided. Assessment completed upon patient's arrival to unit and care assumed. Patient transported with a Registered Nurse.

## 2019-04-11 NOTE — INTERVAL H&P NOTE
H&P Update: 
Joaquin Ortega III was seen and examined. History and physical has been reviewed. The patient has been examined. There have been no significant clinical changes since the completion of the originally dated History and Physical. 
 
Risk and benefit of cardiac catheterization/PCI was described in detail to patient.  (risk of vascular access complication with potential surgical intervention for management, stroke, myocardial infarction, emergent open heart surgery and death). Patient wishes to proceed with coronary angiography with possible intervention.

## 2019-04-11 NOTE — PROGRESS NOTES
BRIEF PROCEDURE NOTE Date of Procedure: 4/11/2019 Preoperative Diagnosis:  Stage CAD Postoperative Diagnosis:   same Procedure: Left heart cath, coronary angiography Interventional Cardiologist: Louise Staples DO Anesthesia: local + IV moderate sedation Estimated Blood Loss: Minimal 
 
Access: right radial artery, 6F Catheters: 
Left coronary: JL 3.5 Right coronary: JR4 PCI: 
 
7F EBU 3.5 guide 
runthorugh wire Heparin for anticoagulation. Attempted to wire second marginal with candy blue, Fielder XT, Runthrough wire. Able to pass to third marginal prior to stenting. Required guideliner to pass equipment across proximal bend. 2.5x12 compliant balloon Overlapping, 2.5x15mm Xience Freida REINALDO  And 3.0x12 Xience SierraDES. Proximal aspect of lesion not covered, subsequent 3.0x8mm Xience Freida REINALDO. Stent dilated with 3.0NC balloon and proximally with 3.75NC balloon. 4 small marginals with karon 3 flow with severe proximal disease. No distal dissection of perfoartion Specimens Removed : None Closure Device: radial TR band See full cath note. Complications: none Findings: 1. Stenting of proximal and mid-Lcx with 2.5x15, 3.0x12 and 3.0x8mm Xience [place] REINALDO. Post-dilated with 3.0NC just inside distal stent edge and 3.75NC proximally at high pressure Plan: 
 
Continue GDMT and DAPT DO Racheal Navarro DO [facility] [address], [address] [place], [address] [place] [address] Office [phone]

## 2019-04-11 NOTE — Clinical Note
Lesion: Located in the Proximal Cx. Stent deployed. Stent catheter removed. Multiple inflations used. Stent deployed. A STENT CORONARY XIENCE SADE [id number] was also used. First inflation pressure = 15 cesilia; inflation time: 33 sec. Second inflation pressure: 18 cesilia; inflation time: 16 sec. Third inflation pressure: 12 cesilia; inflation time: 13 sec.  3X12 XIENCE  REINALDO TO PROX CX

## 2019-04-12 VITALS
TEMPERATURE: 97.8 F | BODY MASS INDEX: 27.75 KG/M2 | RESPIRATION RATE: 16 BRPM | HEART RATE: 68 BPM | SYSTOLIC BLOOD PRESSURE: 151 MMHG | OXYGEN SATURATION: 98 % | HEIGHT: 71 IN | WEIGHT: 198.19 LBS | DIASTOLIC BLOOD PRESSURE: 79 MMHG

## 2019-04-12 LAB
ALBUMIN SERPL-MCNC: 3.4 G/DL (ref 3.5–5)
ALBUMIN/GLOB SERPL: 1.3 {RATIO} (ref 1.1–2.2)
ALP SERPL-CCNC: 56 U/L (ref 45–117)
ALT SERPL-CCNC: 13 U/L (ref 12–78)
ANION GAP SERPL CALC-SCNC: 5 MMOL/L (ref 5–15)
AST SERPL-CCNC: 12 U/L (ref 15–37)
BILIRUB SERPL-MCNC: 0.3 MG/DL (ref 0.2–1)
BUN SERPL-MCNC: 23 MG/DL (ref 6–20)
BUN/CREAT SERPL: 18 (ref 12–20)
CALCIUM SERPL-MCNC: 8.6 MG/DL (ref 8.5–10.1)
CHLORIDE SERPL-SCNC: 109 MMOL/L (ref 97–108)
CO2 SERPL-SCNC: 27 MMOL/L (ref 21–32)
CREAT SERPL-MCNC: 1.26 MG/DL (ref 0.7–1.3)
GLOBULIN SER CALC-MCNC: 2.6 G/DL (ref 2–4)
GLUCOSE BLD STRIP.AUTO-MCNC: 113 MG/DL (ref 65–100)
GLUCOSE SERPL-MCNC: 118 MG/DL (ref 65–100)
POTASSIUM SERPL-SCNC: 4.2 MMOL/L (ref 3.5–5.1)
PROT SERPL-MCNC: 6 G/DL (ref 6.4–8.2)
SERVICE CMNT-IMP: ABNORMAL
SODIUM SERPL-SCNC: 141 MMOL/L (ref 136–145)

## 2019-04-12 PROCEDURE — 80053 COMPREHEN METABOLIC PANEL: CPT

## 2019-04-12 PROCEDURE — 99218 HC RM OBSERVATION: CPT

## 2019-04-12 PROCEDURE — 74011250637 HC RX REV CODE- 250/637: Performed by: STUDENT IN AN ORGANIZED HEALTH CARE EDUCATION/TRAINING PROGRAM

## 2019-04-12 PROCEDURE — 82962 GLUCOSE BLOOD TEST: CPT

## 2019-04-12 PROCEDURE — 74011250636 HC RX REV CODE- 250/636: Performed by: STUDENT IN AN ORGANIZED HEALTH CARE EDUCATION/TRAINING PROGRAM

## 2019-04-12 PROCEDURE — 36415 COLL VENOUS BLD VENIPUNCTURE: CPT

## 2019-04-12 RX ADMIN — PANTOPRAZOLE SODIUM 40 MG: 40 TABLET, DELAYED RELEASE ORAL at 06:32

## 2019-04-12 RX ADMIN — SODIUM CHLORIDE 100 ML/HR: 900 INJECTION, SOLUTION INTRAVENOUS at 06:32

## 2019-04-12 RX ADMIN — LISINOPRIL 40 MG: 20 TABLET ORAL at 08:42

## 2019-04-12 RX ADMIN — CLOPIDOGREL BISULFATE 75 MG: 75 TABLET ORAL at 08:42

## 2019-04-12 RX ADMIN — ASPIRIN 81 MG: 81 TABLET ORAL at 08:42

## 2019-04-12 RX ADMIN — ISOSORBIDE MONONITRATE 60 MG: 30 TABLET, EXTENDED RELEASE ORAL at 08:42

## 2019-04-12 RX ADMIN — HYDRALAZINE HYDROCHLORIDE 50 MG: 25 TABLET, FILM COATED ORAL at 08:42

## 2019-04-12 RX ADMIN — ATENOLOL 25 MG: 25 TABLET ORAL at 08:55

## 2019-04-12 NOTE — CARDIO/PULMONARY
Cardiac Rehab: 73 yo male admitted for cardiac cath. S/P PTCA with REINALDO to LCX (4/11). LVEF 58% by stress test (2/25/19). Cardiologist is Dr Shukri Alexandra. Cardiac Meds: 
ACE/ARB - lisinopril BB - atenolol Statin - rosuvastatin ASA - 81 mg 
Prior to admission meds also included: Plavix, felodipine, Imdur, Lasix, hydralazine, coQ10, and fish oil. New PO Cardiac Medications: none. Smoking History: Former smoker (quit 1996). 4/12/2019 Met with Lucian Miller III, prior to discharge from [facility]. Pt reported he resides with his wife in [place]. He is retired from working for [employer]; he has done electrical work. Discussed pt's understanding of PCI procedure and tx plan. Pt indicated awareness that he need stents and he has multiple blockages, including  of distal LAD. Provided PCI education folder with stent card. Reviewed CAD risk factors: diabetes, HTN, HLD, family hx, past smoking, age, and gender. Hgb a1c 6.0 (1/25/19). Reinforced importance of med compliance, especially with Plavix to reduce risk of occluding stents. Encouraged heart healthy nutrition. Pt indicated it was \"too late\" to improve his diet. Discussed benefits of outpatient cardiac rehab program. Pt lives closer to [place] and was provided with info on this program. Pt declined cardiac rehab referral at this time, because he believes he is [place] enough. \" Reported he enjoys playing 18 holes of gold (uses golf cart). Explained importance of aerobic exercise and value of being on heart monitor while exercising. Encouraged pt to talk with his cardiologist further about cardiac rehab. Lucian Roles III, verbalized basic understanding. All questions were answered.

## 2019-04-12 NOTE — PROGRESS NOTES
Benny Reis DO [facility] [address], [address] [place], [id number] [place] [address] Office [phone] 2019 Admit Date: 2019 Admit Diagnosis: Chest pain, unspecified type [R07.9]; History of angina [Z86.79];CAD (coronary artery disease) [I25.10] NAME: Felipa Trinidad  
:  1945     MRN: [id number] Assessment/Plan: 
 
 
CAD s/p stenting of prox and mid-LCx.  of distal LAD. Moderate disease of RCA with negative FFR 
 
- cont gdmt 
- cont dapt F/u 2 weeks Please do not hesitate to contact us with questions or concerns. See note below for details. Chito Guallpa DO History of Present Illness: 
Presented for staged pci of Lcx for chronic stable angina Interval Hx: 
No chest pain, mild palpitations ROS: 
Constitutional: neg 
Cardiovascular: palpitations Respiratory: neg Medications: 
Current Facility-Administered Medications Medication Dose Route Frequency  0.9% sodium chloride infusion  100 mL/hr IntraVENous CONTINUOUS  
 sodium chloride (NS) flush 5-40 mL  5-40 mL IntraVENous Q8H  
 sodium chloride (NS) flush 5-40 mL  5-40 mL IntraVENous PRN  
 aspirin delayed-release tablet 81 mg  81 mg Oral DAILY  clopidogrel (PLAVIX) tablet 75 mg  75 mg Oral DAILY  co-enzyme Q-10 (CO Q-10) capsule 100 mg  100 mg Oral DAILY  furosemide (LASIX) tablet 20 mg  20 mg Oral DAILY  atenolol (TENORMIN) tablet 25 mg  25 mg Oral DAILY  felodipine (PLENDIL SR) 24 hr tablet 10 mg  10 mg Oral DAILY  hydrALAZINE (APRESOLINE) tablet 50 mg  50 mg Oral TID  isosorbide mononitrate ER (IMDUR) tablet 60 mg  60 mg Oral DAILY  lisinopril (PRINIVIL, ZESTRIL) tablet 40 mg  40 mg Oral DAILY  pantoprazole (PROTONIX) tablet 40 mg  40 mg Oral ACB  rosuvastatin (CRESTOR) tablet 20 mg  20 mg Oral QHS  sodium chloride (NS) flush 5-40 mL  5-40 mL IntraVENous Q8H  
 sodium chloride (NS) flush 5-40 mL  5-40 mL IntraVENous PRN  
 glucose chewable tablet 16 g  4 Tab Oral PRN  
 dextrose (D50W) injection syrg 12.5-25 g  25-50 mL IntraVENous PRN  
 glucagon (GLUCAGEN) injection 1 mg  1 mg IntraMUSCular PRN  
 insulin lispro (HUMALOG) injection   SubCUTAneous TID WITH MEALS Physical Exam: 
Visit Vitals /79 (BP 1 Location: Left arm, BP Patient Position: Sitting) Pulse (!) 57 Temp 97.8 °F (36.6 °C) Resp 16 Ht 5' 11\" (1.803 m) Wt 198 lb 3.1 oz (89.9 kg) SpO2 98% BMI 27.64 kg/m² O2 Device: Room air Gen: Well-developed, well-nourished, no acute distress Resp: No accessory muscle use, Clear breath sounds, No rales or rhonchi 
Card: Normal Rate,Regular Rythm, Normal S1, S2, No murmurs, rubs or gallop. No edema. PT/DP pulses 2+. Psych:  Good insight, oriented to person, place Access with mild echymosis no hematoma PCI: 
  
7F EBU 3.5 guide 
runthorugh wire Heparin for anticoagulation. 
  
Attempted to wire second marginal with candy blue, Fielder XT, Runthrough wire. Able to pass to third marginal prior to stenting. 
  
Required guideliner to pass equipment across proximal bend. 
  
2.5x12 compliant balloon 
  
Overlapping, 2.5x15mm Xience Freida REINALDO  And 3.0x12 Xience SierraDES. Proximal aspect of lesion not covered, subsequent 3.0x8mm Xience Freida REINALDO. Stent dilated with 3.0NC balloon and proximally with 3.75NC balloon. 
  
4 small marginals with karon 3 flow with severe proximal disease. No distal dissection of perfoartion 
  
Specimens Removed : None 
  
Closure Device: radial TR band 
  
See full cath note. 
  
Complications: none 
  
Findings: 1. Stenting of proximal and mid-Lcx with 2.5x15, 3.0x12 and 3.0x8mm Xience [address] REINALDO. Post-dilated with 3.0NC just inside distal stent edge and 3.75NC proximally at high pressure 
  
 
 
 
Labs: No results for input(s): CPK, TROIQ in the last 72 hours. No lab exists for component: CKQMB, CPKMB Recent Labs 04/12/19 
0354   
K 4.2 * BUN 23* CREA 1.26  
* CA 8.6 No results for input(s): WBC, HGB, HCT, PLT, HGBEXT, HCTEXT, PLTEXT in the last 72 hours. No results for input(s): CHOL, LDLC in the last 72 hours.  
 
No lab exists for component: TGL, HDLC,  HBA1C

## 2019-04-12 NOTE — DISCHARGE INSTRUCTIONS
Restart metformin on Sunday 4/14. Radial Cardiac Catheterization/Angiography Discharge Instructions   It is normal to feel tired the first couple days. Take it easy and follow the physicians instructions. CHECK THE CATHETER INSERTION SITE DAILY:   Remove the wrist dressing 24 hours after the procedure. You may shower 24 hours after the procedure. Wash with soap and water and pat dry. Gentle cleaning of the site with soap and water is sufficient, cover with a dry clean dressing or bandage. Do not apply creams or powders to the area. No soaking the wrist for 3 days. Leave the puncture site open to air after 24 hours post-procedure. CALL THE PHYSICIANS:   If the site becomes red, swollen or feels warm to the touch   If there is bleeding or drainage or if there is unusual pain at the radial site. If there is any minor oozing, you may apply a band-aid and remove after 12 hours. If the bleeding continues, hold pressure with the middle finger against the puncture site and the thumb against the back of the wrist,call 911 to be transported to the hospital.   DO NOT DRIVE YOURSELF, [phone]. ACTIVITY:   For the first 24 hours do not manipulate the wrist.   No lifting, pushing or pulling over 3-5 pounds with the affected wrist for 7 daysand no straining the insertion site. Do not life grocery bags or the garbage can, do not run the vacuum  or  for 7 days. Start with short walks as in the hospital and gradually increase as tolerated each day. It is recommended to walk 30 minutes 5-7 days per week. Follow your physicians instructions on activity. Avoid walking outside in extremes of heat or cold. Walk inside when it is cold and windy or hot and humid. Things to keep in mind:   No driving for at least 24 hours, or as designated by your physician. Limit the number of times you go up and down the stairs   Take rests and pace yourself with activity.    Be careful and do not strain with bowel movements. MEDICATIONS:   Take all medications as prescribed   Call your physician if you have any questions   Keep an updated list of your medications with you at all times and give a list to your physician and pharmacist   SIGNS AND SYMPTOMS:   Be cautious of symptoms of angina or recurrent symptoms such as chest discomfort, unusual shortness of breath or fatigue. These could be symptoms of restenosis, a new blockage or a heart attack. If your symptoms are relieved with rest it is still recommended that you notify your physician of recurrent chest pain or discomfort. For CHEST PAIN or symptoms of angina not relieved with rest: If the discomfort is not relieved with rest, and you have been prescribed Nitroglycerin, take as directed (taken under the tongue, one at a time 5 minutes apart for a total of 3 doses). If the discomfort is not relieved after the 3rd nitroglycerin, call 911. If you have not been prescribed Nitroglycerin and your chest discomfort is not relieved with rest, call 911. AFTER CARE:   Follow up with your physician as instructed. Follow a heart healthy diet with proper portion control, daily stress management, daily exercise, blood pressure and cholesterol control , and smoking cessation.

## 2019-04-12 NOTE — PROGRESS NOTES
4- Reason for Admission:   Elective admission for a Left Cardiac Cath RRAT Score:    32 Resources/supports as identified by patient/family:  Supportive family Top Challenges facing patient (as identified by patient/family and CM): Finances/Medication cost? No      
           
Transportation? No 
           
Support system or lack thereof? No 
                  
Living arrangements? No  
        
Self-care/ADLs/Cognition? Alert and oriented Current Advanced Directive/Advance Care Plan:  Full Code Plan for utilizing home health:   No  
                   
Likelihood of readmission: Moderate Transition of Care Plan:   Home I met with the pt to determine potential discharge needs. He lives with his wife, Tamiko Maldonado ([phone]), in a 2-story house. He is independent with his ADL's, uses no assistive devices and drives. His PCP is Dr. Rich Richards and I notified his nurse navigator of the pt's admission and discharge. He has prescription drug coverage and gets his medications from [address] [place] [address] Road in [place]. I explained Observation status to him, he signed the [employer] and [employer], was given copies and the originals were placed on his chart. He is not anticipating any discharge needs and his brother will transport him home today. Care Management Interventions PCP Verified by CM: Yes(Dr. Alexia Almeida navigator notified of admission and discharge) Discharge Durable Medical Equipment: No 
Physical Therapy Consult: No 
Occupational Therapy Consult: No 
Speech Therapy Consult: No 
Current Support Network: Lives with Spouse, Own Home Confirm Follow Up Transport: Family Plan discussed with Pt/Family/Caregiver: Yes Discharge Location Discharge Placement: (Home) HEBER Cotton, CM

## 2019-04-12 NOTE — PROGRESS NOTES
Bedside and Verbal shift change report given to Magali (oncoming nurse) by Greg Leach (offgoing nurse). Report included the following information SBAR, Kardex and Cardiac Rhythm sinus allyssa. 0810 orders seen to discharge patient. 0940 discharge instructions given to patient, patient awaiting to be picked up. IV removed, tele box removed, tele notified, and box returned to station.

## 2019-04-12 NOTE — PROGRESS NOTES
1900: Bedside and Verbal shift change report given to Liseth (oncoming nurse) by Brianna Oakes (offgoing nurse). Report included the following information SBAR, Intake/Output, MAR, Accordion and Cardiac Rhythm  . 0700: Bedside and Verbal shift change report given to Magali (oncoming nurse) by Liseth (offgoing nurse). Report included the following information SBAR, Intake/Output, MAR, Accordion and Cardiac Rhythm  .

## 2019-04-12 NOTE — PROGRESS NOTES
Discharge order written. Cardiac Rehab RN has educated patient. All Care Plans and education are complete. There are no new RX for this patient at discharge.

## 2019-04-15 ENCOUNTER — PATIENT OUTREACH (OUTPATIENT)
Dept: FAMILY MEDICINE CLINIC | Age: 74
End: 2019-04-15

## 2019-04-15 ENCOUNTER — TELEPHONE (OUTPATIENT)
Dept: FAMILY MEDICINE CLINIC | Age: 74
End: 2019-04-15

## 2019-04-15 ENCOUNTER — TELEPHONE (OUTPATIENT)
Dept: CARDIOLOGY CLINIC | Age: 74
End: 2019-04-15

## 2019-04-15 NOTE — TELEPHONE ENCOUNTER
Called returned to patient and made pt aware that Dr. Jessica Peter is seeing pt's today. Pt stated that he recently was in [facility] for stent placement and that he is on a bunch of medications and would like Dr. Jessica Peter to review his medication list and look at the note from Dr. Beena Bourne to see if he is able to come off any of his medications. He stated that he is on about 17 pills a day and he has upset stomach and would like to come off some of them. Pt made aware a mess would be sent to Dr. Jessica Peter.  Pt verbalized understanding

## 2019-04-15 NOTE — TELEPHONE ENCOUNTER
Pt requesting to speak w/ Dr. Joseph Henderson to discuss some medications that he is currently taking.

## 2019-04-15 NOTE — TELEPHONE ENCOUNTER
Called patient wanted to follow up on a call that he made to his PCP's office today. Patient had a Cardiac Cath done last week on 4/11/19 since then has felt fatigue and weak. Patient went out on Saturday to the post office and experienced blurry vision and dizziness went back home and rested. He said it lasted about 45 minutes after resting he felt okay. No chest pains noted     Patient is concerned that he is taking too many medications when he counted them he said it was close to 17 med's. Patient has a follow up appointment scheduled with Dr Johnathan Yuen on April 29th.

## 2019-04-16 ENCOUNTER — TELEPHONE (OUTPATIENT)
Dept: FAMILY MEDICINE CLINIC | Age: 74
End: 2019-04-16

## 2019-04-16 NOTE — TELEPHONE ENCOUNTER
Pt returned call and was advised of message. Pt verbalized understanding and states that he has an appt coming up and he will discuss everything w/ Cardiologist.     Pt also would like to know if Dr. Ruben Garnett would send a new Rx in for Atenolol w/ directions to take 2 tabs daily. He states that his cardiologist wants him to take 2 tabs daily instead of 1. Pt states that he was told that there is a generic for Epipen and he would like a Rx for that as well.

## 2019-04-16 NOTE — TELEPHONE ENCOUNTER
Called patient he does check his vital signs at home sometimes it is every day other times it may be every other day. He did check his BP yesterday it was 123/68 HR 50 and weight was 200. He stated his weight has not fluctuated above or below   5 pounds.

## 2019-04-16 NOTE — TELEPHONE ENCOUNTER
Called made to pt, no answer, mess left to call back:  Pltease advise pt per Dr. Rain Redmond:     He cannot stop any medications unless he clears it with cardiologist prior to him seeing me.    Thank you,   Dr. Rain Redmond

## 2019-04-16 NOTE — PROGRESS NOTES
Patient reported to NN during [facility] call that on 4/13, two days after procedure, he had an episode of blurry vision while driving. He reports he went home and laid down for a while and it went away. NN instructed patient to call his Cardiologist and report this to them. Patient requested that this NN call for him. NN contacted cardiology NN Joellen Mcgovern and notified her of sx. Patient was also requesting coming off some of his medications due to ongoing dizziness that he has had for a while. Per Joellen Mcgovern, cardiology nurse will contact patient for follow up.

## 2019-04-16 NOTE — TELEPHONE ENCOUNTER
Called patient advised him to continue taking BP and HR and keep a log of it to bring with him to his next appointment. At this time patient does not feel he needs to be seen prior to his scheduled appointment with Dr Marshal Richards on  April 29th.

## 2019-04-17 ENCOUNTER — TELEPHONE (OUTPATIENT)
Dept: FAMILY MEDICINE CLINIC | Age: 74
End: 2019-04-17

## 2019-04-17 DIAGNOSIS — I10 ESSENTIAL HYPERTENSION: ICD-10-CM

## 2019-04-17 RX ORDER — EPINEPHRINE 0.3 MG/.3ML
INJECTION SUBCUTANEOUS
Qty: 1 SYRINGE | Refills: 3 | Status: SHIPPED | OUTPATIENT
Start: 2019-04-17 | End: 2020-12-01 | Stop reason: SDUPTHER

## 2019-04-17 RX ORDER — ATENOLOL 25 MG/1
25 TABLET ORAL 2 TIMES DAILY
Qty: 60 TAB | Refills: 5 | Status: SHIPPED | OUTPATIENT
Start: 2019-04-17 | End: 2019-06-07 | Stop reason: SDUPTHER

## 2019-04-17 NOTE — TELEPHONE ENCOUNTER
Spoke with patient and advised him that his prescriptions have been sent to his pharmacy. Patient verbalized understanding.

## 2019-04-17 NOTE — TELEPHONE ENCOUNTER
Returned call to patient and advised Dr Evy Jeffers out of office. Message was sent to Cardiology NP as well as Dr. Evy Jeffers, but patient should plan to take medications tonight and recheck blood pressure. If still feeling dizzy he can page on call MD to discuss. Otherwise, we will call him back tomorrow to advise. Patient expressed understanding and agreed with plan.

## 2019-04-17 NOTE — TELEPHONE ENCOUNTER
BP is 106/63 and now 116/63. He wants to know is that too low for him to take his BP medication tonight?

## 2019-04-18 NOTE — TELEPHONE ENCOUNTER
Returned phone call to maria d. He reports that his blood pressure sitting this morning was 120/79. He says that he took his medication and then checked him BP standing while on the phone which was 136/77. He says that he feels fine today. Advised patient to check his blood pressure again sitting and standing if he starts to feel dizzy. Patient verbalized understanding.

## 2019-04-26 ENCOUNTER — OFFICE VISIT (OUTPATIENT)
Dept: CARDIOLOGY CLINIC | Age: 74
End: 2019-04-26

## 2019-04-26 VITALS
WEIGHT: 204.4 LBS | HEIGHT: 71 IN | RESPIRATION RATE: 18 BRPM | OXYGEN SATURATION: 97 % | BODY MASS INDEX: 28.61 KG/M2 | HEART RATE: 48 BPM | SYSTOLIC BLOOD PRESSURE: 122 MMHG | DIASTOLIC BLOOD PRESSURE: 70 MMHG

## 2019-04-26 DIAGNOSIS — I25.118 CORONARY ARTERY DISEASE OF NATIVE ARTERY OF NATIVE HEART WITH STABLE ANGINA PECTORIS (HCC): Primary | ICD-10-CM

## 2019-04-26 RX ORDER — CLOPIDOGREL BISULFATE 75 MG/1
75 TABLET ORAL DAILY
Qty: 30 TAB | Refills: 11 | Status: SHIPPED | OUTPATIENT
Start: 2019-04-26 | End: 2020-04-15 | Stop reason: SDUPTHER

## 2019-04-26 RX ORDER — TRAMADOL HYDROCHLORIDE 50 MG/1
TABLET ORAL
COMMUNITY
Start: 2019-04-25 | End: 2019-06-24 | Stop reason: SDUPTHER

## 2019-04-26 NOTE — PROGRESS NOTES
[facility] [address], [address] [facility], [address] [place] [address]    Office [phone],SGX [phone]           Juan Carlos Camacho III is a 66 y.o. male valuation of palpitations and chest discomfort. Consult requested by Dorina Best MD        Assessment/Recommendations:      Coronary artery disease-status post stenting of left circumflex for 2019. FFR negative of moderate lesion and anomalous right coronary artery. -DAPT for at least 6 months  -Goal-directed medical therapy    Palpitations-symptomatic PVCs based on Holter monitor. Brief 4 beat run of ventricular tachycardia noted on monitor. resolved with restarting beta-blocker therapy. Continue atenolol therapy. Hypertension-stable continue treatment per primary care. Diabetes-A1c 6%, per primary care. Hyperlipidemia-LDL 76, on statin therapy. On Crestor 40 mg daily. Mild is on higher dose statin therapy    GERD-on PPI therapy    Carotid artery stenosis- s/p carotid endarterectomy on aspirin and statin. followed by  Mathew Mcgill MD     Amaurosis fugax of left eye-continue to monitor symptoms consider repeat carotid study if recurrence of symptoms. Primary Care Physician- Dorina Best MD    F/u 3 months    Subjective:  69-year-old male presents for follow-up after PCI of his left circumflex. He underwent stenting of the proximal to mid circumflex. He reports no recurrence of chest pain or shortness of breath. He did play golf last week without recurrence of symptoms. He did report having transient approximately 30 minutes of blurred vision in his left eye after PCI. No recurrence of symptoms. No shortness of breath orthopnea PND. Blood pressure is improved. No further palpitations.       Past Medical History:   Diagnosis Date    Amaurosis fugax of left eye 5/6/2012    Arthritis     OSTEO    CAD (coronary artery disease) 2012    CAROTID LEFT     Cancer [facility]) 2013 PROSTATE    Chronic pain     DJD lumbar    Diabetes ([facility] [address] 5/22/2010    Frequent nocturnal awakening     urinary frequency    GERD (gastroesophageal reflux disease) 5/22/2010    Hyperlipidemia 5/22/2010    Hypertension 5/22/2010    Kidney stone 5/22/2010    Nodular goiter     1.3 cm right , FNA 6/15    Obesity (BMI 30-39. 9)     Psychiatric disorder     ANXIETY    Vertigo         Past Surgical History:   Procedure Laterality Date    COLONOSCOPY  3/3/2016         EGD  3/3/2016         HX HEENT      tonsillectomy    HX MOHS PROCEDURES  2010    right    HX ORTHOPAEDIC      coccyx removed    HX UROLOGICAL  2010    left lithotripsy. basket  extraction,ureteral stent    HX VASECTOMY      NEUROLOGICAL PROCEDURE UNLISTED  2011    Steroid injections in epidural    VASCULAR SURGERY PROCEDURE UNLIST  2012    LEFT CAROTID         Current Outpatient Medications:     traMADol (ULTRAM) 50 mg tablet, , Disp: , Rfl:     clopidogrel (PLAVIX) 75 mg tab, Take 1 Tab by mouth daily for 360 days. , Disp: 30 Tab, Rfl: 11    EPINEPHrine (EPIPEN 2-KARL) 0.3 mg/0.3 mL injection, INJECT INTRAMUSCULARLY AS NEEDED FOR ONE DOSE. TAKE WITH 50MG OF BENADRYL AND CALL 911. Dispense Generic, Disp: 1 Syringe, Rfl: 3    atenolol (TENORMIN) 25 mg tablet, Take 1 Tab by mouth two (2) times a day. One tab twice daily. , Disp: 60 Tab, Rfl: 5    rosuvastatin (CRESTOR) 20 mg tablet, Take 20 mg by mouth nightly., Disp: , Rfl:     isosorbide mononitrate ER (IMDUR) 60 mg CR tablet, Take 1 Tab by mouth daily. , Disp: 30 Tab, Rfl: 5    furosemide (LASIX) 20 mg tablet, TAKE ONE TABLET BY MOUTH EVERY DAY, Disp: 30 Tab, Rfl: 11    lisinopril (PRINIVIL, ZESTRIL) 40 mg tablet, TAKE ONE TABLET BY MOUTH TWICE DAILY, Disp: 60 Tab, Rfl: 11    pantoprazole (PROTONIX) 40 mg tablet, TAKE ONE TABLET BY MOUTH EVERY DAY FOR: GASTROESOPHAGEAL REFLUX, Disp: 30 Tab, Rfl: 11    hydrALAZINE (APRESOLINE) 50 mg tablet, TAKE ONE TABLET BY MOUTH THREE TIMES DAILY FOR HYPERTENSION, Disp: 90 Tab, Rfl: 6    felodipine (PLENDIL SR) 10 mg 24 hr tablet, TAKE ONE TABLET BY MOUTH EVERY DAY FOR HYPERTENSION, Disp: 30 Tab, Rfl: 11    raNITIdine (ZANTAC) 300 mg tab, TAKE ONE TABLET BY MOUTH EVERY DAY, Disp: 30 Tab, Rfl: 11    diclofenac (VOLTAREN) 1 % gel, Apply 4 g to affected area four (4) times daily. Painful shoulder., Disp: 100 g, Rfl: 3    metFORMIN ER (GLUCOPHAGE XR) 500 mg tablet, Take 1 Tab by mouth two (2) times a day., Disp: 180 Tab, Rfl: 3    meclizine (ANTIVERT) 25 mg chewable tablet, Take  by mouth three (3) times daily as needed. , Disp: , Rfl:     aspirin delayed-release 81 mg tablet, Take 1 Tab by mouth daily. , Disp: 30 Tab, Rfl: 0    Peak Flow Meter eric, Use prior and post nebulizer treatment, Disp: 1 Device, Rfl: 0    albuterol (PROVENTIL VENTOLIN) 2.5 mg /3 mL (0.083 %) nebulizer solution, 3 mL by Nebulization route every six (6) hours as needed for Wheezing or Shortness of Breath., Disp: 24 Each, Rfl: 5    omega-3 fatty acids-vitamin e (FISH OIL) 1,000 mg cap, Take 1 Cap by mouth., Disp: , Rfl:     coenzyme q10 (CO Q-10) 100 mg Cap, Take 100 mg by mouth daily. , Disp: , Rfl:     Allergies   Allergen Reactions    Bee Sting [Sting, Bee] Anaphylaxis    Vytorin 10-10 [Ezetimibe-Simvastatin] Myalgia    Amlodipine Other (comments)     Creepy crawly sensation, twitches    Lipitor [Atorvastatin] Myalgia        Family History   Problem Relation Age of Onset    Diabetes Mother     Obesity Mother     Heart Disease Mother     Stroke Father     Heart Disease Sister     Obesity Sister    Sanjay Nowak Diabetes Sister    Mother  of a heart attack at age 62  Father had a stroke in his 76s    Social History     Tobacco Use    Smoking status: Former Smoker     Packs/day: 0.50     Years: 4.00     Pack years: 2.00     Last attempt to quit: 1966     Years since quittin.0    Smokeless tobacco: Never Used   Substance Use Topics    Alcohol use: No    Drug use:  No Review of Symptoms:  Pertinent Positive: resolved Chest pain, transient left eye blurred vision  Pertinent Negative: No shortness of breath orthopnea PND. All Other systems reviewed and are negative for a Comprehensive ROS (10+)    Physical Exam    Blood pressure 122/70, pulse (!) 48, resp. rate 18, height 5' 11\" (1.803 m), weight 204 lb 6.4 oz (92.7 kg), SpO2 97 %. Constitutional:  well-developed and well-nourished. No distress. HENT: Normocephalic. Eyes: No scleral icterus. Neck:  Neck supple. No JVD present. Pulmonary/Chest: Effort normal and breath sounds normal. No respiratory distress, wheezes or rales. Cardiovascular: Normal rate, regular rhythm, S1 S2 .  2/6 systolic murmur   extremities:  Normal muscle tone  Abdominal:   No abnormal distension. Neurological:  Moving all extremities, cranial nerves appear grossly intact. Skin: Skin is not cold. Not diaphoretic. No erythema. Psychiatric:  Grossly normal mood and affect. Intact insight. Objective Data:     Lipids 1/25/19 - , HDL 64, LDL 76, , VLDL 22    Echo 7/2018: Normal LVEF, mild AS, mild MR/TR    Event monitor-1/31/2019 to 2/7/2019. Rare ventricular ectopy, 6 supraventricular runs of ventricular tachycardia the longest being 4 beats. Supraventricular ectopy involving 8.2% of beats. 02/25/19  NUCLEAR CARDIAC STRESS TEST 02/27/2019 2/27/2019  Abnormal perfusion    4/2019  L Main: no significant disease  LAD: distal LAD  after large branching diagonal  LCx: proximal LCx just distal to origin of OM1 into mid-LCx with severe diffuse disease,  Involves origin of OM2 and OM3  RCA: anomalous origin from left cusp, serial focal moderate stenosis of mid-Lcx and distal Lcx that is not hemodynamically significant by FFR    PCI  Stenting of proximal and mid-Lcx (distal to proximal) with 2.5x15, 3.0x12 and 3.0x8mm Xience Freida REINALDO.  Post-dilated with 3.0NC just inside distal stent edge and 3.75NC proximally at high pressure                 Family Comments                      Shea Ortiz, DO

## 2019-04-26 NOTE — PROGRESS NOTES
Mika Chinchilla is a 66 y.o. male    Chief Complaint   Patient presents with   [facility] Follow Up     STENT on 4/11/19       1. Have you been to the ER, urgent care clinic since your last visit? Hospitalized since your last visit? YES, [facility] for CP on 4/11/19-4/12/19. 2. Have you seen or consulted any other health care providers outside of the [address] [place] [address] since your last visit? Include any pap smears or colon screening.   NO    Visit Vitals  /70 (BP 1 Location: Left arm, BP Patient Position: Sitting)   Pulse (!) 48   Resp 18   Ht 5' 11\" (1.803 m)   Wt 204 lb 6.4 oz (92.7 kg)   SpO2 97%   BMI 28.51 kg/m²

## 2019-05-02 DIAGNOSIS — J45.20 ASTHMATIC BRONCHITIS, MILD INTERMITTENT, UNCOMPLICATED: ICD-10-CM

## 2019-05-02 RX ORDER — ALBUTEROL SULFATE 90 UG/1
2 AEROSOL, METERED RESPIRATORY (INHALATION)
Qty: 1 INHALER | Refills: 10 | Status: SHIPPED | OUTPATIENT
Start: 2019-05-02 | End: 2020-04-26

## 2019-06-07 DIAGNOSIS — I10 ESSENTIAL HYPERTENSION: ICD-10-CM

## 2019-06-07 RX ORDER — ATENOLOL 25 MG/1
25 TABLET ORAL 2 TIMES DAILY
Qty: 180 TAB | Refills: 2 | Status: SHIPPED | OUTPATIENT
Start: 2019-06-07 | End: 2020-06-24 | Stop reason: SDUPTHER

## 2019-06-10 ENCOUNTER — OFFICE VISIT (OUTPATIENT)
Dept: FAMILY MEDICINE CLINIC | Age: 74
End: 2019-06-10

## 2019-06-10 VITALS
WEIGHT: 199 LBS | BODY MASS INDEX: 27.86 KG/M2 | HEART RATE: 48 BPM | TEMPERATURE: 98 F | RESPIRATION RATE: 18 BRPM | SYSTOLIC BLOOD PRESSURE: 119 MMHG | DIASTOLIC BLOOD PRESSURE: 73 MMHG | HEIGHT: 71 IN | OXYGEN SATURATION: 98 %

## 2019-06-10 DIAGNOSIS — I10 ESSENTIAL HYPERTENSION: Chronic | ICD-10-CM

## 2019-06-10 DIAGNOSIS — I25.118 CORONARY ARTERY DISEASE OF NATIVE ARTERY OF NATIVE HEART WITH STABLE ANGINA PECTORIS (HCC): ICD-10-CM

## 2019-06-10 DIAGNOSIS — E11.21 TYPE 2 DIABETES WITH NEPHROPATHY (HCC): Primary | ICD-10-CM

## 2019-06-10 DIAGNOSIS — R01.1 HEART MURMUR: Chronic | ICD-10-CM

## 2019-06-10 DIAGNOSIS — E11.21 CONTROLLED TYPE 2 DIABETES MELLITUS WITH DIABETIC NEPHROPATHY, WITHOUT LONG-TERM CURRENT USE OF INSULIN (HCC): ICD-10-CM

## 2019-06-10 DIAGNOSIS — E78.00 PURE HYPERCHOLESTEROLEMIA: Chronic | ICD-10-CM

## 2019-06-10 DIAGNOSIS — K21.00 GASTROESOPHAGEAL REFLUX DISEASE WITH ESOPHAGITIS: Chronic | ICD-10-CM

## 2019-06-10 DIAGNOSIS — C61 PROSTATE CANCER (HCC): Chronic | ICD-10-CM

## 2019-06-10 DIAGNOSIS — N18.30 CKD STAGE 3 DUE TO TYPE 2 DIABETES MELLITUS (HCC): ICD-10-CM

## 2019-06-10 DIAGNOSIS — E11.22 CKD STAGE 3 DUE TO TYPE 2 DIABETES MELLITUS (HCC): ICD-10-CM

## 2019-06-10 DIAGNOSIS — E55.9 VITAMIN D DEFICIENCY: Chronic | ICD-10-CM

## 2019-06-10 NOTE — PATIENT INSTRUCTIONS

## 2019-06-10 NOTE — PROGRESS NOTES
1. Have you been to the ER, urgent care clinic since your last visit? Hospitalized since your last visit? no    2. Have you seen or consulted any other health care providers outside of the [address] [place] [address] since your last visit? Including any pap smears or colon screening. no    Reviewed record in preparation for visit and have necessary documentation    Pt did not bring medication to office visit for review  Opportunity was given for questions    Goals that were addressed and/or need to be completed during or after this appointment include   Health Maintenance Due   Topic Date Due    MEDICARE YEARLY EXAM  03/28/2018    EYE EXAM RETINAL OR DILATED  05/25/2019     Body mass index is 27.75 kg/m².

## 2019-06-11 LAB
25(OH)D3+25(OH)D2 SERPL-MCNC: 42.4 NG/ML (ref 30–100)
ALBUMIN SERPL-MCNC: 4.4 G/DL (ref 3.5–4.8)
ALT SERPL-CCNC: 14 IU/L (ref 0–44)
AST SERPL-CCNC: 21 IU/L (ref 0–40)
BUN SERPL-MCNC: 30 MG/DL (ref 8–27)
BUN/CREAT SERPL: 22 (ref 10–24)
CALCIUM SERPL-MCNC: 9.7 MG/DL (ref 8.6–10.2)
CHLORIDE SERPL-SCNC: 102 MMOL/L (ref 96–106)
CHOLEST SERPL-MCNC: 169 MG/DL (ref 100–199)
CO2 SERPL-SCNC: 26 MMOL/L (ref 20–29)
CREAT SERPL-MCNC: 1.35 MG/DL (ref 0.76–1.27)
CREAT UR-MCNC: 109.1 MG/DL
EST. AVERAGE GLUCOSE BLD GHB EST-MCNC: 126 MG/DL
GLUCOSE SERPL-MCNC: 97 MG/DL (ref 65–99)
HBA1C MFR BLD: 6 % (ref 4.8–5.6)
HDLC SERPL-MCNC: 55 MG/DL
HGB BLD-MCNC: 11.6 G/DL (ref 13–17.7)
INTERPRETATION: NORMAL
LDLC SERPL CALC-MCNC: 81 MG/DL (ref 0–99)
Lab: NORMAL
PHOSPHATE SERPL-MCNC: 3.2 MG/DL (ref 2.5–4.5)
POTASSIUM SERPL-SCNC: 4.5 MMOL/L (ref 3.5–5.2)
PROT UR-MCNC: 11 MG/DL
PROT/CREAT UR: 101 MG/G CREAT (ref 0–200)
PTH-INTACT SERPL-MCNC: 66 PG/ML (ref 15–65)
SODIUM SERPL-SCNC: 142 MMOL/L (ref 134–144)
TRIGL SERPL-MCNC: 166 MG/DL (ref 0–149)
VLDLC SERPL CALC-MCNC: 33 MG/DL (ref 5–40)

## 2019-06-19 NOTE — PROGRESS NOTES
[address] [place] [address], [address] [facility]  [phone]           Progress Note    Patient: Marcos Valdes MRN: [id number]  SSN: [id number]    YOB: 1945  Age: 66 y.o. Sex: male        Chief Complaint   Patient presents with    Diabetes         Subjective:     Encounter Diagnoses   Name Primary?  Type 2 diabetes with nephropathy ([address]): This patient is managed under a comprehensive plan of care for Diabetes. Overall the patient feels well with good energy level. Key Antihyperglycemic Medications             metFORMIN ER (GLUCOPHAGE XR) 500 mg tablet (Taking) Take 1 Tab by mouth two (2) times a day. Pertinent Labs:   Lab Results   Component Value Date/Time    Hemoglobin A1c 6.0 (H) 06/10/2019 11:46 AM    Hemoglobin A1c 6.0 (H) 01/25/2019 10:11 AM    Hemoglobin A1c 6.0 (H) 10/17/2018 12:14 PM      Body mass index is 27.75 kg/m². Lab Results   Component Value Date/Time    LDL, calculated 81 06/10/2019 11:46 AM         Lab Results   Component Value Date/Time    Sodium 142 06/10/2019 11:46 AM    Potassium 4.5 06/10/2019 11:46 AM    Chloride 102 06/10/2019 11:46 AM    CO2 26 06/10/2019 11:46 AM    Anion gap 5 04/12/2019 03:54 AM    Glucose 97 06/10/2019 11:46 AM    BUN 30 (H) 06/10/2019 11:46 AM    Creatinine 1.35 (H) 06/10/2019 11:46 AM    BUN/Creatinine ratio 22 06/10/2019 11:46 AM    GFR est AA 59 (L) 06/10/2019 11:46 AM    GFR est non-AA 51 (L) 06/10/2019 11:46 AM    Calcium 9.7 06/10/2019 11:46 AM    AST (SGOT) 21 06/10/2019 11:46 AM    Alk.  phosphatase 56 04/12/2019 03:54 AM    Protein, total 6.0 (L) 04/12/2019 03:54 AM    Albumin 4.4 06/10/2019 11:46 AM    Globulin 2.6 04/12/2019 03:54 AM    A-G Ratio 1.3 04/12/2019 03:54 AM    ALT (SGPT) 14 06/10/2019 11:46 AM     Lab Results   Component Value Date/Time    Microalbumin/Creat ratio (mg/g creat) 8 03/12/2009 09:00 AM    Microalb/Creat ratio (ug/mg creat.) <5.6 03/08/2019 09:54 AM    Microalbumin,urine random 1.47 2009 09:00 AM      Frequency of home glucose testing:no logs   Blood Sugar range at home:    Last eye exam: due   Last foot exam: This year. Polyuria, polyphagia or polydipsia: No   Retinopathy: No   Neuropathy SX: No    Low blood sugar symptoms: No   Dietary compliance: Good   Medication compliance:Good   On ASA:ASA or antiplatelet therapy not prescibed for medical reasons. Depression: No   CKD:no     Wt Readings from Last 3 Encounters:   06/10/19 199 lb (90.3 kg)   19 204 lb 6.4 oz (92.7 kg)   19 198 lb 3.1 oz (89.9 kg)        Social History     Tobacco Use   Smoking Status Former Smoker    Packs/day: 0.50    Years: 4.00    Pack years: 2.00    Last attempt to quit: 1966    Years since quittin.2   Smokeless Tobacco Never Used     Body mass index is 27.75 kg/m². All the patient's questions regarding medications, diet and exercise were answered. Goal of A1C of less than 7.5% is our goal.   Our overall goal is to reduce or eliminate the long term consequences of poorly controlled diabetes. Yes    Controlled type 2 diabetes mellitus with diabetic nephropathy, without long-term current use of insulin ([facility] [address]): See above.          CKD stage 3 due to type 2 diabetes mellitus [facility]):     Lab Results   Component Value Date/Time    GFR est AA 59 (L) 06/10/2019 11:46 AM    GFR est non-AA 51 (L) 06/10/2019 11:46 AM    Creatinine (POC) 1.0 2017 09:20 AM    Creatinine 1.35 (H) 06/10/2019 11:46 AM    BUN 30 (H) 06/10/2019 11:46 AM    BUN (POC) 21 (H) 2017 09:20 AM    Sodium (POC) 139 2017 09:20 AM    Sodium 142 06/10/2019 11:46 AM    Potassium 4.5 06/10/2019 11:46 AM    Potassium (POC) 4.1 2017 09:20 AM    Chloride (POC) 101 2017 09:20 AM    Chloride 102 06/10/2019 11:46 AM    CO2 26 06/10/2019 11:46 AM     Lab Results   Component Value Date/Time    WBC 5.8 2019 11:26 AM    Hemoglobin (POC) 12.9 2017 09:20 AM    HGB 11.6 (L) 06/10/2019 11:46 AM    Hematocrit (POC) 38 02/24/2017 09:20 AM    HCT 34.8 (L) 03/29/2019 11:26 AM    PLATELET 227 60/91/9670 11:26 AM    MCV 87 03/29/2019 11:26 AM     Lab Results   Component Value Date/Time    Vitamin D 25-Hydroxy 29 (L) 10/04/2010 11:30 AM    VITAMIN D, 25-HYDROXY 42.4 06/10/2019 11:46 AM       Lab Results   Component Value Date/Time    Calcium 9.7 06/10/2019 11:46 AM    Phosphorus 3.2 06/10/2019 11:46 AM    PTH, Intact 66 (H) 06/10/2019 11:46 AM              Vitamin D deficiency:  No sx. Due for testing. Lab Results   Component Value Date/Time    Vitamin D 25-Hydroxy 29 (L) 10/04/2010 11:30 AM    VITAMIN D, 25-HYDROXY 42.4 06/10/2019 11:46 AM            Essential hypertension: Excellent control recently. BP Readings from Last 3 Encounters:   06/10/19 119/73   04/26/19 122/70   04/12/19 151/79     The patient reports:  taking medications as instructed, no medication side effects noted, no TIA's, no chest pain on exertion, no dyspnea on exertion, no swelling of ankles. Key CAD CHF Meds             atenolol (TENORMIN) 25 mg tablet (Taking) Take 1 Tab by mouth two (2) times a day. One tab twice daily. clopidogrel (PLAVIX) 75 mg tab (Taking) Take 1 Tab by mouth daily for 360 days. rosuvastatin (CRESTOR) 20 mg tablet (Taking) Take 20 mg by mouth nightly. isosorbide mononitrate ER (IMDUR) 60 mg CR tablet (Taking) Take 1 Tab by mouth daily. furosemide (LASIX) 20 mg tablet (Taking) TAKE ONE TABLET BY MOUTH EVERY DAY    lisinopril (PRINIVIL, ZESTRIL) 40 mg tablet (Taking) TAKE ONE TABLET BY MOUTH TWICE DAILY    hydrALAZINE (APRESOLINE) 50 mg tablet (Taking) TAKE ONE TABLET BY MOUTH THREE TIMES DAILY FOR HYPERTENSION    felodipine (PLENDIL SR) 10 mg 24 hr tablet (Taking) TAKE ONE TABLET BY MOUTH EVERY DAY FOR HYPERTENSION    aspirin delayed-release 81 mg tablet (Taking) Take 1 Tab by mouth daily.     omega-3 fatty acids-vitamin e (FISH OIL) 1,000 mg cap (Taking) Take 1 Cap by mouth. Lab Results   Component Value Date/Time    Sodium 142 06/10/2019 11:46 AM    Potassium 4.5 06/10/2019 11:46 AM    Chloride 102 06/10/2019 11:46 AM    CO2 26 06/10/2019 11:46 AM    Anion gap 5 04/12/2019 03:54 AM    Glucose 97 06/10/2019 11:46 AM    BUN 30 (H) 06/10/2019 11:46 AM    Creatinine 1.35 (H) 06/10/2019 11:46 AM    BUN/Creatinine ratio 22 06/10/2019 11:46 AM    GFR est AA 59 (L) 06/10/2019 11:46 AM    GFR est non-AA 51 (L) 06/10/2019 11:46 AM    Calcium 9.7 06/10/2019 11:46 AM    Bilirubin, total 0.3 04/12/2019 03:54 AM    AST (SGOT) 21 06/10/2019 11:46 AM    Alk. phosphatase 56 04/12/2019 03:54 AM    Protein, total 6.0 (L) 04/12/2019 03:54 AM    Albumin 4.4 06/10/2019 11:46 AM    Globulin 2.6 04/12/2019 03:54 AM    A-G Ratio 1.3 04/12/2019 03:54 AM    ALT (SGPT) 14 06/10/2019 11:46 AM     Low salt diet? yes  Aerobic exercise? no  Our goal is to normalize the blood pressure to decrease the risks of strokes and heart attacks. The patient is in agreement with the plan.  Pure hypercholesterolemia:  Cardiovascular risks for him are: LDL goal is under 80  diabetic  existing CAD  hypertension  hyperlipidemia. Key Antihyperlipidemia Meds             rosuvastatin (CRESTOR) 20 mg tablet (Taking) Take 20 mg by mouth nightly. omega-3 fatty acids-vitamin e (FISH OIL) 1,000 mg cap (Taking) Take 1 Cap by mouth. Lab Results   Component Value Date/Time    Cholesterol, total 169 06/10/2019 11:46 AM    HDL Cholesterol 55 06/10/2019 11:46 AM    LDL, calculated 81 06/10/2019 11:46 AM    Triglyceride 166 (H) 06/10/2019 11:46 AM    CHOL/HDL Ratio 3.5 10/04/2010 11:30 AM     Lab Results   Component Value Date/Time    ALT (SGPT) 14 06/10/2019 11:46 AM    AST (SGOT) 21 06/10/2019 11:46 AM    Alk.  phosphatase 56 04/12/2019 03:54 AM    Bilirubin, total 0.3 04/12/2019 03:54 AM      Myalgias: No   Fatigue: No   Other side effects: no  Wt Readings from Last 3 Encounters:   06/10/19 199 lb (90.3 kg)   04/26/19 204 lb 6.4 oz (92.7 kg)   04/11/19 198 lb 3.1 oz (89.9 kg)     The patient is aware of our goal to reduce or eliminate the long term problems (such as strokes and heart attacks) related to poorly controlled hyperlipidemia.  Heart murmur: no change         Gastroesophageal reflux disease with esophagitis:  Current control of Symptoms:good  Hiatal Hernia:no  Current Medications: Pantoprazole  The patient has no history melena or bright red blood in the stools. The patient avoids high dose aspirin and NSAID therapy. The patient is aware of diet changes needed, elevating the head of the bed and appropriate use of antacids.  Coronary artery disease of native artery of native heart with stable angina pectoris (HCC):S/p 3 stents.  Prostate cancer [facility]): no sx recurrence              Current and past medical information:    Current Medications after this visit[de-identified]     Current Outpatient Medications   Medication Sig    atenolol (TENORMIN) 25 mg tablet Take 1 Tab by mouth two (2) times a day. One tab twice daily.  traMADol (ULTRAM) 50 mg tablet     clopidogrel (PLAVIX) 75 mg tab Take 1 Tab by mouth daily for 360 days.  rosuvastatin (CRESTOR) 20 mg tablet Take 20 mg by mouth nightly.  isosorbide mononitrate ER (IMDUR) 60 mg CR tablet Take 1 Tab by mouth daily.  furosemide (LASIX) 20 mg tablet TAKE ONE TABLET BY MOUTH EVERY DAY    lisinopril (PRINIVIL, ZESTRIL) 40 mg tablet TAKE ONE TABLET BY MOUTH TWICE DAILY    pantoprazole (PROTONIX) 40 mg tablet TAKE ONE TABLET BY MOUTH EVERY DAY FOR: GASTROESOPHAGEAL REFLUX    hydrALAZINE (APRESOLINE) 50 mg tablet TAKE ONE TABLET BY MOUTH THREE TIMES DAILY FOR HYPERTENSION    felodipine (PLENDIL SR) 10 mg 24 hr tablet TAKE ONE TABLET BY MOUTH EVERY DAY FOR HYPERTENSION    raNITIdine (ZANTAC) 300 mg tab TAKE ONE TABLET BY MOUTH EVERY DAY    diclofenac (VOLTAREN) 1 % gel Apply 4 g to affected area four (4) times daily. Painful shoulder.  metFORMIN ER (GLUCOPHAGE XR) 500 mg tablet Take 1 Tab by mouth two (2) times a day.  aspirin delayed-release 81 mg tablet Take 1 Tab by mouth daily.  omega-3 fatty acids-vitamin e (FISH OIL) 1,000 mg cap Take 1 Cap by mouth.  coenzyme q10 (CO Q-10) 100 mg Cap Take 100 mg by mouth daily.  albuterol (PROVENTIL HFA, VENTOLIN HFA, PROAIR HFA) 90 mcg/actuation inhaler Take 2 Puffs by inhalation every four (4) hours as needed for Wheezing for up to 360 days.  EPINEPHrine (EPIPEN 2-KARL) 0.3 mg/0.3 mL injection INJECT INTRAMUSCULARLY AS NEEDED FOR ONE DOSE. TAKE WITH 50MG OF BENADRYL AND CALL 911. Dispense Generic    meclizine (ANTIVERT) 25 mg chewable tablet Take  by mouth three (3) times daily as needed.  Peak Flow Meter eric Use prior and post nebulizer treatment    albuterol (PROVENTIL VENTOLIN) 2.5 mg /3 mL (0.083 %) nebulizer solution 3 mL by Nebulization route every six (6) hours as needed for Wheezing or Shortness of Breath. No current facility-administered medications for this visit.         Patient Active Problem List    Diagnosis Date Noted    History of angina 04/11/2019     Priority: 1 - One    CAD (coronary artery disease) 04/11/2019     Priority: 1 - One    Bone spur 10/04/2010     Priority: 1 - One    Hypertension 05/22/2010     Priority: 1 - One    Hyperlipidemia 05/22/2010     Priority: 1 - One    GERD (gastroesophageal reflux disease) 05/22/2010     Priority: 1 - One    Kidney stone 05/22/2010     Priority: 1 - One    Heart murmur 07/23/2018    Type 2 diabetes with nephropathy ([address] 06/29/2018    CKD stage 3 due to type 2 diabetes mellitus ([facility] [address] 06/29/2018    Age-related nuclear cataract of both eyes 03/27/2018    PVD (posterior vitreous detachment), left eye 03/27/2018    Controlled type 2 diabetes mellitus with diabetic nephropathy ([address] 12/03/2015    Prostate cancer ([facility] [address] 08/23/2013    Prostate nodule without urinary obstruction 02/11/2013    S/P carotid endarterectomy 2012    Vitamin D deficiency 2012    Amaurosis fugax of left eye 2012    Carotid artery obstruction 2012    Allergy to bee sting 2012       Past Medical History:   Diagnosis Date    Amaurosis fugax of left eye 2012    Arthritis     OSTEO    CAD (coronary artery disease)     CAROTID LEFT     Cancer ([facility] [address] 2013    PROSTATE    Chronic pain     DJD lumbar    Diabetes ([facility] [address] 2010    Frequent nocturnal awakening     urinary frequency    GERD (gastroesophageal reflux disease) 2010    Hyperlipidemia 2010    Hypertension 2010    Kidney stone 2010    Nodular goiter     1.3 cm right , FNA 6/15    Obesity (BMI 30-39. 9)     Psychiatric disorder     ANXIETY    Vertigo        Allergies   Allergen Reactions    Bee Sting [Sting, Bee] Anaphylaxis    Vytorin 10-10 [Ezetimibe-Simvastatin] Myalgia    Amlodipine Other (comments)     Creepy crawly sensation, twitches    Lipitor [Atorvastatin] Myalgia       Past Surgical History:   Procedure Laterality Date    CARDIAC SURG PROCEDURE UNLIST  2019    3 stents placed    COLONOSCOPY  3/3/2016         EGD  3/3/2016         HX HEENT      tonsillectomy    HX MOHS PROCEDURES  2010    right    HX ORTHOPAEDIC      coccyx removed    HX UROLOGICAL  2010    left lithotripsy. basket  extraction,ureteral stent    HX VASECTOMY      NEUROLOGICAL PROCEDURE UNLISTED      Steroid injections in epidural    VASCULAR SURGERY PROCEDURE UNLIST      LEFT CAROTID       Social History     Socioeconomic History    Marital status:      Spouse name: Not on file    Number of children: Not on file    Years of education: Not on file    Highest education level: Not on file   Tobacco Use    Smoking status: Former Smoker     Packs/day: 0.50     Years: 4.00     Pack years: 2.00     Last attempt to quit: 1966     Years since quittin.2    Smokeless tobacco: Never Used Substance and Sexual Activity    Alcohol use: No    Drug use: No    Sexual activity: Yes     Partners: Female       Review of Systems   Constitutional: Negative. Negative for chills, fever, malaise/fatigue and weight loss. HENT: Negative. Negative for hearing loss. Eyes: Negative. Negative for blurred vision and double vision. Respiratory: Negative. Negative for cough, sputum production and shortness of breath. Cardiovascular: Negative. Negative for chest pain, palpitations and leg swelling. Gastrointestinal: Negative. Negative for abdominal pain, blood in stool, heartburn, nausea and vomiting. Genitourinary: Negative. Negative for dysuria, frequency and urgency. Musculoskeletal: Negative. Negative for back pain, falls, myalgias and neck pain. Skin: Negative. Negative for rash. Neurological: Negative. Negative for dizziness, tingling, tremors, weakness and headaches. Endo/Heme/Allergies: Negative. Psychiatric/Behavioral: Negative. Negative for depression. Objective:     Vitals:    06/10/19 0811 06/10/19 0818   BP: 155/73 119/73   Pulse: (!) 48    Resp: 18    Temp: 98 °F (36.7 °C)    TempSrc: Oral    SpO2: 98%    Weight: 199 lb (90.3 kg)    Height: 5' 11\" (1.803 m)       Body mass index is 27.75 kg/m². Physical Exam   Constitutional: He is oriented to person, place, and time and well-developed, well-nourished, and in no distress. HENT:   Head: Normocephalic and atraumatic. Mouth/Throat: Oropharynx is clear and moist.   Eyes: Right eye exhibits no discharge. Left eye exhibits no discharge. No scleral icterus. Neck: No thyromegaly present. No bruit. Cardiovascular: Normal rate and regular rhythm. Exam reveals no gallop and no friction rub. Murmur heard. Pulmonary/Chest: Effort normal and breath sounds normal. He has no wheezes. He has no rales. Abdominal: Soft. He exhibits no distension. Musculoskeletal: He exhibits no edema.    Neurological: He is alert and oriented to person, place, and time. Skin: No rash noted. No erythema. Psychiatric: Mood and affect normal.   Nursing note and vitals reviewed. Health Maintenance Due   Topic Date Due    MEDICARE YEARLY EXAM  03/28/2018    EYE EXAM RETINAL OR DILATED  05/25/2019         Assessment and orders:     Encounter Diagnoses     ICD-10-CM ICD-9-CM   1. Type 2 diabetes with nephropathy (HCC) E11.21 250.40     583.81   2. Controlled type 2 diabetes mellitus with diabetic nephropathy, without long-term current use of insulin (HCC) E11.21 250.40     583.81   3. CKD stage 3 due to type 2 diabetes mellitus ([facility]) E11.22 250.40    N18.3 585.3   4. Vitamin D deficiency E55.9 268.9   5. Essential hypertension I10 401.9   6. Pure hypercholesterolemia E78.00 272.0   7. Heart murmur R01.1 785.2   8. Gastroesophageal reflux disease with esophagitis K21.0 530.11   9. Coronary artery disease of native artery of native heart with stable angina pectoris ([facility] I25.118 414.01     413.9   10. Prostate cancer ([facility] C61 185     Diagnoses and all orders for this visit:    1. Type 2 diabetes with nephropathy ([facility])--control  -     HEMOGLOBIN A1C WITH EAG  -     LIPID PANEL  -     RENAL FUNCTION PANEL    2. Controlled type 2 diabetes mellitus with diabetic nephropathy, without long-term current use of insulin ([facility])  -     HEMOGLOBIN A1C WITH EAG  -     LIPID PANEL  -     RENAL FUNCTION PANEL    3. CKD stage 3 due to type 2 diabetes mellitus ([facility]) CKD 3 is stable. -     LIPID PANEL  -     RENAL FUNCTION PANEL  -     PROT+CREATU (RANDOM)  -     PTH INTACT  -     VITAMIN D, 25 HYDROXY  -     HEMOGLOBIN    4. Vitamin D deficiency-corrected  -     VITAMIN D, 25 HYDROXY    5. Essential hypertension-controlled  -     LIPID PANEL  -     RENAL FUNCTION PANEL  -     PROT+CREATU (RANDOM)    6. Pure hypercholesterolemia-acceptable  -     LIPID PANEL  -     ALT+AST    7. Heart murmur-no change. Not felt to be clinically significant.     8. Gastroesophageal reflux disease with esophagitis-symptoms controlled  -     HEMOGLOBIN    9. Coronary artery disease of native artery of native heart with stable angina pectoris (HCC)-stable after 3 stents  -     LIPID PANEL    10. Prostate cancer (HCC)-the symptoms of recurrence              Plan of care:  Discussed diagnoses in detail with patient. Medication risks/benefits/side effects discussed with patient. All of the patient's questions were addressed. The patient understands and agrees with our plan of care. The patient knows to call back if they are unsure of or forget any changes we discussed today or if the symptoms change. The patient received an After-Visit Summary which contains VS, orders, medication list and allergy list. This can be used as a \"mini-medical record\" should they have to seek medical care while out of town. Patient Care Team:  Mariano Mora MD as PCP - General  Juan Manuel Lei MD (General and Vascular Surgery)  Lucius Mary MD (Neurology)  Simon Castano MD as Surgeon (Urology)  Kian Gamble MD (Endocrinology)  oTmmy Vivar MD (Dermatology)  Ang Charles DO as Physician (Cardiology)  Steffany Marinelli RN as Ambulatory Care Navigator    Follow-up and Dispositions    · Return in about 3 months (around 9/10/2019) for  mwe 1 month. Future Appointments   Date Time Provider Hank Rubio   7/16/2019  1:25 PM Mariano Mora MD [facility] JOSE SCHED   7/26/2019  9:20 AM Ang Charles DO [facility] JOSE SCHED   9/13/2019  8:20 AM Mariano Mora MD [facility] JOSE SCHED       Signed By: Rasheeda Griffiths MD     June 19, 2019      ATTENTION:   This medical record was transcribed using an electronic medical records/speech recognition system. Although proofread, it may and can contain electronic, spelling and other errors. Corrections may be executed at a later time. Please feel free to contact me for any clarifications as needed.

## 2019-07-16 ENCOUNTER — OFFICE VISIT (OUTPATIENT)
Dept: FAMILY MEDICINE CLINIC | Age: 74
End: 2019-07-16

## 2019-07-16 VITALS
OXYGEN SATURATION: 98 % | HEIGHT: 71 IN | RESPIRATION RATE: 16 BRPM | BODY MASS INDEX: 27.69 KG/M2 | DIASTOLIC BLOOD PRESSURE: 69 MMHG | TEMPERATURE: 98.2 F | WEIGHT: 197.8 LBS | HEART RATE: 96 BPM | SYSTOLIC BLOOD PRESSURE: 146 MMHG

## 2019-07-16 DIAGNOSIS — Z00.00 MEDICARE ANNUAL WELLNESS VISIT, SUBSEQUENT: Primary | ICD-10-CM

## 2019-07-16 DIAGNOSIS — L25.5 RHUS DERMATITIS: ICD-10-CM

## 2019-07-16 PROBLEM — I25.118 CORONARY ARTERY DISEASE WITH STABLE ANGINA PECTORIS (HCC): Status: ACTIVE | Noted: 2019-07-16

## 2019-07-16 RX ORDER — TRIAMCINOLONE ACETONIDE 1 MG/G
CREAM TOPICAL 2 TIMES DAILY
Qty: 453 G | Refills: 0 | Status: SHIPPED | OUTPATIENT
Start: 2019-07-16

## 2019-07-16 NOTE — PATIENT INSTRUCTIONS
Well Visit, Over 72: Care Instructions  Your Care Instructions    Physical exams can help you stay healthy. Your doctor has checked your overall health and may have suggested ways to take good care of yourself. He or she also may have recommended tests. At home, you can help prevent illness with healthy eating, regular exercise, and other steps. Follow-up care is a key part of your treatment and safety. Be sure to make and go to all appointments, and call your doctor if you are having problems. It's also a good idea to know your test results and keep a list of the medicines you take. How can you care for yourself at home? · Reach and stay at a healthy weight. This will lower your risk for many problems, such as obesity, diabetes, heart disease, and high blood pressure. · Get at least 30 minutes of exercise on most days of the week. Walking is a good choice. You also may want to do other activities, such as running, swimming, cycling, or playing tennis or team sports. · Do not smoke. Smoking can make health problems worse. If you need help quitting, talk to your doctor about stop-smoking programs and medicines. These can increase your chances of quitting for good. · Protect your skin from too much sun. When you're outdoors from 10 a.m. to 4 p.m., stay in the shade or cover up with clothing and a hat with a wide brim. Wear sunglasses that block UV rays. Even when it's cloudy, put broad-spectrum sunscreen (SPF 30 or higher) on any exposed skin. · See a dentist one or two times a year for checkups and to have your teeth cleaned. · Wear a seat belt in the car. · Limit alcohol to 2 drinks a day for men and 1 drink a day for women. Too much alcohol can cause health problems. Follow your doctor's advice about when to have certain tests. These tests can spot problems early. For men and women  · Cholesterol.  Your doctor will tell you how often to have this done based on your overall health and other things that can increase your risk for heart attack and stroke. · Blood pressure. Have your blood pressure checked during a routine doctor visit. Your doctor will tell you how often to check your blood pressure based on your age, your blood pressure results, and other factors. · Diabetes. Ask your doctor whether you should have tests for diabetes. · Vision. Experts recommend that you have yearly exams for glaucoma and other age-related eye problems. · Hearing. Tell your doctor if you notice any change in your hearing. You can have tests to find out how well you hear. · Colon cancer tests. Keep having colon cancer tests as your doctor recommends. You can have one of several types of tests. · Heart attack and stroke risk. At least every 4 to 6 years, you should have your risk for heart attack and stroke assessed. Your doctor uses factors such as your age, blood pressure, cholesterol, and whether you smoke or have diabetes to show what your risk for a heart attack or stroke is over the next 10 years. · Osteoporosis. Talk to your doctor about whether you should have a bone density test to find out whether you have thinning bones. Also ask your doctor about whether you should take calcium and vitamin D supplements. For women  · Pap test and pelvic exam. You may no longer need a Pap test. Talk with your doctor about whether to stop or continue to have Pap tests. · Breast exam and mammogram. Ask how often you should have a mammogram, which is an X-ray of your breasts. A mammogram can spot breast cancer before it can be felt and when it is easiest to treat. · Thyroid disease. Talk to your doctor about whether to have your thyroid checked as part of a regular physical exam. Women have an increased chance of a thyroid problem. For men  · Prostate exam. Talk to your doctor about whether you should have a blood test (called a PSA test) for prostate cancer.  Experts disagree on whether men should have this test. Some experts recommend that you discuss the benefits and risks of the test with your doctor. · Abdominal aortic aneurysm. Ask your doctor whether you should have a test to check for an aneurysm. You may need a test if you ever smoked or if your parent, brother, sister, or child has had an aneurysm. When should you call for help? Watch closely for changes in your health, and be sure to contact your doctor if you have any problems or symptoms that concern you. Where can you learn more? Go to http://elizabeth-antonieta.info/. Enter G433 in the search box to learn more about \"Well Visit, Over 65: Care Instructions. \"  Current as of: March 28, 2018  Content Version: 11.9  © 1555-5784 [employer]. Care instructions adapted under license by [employer] (which disclaims liability or warranty for this information). If you have questions about a medical condition or this instruction, always ask your healthcare professional. [place] [id number] any warranty or liability for your use of this information. Poison [place], [place], and [place]: Care Instructions  Your Care Instructions    Poison ivy, poison oak, and poison sumac are plants that can cause a skin rash upon contact. The red, itchy rash often shows up in lines or streaks and may cause fluid-filled blisters or large, raised hives. The rash is caused by an allergic reaction to an oil in poison ivy, oak, and sumac. The rash may occur when you touch the plant or when you touch clothing, pet fur, sporting gear, gardening tools, or other objects that have come in contact with one of these plants. You cannot catch or spread the rash, even if you touch it or the blister fluid, because the plant oil will already have been absorbed or washed off the skin. The rash may seem to be spreading, but either it is still developing from earlier contact or you have touched something that still has the plant oil on it.   Follow-up care is a key part of your treatment and safety. Be sure to make and go to all appointments, and call your doctor if you are having problems. It's also a good idea to know your test results and keep a list of the medicines you take. How can you care for yourself at home? · If your doctor prescribed a cream, use it as directed. If your doctor prescribed medicine, take it exactly as prescribed. Call your doctor if you think you are having a problem with your medicine. · Use cold, wet cloths to reduce itching. · Keep cool, and stay out of the sun. · Leave the rash open to the air. · Wash all clothing or other things that may have come in contact with the plant oil. · Avoid most lotions and ointments until the rash heals. Calamine lotion may help relieve symptoms of a plant rash. Use it 3 or 4 times a day. To prevent poison ivy exposure  If you know that you will be near poison ivy, oak, or sumac, you can try these options:  · Use a product designed to help prevent plant oil from getting on the skin. These products, such as Ivy X Pre-Contact Skin Solution, come in lotions, sprays, or towelettes. You put the product on your skin right before you go outdoors. · If you did not use a preventive product and you have had contact with plant oil, clean it off your skin as soon as possible. Use a product such as [employer] Original Outdoor Skin Cleanser. These products can also be used to clean plant oil from clothing or tools. When should you call for help? Call your doctor now or seek immediate medical care if:    · Your rash gets worse, and you start to feel bad and have a fever, a stiff neck, nausea, and vomiting.     · You have signs of infection, such as:  ? Increased pain, swelling, warmth, or redness. ? Red streaks leading from the rash. ? Pus draining from the rash.   ? A fever.    Watch closely for changes in your health, and be sure to contact your doctor if:    · You have new blisters or bruises, or the rash spreads and looks like a sunburn.     · The rash gets worse, or it comes back after nearly disappearing.     · You think a medicine you are using is making your rash worse.     · Your rash does not clear up after 1 to 2 weeks of home treatment.     · You have joint aches or body aches with your rash. Where can you learn more? Go to http://elizabeth-antonieta.info/. Enter K764 in the search box to learn more about \"Poison [place], [place], and [place]: Care Instructions. \"  Current as of: April 17, 2018  Content Version: 11.9  © 5962-9774 [email or web address]. Care instructions adapted under license by [employer] (which disclaims liability or warranty for this information). If you have questions about a medical condition or this instruction, always ask your healthcare professional. [address] any warranty or liability for your use of this information. Poison [place], [place], and [place]: Care Instructions  Your Care Instructions    Poison ivy, poison oak, and poison sumac are plants that can cause a skin rash upon contact. The red, itchy rash often shows up in lines or streaks and may cause fluid-filled blisters or large, raised hives. The rash is caused by an allergic reaction to an oil in poison ivy, oak, and sumac. The rash may occur when you touch the plant or when you touch clothing, pet fur, sporting gear, gardening tools, or other objects that have come in contact with one of these plants. You cannot catch or spread the rash, even if you touch it or the blister fluid, because the plant oil will already have been absorbed or washed off the skin. The rash may seem to be spreading, but either it is still developing from earlier contact or you have touched something that still has the plant oil on it. Follow-up care is a key part of your treatment and safety. Be sure to make and go to all appointments, and call your doctor if you are having problems.  It's also a good idea to know your test results and keep a list of the medicines you take. How can you care for yourself at home? · If your doctor prescribed a cream, use it as directed. If your doctor prescribed medicine, take it exactly as prescribed. Call your doctor if you think you are having a problem with your medicine. · Use cold, wet cloths to reduce itching. · Keep cool, and stay out of the sun. · Leave the rash open to the air. · Wash all clothing or other things that may have come in contact with the plant oil. · Avoid most lotions and ointments until the rash heals. Calamine lotion may help relieve symptoms of a plant rash. Use it 3 or 4 times a day. To prevent poison ivy exposure  If you know that you will be near poison ivy, oak, or sumac, you can try these options:  · Use a product designed to help prevent plant oil from getting on the skin. These products, such as Ivy X Pre-Contact Skin Solution, come in lotions, sprays, or towelettes. You put the product on your skin right before you go outdoors. · If you did not use a preventive product and you have had contact with plant oil, clean it off your skin as soon as possible. Use a product such as [employer] Original Outdoor Skin Cleanser. These products can also be used to clean plant oil from clothing or tools. When should you call for help? Call your doctor now or seek immediate medical care if:    · Your rash gets worse, and you start to feel bad and have a fever, a stiff neck, nausea, and vomiting.     · You have signs of infection, such as:  ? Increased pain, swelling, warmth, or redness. ? Red streaks leading from the rash. ? Pus draining from the rash. ? A fever.    Watch closely for changes in your health, and be sure to contact your doctor if:    · You have new blisters or bruises, or the rash spreads and looks like a sunburn.     · The rash gets worse, or it comes back after nearly disappearing.     · You think a medicine you are using is making your rash worse.   · Your rash does not clear up after 1 to 2 weeks of home treatment.     · You have joint aches or body aches with your rash. Where can you learn more? Go to http://elizabeth-antonieta.info/. Enter K014 in the search box to learn more about \"Poison [place], [place], and [place]: Care Instructions. \"  Current as of: April 17, 2018  Content Version: 11.9  © 9537-5001 [employer]. Care instructions adapted under license by [employer] (which disclaims liability or warranty for this information). If you have questions about a medical condition or this instruction, always ask your healthcare professional. [address] any warranty or liability for your use of this information.

## 2019-07-16 NOTE — PROGRESS NOTES
[address] [address] [address] [facility], [address] [facility]             Date of visit: 7/16/2019       This is a Subsequent Medicare Annual Wellness Visit (AWV), (Performed more than 12 months after effective date of Medicare Part B enrollment and 12 months after last wellness visit)    I have reviewed the patient's medical history in detail and updated the computerized patient record. Kristeen Simmonds is a 66 y.o. male   History obtained from: the patient.     Concerns today   (Patient understands that medical problems addressed today may incur additional notes andcost as this is a preventive visit)      History     Patient Active Problem List   Diagnosis Code    Hypertension I10    Hyperlipidemia E78.5    GERD (gastroesophageal reflux disease) K21.9    Kidney stone N20.0    Bone spur M77.9    Allergy to bee sting Z91.030    Carotid artery obstruction I65.29    Amaurosis fugax of left eye G45.3    S/P carotid endarterectomy Z98.890    Vitamin D deficiency E55.9    Prostate nodule without urinary obstruction N40.2    Prostate cancer ([address] C61    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.21    Type 2 diabetes with nephropathy ([address] E11.21    CKD stage 3 due to type 2 diabetes mellitus (HCC) E11.22, N18.3    Heart murmur R01.1    Age-related nuclear cataract of both eyes H25.13    PVD (posterior vitreous detachment), left eye H43.812    History of angina Z86.79    CAD (coronary artery disease) I25.10    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 ([address] I25.118     Past Medical History:   Diagnosis Date    Amaurosis fugax of left eye 5/6/2012    Arthritis     OSTEO    CAD (coronary artery disease) 2012    CAROTID LEFT     Cancer ([address] 2013    PROSTATE    Chronic pain     DJD lumbar    Diabetes ([address] 5/22/2010    Frequent nocturnal awakening     urinary frequency    GERD (gastroesophageal reflux disease) 5/22/2010    Hyperlipidemia 5/22/2010    Hypertension 5/22/2010    Kidney stone 5/22/2010    Nodular goiter     1.3 cm right , FNA 6/15    Obesity (BMI 30-39. 9)     Psychiatric disorder     ANXIETY    Vertigo       Past Surgical History:   Procedure Laterality Date    CARDIAC SURG PROCEDURE UNLIST  04/2019    3 stents placed    COLONOSCOPY  3/3/2016         EGD  3/3/2016         HX HEENT      tonsillectomy    HX MOHS PROCEDURES  2010    right    HX ORTHOPAEDIC      coccyx removed    HX UROLOGICAL  2010    left lithotripsy. basket  extraction,ureteral stent    HX VASECTOMY      NEUROLOGICAL PROCEDURE UNLISTED  2011    Steroid injections in epidural    VASCULAR SURGERY PROCEDURE UNLIST  2012    LEFT CAROTID     Allergies   Allergen Reactions    Bee Sting [Sting, Bee] Anaphylaxis    Vytorin 10-10 [Ezetimibe-Simvastatin] Myalgia    Amlodipine Other (comments)     Creepy crawly sensation, twitches    Lipitor [Atorvastatin] Myalgia     Current Outpatient Medications   Medication Sig Dispense Refill    rosuvastatin (CRESTOR) 20 mg tablet TAKE ONE TABLET BY MOUTH NIGHTLY 30 Tab 6    hydrALAZINE (APRESOLINE) 50 mg tablet TAKE ONE TABLET BY MOUTH THREE TIMES DAILY FOR HYPERTENSION 90 Tab 6    traMADol (ULTRAM) 50 mg tablet TAKE TWO TABLETS BY MOUTH EVERY 12 HOURS as needed for pain FOR UP TO 30 DAYS. max daily DOSE 200 MG. 120 Tab 1    atenolol (TENORMIN) 25 mg tablet Take 1 Tab by mouth two (2) times a day. One tab twice daily. 180 Tab 2    albuterol (PROVENTIL HFA, VENTOLIN HFA, PROAIR HFA) 90 mcg/actuation inhaler Take 2 Puffs by inhalation every four (4) hours as needed for Wheezing for up to 360 days. 1 Inhaler 10    clopidogrel (PLAVIX) 75 mg tab Take 1 Tab by mouth daily for 360 days. 30 Tab 11    EPINEPHrine (EPIPEN 2-KARL) 0.3 mg/0.3 mL injection INJECT INTRAMUSCULARLY AS NEEDED FOR ONE DOSE. TAKE WITH 50MG OF BENADRYL AND CALL 911.  Dispense Generic 1 Syringe 3    isosorbide mononitrate ER (IMDUR) 60 mg CR tablet Take 1 Tab by mouth daily. 30 Tab 5    furosemide (LASIX) 20 mg tablet TAKE ONE TABLET BY MOUTH EVERY DAY 30 Tab 11    lisinopril (PRINIVIL, ZESTRIL) 40 mg tablet TAKE ONE TABLET BY MOUTH TWICE DAILY 60 Tab 11    pantoprazole (PROTONIX) 40 mg tablet TAKE ONE TABLET BY MOUTH EVERY DAY FOR: GASTROESOPHAGEAL REFLUX 30 Tab 11    felodipine (PLENDIL SR) 10 mg 24 hr tablet TAKE ONE TABLET BY MOUTH EVERY DAY FOR HYPERTENSION 30 Tab 11    raNITIdine (ZANTAC) 300 mg tab TAKE ONE TABLET BY MOUTH EVERY DAY 30 Tab 11    diclofenac (VOLTAREN) 1 % gel Apply 4 g to affected area four (4) times daily. Painful shoulder. 100 g 3    metFORMIN ER (GLUCOPHAGE XR) 500 mg tablet Take 1 Tab by mouth two (2) times a day. 180 Tab 3    meclizine (ANTIVERT) 25 mg chewable tablet Take  by mouth three (3) times daily as needed.  aspirin delayed-release 81 mg tablet Take 1 Tab by mouth daily. 30 Tab 0    Peak Flow Meter eric Use prior and post nebulizer treatment 1 Device 0    albuterol (PROVENTIL VENTOLIN) 2.5 mg /3 mL (0.083 %) nebulizer solution 3 mL by Nebulization route every six (6) hours as needed for Wheezing or Shortness of Breath. 24 Each 5    omega-3 fatty acids-vitamin e (FISH OIL) 1,000 mg cap Take 1 Cap by mouth.  coenzyme q10 (CO Q-10) 100 mg Cap Take 100 mg by mouth daily. Family History   Problem Relation Age of Onset   [facility] Diabetes Mother     Obesity Mother     Heart Disease Mother    [facility] Stroke Father     Heart Disease Sister     Obesity Sister     Diabetes Sister      Social History     Tobacco Use    Smoking status: Former Smoker     Packs/day: 0.50     Years: 4.00     Pack years: 2.00     Last attempt to quit: 1966     Years since quittin.2    Smokeless tobacco: Never Used   Substance Use Topics    Alcohol use: No       Specialists/Care Team   Fabiana Griffith III has established care with the following healthcare providers:  Patient Care Team:  Jeovany Livingston Delphine Grey MD as PCP - Graciela Duong MD (General and Vascular Surgery)  Lorra Denver., MD (Neurology)  Yaneth Nur MD as Surgeon (Urology)  Simon Reardon MD (Endocrinology)  Jean Bolton MD (Dermatology)  Reggie Soni DO as Physician (Cardiology)  Gagan Peacock, RN as [facility]     Demographics   male  66 y.o. General Health Questions   -During the past 4 weeks:   -how would you rate your health in general? Good   -how often have you been bothered by feeling dizzy when standing up? occasionally   -how much have you been bothered by bodily pain? mildly   -Have you noticed any hearing difficulties? no   -has your physical and emotional health limited your social activities with family or friends? no    Emotional Health Questions   -Do you have a history of depression, anxiety, or emotional problems? no  -Over the past 2 weeks, have you felt down, depressed or hopeless? no  -Over the past 2 weeks, have you felt little interest or pleasure in doing things? no    Health Habits   Please describe your diet habits: Self grade C. Do you get 5 servings of fruits or vegetables daily? no  Do you exercise regularly? yes    Alcohol Risk Factor Screening: On any occasion during the past 3 months, have you had more than 4 drinks containing alcohol? No   Do you average more than 14 drinks per week? No     Activities of Daily Living and Functional Status   -Do you need help with eating, walking, dressing, bathing, toileting, the phone, transportation, shopping, preparing meals, housework, laundry, medications or managing money? no  -In the past four weeks, was someone available to help you if you needed and wanted help with anything? yes  -Are you confident are you that you can control and manage most of your health problems? yes  -Have you been given information to help you keep track of your medications?  yes  -How often do you have trouble taking your medications as prescribed? never    Fall Risk and Home Safety   Have you fallen 2 or more times in the past year? no  Does your home have rugs in the hallway, lack grab bars in the bathroom, lack handrails on the stairs or have poor lighting? no  Do you have smoke detectors and check them regularly? yes  Do you have difficulties driving a car? no  Do you always fasten your seat belt when you are in a car? yes    Review of Systems (if indicated for problems addressed today)   Review of Systems   Constitutional: Negative. Negative for chills, fever, malaise/fatigue and weight loss. HENT: Negative. Negative for hearing loss. Eyes: Negative. Negative for blurred vision and double vision. Respiratory: Negative. Negative for cough, sputum production and shortness of breath. Cardiovascular: Negative. Negative for chest pain and palpitations. He has multiple new stents. He is able to play golf without chest pain. I have asked him to avoid getting overheated. He has canceled a tournament scheduled for this weekend. Gastrointestinal: Negative. Negative for abdominal pain, blood in stool, heartburn, nausea and vomiting. Genitourinary: Negative. Negative for dysuria, frequency and urgency. Musculoskeletal: Negative. Negative for back pain, falls, myalgias and neck pain. Skin: Positive for rash. He has scattered patches of rhus dermatitis all over his body. Working as an  still he has to crawl under houses and is frequently exposed to [address]. Neurological: Negative. Negative for dizziness, tingling, tremors, weakness and headaches. Endo/Heme/Allergies: Negative. Psychiatric/Behavioral: Negative. Negative for depression.          Physical Examination     Wt Readings from Last 3 Encounters:   07/16/19 197 lb 12.8 oz (89.7 kg)   06/10/19 199 lb (90.3 kg)   04/26/19 204 lb 6.4 oz (92.7 kg)     Temp Readings from Last 3 Encounters:   07/16/19 98.2 °F (36.8 °C) (Oral)   06/10/19 98 °F (36.7 °C) (Oral)   04/12/19 97.8 °F (36.6 °C)     BP Readings from Last 3 Encounters:   07/16/19 149/75   06/10/19 119/73   04/26/19 122/70     Pulse Readings from Last 3 Encounters:   07/16/19 (!) 52   06/10/19 (!) 48   04/26/19 (!) 48       Body mass index is 27.59 kg/m². No exam data present  Was the patient's timed Up & Go test unsteady or longer than 10 seconds? no    Evaluation of Cognitive Function   Mood/affect:  happy  Orientation: Person, Place, Time and Situation  Appearance: age appropriate and casually dressed  Family member/caregiver input: no    Additional exam if indicated for problems addressed today:  Physical Exam   Constitutional: He is oriented to person, place, and time. He appears well-developed and well-nourished. No distress. HENT:   Head: Normocephalic and atraumatic. Mouth/Throat: Oropharynx is clear and moist.   Eyes: Conjunctivae are normal. Right eye exhibits no discharge. Left eye exhibits no discharge. No scleral icterus. Neck: No thyromegaly present. No carotid bruit. Cardiovascular: Normal rate, regular rhythm and normal heart sounds. Exam reveals no gallop and no friction rub. No murmur heard. Pulmonary/Chest: Effort normal and breath sounds normal. No respiratory distress. He has no wheezes. He has no rales. He exhibits no tenderness. Abdominal: Soft. Bowel sounds are normal. He exhibits no distension and no mass. There is no tenderness. There is no guarding. Musculoskeletal: He exhibits no edema or tenderness. Lymphadenopathy:     He has no cervical adenopathy. Neurological: He is alert and oriented to person, place, and time. No cranial nerve deficit. Skin: Skin is warm and dry. No rash noted. He is not diaphoretic. No erythema. No pallor. Psychiatric: He has a normal mood and affect.  His behavior is normal.         Advice/Referrals/Counseling (as indicated)         Preventive Services     (Preventive care checklist to be included in patient instructions)  Discussed today Done Previously Not Needed     x  Pneumococcal vaccines    x  Flu vaccine     x Hepatitis B vaccine (if at risk)   X, declined   Shingles vaccine    x  TDAP vaccine     x Digital rectal exam     x PSA    x  Colorectal cancer screening    x  Low-dose CT for lung cancer screening    x  Bone density test   x   Glaucoma screening    x  Cholesterol test    x  Diabetes screening test     x  Diabetes self-management class     x Nutritionist referral for diabetes or renal disease     Discussion of Advance Directive   Discussed with Yovany Betancourt. Marcos III his ability to prepare and advance directive in the case that an injury or illness causes him to be unable to make health care decisions. Assessment/Plan   Z00.00    ICD-10-CM ICD-9-CM    1. Medicare annual wellness visit, subsequent Z00.00 V70.0    2. Rhus dermatitis L25.5 692.6        No orders of the defined types were placed in this encounter.       Patient Care Team:  Los Martinez MD as PCP - General  Drew Thomason MD (General and Vascular Surgery)  Eva Mart MD (Neurology)  Kavon Nelson MD as Surgeon (Urology)  Bella Mott MD (Endocrinology)  Colin Pascal MD (Dermatology)  Effie Goldmann, DO as Physician (Cardiology)  Charlie Batista, RN as Ambulatory Care Navigator        Future Appointments   Date Time Provider Hank Joanne   7/16/2019  1:25 PM Los Martinez MD [address] [address] [facility]   7/26/2019  9:20 AM Effie Goldmann, DO [facility] [address]   9/13/2019  8:20 AM MD Nakia Gorman MD

## 2019-07-16 NOTE — PROGRESS NOTES
Chief Complaint   Patient presents with    Annual Wellness Visit       Body mass index is 27.59 kg/m². 1. Have you been to the ER, urgent care clinic since your last visit? Hospitalized since your last visit? No    2. Have you seen or consulted any other health care providers outside of the [address] [place] [address] since your last visit? Include any pap smears or colon screening.  No    Reviewed record in preparation for visit and have necessary documentation  Pt did not bring medication to office visit for review  Information was given to pt on Advanced Directives, Living Will  Information was given on Shingles Vaccine  Opportunity was given for questions  Goals that were addressed and/or need to be completed after this appointment include:     Health Maintenance Due   Topic Date Due    MEDICARE YEARLY EXAM  03/28/2018    EYE EXAM RETINAL OR DILATED  05/25/2019

## 2019-07-26 ENCOUNTER — TELEPHONE (OUTPATIENT)
Dept: CARDIOLOGY CLINIC | Age: 74
End: 2019-07-26

## 2019-07-26 ENCOUNTER — OFFICE VISIT (OUTPATIENT)
Dept: CARDIOLOGY CLINIC | Age: 74
End: 2019-07-26

## 2019-07-26 VITALS
HEIGHT: 71 IN | BODY MASS INDEX: 28 KG/M2 | OXYGEN SATURATION: 97 % | HEART RATE: 46 BPM | DIASTOLIC BLOOD PRESSURE: 68 MMHG | SYSTOLIC BLOOD PRESSURE: 120 MMHG | WEIGHT: 200 LBS

## 2019-07-26 DIAGNOSIS — N18.30 CKD STAGE 3 DUE TO TYPE 2 DIABETES MELLITUS (HCC): ICD-10-CM

## 2019-07-26 DIAGNOSIS — E11.22 CKD STAGE 3 DUE TO TYPE 2 DIABETES MELLITUS (HCC): ICD-10-CM

## 2019-07-26 DIAGNOSIS — E11.21 TYPE 2 DIABETES WITH NEPHROPATHY (HCC): ICD-10-CM

## 2019-07-26 DIAGNOSIS — E78.00 PURE HYPERCHOLESTEROLEMIA: Chronic | ICD-10-CM

## 2019-07-26 DIAGNOSIS — I25.118 CORONARY ARTERY DISEASE OF NATIVE ARTERY OF NATIVE HEART WITH STABLE ANGINA PECTORIS (HCC): ICD-10-CM

## 2019-07-26 DIAGNOSIS — I10 ESSENTIAL HYPERTENSION: Primary | Chronic | ICD-10-CM

## 2019-07-26 RX ORDER — ROSUVASTATIN CALCIUM 40 MG/1
TABLET, COATED ORAL
Qty: 90 TAB | Refills: 3 | Status: SHIPPED | OUTPATIENT
Start: 2019-07-26 | End: 2020-02-25

## 2019-07-26 NOTE — TELEPHONE ENCOUNTER
Returned call to [place] from [address] [facility] patient should be taking Crestor 40 mg daily.

## 2019-07-26 NOTE — PROGRESS NOTES
[facility] [address], [address] [facility], [address] [place] [address]    Office [phone],ZNR [phone]           Sierra Markham III is a 66 y.o. presents for f/u of CAD      Assessment/Recommendations:      Coronary artery disease - patient previously deferred on CABG. No ongoing angina symptoms  Distal LAD   stenting of left circumflex 4/2019. 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 -DAPT for at least 6 months  -Goal-directed medical therapy    Palpitations-symptomatic PVCs based on Holter monitor. Brief 4 beat run of ventricular tachycardia noted on monitor. resolved with restarting beta-blocker therapy. Continue atenolol therapy. Hypertension-stable continue treatment per primary care. Diabetes-  Per primary care  Lab Results   Component Value Date/Time    Hemoglobin A1c 6.0 (H) 06/10/2019 11:46 AM    Hemoglobin A1c (POC) 5.8 04/20/2015 08:44 AM         Hyperlipidemia-LDL 76, on statin therapy. Will try crestor 40mg daily again, previously did have myalgias. Will continue to monitor symptoms      GERD- recommend increasing to twice daily ppi for left sided chest burning      Carotid artery stenosis- s/p carotid endarterectomy on aspirin and statin. followed by  Elenita Orosco MD       Amaurosis fugax of left eye-continue to monitor symptoms consider repeat carotid study if recurrence of symptoms. Primary Care Physician- Gates Castleman, MD    F/u 6 months sooner as needed    Subjective:  43-year-old male presents for follow-up. Previously deferred on CABG. Distal LAD  with retrograde filling of apical LAD, severe disease within the proximal AV groove circumflex supplies several very small disease marginals. 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 Continues to do well. He is without any exertional heaviness or tightness.   His only complaint is random left-sided chest burning. The chest burning symptoms are not brought on by exertion, and not exacerbated with eatting. His exertional related chest tightness and heaviness has resolved. He played golf yesterday without any significant anginal chest pain. Tolerating current medical therapy. Currently on rosuvastatin 20 mg, did have some muscle aches previously on higher dose statin. Past Medical History:   Diagnosis Date    Amaurosis fugax of left eye 5/6/2012    Arthritis     OSTEO    CAD (coronary artery disease) 2012    CAROTID LEFT     Cancer ([facility] 2013    PROSTATE    Chronic pain     DJD lumbar    Diabetes ([facility] 5/22/2010    Frequent nocturnal awakening     urinary frequency    GERD (gastroesophageal reflux disease) 5/22/2010    Hyperlipidemia 5/22/2010    Hypertension 5/22/2010    Kidney stone 5/22/2010    Nodular goiter     1.3 cm right , FNA 6/15    Obesity (BMI 30-39. 9)     Psychiatric disorder     ANXIETY    Vertigo         Past Surgical History:   Procedure Laterality Date    CARDIAC SURG PROCEDURE UNLIST  04/2019    3 stents placed    COLONOSCOPY  3/3/2016         EGD  3/3/2016         HX HEENT      tonsillectomy    HX MOHS PROCEDURES  2010    right    HX ORTHOPAEDIC      coccyx removed    HX UROLOGICAL  2010    left lithotripsy. basket  extraction,ureteral stent    HX VASECTOMY      NEUROLOGICAL PROCEDURE UNLISTED  2011    Steroid injections in epidural    VASCULAR SURGERY PROCEDURE UNLIST  2012    LEFT CAROTID         Current Outpatient Medications:     triamcinolone acetonide (KENALOG) 0.1 % topical cream, Apply  to affected area two (2) times a day.  use thin layer to poison ivy, Disp: 453 g, Rfl: 0    rosuvastatin (CRESTOR) 20 mg tablet, TAKE ONE TABLET BY MOUTH NIGHTLY, Disp: 30 Tab, Rfl: 6    hydrALAZINE (APRESOLINE) 50 mg tablet, TAKE ONE TABLET BY MOUTH THREE TIMES DAILY FOR HYPERTENSION, Disp: 90 Tab, Rfl: 6    traMADol (ULTRAM) 50 mg tablet, TAKE TWO TABLETS BY MOUTH EVERY 12 HOURS as needed for pain FOR UP TO 30 DAYS. max daily DOSE 200 MG., Disp: 120 Tab, Rfl: 1    atenolol (TENORMIN) 25 mg tablet, Take 1 Tab by mouth two (2) times a day. One tab twice daily. , Disp: 180 Tab, Rfl: 2    albuterol (PROVENTIL HFA, VENTOLIN HFA, PROAIR HFA) 90 mcg/actuation inhaler, Take 2 Puffs by inhalation every four (4) hours as needed for Wheezing for up to 360 days. , Disp: 1 Inhaler, Rfl: 10    clopidogrel (PLAVIX) 75 mg tab, Take 1 Tab by mouth daily for 360 days. , Disp: 30 Tab, Rfl: 11    isosorbide mononitrate ER (IMDUR) 60 mg CR tablet, Take 1 Tab by mouth daily. , Disp: 30 Tab, Rfl: 5    furosemide (LASIX) 20 mg tablet, TAKE ONE TABLET BY MOUTH EVERY DAY, Disp: 30 Tab, Rfl: 11    lisinopril (PRINIVIL, ZESTRIL) 40 mg tablet, TAKE ONE TABLET BY MOUTH TWICE DAILY, Disp: 60 Tab, Rfl: 11    pantoprazole (PROTONIX) 40 mg tablet, TAKE ONE TABLET BY MOUTH EVERY DAY FOR: GASTROESOPHAGEAL REFLUX, Disp: 30 Tab, Rfl: 11    felodipine (PLENDIL SR) 10 mg 24 hr tablet, TAKE ONE TABLET BY MOUTH EVERY DAY FOR HYPERTENSION, Disp: 30 Tab, Rfl: 11    raNITIdine (ZANTAC) 300 mg tab, TAKE ONE TABLET BY MOUTH EVERY DAY, Disp: 30 Tab, Rfl: 11    diclofenac (VOLTAREN) 1 % gel, Apply 4 g to affected area four (4) times daily. Painful shoulder., Disp: 100 g, Rfl: 3    metFORMIN ER (GLUCOPHAGE XR) 500 mg tablet, Take 1 Tab by mouth two (2) times a day., Disp: 180 Tab, Rfl: 3    meclizine (ANTIVERT) 25 mg chewable tablet, Take  by mouth three (3) times daily as needed. , Disp: , Rfl:     aspirin delayed-release 81 mg tablet, Take 1 Tab by mouth daily. , Disp: 30 Tab, Rfl: 0    Peak Flow Meter eric, Use prior and post nebulizer treatment, Disp: 1 Device, Rfl: 0    albuterol (PROVENTIL VENTOLIN) 2.5 mg /3 mL (0.083 %) nebulizer solution, 3 mL by Nebulization route every six (6) hours as needed for Wheezing or Shortness of Breath., Disp: 24 Each, Rfl: 5    omega-3 fatty acids-vitamin e (FISH OIL) 1,000 mg cap, Take 1 Cap by mouth., Disp: , Rfl:     coenzyme q10 (CO Q-10) 100 mg Cap, Take 100 mg by mouth daily. , Disp: , Rfl:     EPINEPHrine (EPIPEN 2-KARL) 0.3 mg/0.3 mL injection, INJECT INTRAMUSCULARLY AS NEEDED FOR ONE DOSE. TAKE WITH 50MG OF BENADRYL AND CALL 911. Dispense Generic, Disp: 1 Syringe, Rfl: 3    Allergies   Allergen Reactions    Bee Sting [Sting, Bee] Anaphylaxis    Vytorin 10-10 [Ezetimibe-Simvastatin] Myalgia    Amlodipine Other (comments)     Creepy crawly sensation, twitches    Lipitor [Atorvastatin] Myalgia        Family History   Problem Relation Age of Onset    Diabetes Mother     Obesity Mother     Heart Disease Mother    Hilda Sher Stroke Father     Heart Disease Sister     Obesity Sister    Hilda Sher Diabetes Sister    Mother  of a heart attack at age 62  Father had a stroke in his 76s    Social History     Tobacco Use    Smoking status: Former Smoker     Packs/day: 0.50     Years: 4.00     Pack years: 2.00     Last attempt to quit: 1966     Years since quittin.3    Smokeless tobacco: Never Used   Substance Use Topics    Alcohol use: No    Drug use: No       Review of Symptoms:  Pertinent Positive: resolved Chest pain, transient left eye blurred vision  Pertinent Negative: No shortness of breath orthopnea PND. All Other systems reviewed and are negative for a Comprehensive ROS (10+)    Physical Exam    Blood pressure 120/68, pulse (!) 46, height 5' 11\" (1.803 m), weight 200 lb (90.7 kg), SpO2 97 %. Constitutional:  well-developed and well-nourished. No distress. HENT: Normocephalic. Eyes: No scleral icterus. Neck:  Neck supple. No JVD present. Pulmonary/Chest: Effort normal and breath sounds normal. No respiratory distress, wheezes or rales. Cardiovascular: Normal rate, regular rhythm, S1 S2 .  2/6 systolic murmur   extremities:  Normal muscle tone  Abdominal:   No abnormal distension. Neurological:  Moving all extremities, cranial nerves appear grossly intact.   Skin: Skin is not cold.  Not diaphoretic. No erythema. Psychiatric:  Grossly normal mood and affect. Intact insight. Objective Data:     Lipids 1/25/19 - , HDL 64, LDL 76, , VLDL 22    Echo 7/2018: Normal LVEF, mild AS, mild MR/TR    Event monitor-1/31/2019 to 2/7/2019. Rare ventricular ectopy, 6 supraventricular runs of ventricular tachycardia the longest being 4 beats. Supraventricular ectopy involving 8.2% of beats. 02/25/19  NUCLEAR CARDIAC STRESS TEST 02/27/2019   Abnormal perfusion    4/2019  L Main: no significant disease  LAD: distal LAD  after large branching diagonal  LCx: proximal LCx just distal to origin of OM1 into mid-LCx with severe diffuse disease,  Involves origin of OM2 and OM3  RCA: anomalous origin from left cusp, serial focal moderate stenosis of mid-RCA and distal RCA that is not hemodynamically significant by FFR (0.92)    PCI  Stenting of proximal and mid-Lcx (distal to proximal) with 2.5x15, 3.0x12 and 3.0x8mm Xience Freida REINALDO.  Post-dilated with 3.0NC just inside distal stent edge and 3.75NC proximally at high pressure                 Family Comments                      Phuong [place] DO

## 2019-07-26 NOTE — TELEPHONE ENCOUNTER
Lc Short from [facility] is calling to clarify a prescription for Rosuvastatin for this patient. Please call back at your earliest convenience. She states it does not have any instructions.      Phone: [phone]

## 2019-07-26 NOTE — PROGRESS NOTES
Chief Complaint   Patient presents with    Follow-up     Patient presents today for a follow up visit for CAD    Coronary Artery Disease     Visit Vitals  /68 (BP 1 Location: Left arm, BP Patient Position: Sitting)   Pulse (!) 46   Ht 5' 11\" (1.803 m)   Wt 200 lb (90.7 kg)   SpO2 97%   BMI 27.89 kg/m²     Chest pain denied - pt gets a burning sensation under the left breast  SOB - denied  Dizziness - at times  Swelling/Edema - denied  Recent hospital visit denied  Refills denied

## 2019-07-29 ENCOUNTER — TELEPHONE (OUTPATIENT)
Dept: FAMILY MEDICINE CLINIC | Age: 74
End: 2019-07-29

## 2019-07-29 NOTE — TELEPHONE ENCOUNTER
Returned phone call to patient. He reports that his cardiologist Dr. Marlys Hanna recently changed his Rosuvastatin dose from 20 mg to 40 mg due to having one artery 100% blocked and another artery 50% blocked. He has been unable to get the prescription for the 40 mg tablets because they are too expensive. He says that he checked with his insurance and he does not have to pay a copay with Lovastatin. He is asking if Dr. Melvina Helm thinks he could tolerate that instead of Rosuvastatin? Patient aware that Dr. Melvina Helm will be out of the office this afternoon. Last office note requested from Dr. Farooq Marcsu office.

## 2019-07-29 NOTE — TELEPHONE ENCOUNTER
Returned phone call to patient and advised him of the following per Dr. Joshua Velez: \"No to Lovastatin.  It is a very weak statin.  Would not help him. Have him check with his pharmacist to see if 2 of the 20 mg rosuvastatin would be less expensive than the 40 mg tablet. \"    Patient verbalized understanding and agreed to contact his pharmacist.

## 2019-08-23 RX ORDER — ISOSORBIDE MONONITRATE 60 MG/1
TABLET, EXTENDED RELEASE ORAL
Qty: 30 TAB | Refills: 5 | Status: SHIPPED | OUTPATIENT
Start: 2019-08-23 | End: 2020-02-25

## 2019-09-03 DIAGNOSIS — E11.9 CONTROLLED TYPE 2 DIABETES MELLITUS WITHOUT COMPLICATION, WITHOUT LONG-TERM CURRENT USE OF INSULIN (HCC): Chronic | ICD-10-CM

## 2019-09-03 RX ORDER — METFORMIN HYDROCHLORIDE 500 MG/1
TABLET, EXTENDED RELEASE ORAL
Qty: 180 TAB | Refills: 3 | Status: ON HOLD | OUTPATIENT
Start: 2019-09-03 | End: 2020-08-24

## 2019-09-03 NOTE — TELEPHONE ENCOUNTER
Patient called and stated that he ran out of the medicine and only had one for this am.  He usually takes it in the am and pm.

## 2019-09-20 ENCOUNTER — TELEPHONE (OUTPATIENT)
Dept: FAMILY MEDICINE CLINIC | Age: 74
End: 2019-09-20

## 2019-09-20 NOTE — TELEPHONE ENCOUNTER
I returned call to patient. He reported feeling bad after he takes his medications in the morning (dizzy and light sensitivity). He states that he has been checking BP and blood sugar and they have been normal.   I advised him I was unsure  which medications he took at certain times of the day and he wasn't sure at the time. He has appt 9/27/19. I offered sooner appt with another provider for medication evaluation. He declined. I advised him to bring all of his medications in office. He states he will write down all of his home medications and when he takes them and keep his scheduled appt with Dr. Adolfo Medeiros.  I advised him that if he feel faint or worse to go to ER for Eval.

## 2019-09-20 NOTE — TELEPHONE ENCOUNTER
Pt states that the medication he is taking in the a.m. Is making him dizzy in about two hours later. He is fine until he takes the meds. Also when he gets out in the sunlight the brightness is magnified in brightness and whiteness. Which medication is he taking that would do this? This is beyond just dizziness. Can he change it to p.m. Or something?

## 2019-09-27 ENCOUNTER — OFFICE VISIT (OUTPATIENT)
Dept: FAMILY MEDICINE CLINIC | Age: 74
End: 2019-09-27

## 2019-09-27 VITALS
OXYGEN SATURATION: 98 % | SYSTOLIC BLOOD PRESSURE: 144 MMHG | HEART RATE: 49 BPM | BODY MASS INDEX: 27.44 KG/M2 | DIASTOLIC BLOOD PRESSURE: 75 MMHG | HEIGHT: 71 IN | RESPIRATION RATE: 16 BRPM | TEMPERATURE: 97.9 F | WEIGHT: 196 LBS

## 2019-09-27 DIAGNOSIS — E78.00 PURE HYPERCHOLESTEROLEMIA: Chronic | ICD-10-CM

## 2019-09-27 DIAGNOSIS — N18.30 CKD STAGE 3 DUE TO TYPE 2 DIABETES MELLITUS (HCC): ICD-10-CM

## 2019-09-27 DIAGNOSIS — E55.9 VITAMIN D DEFICIENCY: ICD-10-CM

## 2019-09-27 DIAGNOSIS — I10 ESSENTIAL HYPERTENSION: ICD-10-CM

## 2019-09-27 DIAGNOSIS — L57.8 SUN-DAMAGED SKIN: ICD-10-CM

## 2019-09-27 DIAGNOSIS — K21.00 GASTROESOPHAGEAL REFLUX DISEASE WITH ESOPHAGITIS: ICD-10-CM

## 2019-09-27 DIAGNOSIS — I25.118 CORONARY ARTERY DISEASE OF NATIVE ARTERY OF NATIVE HEART WITH STABLE ANGINA PECTORIS (HCC): ICD-10-CM

## 2019-09-27 DIAGNOSIS — E11.21 CONTROLLED TYPE 2 DIABETES MELLITUS WITH DIABETIC NEPHROPATHY, WITHOUT LONG-TERM CURRENT USE OF INSULIN (HCC): Primary | ICD-10-CM

## 2019-09-27 DIAGNOSIS — Z23 ENCOUNTER FOR IMMUNIZATION: ICD-10-CM

## 2019-09-27 DIAGNOSIS — E11.22 CKD STAGE 3 DUE TO TYPE 2 DIABETES MELLITUS (HCC): ICD-10-CM

## 2019-09-27 DIAGNOSIS — C61 PROSTATE CANCER (HCC): Chronic | ICD-10-CM

## 2019-09-27 RX ORDER — LISINOPRIL 40 MG/1
40 TABLET ORAL DAILY
Qty: 30 TAB | Refills: 11 | Status: SHIPPED | OUTPATIENT
Start: 2019-09-27 | End: 2020-04-23 | Stop reason: SDUPTHER

## 2019-09-27 RX ORDER — TRAMADOL HYDROCHLORIDE 50 MG/1
TABLET ORAL
Refills: 1 | COMMUNITY
Start: 2019-09-11 | End: 2019-09-30 | Stop reason: SDUPTHER

## 2019-09-27 NOTE — PATIENT INSTRUCTIONS

## 2019-09-27 NOTE — PROGRESS NOTES
[address]  [phone]           Progress Note    Patient: Sidney Brantley MRN: [id number]  SSN: [id number]    YOB: 1945  Age: 66 y.o. Sex: male        Chief Complaint   Patient presents with    Follow-up    Dizziness     1-2 months with visual problems, brightness         Subjective:     Encounter Diagnoses   Name Primary?  Controlled type 2 diabetes mellitus with diabetic nephropathy, without long-term current use of insulin ([facility] [address]): This patient is managed under a comprehensive plan of care for Diabetes. Overall the patient feels well with good energy level. Key Antihyperglycemic Medications             metFORMIN ER (GLUCOPHAGE XR) 500 mg tablet (Taking) TAKE 1 TABLET by MOUTH two TIMES A DAY           Pertinent Labs:   Lab Results   Component Value Date/Time    Hemoglobin A1c 6.0 (H) 06/10/2019 11:46 AM    Hemoglobin A1c 6.0 (H) 01/25/2019 10:11 AM    Hemoglobin A1c 6.0 (H) 10/17/2018 12:14 PM      Body mass index is 27.34 kg/m². Lab Results   Component Value Date/Time    LDL, calculated 81 06/10/2019 11:46 AM         Lab Results   Component Value Date/Time    Sodium 142 06/10/2019 11:46 AM    Potassium 4.5 06/10/2019 11:46 AM    Chloride 102 06/10/2019 11:46 AM    CO2 26 06/10/2019 11:46 AM    Anion gap 5 04/12/2019 03:54 AM    Glucose 97 06/10/2019 11:46 AM    BUN 30 (H) 06/10/2019 11:46 AM    Creatinine 1.35 (H) 06/10/2019 11:46 AM    BUN/Creatinine ratio 22 06/10/2019 11:46 AM    GFR est AA 59 (L) 06/10/2019 11:46 AM    GFR est non-AA 51 (L) 06/10/2019 11:46 AM    Calcium 9.7 06/10/2019 11:46 AM    AST (SGOT) 21 06/10/2019 11:46 AM    Alk.  phosphatase 56 04/12/2019 03:54 AM    Protein, total 6.0 (L) 04/12/2019 03:54 AM    Albumin 4.4 06/10/2019 11:46 AM    Globulin 2.6 04/12/2019 03:54 AM    A-G Ratio 1.3 04/12/2019 03:54 AM    ALT (SGPT) 14 06/10/2019 11:46 AM     Lab Results   Component Value Date/Time    Microalbumin/Creat ratio (mg/g creat) 8 2009 09:00 AM    Microalb/Creat ratio (ug/mg creat.) <5.6 2019 09:54 AM    Microalbumin,urine random 1.47 2009 09:00 AM      Frequency of home glucose testing: No logs   Blood Sugar range at home:    Last eye exam: Due   Last foot exam: This year. Polyuria, polyphagia or polydipsia: No   Retinopathy: No   Neuropathy SX: No   Low blood sugar symptoms: No   Dietary compliance: Good   Medication compliance:Good   On ASA: Yes   Depression: No   CKD: CKD 3. Wt Readings from Last 3 Encounters:   19 196 lb (88.9 kg)   19 200 lb (90.7 kg)   19 197 lb 12.8 oz (89.7 kg)        Social History     Tobacco Use   Smoking Status Former Smoker    Packs/day: 0.50    Years: 4.00    Pack years: 2.00    Last attempt to quit: 1966    Years since quittin.4   Smokeless Tobacco Never Used     Body mass index is 27.34 kg/m². Diabetic Consultants: All the patient's questions regarding medications, diet and exercise were answered. Goal of A1C of less than 7.5% is our goal.   Our overall goal is to reduce or eliminate the long term consequences of poorly controlled diabetes. Yes    Coronary artery disease of native artery of native heart with stable angina pectoris ([address] Occasional chest pain chest pain when he is working very hard. No BATES or SOB. No PND or orthopnea. He still has 1 artery rate was 50% blocked. Another artery was 100% blocked         Pure hypercholesterolemia:  Cardiovascular risks for him are: LDL goal is under 80  diabetic  hypertension  hyperlipidemia. Key Antihyperlipidemia Meds             rosuvastatin (CRESTOR) 40 mg tablet (Taking) . omega-3 fatty acids-vitamin e (FISH OIL) 1,000 mg cap (Taking) Take 1 Cap by mouth.         Lab Results   Component Value Date/Time    Cholesterol, total 169 06/10/2019 11:46 AM    HDL Cholesterol 55 06/10/2019 11:46 AM    LDL, calculated 81 06/10/2019 11:46 AM    Triglyceride 166 (H) 06/10/2019 11:46 AM    CHOL/HDL Ratio 3.5 10/04/2010 11:30 AM     Lab Results   Component Value Date/Time    ALT (SGPT) 14 06/10/2019 11:46 AM    AST (SGOT) 21 06/10/2019 11:46 AM    Alk. phosphatase 56 04/12/2019 03:54 AM    Bilirubin, total 0.3 04/12/2019 03:54 AM      Myalgias: No   Fatigue: No   Other side effects: no  Wt Readings from Last 3 Encounters:   09/27/19 196 lb (88.9 kg)   07/26/19 200 lb (90.7 kg)   07/16/19 197 lb 12.8 oz (89.7 kg)     The patient is aware of our goal to reduce or eliminate the long term problems (such as strokes and heart attacks) related to poorly controlled hyperlipidemia.            CKD stage 3 due to type 2 diabetes mellitus [facility]):  Lab Results   Component Value Date/Time    GFR est AA 59 (L) 06/10/2019 11:46 AM    GFR est non-AA 51 (L) 06/10/2019 11:46 AM    Creatinine (POC) 1.0 02/24/2017 09:20 AM    Creatinine 1.35 (H) 06/10/2019 11:46 AM    BUN 30 (H) 06/10/2019 11:46 AM    BUN (POC) 21 (H) 02/24/2017 09:20 AM    Sodium (POC) 139 02/24/2017 09:20 AM    Sodium 142 06/10/2019 11:46 AM    Potassium 4.5 06/10/2019 11:46 AM    Potassium (POC) 4.1 02/24/2017 09:20 AM    Chloride (POC) 101 02/24/2017 09:20 AM    Chloride 102 06/10/2019 11:46 AM    CO2 26 06/10/2019 11:46 AM     Lab Results   Component Value Date/Time    WBC 5.8 03/29/2019 11:26 AM    Hemoglobin (POC) 12.9 02/24/2017 09:20 AM    HGB 11.6 (L) 06/10/2019 11:46 AM    Hematocrit (POC) 38 02/24/2017 09:20 AM    HCT 34.8 (L) 03/29/2019 11:26 AM    PLATELET 352 61/26/6496 11:26 AM    MCV 87 03/29/2019 11:26 AM     Lab Results   Component Value Date/Time    Vitamin D 25-Hydroxy 29 (L) 10/04/2010 11:30 AM    VITAMIN D, 25-HYDROXY 42.4 06/10/2019 11:46 AM       Lab Results   Component Value Date/Time    Calcium 9.7 06/10/2019 11:46 AM    Phosphorus 3.2 06/10/2019 11:46 AM    PTH, Intact 66 (H) 06/10/2019 11:46 AM              Gastroesophageal reflux disease with esophagitis:  Current control of Symptoms:good  Hiatal Hernia:no  Current Medications: Pantoprazole  The patient has no history melena or bright red blood in the stools. The patient avoids high dose aspirin and NSAID therapy. The patient is aware of diet changes needed, elevating the head of the bed and appropriate use of antacids.  Vitamin D deficiency:  No sx. Due for testing. Lab Results   Component Value Date/Time    Vitamin D 25-Hydroxy 29 (L) 10/04/2010 11:30 AM    VITAMIN D, 25-HYDROXY 42.4 06/10/2019 11:46 AM            Prostate cancer ([facility] [address]): No symptoms.  Encounter for immunization: Flu vaccine given.  Essential hypertension: Blood pressure was controlled sitting. BP Readings from Last 3 Encounters:   09/27/19 144/75   07/26/19 120/68   07/16/19 146/69     The patient reports:  taking medications as instructed, no medication side effects noted, no TIA's, no chest pain on exertion, no dyspnea on exertion, no swelling of ankles. Key CAD CHF Meds             lisinopril (PRINIVIL, ZESTRIL) 40 mg tablet (Taking) Take 1 Tab by mouth daily. isosorbide mononitrate ER (IMDUR) 60 mg CR tablet (Taking) TAKE ONE TABLET BY MOUTH EVERY DAY    rosuvastatin (CRESTOR) 40 mg tablet (Taking) . hydrALAZINE (APRESOLINE) 50 mg tablet (Taking) TAKE ONE TABLET BY MOUTH THREE TIMES DAILY FOR HYPERTENSION    atenolol (TENORMIN) 25 mg tablet (Taking) Take 1 Tab by mouth two (2) times a day. One tab twice daily. clopidogrel (PLAVIX) 75 mg tab (Taking) Take 1 Tab by mouth daily for 360 days. furosemide (LASIX) 20 mg tablet (Taking) TAKE ONE TABLET BY MOUTH EVERY DAY    felodipine (PLENDIL SR) 10 mg 24 hr tablet (Taking) TAKE ONE TABLET BY MOUTH EVERY DAY FOR HYPERTENSION    aspirin delayed-release 81 mg tablet (Taking) Take 1 Tab by mouth daily. omega-3 fatty acids-vitamin e (FISH OIL) 1,000 mg cap (Taking) Take 1 Cap by mouth.            Lab Results   Component Value Date/Time    Sodium 142 06/10/2019 11:46 AM Potassium 4.5 06/10/2019 11:46 AM    Chloride 102 06/10/2019 11:46 AM    CO2 26 06/10/2019 11:46 AM    Anion gap 5 04/12/2019 03:54 AM    Glucose 97 06/10/2019 11:46 AM    BUN 30 (H) 06/10/2019 11:46 AM    Creatinine 1.35 (H) 06/10/2019 11:46 AM    BUN/Creatinine ratio 22 06/10/2019 11:46 AM    GFR est AA 59 (L) 06/10/2019 11:46 AM    GFR est non-AA 51 (L) 06/10/2019 11:46 AM    Calcium 9.7 06/10/2019 11:46 AM    Bilirubin, total 0.3 04/12/2019 03:54 AM    AST (SGOT) 21 06/10/2019 11:46 AM    Alk. phosphatase 56 04/12/2019 03:54 AM    Protein, total 6.0 (L) 04/12/2019 03:54 AM    Albumin 4.4 06/10/2019 11:46 AM    Globulin 2.6 04/12/2019 03:54 AM    A-G Ratio 1.3 04/12/2019 03:54 AM    ALT (SGPT) 14 06/10/2019 11:46 AM     Low salt diet? yes  Aerobic exercise? no  Our goal is to normalize the blood pressure to decrease the risks of strokes and heart attacks. The patient is in agreement with the plan.  Sun-damaged skin: Needs follow-up with dermatology. Current and past medical information:    Current Medications after this visit[de-identified]     Current Outpatient Medications   Medication Sig    traMADol (ULTRAM) 50 mg tablet TAKE TWO TABLETS BY MOUTH EVERY 12 HOURS as needed for pain FOR UP TO 30 DAYS    lisinopril (PRINIVIL, ZESTRIL) 40 mg tablet Take 1 Tab by mouth daily.  metFORMIN ER (GLUCOPHAGE XR) 500 mg tablet TAKE 1 TABLET by MOUTH two TIMES A DAY    isosorbide mononitrate ER (IMDUR) 60 mg CR tablet TAKE ONE TABLET BY MOUTH EVERY DAY    rosuvastatin (CRESTOR) 40 mg tablet .  triamcinolone acetonide (KENALOG) 0.1 % topical cream Apply  to affected area two (2) times a day. use thin layer to poison ivy    hydrALAZINE (APRESOLINE) 50 mg tablet TAKE ONE TABLET BY MOUTH THREE TIMES DAILY FOR HYPERTENSION    atenolol (TENORMIN) 25 mg tablet Take 1 Tab by mouth two (2) times a day. One tab twice daily.     albuterol (PROVENTIL HFA, VENTOLIN HFA, PROAIR HFA) 90 mcg/actuation inhaler Take 2 Puffs by inhalation every four (4) hours as needed for Wheezing for up to 360 days.  clopidogrel (PLAVIX) 75 mg tab Take 1 Tab by mouth daily for 360 days.  EPINEPHrine (EPIPEN 2-KARL) 0.3 mg/0.3 mL injection INJECT INTRAMUSCULARLY AS NEEDED FOR ONE DOSE. TAKE WITH 50MG OF BENADRYL AND CALL 911. Dispense Generic    furosemide (LASIX) 20 mg tablet TAKE ONE TABLET BY MOUTH EVERY DAY    pantoprazole (PROTONIX) 40 mg tablet TAKE ONE TABLET BY MOUTH EVERY DAY FOR: GASTROESOPHAGEAL REFLUX    felodipine (PLENDIL SR) 10 mg 24 hr tablet TAKE ONE TABLET BY MOUTH EVERY DAY FOR HYPERTENSION    raNITIdine (ZANTAC) 300 mg tab TAKE ONE TABLET BY MOUTH EVERY DAY    diclofenac (VOLTAREN) 1 % gel Apply 4 g to affected area four (4) times daily. Painful shoulder.  aspirin delayed-release 81 mg tablet Take 1 Tab by mouth daily.  Peak Flow Meter eric Use prior and post nebulizer treatment    albuterol (PROVENTIL VENTOLIN) 2.5 mg /3 mL (0.083 %) nebulizer solution 3 mL by Nebulization route every six (6) hours as needed for Wheezing or Shortness of Breath.  omega-3 fatty acids-vitamin e (FISH OIL) 1,000 mg cap Take 1 Cap by mouth.  coenzyme q10 (CO Q-10) 100 mg Cap Take 100 mg by mouth daily.  meclizine (ANTIVERT) 25 mg chewable tablet Take  by mouth three (3) times daily as needed. No current facility-administered medications for this visit.         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 ([facility] [address] 07/16/2019    Heart murmur 07/23/2018    Type 2 diabetes with nephropathy ([facility] [address] 06/29/2018    CKD stage 3 due to type 2 diabetes mellitus ([facility] [address] 06/29/2018    Age-related nuclear cataract of both eyes 03/27/2018    PVD (posterior vitreous detachment), left eye 03/27/2018    Controlled type 2 diabetes mellitus with diabetic nephropathy ([facility] [address] 12/03/2015    Prostate cancer ([facility] 08/23/2013    Prostate nodule without urinary obstruction 02/11/2013    S/P carotid endarterectomy 12/05/2012    Vitamin D deficiency 12/05/2012    Amaurosis fugax of left eye 05/06/2012    Carotid artery obstruction 04/17/2012    Allergy to bee sting 04/13/2012       Past Medical History:   Diagnosis Date    Amaurosis fugax of left eye 5/6/2012    Arthritis     OSTEO    CAD (coronary artery disease) 2012    CAROTID LEFT     Cancer ([facility] 2013    PROSTATE    Chronic pain     DJD lumbar    Diabetes ([facility] [address] 5/22/2010    Frequent nocturnal awakening     urinary frequency    GERD (gastroesophageal reflux disease) 5/22/2010    Hyperlipidemia 5/22/2010    Hypertension 5/22/2010    Kidney stone 5/22/2010    Nodular goiter     1.3 cm right , FNA 6/15    Obesity (BMI 30-39. 9)     Psychiatric disorder     ANXIETY    Vertigo        Allergies   Allergen Reactions    Bee Sting [Sting, Bee] Anaphylaxis    Vytorin 10-10 [Ezetimibe-Simvastatin] Myalgia    Amlodipine Other (comments)     Creepy crawly sensation, twitches    Lipitor [Atorvastatin] Myalgia       Past Surgical History:   Procedure Laterality Date    CARDIAC SURG PROCEDURE UNLIST  04/2019    3 stents placed    COLONOSCOPY  3/3/2016         EGD  3/3/2016         HX HEENT      tonsillectomy    HX MOHS PROCEDURES  2010    right    HX ORTHOPAEDIC      coccyx removed    HX UROLOGICAL  2010    left lithotripsy. basket  extraction,ureteral stent    HX VASECTOMY      NEUROLOGICAL PROCEDURE UNLISTED  2011    Steroid injections in epidural    VASCULAR SURGERY PROCEDURE UNLIST  2012    LEFT CAROTID       Social History Socioeconomic History    Marital status:      Spouse name: Not on file    Number of children: Not on file    Years of education: Not on file    Highest education level: Not on file   Tobacco Use    Smoking status: Former Smoker     Packs/day: 0.50     Years: 4.00     Pack years: 2.00     Last attempt to quit: 1966     Years since quittin.4    Smokeless tobacco: Never Used   Substance and Sexual Activity    Alcohol use: No    Drug use: No    Sexual activity: Yes     Partners: Female       Review of Systems   Constitutional: Negative. Negative for chills, fever, malaise/fatigue and weight loss. HENT: Negative. Negative for hearing loss. Eyes: Negative. Negative for blurred vision and double vision. Respiratory: Negative. Negative for cough, sputum production and shortness of breath. Cardiovascular: Negative. Negative for chest pain and palpitations. Gastrointestinal: Negative. Negative for abdominal pain, blood in stool, heartburn, nausea and vomiting. Genitourinary: Negative. Negative for dysuria, frequency and urgency. Musculoskeletal: Negative. Negative for back pain, falls, myalgias and neck pain. Skin: Negative. Negative for rash. Neurological: Positive for dizziness. Negative for tingling, tremors, weakness and headaches. Lightheaded from his blood pressure medication. He also gets heat and light sensitivity. Endo/Heme/Allergies: Negative. Psychiatric/Behavioral: Negative. Negative for depression. Objective:     Vitals:    19 0845 19 0853   BP: 136/74 144/75   Pulse: (!) 49    Resp: 16    Temp: 97.9 °F (36.6 °C)    TempSrc: Oral    SpO2: 98%    Weight: 196 lb (88.9 kg)    Height: 5' 11\" (1.803 m)       Body mass index is 27.34 kg/m². Physical Exam   Constitutional: He is oriented to person, place, and time and well-developed, well-nourished, and in no distress. HENT:   Head: Normocephalic and atraumatic. Mouth/Throat: Oropharynx is clear and moist.   Eyes: Right eye exhibits no discharge. Left eye exhibits no discharge. No scleral icterus. Neck: No thyromegaly present. No bruit. Cardiovascular: Normal rate, regular rhythm and normal heart sounds. Exam reveals no friction rub. No murmur heard. Pulmonary/Chest: Effort normal and breath sounds normal. No respiratory distress. Abdominal: Soft. He exhibits no distension. Neurological: He is alert and oriented to person, place, and time. Skin: No rash noted. No erythema. Psychiatric: Mood and affect normal.   Nursing note and vitals reviewed. Health Maintenance Due   Topic Date Due    EYE EXAM RETINAL OR DILATED  05/25/2019    Influenza Age 5 to Adult  08/01/2019    FOOT EXAM Q1  10/17/2019         Assessment and orders:     Encounter Diagnoses     ICD-10-CM ICD-9-CM   1. Controlled type 2 diabetes mellitus with diabetic nephropathy, without long-term current use of insulin ([facility]) E11.21 250.40     583.81   2. Coronary artery disease of native artery of native heart with stable angina pectoris ([facility] I25.118 414.01     413.9   3. Pure hypercholesterolemia E78.00 272.0   4. CKD stage 3 due to type 2 diabetes mellitus ([facility]) E11.22 250.40    N18.3 585.3   5. Gastroesophageal reflux disease with esophagitis K21.0 530.11   6. Vitamin D deficiency E55.9 268.9   7. Prostate cancer ([facility] C61 185   8. Encounter for immunization Z23 V03.89   9. Essential hypertension I10 401.9   10. Sun-damaged skin L57.8 692.79     Diagnoses and all orders for this visit:    1. Controlled type 2 diabetes mellitus with diabetic nephropathy, without long-term current use of insulin ([facility])-retest  -     HEMOGLOBIN A1C WITH EAG  -     MICROALBUMIN, UR, RAND W/ MICROALB/CREAT RATIO  -     LIPID PANEL  -     METABOLIC PANEL, COMPREHENSIVE  -     REFERRAL TO OPHTHALMOLOGY    2.  Coronary artery disease of native artery of native heart with stable angina pectoris ([facility])-advised to cut back on his workload. -     LIPID PANEL  -     METABOLIC PANEL, COMPREHENSIVE    3. Pure hypercholesterolemia-retest  -     LIPID PANEL  -     METABOLIC PANEL, COMPREHENSIVE    4. CKD stage 3 due to type 2 diabetes mellitus (HCC)-retest  -     METABOLIC PANEL, COMPREHENSIVE    5. Gastroesophageal reflux disease with esophagitis-symptoms controlled    6. Vitamin D deficiency-treated    7. Prostate cancer (HCC)-no new symptoms    8. Encounter for immunization-flu vaccine given    9. Essential hypertension-because of multiple side effects such as heat and light headedness, we need to decrease and/or stop his felodipine. That is the most likely cause. He needs to return in 1-2 weeks with blood pressure readings or to get it read here. -    Stay on lisinopril (PRINIVIL, ZESTRIL) 40 mg tablet; Take 1 Tab by mouth daily. 10. Sun-damaged skin  -     REFERRAL TO DERMATOLOGY            Plan of care:  Discussed diagnoses in detail with patient. Medication risks/benefits/side effects discussed with patient. All of the patient's questions were addressed. The patient understands and agrees with our plan of care. The patient knows to call back if they are unsure of or forget any changes we discussed today or if the symptoms change. The patient received an After-Visit Summary which contains VS, orders, medication list and allergy list. This can be used as a \"mini-medical record\" should they have to seek medical care while out of town. Patient Care Team:  Stanton Pepe MD as PCP - General  Gissel Platt MD (General and Vascular Surgery)  Evelia Aguilar MD (Neurology)  Herman Beal MD as Surgeon (Urology)  Byron Cueto MD (Endocrinology)  Donna Hernandez MD (Dermatology)  Earlene Castillo DO as Physician (Cardiology)  Reshma Aceves RN as Ambulatory Care Navigator    Follow-up and Dispositions    · Return in about 3 months (around 12/27/2019).          Future Appointments   Date Time Provider Hank Joanne   1/24/2020  9:20 AM DO STEW Albarran JOSE MOSS       Signed By: Robert Renteria MD     September 27, 2019      ATTENTION:   This medical record was transcribed using an electronic medical records/speech recognition system. Although proofread, it may and can contain electronic, spelling and other errors. Corrections may be executed at a later time. Please feel free to contact me for any clarifications as needed.

## 2019-09-27 NOTE — PROGRESS NOTES
1. Have you been to the ER, urgent care clinic since your last visit? Hospitalized since your last visit? No    2. Have you seen or consulted any other health care providers outside of the [address] [place] [address] since your last visit? Include any pap smears or colon screening.  No  Reviewed record in preparation for visit and have necessary documentation  Pt did not bring medication to office visit for review    Goals that were addressed and/or need to be completed during or after this appointment include     Health Maintenance Due   Topic Date Due    EYE EXAM RETINAL OR DILATED  05/25/2019    Influenza Age 5 to Adult  08/01/2019    FOOT EXAM Q1  10/17/2019

## 2019-09-28 LAB
ALBUMIN SERPL-MCNC: 4.3 G/DL (ref 3.5–4.8)
ALBUMIN/CREAT UR: 68.5 MG/G CREAT (ref 0–30)
ALBUMIN/GLOB SERPL: 2.3 {RATIO} (ref 1.2–2.2)
ALP SERPL-CCNC: 49 IU/L (ref 39–117)
ALT SERPL-CCNC: 16 IU/L (ref 0–44)
AST SERPL-CCNC: 20 IU/L (ref 0–40)
BILIRUB SERPL-MCNC: 0.2 MG/DL (ref 0–1.2)
BUN SERPL-MCNC: 26 MG/DL (ref 8–27)
BUN/CREAT SERPL: 17 (ref 10–24)
CALCIUM SERPL-MCNC: 9.5 MG/DL (ref 8.6–10.2)
CHLORIDE SERPL-SCNC: 103 MMOL/L (ref 96–106)
CHOLEST SERPL-MCNC: 154 MG/DL (ref 100–199)
CO2 SERPL-SCNC: 26 MMOL/L (ref 20–29)
CREAT SERPL-MCNC: 1.56 MG/DL (ref 0.76–1.27)
CREAT UR-MCNC: 35.9 MG/DL
EST. AVERAGE GLUCOSE BLD GHB EST-MCNC: 120 MG/DL
GLOBULIN SER CALC-MCNC: 1.9 G/DL (ref 1.5–4.5)
GLUCOSE SERPL-MCNC: 119 MG/DL (ref 65–99)
HBA1C MFR BLD: 5.8 % (ref 4.8–5.6)
HDLC SERPL-MCNC: 60 MG/DL
INTERPRETATION: NORMAL
LDLC SERPL CALC-MCNC: 72 MG/DL (ref 0–99)
Lab: NORMAL
Lab: NORMAL
MICROALBUMIN UR-MCNC: 24.6 UG/ML
POTASSIUM SERPL-SCNC: 4.9 MMOL/L (ref 3.5–5.2)
PROT SERPL-MCNC: 6.2 G/DL (ref 6–8.5)
SODIUM SERPL-SCNC: 142 MMOL/L (ref 134–144)
TRIGL SERPL-MCNC: 110 MG/DL (ref 0–149)
VLDLC SERPL CALC-MCNC: 22 MG/DL (ref 5–40)

## 2019-09-30 ENCOUNTER — TELEPHONE (OUTPATIENT)
Dept: FAMILY MEDICINE CLINIC | Age: 74
End: 2019-09-30

## 2019-09-30 DIAGNOSIS — M15.9 OSTEOARTHRITIS, GENERALIZED: Primary | ICD-10-CM

## 2019-09-30 DIAGNOSIS — G89.4 CHRONIC PAIN SYNDROME: ICD-10-CM

## 2019-09-30 RX ORDER — TRAMADOL HYDROCHLORIDE 50 MG/1
100 TABLET ORAL EVERY 12 HOURS
Qty: 120 TAB | Refills: 1 | Status: SHIPPED | OUTPATIENT
Start: 2019-09-30 | End: 2019-10-30

## 2019-09-30 NOTE — TELEPHONE ENCOUNTER
Caller's first/last name:  Monse Credit contact number:  [phone]    Further clarification of call:      Patient requesting a refill on Tramadol. Please call when ready.

## 2019-09-30 NOTE — TELEPHONE ENCOUNTER
9/27/19 last office visit and labs   [address] on file. Needs drug screen. Can I add on to labs from last Friday ?

## 2019-09-30 NOTE — TELEPHONE ENCOUNTER
Attempted to call. No answer. Message left. Need to advise patient per Dr. Wendie Su: Your A1C is better and your kidney function is decreased so stop your metformin and call me if your sugars on average go up more than 50 points.     See me back earlier this time in 2 months or call in the meantime if you have a problem.

## 2019-09-30 NOTE — TELEPHONE ENCOUNTER
Advise patient per Dr. Wendie Su of the below. Your A1C is better and your kidney function is decreased so stop your metformin and call me if your sugars on average go up more than 50 points.     See me back earlier this time in 2 months or call in the meantime if you have a problem.     Patient voiced understanding.

## 2019-10-23 DIAGNOSIS — I10 ESSENTIAL HYPERTENSION: Chronic | ICD-10-CM

## 2019-10-24 RX ORDER — RANITIDINE 300 MG/1
TABLET ORAL
Qty: 30 TAB | Refills: 11 | Status: SHIPPED | OUTPATIENT
Start: 2019-10-24 | End: 2019-12-30

## 2019-10-24 RX ORDER — FELODIPINE 10 MG/1
TABLET, EXTENDED RELEASE ORAL
Qty: 30 TAB | Refills: 11 | Status: SHIPPED | OUTPATIENT
Start: 2019-10-24 | End: 2019-11-10

## 2019-11-07 ENCOUNTER — TELEPHONE (OUTPATIENT)
Dept: FAMILY MEDICINE CLINIC | Age: 74
End: 2019-11-07

## 2019-11-07 NOTE — TELEPHONE ENCOUNTER
----- Message from Alcon Obrien sent at 11/7/2019 11:16 AM EST -----  Regarding: Dr. Heath Keep: [phone]  Caller's first and last name: Kvng Dowell III  Reason for call: meds  Callback required yes/no and why: yes  Best contact number(s): [phone]  Details to clarify the request: wants to know if he is taken off of \"lolapin\" and if the doctor has taken him off of Rx, should he stop taking it?

## 2019-11-10 ENCOUNTER — DOCUMENTATION ONLY (OUTPATIENT)
Dept: FAMILY MEDICINE CLINIC | Age: 74
End: 2019-11-10

## 2019-11-11 NOTE — TELEPHONE ENCOUNTER
I called and advised patient per Dr. Juni Kaur to discontinue felodipine. He verbalized understanding. Also called and spoke to pharmacist to discontinue this medication off patient medication regimen.

## 2019-11-20 DIAGNOSIS — G89.4 CHRONIC PAIN SYNDROME: ICD-10-CM

## 2019-11-20 DIAGNOSIS — M19.90 CHRONIC ARTHRITIS: Primary | ICD-10-CM

## 2019-11-21 RX ORDER — TRAMADOL HYDROCHLORIDE 50 MG/1
TABLET ORAL
Qty: 120 TAB | Refills: 1 | Status: SHIPPED | OUTPATIENT
Start: 2019-11-21 | End: 2020-01-27

## 2019-11-25 ENCOUNTER — TELEPHONE (OUTPATIENT)
Dept: FAMILY MEDICINE CLINIC | Age: 74
End: 2019-11-25

## 2019-11-25 DIAGNOSIS — I10 ESSENTIAL HYPERTENSION: Chronic | ICD-10-CM

## 2019-11-25 RX ORDER — FELODIPINE 10 MG/1
10 TABLET, EXTENDED RELEASE ORAL DAILY
Qty: 30 TAB | Refills: 11 | Status: SHIPPED | OUTPATIENT
Start: 2019-11-25 | End: 2020-12-04 | Stop reason: SDUPTHER

## 2019-11-25 NOTE — TELEPHONE ENCOUNTER
Called and spoke to patient. Patient states he has not written BP down but reports blood pressure running \"205/105\" for several days . He states yesterday he took felodipine and halved his PM lisinopril to equal 20mg along with his normal prescribed medications and his BP came down to \"normal\". He also took same medication this morning. He is requested to restart his felodipine. Please advise.

## 2019-11-25 NOTE — TELEPHONE ENCOUNTER
Called patient. Advised per Dr. Darrius Pierre to restart felodipine and rx sent to pharmacy. Advised patient to call back in one week with BP reading sitting and standing. He verbalized understanding.

## 2019-11-25 NOTE — TELEPHONE ENCOUNTER
Caller's first/last name:  Zohra Reyes    Reason for call:  BP problem    Does caller want a return call?  yes    Best contact number:  [phone]    Further clarification of call:        BP going back up. Took off some his medication. Wants to know if he should restart it. Please advise [phone].

## 2019-12-09 ENCOUNTER — TELEPHONE (OUTPATIENT)
Dept: FAMILY MEDICINE CLINIC | Age: 74
End: 2019-12-09

## 2019-12-09 NOTE — TELEPHONE ENCOUNTER
Returned call. Patient reports   11/26/19 4pm sitting 134/61     Standing 111/62   11/27/19 9:30am sitting 128/68     4pm 136/65  11/28/19  9am sitting 143/68   Standing 128/62  11/29/19   sitting 142/63  Standing 119/58  11/30/19  Sitting 138/58   12/1/19 3:15pm  Sitting 121/59  12/3/19   Sitting 125/67       6:15pm sitting 125/59  12/4/19 8:30pm sitting 133/63   4pm sitting 146/77  12/5/19  Sitting 131/63  12/6/19  Sitting 134/62  12/7/19 4pm sitting 140/66  12/8/19 2pm sitting 121/64    10pm 188/93  (patient didn't feel good so he took BP)  12/9/19 11am  Sitting 125/63 standing 104/59      Patient states \"not really\" when questioning if he has dizziness with standing. Please advise.

## 2019-12-09 NOTE — TELEPHONE ENCOUNTER
Called and spoke to patient. Advised patient per Dr. Дмитрий Morocho: These blood pressures are acceptable as long as he is not dizzy. He verbalized understanding.

## 2019-12-30 ENCOUNTER — OFFICE VISIT (OUTPATIENT)
Dept: FAMILY MEDICINE CLINIC | Age: 74
End: 2019-12-30

## 2019-12-30 ENCOUNTER — HOSPITAL ENCOUNTER (OUTPATIENT)
Dept: LAB | Age: 74
Discharge: HOME | End: 2019-12-30

## 2019-12-30 VITALS
OXYGEN SATURATION: 98 % | TEMPERATURE: 98 F | RESPIRATION RATE: 20 BRPM | WEIGHT: 200.8 LBS | DIASTOLIC BLOOD PRESSURE: 52 MMHG | SYSTOLIC BLOOD PRESSURE: 99 MMHG | HEART RATE: 47 BPM | HEIGHT: 71 IN | BODY MASS INDEX: 28.11 KG/M2

## 2019-12-30 DIAGNOSIS — E78.00 PURE HYPERCHOLESTEROLEMIA: Chronic | ICD-10-CM

## 2019-12-30 DIAGNOSIS — E55.9 VITAMIN D DEFICIENCY: ICD-10-CM

## 2019-12-30 DIAGNOSIS — N18.30 CKD STAGE 3 DUE TO TYPE 2 DIABETES MELLITUS (HCC): ICD-10-CM

## 2019-12-30 DIAGNOSIS — I10 ESSENTIAL HYPERTENSION: Chronic | ICD-10-CM

## 2019-12-30 DIAGNOSIS — K21.00 GASTROESOPHAGEAL REFLUX DISEASE WITH ESOPHAGITIS: Chronic | ICD-10-CM

## 2019-12-30 DIAGNOSIS — I25.118 CORONARY ARTERY DISEASE OF NATIVE ARTERY OF NATIVE HEART WITH STABLE ANGINA PECTORIS (HCC): ICD-10-CM

## 2019-12-30 DIAGNOSIS — E11.21 CONTROLLED TYPE 2 DIABETES MELLITUS WITH DIABETIC NEPHROPATHY, WITHOUT LONG-TERM CURRENT USE OF INSULIN (HCC): Primary | ICD-10-CM

## 2019-12-30 DIAGNOSIS — E11.22 CKD STAGE 3 DUE TO TYPE 2 DIABETES MELLITUS (HCC): ICD-10-CM

## 2019-12-30 DIAGNOSIS — E11.21 CONTROLLED TYPE 2 DIABETES MELLITUS WITH DIABETIC NEPHROPATHY, WITHOUT LONG-TERM CURRENT USE OF INSULIN (HCC): ICD-10-CM

## 2019-12-30 DIAGNOSIS — C61 PROSTATE CANCER (HCC): ICD-10-CM

## 2019-12-30 LAB
25(OH)D3 SERPL-MCNC: 36.5 NG/ML (ref 30–100)
ALBUMIN SERPL-MCNC: 3.8 G/DL (ref 3.5–5)
ALBUMIN/GLOB SERPL: 1.5 {RATIO} (ref 1.1–2.2)
ALP SERPL-CCNC: 51 U/L (ref 45–117)
ALT SERPL-CCNC: 20 U/L (ref 12–78)
ANION GAP SERPL CALC-SCNC: 5 MMOL/L (ref 5–15)
AST SERPL-CCNC: 17 U/L (ref 15–37)
BILIRUB SERPL-MCNC: 0.4 MG/DL (ref 0.2–1)
BUN SERPL-MCNC: 28 MG/DL (ref 6–20)
BUN/CREAT SERPL: 20 (ref 12–20)
CALCIUM SERPL-MCNC: 8.9 MG/DL (ref 8.5–10.1)
CALCIUM SERPL-MCNC: 9.1 MG/DL (ref 8.5–10.1)
CHLORIDE SERPL-SCNC: 107 MMOL/L (ref 97–108)
CHOLEST SERPL-MCNC: 159 MG/DL
CO2 SERPL-SCNC: 28 MMOL/L (ref 21–32)
CREAT SERPL-MCNC: 1.38 MG/DL (ref 0.7–1.3)
CREAT UR-MCNC: 32.2 MG/DL
EST. AVERAGE GLUCOSE BLD GHB EST-MCNC: 126 MG/DL
GLOBULIN SER CALC-MCNC: 2.5 G/DL (ref 2–4)
GLUCOSE SERPL-MCNC: 112 MG/DL (ref 65–100)
HBA1C MFR BLD: 6 % (ref 4–5.6)
HDLC SERPL-MCNC: 71 MG/DL
HDLC SERPL: 2.2 {RATIO} (ref 0–5)
LDLC SERPL CALC-MCNC: 66 MG/DL (ref 0–100)
LIPID PROFILE,FLP: NORMAL
POTASSIUM SERPL-SCNC: 4.4 MMOL/L (ref 3.5–5.1)
PROT SERPL-MCNC: 6.3 G/DL (ref 6.4–8.2)
PROT UR-MCNC: 9 MG/DL (ref 0–11.9)
PROT/CREAT UR-RTO: 0.3
PTH-INTACT SERPL-MCNC: 72.3 PG/ML (ref 18.4–88)
SODIUM SERPL-SCNC: 140 MMOL/L (ref 136–145)
TRIGL SERPL-MCNC: 110 MG/DL (ref ?–150)
VLDLC SERPL CALC-MCNC: 22 MG/DL

## 2019-12-30 RX ORDER — FAMOTIDINE 40 MG/1
40 TABLET, FILM COATED ORAL
Qty: 90 TAB | Refills: 3 | Status: SHIPPED | OUTPATIENT
Start: 2019-12-30 | End: 2020-12-29 | Stop reason: SDUPTHER

## 2019-12-30 NOTE — PROGRESS NOTES
[address]  [phone]           Progress Note    Patient: Zina Mcdermott MRN: [id number]  SSN: [id number]    YOB: 1945  Age: 66 y.o. Sex: male        Chief Complaint   Patient presents with    Labs    Hypertension         Subjective:     Encounter Diagnoses   Name Primary?  Controlled type 2 diabetes mellitus with diabetic nephropathy, without long-term current use of insulin ([facility] [address]): This patient is managed under a comprehensive plan of care for Diabetes. Overall the patient feels well with good energy level. Key Antihyperglycemic Medications             metFORMIN ER (GLUCOPHAGE XR) 500 mg tablet TAKE 1 TABLET by MOUTH two TIMES A DAY           Pertinent Labs:   Lab Results   Component Value Date/Time    Hemoglobin A1c 5.8 (H) 09/27/2019 09:38 AM    Hemoglobin A1c 6.0 (H) 06/10/2019 11:46 AM    Hemoglobin A1c 6.0 (H) 01/25/2019 10:11 AM      Body mass index is 28.01 kg/m². Lab Results   Component Value Date/Time    LDL, calculated 72 09/27/2019 09:38 AM         Lab Results   Component Value Date/Time    Sodium 142 09/27/2019 09:38 AM    Potassium 4.9 09/27/2019 09:38 AM    Chloride 103 09/27/2019 09:38 AM    CO2 26 09/27/2019 09:38 AM    Anion gap 5 04/12/2019 03:54 AM    Glucose 119 (H) 09/27/2019 09:38 AM    BUN 26 09/27/2019 09:38 AM    Creatinine 1.56 (H) 09/27/2019 09:38 AM    BUN/Creatinine ratio 17 09/27/2019 09:38 AM    GFR est AA 49 (L) 09/27/2019 09:38 AM    GFR est non-AA 43 (L) 09/27/2019 09:38 AM    Calcium 9.5 09/27/2019 09:38 AM    AST (SGOT) 20 09/27/2019 09:38 AM    Alk.  phosphatase 49 09/27/2019 09:38 AM    Protein, total 6.2 09/27/2019 09:38 AM    Albumin 4.3 09/27/2019 09:38 AM    Globulin 2.6 04/12/2019 03:54 AM    A-G Ratio 2.3 (H) 09/27/2019 09:38 AM    ALT (SGPT) 16 09/27/2019 09:38 AM     Lab Results   Component Value Date/Time    Microalbumin/Creat ratio (mg/g creat) 8 2009 09:00 AM    Microalb/Creat ratio (ug/mg creat.) 68.5 (H) 2019 10:01 AM    Microalbumin,urine random 1.47 2009 09:00 AM      Frequency of home glucose testing:no logs   Blood Sugar range at home:    Last eye exam: In past 12 months. Last foot exam: This year. Polyuria, polyphagia or polydipsia: No   Retinopathy: No   Neuropathy SX: No    Low blood sugar symptoms: No   Dietary compliance: Good   Medication compliance:Good   On ASA: Yes   Depression: No   CKD:no     Wt Readings from Last 3 Encounters:   19 200 lb 12.8 oz (91.1 kg)   19 196 lb (88.9 kg)   19 200 lb (90.7 kg)        Social History     Tobacco Use   Smoking Status Former Smoker    Packs/day: 0.50    Years: 4.00    Pack years: 2.00    Last attempt to quit: 1966    Years since quittin.7   Smokeless Tobacco Never Used     Body mass index is 28.01 kg/m². All the patient's questions regarding medications, diet and exercise were answered. Goal of A1C of less than 7.5% is our goal.   Our overall goal is to reduce or eliminate the long term consequences of poorly controlled diabetes.        Yes    CKD stage 3 due to type 2 diabetes mellitus [facility]):  Nephrologist:   Lab Results   Component Value Date/Time    GFR est AA 49 (L) 2019 09:38 AM    GFR est non-AA 43 (L) 2019 09:38 AM    Creatinine (POC) 1.0 2017 09:20 AM    Creatinine 1.56 (H) 2019 09:38 AM    BUN 26 2019 09:38 AM    BUN (POC) 21 (H) 2017 09:20 AM    Sodium (POC) 139 2017 09:20 AM    Sodium 142 2019 09:38 AM    Potassium 4.9 2019 09:38 AM    Potassium (POC) 4.1 2017 09:20 AM    Chloride (POC) 101 2017 09:20 AM    Chloride 103 2019 09:38 AM    CO2 26 2019 09:38 AM     Lab Results   Component Value Date/Time    WBC 5.8 2019 11:26 AM    Hemoglobin (POC) 12.9 2017 09:20 AM    HGB 11.6 (L) 06/10/2019 11:46 AM    Hematocrit (POC) 38 2017 09:20 AM HCT 34.8 (L) 03/29/2019 11:26 AM    PLATELET 284 40/82/9990 11:26 AM    MCV 87 03/29/2019 11:26 AM     Lab Results   Component Value Date/Time    Vitamin D 25-Hydroxy 29 (L) 10/04/2010 11:30 AM    VITAMIN D, 25-HYDROXY 42.4 06/10/2019 11:46 AM       Lab Results   Component Value Date/Time    Calcium 9.5 09/27/2019 09:38 AM    Phosphorus 3.2 06/10/2019 11:46 AM    PTH, Intact 66 (H) 06/10/2019 11:46 AM              Coronary artery disease of native artery of native heart with stable angina pectoris ([facility] [address]):  No BATES or SOB. No PND or orthopnea. He is having some vague chest discomfort. It is not necessarily related to exercise and is migratory in its location. He had 3 stents placed in April. He has cardiology appointment in 3 weeks. He has not tried nitroglycerin when this pain occurs. Advised him to do so and if pain increases or changes in any way he should go to the emergency room immediately or call 911.  Essential hypertension: he is having some orthostatic changes-  Mild dizziness if he stands up quickly. He has to tolerate this in order to keep him from spiking systolic pressures over 237. BP Readings from Last 3 Encounters:   12/30/19 99/52   09/27/19 144/75   07/26/19 120/68     The patient reports:  taking medications as instructed, no medication side effects noted, no TIA's, no chest pain on exertion, no dyspnea on exertion, no swelling of ankles. Key CAD CHF Meds             felodipine (PLENDIL SR) 10 mg 24 hr tablet (Taking) Take 1 Tab by mouth daily. Indications: high blood pressure    lisinopril (PRINIVIL, ZESTRIL) 40 mg tablet (Taking) Take 1 Tab by mouth daily. isosorbide mononitrate ER (IMDUR) 60 mg CR tablet (Taking) TAKE ONE TABLET BY MOUTH EVERY DAY    rosuvastatin (CRESTOR) 40 mg tablet (Taking) .     hydrALAZINE (APRESOLINE) 50 mg tablet (Taking) TAKE ONE TABLET BY MOUTH THREE TIMES DAILY FOR HYPERTENSION    atenolol (TENORMIN) 25 mg tablet (Taking) Take 1 Tab by mouth two (2) times a day. One tab twice daily. clopidogrel (PLAVIX) 75 mg tab (Taking) Take 1 Tab by mouth daily for 360 days. furosemide (LASIX) 20 mg tablet (Taking) TAKE ONE TABLET BY MOUTH EVERY DAY    aspirin delayed-release 81 mg tablet (Taking) Take 1 Tab by mouth daily. omega-3 fatty acids-vitamin e (FISH OIL) 1,000 mg cap (Taking) Take 1 Cap by mouth. Lab Results   Component Value Date/Time    Sodium 142 09/27/2019 09:38 AM    Potassium 4.9 09/27/2019 09:38 AM    Chloride 103 09/27/2019 09:38 AM    CO2 26 09/27/2019 09:38 AM    Anion gap 5 04/12/2019 03:54 AM    Glucose 119 (H) 09/27/2019 09:38 AM    BUN 26 09/27/2019 09:38 AM    Creatinine 1.56 (H) 09/27/2019 09:38 AM    BUN/Creatinine ratio 17 09/27/2019 09:38 AM    GFR est AA 49 (L) 09/27/2019 09:38 AM    GFR est non-AA 43 (L) 09/27/2019 09:38 AM    Calcium 9.5 09/27/2019 09:38 AM    Bilirubin, total 0.2 09/27/2019 09:38 AM    AST (SGOT) 20 09/27/2019 09:38 AM    Alk. phosphatase 49 09/27/2019 09:38 AM    Protein, total 6.2 09/27/2019 09:38 AM    Albumin 4.3 09/27/2019 09:38 AM    Globulin 2.6 04/12/2019 03:54 AM    A-G Ratio 2.3 (H) 09/27/2019 09:38 AM    ALT (SGPT) 16 09/27/2019 09:38 AM     Low salt diet? yes  Aerobic exercise? no  Our goal is to normalize the blood pressure to decrease the risks of strokes and heart attacks. The patient is in agreement with the plan.  Pure hypercholesterolemia:  Cardiovascular risks for him are: LDL goal is under 80  diabetic  existing CAD  hypertension  hyperlipidemia. Key Antihyperlipidemia Meds             rosuvastatin (CRESTOR) 40 mg tablet (Taking) . omega-3 fatty acids-vitamin e (FISH OIL) 1,000 mg cap (Taking) Take 1 Cap by mouth.         Lab Results   Component Value Date/Time    Cholesterol, total 154 09/27/2019 09:38 AM    HDL Cholesterol 60 09/27/2019 09:38 AM    LDL, calculated 72 09/27/2019 09:38 AM    Triglyceride 110 09/27/2019 09:38 AM    CHOL/HDL Ratio 3.5 10/04/2010 11:30 AM Lab Results   Component Value Date/Time    ALT (SGPT) 16 09/27/2019 09:38 AM    AST (SGOT) 20 09/27/2019 09:38 AM    Alk. phosphatase 49 09/27/2019 09:38 AM    Bilirubin, total 0.2 09/27/2019 09:38 AM      Myalgias: No   Fatigue: No   Other side effects: no  Wt Readings from Last 3 Encounters:   12/30/19 200 lb 12.8 oz (91.1 kg)   09/27/19 196 lb (88.9 kg)   07/26/19 200 lb (90.7 kg)     The patient is aware of our goal to reduce or eliminate the long term problems (such as strokes and heart attacks) related to poorly controlled hyperlipidemia.  Prostate cancer [facility]):  No symptoms status post surgery.  Vitamin D deficiency:  No sx. Due for testing. Lab Results   Component Value Date/Time    Vitamin D 25-Hydroxy 29 (L) 10/04/2010 11:30 AM    VITAMIN D, 25-HYDROXY 42.4 06/10/2019 11:46 AM             Day  Current and past medical information:    Current Medications after this visit[de-identified]     Current Outpatient Medications   Medication Sig    famotidine (PEPCID) 40 mg tablet Take 1 Tab by mouth nightly.  felodipine (PLENDIL SR) 10 mg 24 hr tablet Take 1 Tab by mouth daily. Indications: high blood pressure    lisinopril (PRINIVIL, ZESTRIL) 40 mg tablet Take 1 Tab by mouth daily.  isosorbide mononitrate ER (IMDUR) 60 mg CR tablet TAKE ONE TABLET BY MOUTH EVERY DAY    rosuvastatin (CRESTOR) 40 mg tablet .  hydrALAZINE (APRESOLINE) 50 mg tablet TAKE ONE TABLET BY MOUTH THREE TIMES DAILY FOR HYPERTENSION    atenolol (TENORMIN) 25 mg tablet Take 1 Tab by mouth two (2) times a day. One tab twice daily.  albuterol (PROVENTIL HFA, VENTOLIN HFA, PROAIR HFA) 90 mcg/actuation inhaler Take 2 Puffs by inhalation every four (4) hours as needed for Wheezing for up to 360 days.  clopidogrel (PLAVIX) 75 mg tab Take 1 Tab by mouth daily for 360 days.  EPINEPHrine (EPIPEN 2-KARL) 0.3 mg/0.3 mL injection INJECT INTRAMUSCULARLY AS NEEDED FOR ONE DOSE. TAKE WITH 50MG OF BENADRYL AND CALL 911.  Dispense Generic    furosemide (LASIX) 20 mg tablet TAKE ONE TABLET BY MOUTH EVERY DAY    pantoprazole (PROTONIX) 40 mg tablet TAKE ONE TABLET BY MOUTH EVERY DAY FOR: GASTROESOPHAGEAL REFLUX    diclofenac (VOLTAREN) 1 % gel Apply 4 g to affected area four (4) times daily. Painful shoulder.  meclizine (ANTIVERT) 25 mg chewable tablet Take  by mouth three (3) times daily as needed.  aspirin delayed-release 81 mg tablet Take 1 Tab by mouth daily.  Peak Flow Meter eric Use prior and post nebulizer treatment    albuterol (PROVENTIL VENTOLIN) 2.5 mg /3 mL (0.083 %) nebulizer solution 3 mL by Nebulization route every six (6) hours as needed for Wheezing or Shortness of Breath.  omega-3 fatty acids-vitamin e (FISH OIL) 1,000 mg cap Take 1 Cap by mouth.  coenzyme q10 (CO Q-10) 100 mg Cap Take 100 mg by mouth daily.  metFORMIN ER (GLUCOPHAGE XR) 500 mg tablet TAKE 1 TABLET by MOUTH two TIMES A DAY    triamcinolone acetonide (KENALOG) 0.1 % topical cream Apply  to affected area two (2) times a day. use thin layer to poison ivy     No current facility-administered medications for this visit.         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 ([facility] [address] 07/16/2019    Type 2 diabetes with nephropathy ([facility] [address] 06/29/2018    CKD stage 3 due to type 2 diabetes mellitus ([facility] [address] 06/29/2018    Age-related nuclear cataract of both eyes 03/27/2018  PVD (posterior vitreous detachment), left eye 03/27/2018    Controlled type 2 diabetes mellitus with diabetic nephropathy ([facility] 12/03/2015    Prostate cancer ([facility] 08/23/2013    S/P carotid endarterectomy 12/05/2012    Vitamin D deficiency 12/05/2012    Amaurosis fugax of left eye 05/06/2012    Carotid artery obstruction 04/17/2012    Allergy to bee sting 04/13/2012       Past Medical History:   Diagnosis Date    Amaurosis fugax of left eye 5/6/2012    Arthritis     OSTEO    CAD (coronary artery disease) 2012    CAROTID LEFT     Cancer ([facility] 2013    PROSTATE    Chronic pain     DJD lumbar    Diabetes ([facility] 5/22/2010    Frequent nocturnal awakening     urinary frequency    GERD (gastroesophageal reflux disease) 5/22/2010    Hyperlipidemia 5/22/2010    Hypertension 5/22/2010    Kidney stone 5/22/2010    Nodular goiter     1.3 cm right , FNA 6/15    Obesity (BMI 30-39. 9)     Psychiatric disorder     ANXIETY    Vertigo        Allergies   Allergen Reactions    Bee Sting [Sting, Bee] Anaphylaxis    Vytorin 10-10 [Ezetimibe-Simvastatin] Myalgia    Amlodipine Other (comments)     Creepy crawly sensation, twitches    Lipitor [Atorvastatin] Myalgia       Past Surgical History:   Procedure Laterality Date    CARDIAC SURG PROCEDURE UNLIST  04/2019    3 stents placed    COLONOSCOPY  3/3/2016         EGD  3/3/2016         HX HEENT      tonsillectomy    HX MOHS PROCEDURES  2010    right    HX ORTHOPAEDIC      coccyx removed    HX UROLOGICAL  2010    left lithotripsy. basket  extraction,ureteral stent    HX VASECTOMY      NEUROLOGICAL PROCEDURE UNLISTED  2011    Steroid injections in epidural    VASCULAR SURGERY PROCEDURE UNLIST  2012    LEFT CAROTID       Social History     Socioeconomic History    Marital status:      Spouse name: Not on file    Number of children: Not on file    Years of education: Not on file    Highest education level: Not on file   Tobacco Use    Smoking status: Former Smoker     Packs/day: 0.50     Years: 4.00     Pack years: 2.00     Last attempt to quit: 1966     Years since quittin.7    Smokeless tobacco: Never Used   Substance and Sexual Activity    Alcohol use: No    Drug use: No    Sexual activity: Yes     Partners: Female       Review of Systems   Constitutional: Negative. Negative for chills, fever, malaise/fatigue and weight loss. HENT: Negative. Negative for hearing loss. Eyes: Negative. Negative for blurred vision and double vision. Respiratory: Negative. Negative for cough, sputum production and shortness of breath. Cardiovascular: Positive for chest pain. Negative for palpitations. Migratory chest pain. Still working as an . Gastrointestinal: Negative. Negative for abdominal pain, blood in stool, heartburn, nausea and vomiting. Genitourinary: Negative. Negative for dysuria, frequency and urgency. Musculoskeletal: Negative. Negative for back pain, falls, myalgias and neck pain. Skin: Negative. Negative for rash. Neurological: Negative. Negative for dizziness, tingling, tremors, weakness and headaches. Endo/Heme/Allergies: Negative. Psychiatric/Behavioral: Negative. Negative for depression. Objective:     Vitals:    19 0818 19 0834 19 0835   BP: 157/82 118/58 99/52   Pulse: (!) 47     Resp: 20     Temp: 98 °F (36.7 °C)     TempSrc: Oral     SpO2: 98%     Weight: 200 lb 12.8 oz (91.1 kg)     Height: 5' 11\" (1.803 m)        Body mass index is 28.01 kg/m². Physical Exam  Vitals signs and nursing note reviewed. Constitutional:       General: He is not in acute distress. Appearance: Normal appearance. He is well-developed. He is not toxic-appearing. HENT:      Head: Normocephalic and atraumatic. Nose: No congestion. Mouth/Throat:      Pharynx: No oropharyngeal exudate or posterior oropharyngeal erythema. Eyes:      General: No scleral icterus. Right eye: No discharge. Left eye: No discharge. Neck:      Comments: No bruit. Cardiovascular:      Rate and Rhythm: Normal rate and regular rhythm. Heart sounds: Murmur present. No friction rub. No gallop. Pulmonary:      Effort: Pulmonary effort is normal. No respiratory distress. Breath sounds: Normal breath sounds. No wheezing, rhonchi or rales. Abdominal:      General: There is no distension. Tenderness: There is no tenderness. There is no guarding. Musculoskeletal:         General: No swelling. Skin:     General: Skin is warm. Coloration: Skin is not jaundiced. Findings: No erythema or rash. Neurological:      Mental Status: He is alert. Health Maintenance Due   Topic Date Due    EYE EXAM RETINAL OR DILATED  05/25/2019    FOOT EXAM Q1  10/17/2019         Assessment and orders:     Encounter Diagnoses     ICD-10-CM ICD-9-CM   1. Controlled type 2 diabetes mellitus with diabetic nephropathy, without long-term current use of insulin ([facility]) E11.21 250.40     583.81   2. CKD stage 3 due to type 2 diabetes mellitus ([facility]) E11.22 250.40    N18.3 585.3   3. Coronary artery disease of native artery of native heart with stable angina pectoris ([facility] I25.118 414.01     413.9   4. Essential hypertension I10 401.9   5. Pure hypercholesterolemia E78.00 272.0   6. Prostate cancer ([facility] C61 185   7. Vitamin D deficiency E55.9 268.9     Diagnoses and all orders for this visit:    1. Controlled type 2 diabetes mellitus with diabetic nephropathy, without long-term current use of insulin ([facility])-retest.  He is off metformin so we will have to make a decision about whether or not he needs additional medication.  -     HEMOGLOBIN A1C WITH EAG; Future  -     LIPID PANEL; Future  -     METABOLIC PANEL, COMPREHENSIVE; Future  -      DIABETES FOOT EXAM    2. CKD stage 3 due to type 2 diabetes mellitus ([facility])-retest  -     LIPID PANEL;  Future  -     METABOLIC PANEL, COMPREHENSIVE; Future  -     VITAMIN D, 25 HYDROXY; Future  -     PTH INTACT; Future  -     PROTEIN/CREATININE RATIO, URINE; Future    3. Coronary artery disease of native artery of native heart with stable angina pectoris (HCC)-see cardiology in 3 weeks. He probably will need a stress test to see if this chest discomfort is musculoskeletal or angina. Emergency room if symptoms change or increase. Asked him to try nitroglycerin when he gets a discomfort to see if his discomfort goes away. No heavy work until after cleared by cardiology.  -     LIPID PANEL; Future  -     METABOLIC PANEL, COMPREHENSIVE; Future    4. Essential hypertension-controlled with mild orthostasis. -     PROTEIN/CREATININE RATIO, URINE; Future    5. Pure hypercholesterolemia-retest-  -     LIPID PANEL; Future  -     METABOLIC PANEL, COMPREHENSIVE; Future    6. Prostate cancer ([facility] [address] treated    7. Vitamin D deficiency-  -     VITAMIN D, 25 HYDROXY; Future    8: GERD-Replacement medication for ranitidine.  -     famotidine (PEPCID) 40 mg tablet; Take 1 Tab by mouth nightly. Plan of care:  Discussed diagnoses in detail with patient. Medication risks/benefits/side effects discussed with patient. All of the patient's questions were addressed. The patient understands and agrees with our plan of care. The patient knows to call back if they are unsure of or forget any changes we discussed today or if the symptoms change. The patient received an After-Visit Summary which contains VS, orders, medication list and allergy list. This can be used as a \"mini-medical record\" should they have to seek medical care while out of town.     Patient Care Team:  Brooke Hernández MD as PCP - General  Brooke Hernández MD as PCP - [facility] Empaneled Provider  Vilma Stephens MD (General and Vascular Surgery)  Sanjay Alex MD (Neurology)  Janes Rowell MD as Surgeon (Urology)  David Yan MD (Endocrinology)  No Garcia MD [facility] (Dermatology)  Sandrita Kelly DO as Physician (Cardiology)  Jaylene Waterman MD (Ophthalmology)    Follow-up and Dispositions    · Return in about 3 months (around 3/30/2020). Future Appointments   Date Time Provider Hank Rubio   1/24/2020  9:20 AM Sandrita Kelly DO [facility] SCHED       Signed By: Hannah Lopes MD     December 30, 2019      ATTENTION:   This medical record was transcribed using an electronic medical records/speech recognition system. Although proofread, it may and can contain electronic, spelling and other errors. Corrections may be executed at a later time. Please feel free to contact me for any clarifications as needed.

## 2019-12-30 NOTE — PROGRESS NOTES
1. Have you been to the ER, urgent care clinic since your last visit? Hospitalized since your last visit? No    2. Have you seen or consulted any other health care providers outside of the [address] [place] [address] since your last visit? Include any pap smears or colon screening. No    Reviewed record in preparation for visit and have necessary documentation  Goals that were addressed and/or need to be completed during or after this appointment include     Health Maintenance Due   Topic Date Due    EYE EXAM RETINAL OR DILATED  05/25/2019    FOOT EXAM Q1  10/17/2019       Patient is accompanied by self I have received verbal consent from Vitaliy Anguiano III to discuss any/all medical information while they are present in the room.

## 2019-12-30 NOTE — PATIENT INSTRUCTIONS
Learning About Coronary Artery Disease (CAD)  What is coronary artery disease? Coronary artery disease (CAD) occurs when plaque builds up in the arteries that bring oxygen-rich blood to your heart. Plaque is a fatty substance made of cholesterol, calcium, and other substances in the blood. This process is called hardening of the arteries, or atherosclerosis. What happens when you have coronary artery disease? · Plaque may narrow the coronary arteries. Narrowed arteries cause poor blood flow. This can lead to angina symptoms such as chest pain or discomfort. If blood flow is completely blocked, you could have a heart attack. · You can slow CAD and reduce the risk of future problems by making changes in your lifestyle. These include quitting smoking and eating heart-healthy foods. · Treatments for CAD, along with changes in your lifestyle, can help you live a longer and healthier life. How can you prevent coronary artery disease? · Do not smoke. It may be the best thing you can do to prevent heart disease. If you need help quitting, talk to your doctor about stop-smoking programs and medicines. These can increase your chances of quitting for good. · Be active. Get at least 30 minutes of exercise on most days of the week. Walking is a good choice. You also may want to do other activities, such as running, swimming, cycling, or playing tennis or team sports. · Eat heart-healthy foods. Eat more fruits and vegetables and less foods that contain saturated and trans fats. Limit alcohol, sodium, and sweets. · Stay at a healthy weight. Lose weight if you need to. · Manage other health problems such as diabetes, high blood pressure, and high cholesterol. How is coronary artery disease treated? · Your doctor will suggest that you make lifestyle changes. For example, your doctor may ask you to eat healthy foods, quit smoking, lose extra weight, and be more active. · You will have to take medicines.   · Your doctor may suggest a procedure to open narrowed or blocked arteries. This is called angioplasty. Or your doctor may suggest using healthy blood vessels to create detours around narrowed or blocked arteries. This is called bypass surgery. Follow-up care is a key part of your treatment and safety. Be sure to make and go to all appointments, and call your doctor if you are having problems. It's also a good idea to know your test results and keep a list of the medicines you take. Where can you learn more? Go to http://elizabeth-antonieta.info/. Enter [phone] in the search box to learn more about \"Learning About Coronary Artery Disease (CAD). \"  Current as of: April 9, 2019  Content Version: 12.2  © 6075-2200 [employer]. Care instructions adapted under license by [employer] (which disclaims liability or warranty for this information). If you have questions about a medical condition or this instruction, always ask your healthcare professional. [place] [address] any warranty or liability for your use of this information.

## 2020-01-24 ENCOUNTER — OFFICE VISIT (OUTPATIENT)
Dept: CARDIOLOGY CLINIC | Age: 75
End: 2020-01-24

## 2020-01-24 VITALS
DIASTOLIC BLOOD PRESSURE: 62 MMHG | BODY MASS INDEX: 28.39 KG/M2 | SYSTOLIC BLOOD PRESSURE: 122 MMHG | HEART RATE: 45 BPM | HEIGHT: 71 IN | WEIGHT: 202.8 LBS | OXYGEN SATURATION: 98 %

## 2020-01-24 DIAGNOSIS — I25.118 CORONARY ARTERY DISEASE OF NATIVE ARTERY OF NATIVE HEART WITH STABLE ANGINA PECTORIS (HCC): Primary | ICD-10-CM

## 2020-01-24 DIAGNOSIS — E11.22 CKD STAGE 3 DUE TO TYPE 2 DIABETES MELLITUS (HCC): ICD-10-CM

## 2020-01-24 DIAGNOSIS — I10 ESSENTIAL HYPERTENSION: ICD-10-CM

## 2020-01-24 DIAGNOSIS — I65.22 OBSTRUCTION OF LEFT CAROTID ARTERY: ICD-10-CM

## 2020-01-24 DIAGNOSIS — E11.21 CONTROLLED TYPE 2 DIABETES MELLITUS WITH DIABETIC NEPHROPATHY, WITHOUT LONG-TERM CURRENT USE OF INSULIN (HCC): ICD-10-CM

## 2020-01-24 DIAGNOSIS — E78.00 PURE HYPERCHOLESTEROLEMIA: ICD-10-CM

## 2020-01-24 DIAGNOSIS — N18.30 CKD STAGE 3 DUE TO TYPE 2 DIABETES MELLITUS (HCC): ICD-10-CM

## 2020-01-24 DIAGNOSIS — E11.21 TYPE 2 DIABETES WITH NEPHROPATHY (HCC): ICD-10-CM

## 2020-01-24 DIAGNOSIS — H53.9 VISION CHANGES: ICD-10-CM

## 2020-01-24 RX ORDER — EZETIMIBE 10 MG/1
10 TABLET ORAL DAILY
Qty: 90 TAB | Refills: 3 | Status: SHIPPED | OUTPATIENT
Start: 2020-01-24 | End: 2020-09-09 | Stop reason: ALTCHOICE

## 2020-01-24 NOTE — PROGRESS NOTES
Chief Complaint   Patient presents with    Follow-up     Patient presents today for a follow up visit for CAD & HTN    Hypertension    Coronary Artery Disease     Visit Vitals  /62 (BP 1 Location: Left arm, BP Patient Position: Sitting)   Pulse (!) 45   Ht 5' 11\" (1.803 m)   Wt 202 lb 12.8 oz (92 kg)   SpO2 98%   BMI 28.28 kg/m²         Chest pain denied  SOB - denied  Dizziness denied  Swelling/Edema - denied  Recent hospital visit denied  Refills denied

## 2020-01-24 NOTE — PROGRESS NOTES
[facility] [address], [address] [facility], [address] [place] [address]    Office [phone],[facility] [phone]           Danilo Guzman III is a 66 y.o. presents for f/u of CAD      Assessment/Recommendations:      Coronary artery disease - stable angina symptoms  Distal LAD  filling via L-->L collaterals   Lcx  4/2019. 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 - cont DAPT, low bleeding risk with severe ASCVD  - Goal-directed medical therapy    Palpitations-symptomatic PVCs based on Holter monitor. Brief 4 beat run of ventricular tachycardia noted on monitor. resolved with restarting beta-blocker therapy. Continue atenolol therapy. Hypertension-stable continue treatment per primary care. Diabetes-  Per Roopa Duran MD      Hyperlipidemia  -LDL 66, on statin therapy. Previous myalgias with Vytorin. Tolerating rosuvastatin 40mg daily, add zetia 10mg daily due to severe ASCVD      GERD- PPI      Carotid artery stenosis- s/p carotid endarterectomy on aspirin and statin. followed by  Kevin Drake MD       Amaurosis fugax of left eye- recommend f/up with neurology        Primary Care Physician- Roopa Duran MD    F/u 6 months sooner as needed      Subjective:  66 y.o. male presents for follow-up. Distal LAD  with retrograde filling of apical LAD, severe disease within the proximal AV groove circumflex supplies several very small disease marginals. 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 Continues to play golf and work as an . Walks up and down ladders, working all day without any chest pain symptoms. Will occasionally develop chest burning, mainly at rest that is fleeting in nature. Main complaint is occasional \"wiggly things across the back of my eye\". No other neurological symptoms with vision changes.       Past Medical History:   Diagnosis Date    Amaurosis fugax of left eye 5/6/2012    Arthritis     OSTEO    CAD (coronary artery disease) 2012    CAROTID LEFT     Cancer ([facility] [address] 2013    PROSTATE    Chronic pain     DJD lumbar    Diabetes ([facility] [address] 5/22/2010    Frequent nocturnal awakening     urinary frequency    GERD (gastroesophageal reflux disease) 5/22/2010    Hyperlipidemia 5/22/2010    Hypertension 5/22/2010    Kidney stone 5/22/2010    Nodular goiter     1.3 cm right , FNA 6/15    Obesity (BMI 30-39. 9)     Psychiatric disorder     ANXIETY    Vertigo         Past Surgical History:   Procedure Laterality Date    CARDIAC SURG PROCEDURE UNLIST  04/2019    3 stents placed    COLONOSCOPY  3/3/2016         EGD  3/3/2016         HX HEENT      tonsillectomy    HX MOHS PROCEDURES  2010    right    HX ORTHOPAEDIC      coccyx removed    HX UROLOGICAL  2010    left lithotripsy. basket  extraction,ureteral stent    HX VASECTOMY      NEUROLOGICAL PROCEDURE UNLISTED  2011    Steroid injections in epidural    VASCULAR SURGERY PROCEDURE UNLIST  2012    LEFT CAROTID         Current Outpatient Medications:     ezetimibe (ZETIA) 10 mg tablet, Take 1 Tab by mouth daily. , Disp: 90 Tab, Rfl: 3    famotidine (PEPCID) 40 mg tablet, Take 1 Tab by mouth nightly., Disp: 90 Tab, Rfl: 3    felodipine (PLENDIL SR) 10 mg 24 hr tablet, Take 1 Tab by mouth daily. Indications: high blood pressure, Disp: 30 Tab, Rfl: 11    lisinopril (PRINIVIL, ZESTRIL) 40 mg tablet, Take 1 Tab by mouth daily. , Disp: 30 Tab, Rfl: 11    isosorbide mononitrate ER (IMDUR) 60 mg CR tablet, TAKE ONE TABLET BY MOUTH EVERY DAY, Disp: 30 Tab, Rfl: 5    rosuvastatin (CRESTOR) 40 mg tablet, ., Disp: 90 Tab, Rfl: 3    triamcinolone acetonide (KENALOG) 0.1 % topical cream, Apply  to affected area two (2) times a day.  use thin layer to poison ivy, Disp: 453 g, Rfl: 0    hydrALAZINE (APRESOLINE) 50 mg tablet, TAKE ONE TABLET BY MOUTH THREE TIMES DAILY FOR HYPERTENSION, Disp: 90 Tab, Rfl: 6    atenolol (TENORMIN) 25 mg tablet, Take 1 Tab by mouth two (2) times a day. One tab twice daily. , Disp: 180 Tab, Rfl: 2    albuterol (PROVENTIL HFA, VENTOLIN HFA, PROAIR HFA) 90 mcg/actuation inhaler, Take 2 Puffs by inhalation every four (4) hours as needed for Wheezing for up to 360 days. , Disp: 1 Inhaler, Rfl: 10    clopidogrel (PLAVIX) 75 mg tab, Take 1 Tab by mouth daily for 360 days. , Disp: 30 Tab, Rfl: 11    EPINEPHrine (EPIPEN 2-KARL) 0.3 mg/0.3 mL injection, INJECT INTRAMUSCULARLY AS NEEDED FOR ONE DOSE. TAKE WITH 50MG OF BENADRYL AND CALL 911. Dispense Generic, Disp: 1 Syringe, Rfl: 3    furosemide (LASIX) 20 mg tablet, TAKE ONE TABLET BY MOUTH EVERY DAY, Disp: 30 Tab, Rfl: 11    pantoprazole (PROTONIX) 40 mg tablet, TAKE ONE TABLET BY MOUTH EVERY DAY FOR: GASTROESOPHAGEAL REFLUX, Disp: 30 Tab, Rfl: 11    diclofenac (VOLTAREN) 1 % gel, Apply 4 g to affected area four (4) times daily. Painful shoulder., Disp: 100 g, Rfl: 3    meclizine (ANTIVERT) 25 mg chewable tablet, Take  by mouth three (3) times daily as needed. , Disp: , Rfl:     aspirin delayed-release 81 mg tablet, Take 1 Tab by mouth daily. , Disp: 30 Tab, Rfl: 0    Peak Flow Meter eric, Use prior and post nebulizer treatment, Disp: 1 Device, Rfl: 0    albuterol (PROVENTIL VENTOLIN) 2.5 mg /3 mL (0.083 %) nebulizer solution, 3 mL by Nebulization route every six (6) hours as needed for Wheezing or Shortness of Breath., Disp: 24 Each, Rfl: 5    omega-3 fatty acids-vitamin e (FISH OIL) 1,000 mg cap, Take 1 Cap by mouth., Disp: , Rfl:     coenzyme q10 (CO Q-10) 100 mg Cap, Take 100 mg by mouth daily. , Disp: , Rfl:     metFORMIN ER (GLUCOPHAGE XR) 500 mg tablet, TAKE 1 TABLET by MOUTH two TIMES A DAY (Patient not taking: Reported on 1/24/2020), Disp: 180 Tab, Rfl: 3    Allergies   Allergen Reactions    Bee Sting [Sting, Bee] Anaphylaxis    Vytorin 10-10 [Ezetimibe-Simvastatin] Myalgia    Amlodipine Other (comments)     Creepy crawly sensation, twitches    Lipitor [Atorvastatin] Myalgia        Family History   Problem Relation Age of Onset    Diabetes Mother     Obesity Mother     Heart Disease Mother    [facility] Stroke Father     Heart Disease Sister     Obesity Sister    [address] [facility] Diabetes Sister    Mother  of a heart attack at age 62  Father had a stroke in his 76s    Social History     Tobacco Use    Smoking status: Former Smoker     Packs/day: 0.50     Years: 4.00     Pack years: 2.00     Last attempt to quit: 1966     Years since quittin.8    Smokeless tobacco: Never Used   Substance Use Topics    Alcohol use: No    Drug use: No       Review of Symptoms:  Pertinent Positive: atypical chest pain symptoms, transient left eye blurred vision  Pertinent Negative: No shortness of breath orthopnea PND. All Other systems reviewed and are negative for a Comprehensive ROS (10+)    Physical Exam    Blood pressure 122/62, pulse (!) 45, height 5' 11\" (1.803 m), weight 202 lb 12.8 oz (92 kg), SpO2 98 %. Constitutional:  well-developed and well-nourished. No distress. HENT: Normocephalic. Eyes: No scleral icterus. Neck:  Neck supple. No JVD present. Pulmonary/Chest: Effort normal and breath sounds normal. No respiratory distress, wheezes or rales. Cardiovascular: Normal rate, regular rhythm, S1 S2 .  2/6 systolic murmur   extremities:  Normal muscle tone  Abdominal:   No abnormal distension. Neurological:  Moving all extremities, cranial nerves appear grossly intact. Skin: Skin is not cold. Not diaphoretic. No erythema. Psychiatric:  Grossly normal mood and affect. Intact insight. Objective Data:     Lipids 19 - , HDL 64, LDL 76, , VLDL 22    Echo 2018: Normal LVEF, mild AS, mild MR/TR    Event monitor-2019 to 2019. Rare ventricular ectopy, 6 supraventricular runs of ventricular tachycardia the longest being 4 beats. Supraventricular ectopy involving 8.2% of beats.     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  Post-dilated with 3.0NC just inside distal stent edge and 3.75NC proximally at high pressure                       Elle Linda, DO

## 2020-01-27 DIAGNOSIS — K21.00 GASTROESOPHAGEAL REFLUX DISEASE WITH ESOPHAGITIS: ICD-10-CM

## 2020-01-27 DIAGNOSIS — M19.90 CHRONIC ARTHRITIS: ICD-10-CM

## 2020-01-27 DIAGNOSIS — G89.4 CHRONIC PAIN SYNDROME: ICD-10-CM

## 2020-01-27 DIAGNOSIS — I10 ACCELERATED HYPERTENSION: ICD-10-CM

## 2020-01-28 RX ORDER — TRAMADOL HYDROCHLORIDE 50 MG/1
TABLET ORAL
Qty: 120 TAB | Refills: 1 | Status: SHIPPED | OUTPATIENT
Start: 2020-01-28 | End: 2020-03-27 | Stop reason: SDUPTHER

## 2020-01-28 RX ORDER — PANTOPRAZOLE SODIUM 40 MG/1
TABLET, DELAYED RELEASE ORAL
Qty: 30 TAB | Refills: 11 | Status: SHIPPED | OUTPATIENT
Start: 2020-01-28 | End: 2021-03-02 | Stop reason: SDUPTHER

## 2020-01-28 RX ORDER — HYDRALAZINE HYDROCHLORIDE 50 MG/1
TABLET, FILM COATED ORAL
Qty: 90 TAB | Refills: 6 | Status: SHIPPED | OUTPATIENT
Start: 2020-01-28 | End: 2020-11-09 | Stop reason: SDUPTHER

## 2020-02-24 ENCOUNTER — TELEPHONE (OUTPATIENT)
Dept: CARDIOLOGY CLINIC | Age: 75
End: 2020-02-24

## 2020-02-24 NOTE — TELEPHONE ENCOUNTER
Patient states that he was asked to call and let Dr Pradeep Sanchez know how he was feeling on Zetia. He states that it was not making him feel well and he would like to discontinue.     Phone:  [phone]

## 2020-02-25 RX ORDER — ISOSORBIDE MONONITRATE 60 MG/1
TABLET, EXTENDED RELEASE ORAL
Qty: 30 TAB | Refills: 5 | Status: SHIPPED | OUTPATIENT
Start: 2020-02-25 | End: 2020-08-20

## 2020-02-25 RX ORDER — ROSUVASTATIN CALCIUM 40 MG/1
TABLET, COATED ORAL
Qty: 90 TAB | Refills: 3 | Status: SHIPPED | OUTPATIENT
Start: 2020-02-25 | End: 2021-03-17

## 2020-03-03 ENCOUNTER — TELEPHONE (OUTPATIENT)
Dept: CARDIOLOGY CLINIC | Age: 75
End: 2020-03-03

## 2020-03-03 NOTE — TELEPHONE ENCOUNTER
Returned patient's call he wanted you know since starting the Zetia about a month ago he has experienced increased muscle & joint pain. He currently is taking Crestor 40 mg daily & Zetia 10 mg daily. He would like to know if he should continue or what you recommend at this time. Please advise.

## 2020-03-03 NOTE — TELEPHONE ENCOUNTER
Patient is about to need a refill on his ezetimibe and states that he think he may be having some side effcets from it and would like to speak with someone about it before getting it refilled. Please advise.     Phone: [phone]

## 2020-03-04 NOTE — TELEPHONE ENCOUNTER
Called patient no answer, LM/Vm to call office in regards to below message. Hold Zetia for now and monitor symptoms.

## 2020-03-05 ENCOUNTER — TELEPHONE (OUTPATIENT)
Dept: FAMILY MEDICINE CLINIC | Age: 75
End: 2020-03-05

## 2020-03-05 NOTE — TELEPHONE ENCOUNTER
----- Message from Patrick San sent at 3/5/2020 10:13 AM EST -----  Regarding: Dr. Tracy Salazar: [phone]  Caller's first and last name: n/a  Reason for call: Pt would like to discuss his blood sugar. Callback required yes/no and why: Yes  Best contact number(s): [phone]  Details to clarify the request: Pt would also like to discuss alternative medication now that he is not taking metformin.

## 2020-03-05 NOTE — TELEPHONE ENCOUNTER
Patient returned phone call and said that Dr. Jonathan Joshi took him off of Metformin. He thought that we were going to send in another medication in place of it. He says that his wife is taking Glipizide and that works for her and wanted to know if that is something he should be on? He reports that his sugars have been running in the 240-250 range after meals. His next appointment is on 3/31/2020. Please advise, thank you.

## 2020-03-06 NOTE — TELEPHONE ENCOUNTER
Spoke with patient's with and advised her that patient needs to make an appointment with Dr. Cody Jarrett to discuss this. She verbalized understanding and agreed to tell the patient.

## 2020-03-24 ENCOUNTER — TELEPHONE (OUTPATIENT)
Dept: FAMILY MEDICINE CLINIC | Age: 75
End: 2020-03-24

## 2020-03-24 NOTE — TELEPHONE ENCOUNTER
Patient called asking which blood pressure medication he was suppose to stop. Advised patient that on 11/25/19 Dr. Heidi Fuchs restarted his Felodipine due to his high blood pressure readings. Patient says that he has been feeling light headed and feels that way all of the time. Asked patient if he has checked his sugar -- he says that he doesn't take Metformin anymore and does not check his blood sugar. Asked patient about recent blood pressure readings. He says that his blood pressure was 130/66 and heart rate was 48 the other day. He reports that his heart rate always runs low. Asked patient to take his blood pressure sitting and then take it standing. Asked patient to check his blood sugar. Patient stated that he was not at home but would call back after doing so.

## 2020-03-27 DIAGNOSIS — G89.4 CHRONIC PAIN SYNDROME: ICD-10-CM

## 2020-03-27 DIAGNOSIS — M19.90 CHRONIC ARTHRITIS: ICD-10-CM

## 2020-03-27 RX ORDER — TRAMADOL HYDROCHLORIDE 50 MG/1
100 TABLET ORAL EVERY 12 HOURS
Qty: 120 TAB | Refills: 0 | Status: SHIPPED | OUTPATIENT
Start: 2020-03-27 | End: 2020-05-20 | Stop reason: SDUPTHER

## 2020-03-27 NOTE — TELEPHONE ENCOUNTER
Attempted to call. No answer. Message left. Need to advise Per Dr. Jennifer Almaraz:  Both of these readings are great. No changes unless he has problems.

## 2020-04-07 DIAGNOSIS — I10 ESSENTIAL HYPERTENSION: ICD-10-CM

## 2020-04-07 RX ORDER — FUROSEMIDE 20 MG/1
20 TABLET ORAL DAILY
Qty: 30 TAB | Refills: 11 | Status: SHIPPED | OUTPATIENT
Start: 2020-04-07 | End: 2020-07-08 | Stop reason: SDUPTHER

## 2020-04-07 NOTE — TELEPHONE ENCOUNTER
Pt sent to Pharmacy since Thursday. I don't see it. Pharmacy says they were waiting on us. Pt is completely out now.

## 2020-04-15 NOTE — TELEPHONE ENCOUNTER
LOV 1- NOV 7-    Medication refill per VO per Dr. Brodie Loza    Requested Prescriptions     Pending Prescriptions Disp Refills    clopidogreL (Plavix) 75 mg tab 90 Tab 0     Sig: Take 1 Tab by mouth daily.

## 2020-04-16 RX ORDER — CLOPIDOGREL BISULFATE 75 MG/1
75 TABLET ORAL DAILY
Qty: 90 TAB | Refills: 1 | Status: SHIPPED | OUTPATIENT
Start: 2020-04-16 | End: 2020-10-20

## 2020-04-23 ENCOUNTER — VIRTUAL VISIT (OUTPATIENT)
Dept: FAMILY MEDICINE CLINIC | Age: 75
End: 2020-04-23

## 2020-04-23 ENCOUNTER — TELEPHONE (OUTPATIENT)
Dept: FAMILY MEDICINE CLINIC | Age: 75
End: 2020-04-23

## 2020-04-23 DIAGNOSIS — C61 PROSTATE CANCER (HCC): ICD-10-CM

## 2020-04-23 DIAGNOSIS — I10 ESSENTIAL HYPERTENSION: Primary | ICD-10-CM

## 2020-04-23 DIAGNOSIS — I25.118 CORONARY ARTERY DISEASE OF NATIVE ARTERY OF NATIVE HEART WITH STABLE ANGINA PECTORIS (HCC): ICD-10-CM

## 2020-04-23 DIAGNOSIS — C61 PROSTATE CANCER (HCC): Primary | ICD-10-CM

## 2020-04-23 RX ORDER — LISINOPRIL 20 MG/1
20 TABLET ORAL DAILY
Qty: 1 TAB | Refills: 0
Start: 2020-04-23 | End: 2020-09-09 | Stop reason: SDUPTHER

## 2020-04-23 NOTE — PROGRESS NOTES
I discussed the findings, assessment and plan in detail with the resident and agree with the resident's findings and plan as documented in the resident's note. Shauna   Francisco Reeder M.D.

## 2020-04-23 NOTE — PROGRESS NOTES
Esme Barber III 
66 y.o. male 1945 
[facility] 
[id number] [facility]:   
Telemedicine Progress Note Paul Chavez MD 
  
 
Encounter Date and Time: April 23, 2020 at 1:18 PM 
 
Consent: 
He and/or the health care decision maker is aware that that he may receive a bill for this telephone service, depending on his insurance coverage, and has provided verbal consent to proceed: Yes Chief Complaint Patient presents with  Hypotension History of Present Illness Krishan Gold is a 66 y.o. male was evaluated by synchronous (real-time) audio-video technology from ***home, through the [employer] Patient Portal. 
 
*** 
 
I get up and feel pretty good, then 2 hours after taking medicine start feeling dizzy/lightheaded, Blood pressure at home was 180/80 8am in bed, took medicine, then 98/50 this AM at 10:30 after walking up 20 steps, MAP of 66, BG was 160, 105/59 at 11:30 HTN: 
-Atenolol 25mg BID  
-Lasix 20mg daily 
-Imdur 60mg CR 
-Hydralazine 50mg TID 
-Lisinopril 40mg daily Will see PCP 5/4/20 Review of Systems Review of Systems Constitutional: Negative for chills and fever. Eyes: Positive for blurred vision. Negative for pain. Respiratory: Negative for cough and shortness of breath. Cardiovascular: Negative for chest pain, palpitations and leg swelling. Gastrointestinal: Negative for nausea and vomiting. Musculoskeletal: Negative for falls. Neurological: Positive for dizziness. Negative for loss of consciousness. Vitals/Objective:  
 
General: {gen appear:40883::\"alert\",\"cooperative\",\"no distress\"} Mental  status: {mental status:628575::\"alert, oriented to person, place, and time\",\"normal mood, behavior, speech, dress, motor activity, and thought processes\":1} Resp: {resp:96498::\"normal effort\",\"no respiratory distress\":1} Neuro: {neuro:78406::\"no gross deficits\":1} Skin: {skin:772240::\"no discoloration or lesions of concern on visible areas\":1} Due to this being a TeleHealth evaluation, many elements of the physical examination are unable to be assessed. Assessment and Plan:  
{Time-based coding, delete if not needed:55549::\"I spent at least 15 minutes with this established patient, and >50% of the time was spent counseling and/or coordinating care regarding ***\"} Sofia Mendiola is a 66 y.o. male with *** There are no diagnoses linked to this encounter. Time spent in direct conversation with the patient to include medical condition(s) discussed, assessment and treatment plan: 
 
  
We discussed the expected course, resolution and complications of the diagnosis(es) in detail. Medication risks, benefits, costs, interactions, and alternatives were discussed as indicated. I advised him to contact the office if his condition worsens, changes or fails to improve as anticipated. He expressed understanding with the diagnosis(es) and plan. Patient understands that this encounter was a temporary measure, and the importance of further follow up and examination was emphasized. Patient verbalized understanding. Patient informed to follow up: ***. Electronically Signed: Lorrie Power MD 
 
Providers location when delivering service (clinic, hospital, home): *** 
 
CPT Codes 79942-64088 for Established Patients may apply to this Telehealth Visit. POS code: 18. Modifier GT Pursuant to the emergency declaration under the [address] [place] [address], Critical access hospital waiver authority and the [employer] and [employer] Act, this Virtual  Visit was conducted, with patient's consent, to reduce the patient's risk of exposure to COVID-19 and provide continuity of care for an established patient.  
  
Services were provided through a video synchronous discussion virtually to substitute for in-person clinic visit. History Patients past medical, surgical and family histories were reviewed. Past Medical History:  
Diagnosis Date  Amaurosis fugax of left eye 5/6/2012  Arthritis OSTEO  
 CAD (coronary artery disease) 2012 CAROTID LEFT   
 Cancer [facility]) 2013 PROSTATE  Chronic pain DJD lumbar  Diabetes ([facility] [address] 5/22/2010  Frequent nocturnal awakening   
 urinary frequency  GERD (gastroesophageal reflux disease) 5/22/2010  Hyperlipidemia 5/22/2010  Hypertension 5/22/2010  Kidney stone 5/22/2010  Nodular goiter 1.3 cm right , FNA 6/15  Obesity (BMI 30-39. 9)  Psychiatric disorder ANXIETY  Vertigo Past Surgical History:  
Procedure Laterality Date  CARDIAC SURG PROCEDURE UNLIST  04/2019  
 3 stents placed  COLONOSCOPY  3/3/2016  EGD  3/3/2016  HX HEENT    
 tonsillectomy  HX MOHS PROCEDURES  2010  
 right  HX ORTHOPAEDIC    
 coccyx removed  HX UROLOGICAL  2010  
 left lithotripsy. basket  extraction,ureteral stent  HX VASECTOMY  NEUROLOGICAL PROCEDURE UNLISTED  2011 Steroid injections in epidural  
 VASCULAR SURGERY PROCEDURE UNLIST  2012 LEFT CAROTID Family History Problem Relation Age of Onset  Diabetes Mother  Obesity Mother  Heart Disease Mother  Stroke Father  Heart Disease Sister  Obesity Sister  Diabetes Sister Social History Socioeconomic History  Marital status:  Spouse name: Not on file  Number of children: Not on file  Years of education: Not on file  Highest education level: Not on file Occupational History  Not on file Social Needs  Financial resource strain: Not on file  Food insecurity Worry: Not on file Inability: Not on file  Transportation needs Medical: Not on file Non-medical: Not on file Tobacco Use  Smoking status: Former Smoker Packs/day: 0.50 Years: 4.00 Pack years: 2.00 Last attempt to quit: 1966 Years since quittin.0  Smokeless tobacco: Never Used Substance and Sexual Activity  Alcohol use: No  
 Drug use: No  
 Sexual activity: Yes  
  Partners: Female Lifestyle  Physical activity Days per week: Not on file Minutes per session: Not on file  Stress: Not on file Relationships  Social connections Talks on phone: Not on file Gets together: Not on file Attends [employer] service: Not on file Active member of club or organization: Not on file Attends meetings of clubs or organizations: Not on file Relationship status: Not on file  Intimate partner violence Fear of current or ex partner: Not on file Emotionally abused: Not on file Physically abused: Not on file Forced sexual activity: Not on file Other Topics Concern  Not on file Social History Narrative  Not on file Patient Active Problem List  
Diagnosis Code  Hypertension I10  
 Hyperlipidemia E78.5  GERD (gastroesophageal reflux disease) K21.9  Kidney stone N20.0  Bone spur M77.9  Allergy to bee sting Z91.030  
 Carotid artery obstruction I65.29  
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  
 Type 2 diabetes with nephropathy (HCC) E11.21  
 CKD stage 3 due to type 2 diabetes mellitus (HCC) E11.22, N18.3  Heart murmur R01.1  Age-related nuclear cataract of both eyes H25.13  
 PVD (posterior vitreous detachment), left eye H43.812  
 History of angina Z86.79  
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 90 Tab 1  
 furosemide (LASIX) 20 mg tablet Take 1 Tab by mouth daily. 30 Tab 11  
 traMADoL (ULTRAM) 50 mg tablet Take 2 Tabs by mouth every twelve (12) hours for 30 days. Max Daily Amount: 200 mg. 120 Tab 0  
 isosorbide mononitrate ER (IMDUR) 60 mg CR tablet TAKE ONE TABLET BY MOUTH EVERY DAY 30 Tab 5  
 rosuvastatin (CRESTOR) 40 mg tablet TAKE ONE TABLET BY MOUTH EVERY DAY 90 Tab 3  pantoprazole (PROTONIX) 40 mg tablet TAKE ONE TABLET BY MOUTH EVERY DAY FOR: GASTROESOPHAGEAL REFLUX 30 Tab 11  
 hydrALAZINE (APRESOLINE) 50 mg tablet TAKE ONE TABLET BY MOUTH THREE TIMES DAILY FOR HYPERTENSION 90 Tab 6  
 ezetimibe (ZETIA) 10 mg tablet Take 1 Tab by mouth daily. 90 Tab 3  
 famotidine (PEPCID) 40 mg tablet Take 1 Tab by mouth nightly. 90 Tab 3  
 felodipine (PLENDIL SR) 10 mg 24 hr tablet Take 1 Tab by mouth daily. Indications: high blood pressure 30 Tab 11  
 lisinopril (PRINIVIL, ZESTRIL) 40 mg tablet Take 1 Tab by mouth daily. 30 Tab 11  
 metFORMIN ER (GLUCOPHAGE XR) 500 mg tablet TAKE 1 TABLET by MOUTH two TIMES A DAY (Patient not taking: Reported on 1/24/2020) 180 Tab 3  
 triamcinolone acetonide (KENALOG) 0.1 % topical cream Apply  to affected area two (2) times a day. use thin layer to poison ivy 453 g 0  
 atenolol (TENORMIN) 25 mg tablet Take 1 Tab by mouth two (2) times a day. One tab twice daily. 180 Tab 2  
 albuterol (PROVENTIL HFA, VENTOLIN HFA, PROAIR HFA) 90 mcg/actuation inhaler Take 2 Puffs by inhalation every four (4) hours as needed for Wheezing for up to 360 days. 1 Inhaler 10  EPINEPHrine (EPIPEN 2-KARL) 0.3 mg/0.3 mL injection INJECT INTRAMUSCULARLY AS NEEDED FOR ONE DOSE. TAKE WITH 50MG OF BENADRYL AND CALL 911. Dispense Generic 1 Syringe 3  
 diclofenac (VOLTAREN) 1 % gel Apply 4 g to affected area four (4) times daily. Painful shoulder.  100 g 3  
  meclizine (ANTIVERT) 25 mg chewable tablet Take  by mouth three (3) times daily as needed.  aspirin delayed-release 81 mg tablet Take 1 Tab by mouth daily. 30 Tab 0  
 Peak Flow Meter eric Use prior and post nebulizer treatment 1 Device 0  
 albuterol (PROVENTIL VENTOLIN) 2.5 mg /3 mL (0.083 %) nebulizer solution 3 mL by Nebulization route every six (6) hours as needed for Wheezing or Shortness of Breath. 24 Each 5  
 omega-3 fatty acids-vitamin e (FISH OIL) 1,000 mg cap Take 1 Cap by mouth.  coenzyme q10 (CO Q-10) 100 mg Cap Take 100 mg by mouth daily. Allergies Allergen Reactions  Bee Sting [Sting, Bee] Anaphylaxis  Vytorin 10-10 [Ezetimibe-Simvastatin] Myalgia  Amlodipine Other (comments) Creepy crawly sensation, twitches  Lipitor [Atorvastatin] Myalgia

## 2020-04-23 NOTE — TELEPHONE ENCOUNTER
Patient reports that his blood pressure was 98/50 and would like to discuss changing his medications. Scheduled patient an appointment today at 1 PM with Dr. Rafia Ruiz for a virtual visit while Dr. Paramjit Reyes precepts. He says that he needs his PSA drawn for Dr. Phuong Fisher but does not want to go to [facility]. Could we order it and fax the lab for him?

## 2020-04-23 NOTE — TELEPHONE ENCOUNTER
----- Message from Ezra Sarkar sent at 4/23/2020 10:46 AM EDT -----  Regarding: [place]/Telephone  General Message/Vendor Calls    Caller's first and last name:Zac Jimenez      Reason for call:blood pressure      Callback required yes/no and why:yes      Best contact number(s):[phone]      Details to clarify the request: Please call patient about his blood pressure      Ezra Sarkar

## 2020-04-23 NOTE — PROGRESS NOTES
Shabana Barbour III  66 y.o. male  1945  [address] [place] [address]  [id number]    [phone] (home)      [facility]:    Telephone Encounter  Kimberly Whyte MD       Encounter Date: 4/23/2020 at 1:38 PM    Consent:  He and/or the health care decision maker is aware that that he may receive a bill for this telephone service, depending on his insurance coverage, and has provided verbal consent to proceed: Yes    Chief Complaint   Patient presents with    Hypotension       History of Present Illness   Shabana Barbour III is a 66 y.o. male was evaluated by telephone. I communicated with the patient and/or health care decision maker about his blood pressure. Patient reports he had a low blood pressure this morning. No symptoms or dizziness today, but says he sometimes feels lightheaded or dizzy about 2 hours after taking morning medications. This morning, BP was 180/80 in bed at 8am, then was 98/50 at 10:30 AM (Map of 66) and 105/59 at 11:30 AM. Pt checked blood glucose which was 160. Patient was asymptomatic this morning. Patient asking if any of his blood pressure medications needs to be adjusted. Sometimes has symptoms while \"out in public\" and has to sit down. Patient also works as an  and wants to make sure he is staying safe. Review of Systems   Review of Systems   Constitutional: Negative for chills and fever. Eyes: Negative for pain. Respiratory: Negative for cough and shortness of breath. Cardiovascular: Negative for chest pain, palpitations and leg swelling. Gastrointestinal: Negative for nausea and vomiting. Musculoskeletal: Negative for falls. Neurological: Positive for dizziness. Negative for loss of consciousness. Vitals/Objective:   General: Patient speaking in complete sentences without effort. Normal speech and cooperative.        Due to this being a Virtual Check-in/Telephone evaluation, many elements of the physical examination are unable to be assessed. Assessment and Plan:   Time-based coding, delete if not needed: I spent at least 25 minutes with this established patient, and >50% of the time was spent counseling and/or coordinating care regarding blood pressure  Total Time: minutes: 21-30 minutes    Rodger Velasco III is a 66 y.o. male with PMH of CAD, HTN, T2DM, CKD 3, and had 3 heart stents placed last year with labile BPs     1. Essential hypertension - labile blood pressures, systolics up to 898 when waking up in AM, sBP down to 90s 1-2 hrs after taking morning medications with some dizziness  -REDUCED lisinopriL from 40mg daily to 20mg daily  -SWITCH felodipine 10mg daily from taking in the morning to taking in the evening to help with high morning pressures  -Continue Hydralazine 50mg TID  -Continue Atenolol 25mg BID    2. Coronary artery disease of native artery of native heart with stable angina pectoris (HCC) - stable  -Continue Imdur 60mg daily  -Continue Crestor 40mg daily  -Continue ASA 81mg daily    3. Prostate cancer ([facility] [address]   -PSA ordered  -F/u with Dr. Joy Galdamez as indicated      We discussed the expected course, resolution and complications of the diagnosis(es) in detail. Medication risks, benefits, costs, interactions, and alternatives were discussed as indicated. I advised him to contact the office if his condition worsens, changes or fails to improve as anticipated. He expressed understanding with the diagnosis(es) and plan. Patient understands that this encounter was a temporary measure, and the importance of further follow up and examination was emphasized. Patient verbalized understanding. Patient informed to follow up: in 1 week or sooner as needed    I affirm this is a Patient Initiated Episode with an Established Patient who has not had a related appointment within my department in the past 7 days or scheduled within the next 24 hours.   Note: not billable if this call serves to triage the patient into an appointment for the relevant concern      Electronically Signed: Marlen Harper MD  Providers location when delivering service: Clinic        ICD-10-CM ICD-9-CM    1. Essential hypertension I10 401.9 lisinopriL (PRINIVIL, ZESTRIL) 20 mg tablet   2. Coronary artery disease of native artery of native heart with stable angina pectoris ([facility] [address] I25.118 414.01      413.9    3. Prostate cancer [facility]) C61 185        Pursuant to the emergency declaration under the [facility], [facility] waiver authority and the [employer] Act, this Virtual  Visit was conducted, with patient's consent, to reduce the patient's risk of exposure to COVID-19 and provide continuity of care for an established patient. History   Patients past medical, surgical and family histories were personally reviewed. Past Medical History:   Diagnosis Date    Amaurosis fugax of left eye 5/6/2012    Arthritis     OSTEO    CAD (coronary artery disease) 2012    CAROTID LEFT     Cancer ([facility] [address] 2013    PROSTATE    Chronic pain     DJD lumbar    Diabetes ([facility] [address] 5/22/2010    Frequent nocturnal awakening     urinary frequency    GERD (gastroesophageal reflux disease) 5/22/2010    Hyperlipidemia 5/22/2010    Hypertension 5/22/2010    Kidney stone 5/22/2010    Nodular goiter     1.3 cm right , FNA 6/15    Obesity (BMI 30-39. 9)     Psychiatric disorder     ANXIETY    Vertigo      Past Surgical History:   Procedure Laterality Date    CARDIAC SURG PROCEDURE UNLIST  04/2019    3 stents placed    COLONOSCOPY  3/3/2016         EGD  3/3/2016         HX HEENT      tonsillectomy    HX MOHS PROCEDURES  2010    right    HX ORTHOPAEDIC      coccyx removed    HX UROLOGICAL  2010    left lithotripsy. basket  extraction,ureteral stent    HX VASECTOMY      NEUROLOGICAL PROCEDURE UNLISTED  2011    Steroid injections in epidural    VASCULAR SURGERY PROCEDURE UNLIST  2012    LEFT CAROTID     Family History   Problem Relation Age of Onset    Diabetes Mother     Obesity Mother     Heart Disease Mother     Stroke Father     Heart Disease Sister     Obesity Sister     Diabetes Sister      Social History     Socioeconomic History    Marital status:      Spouse name: Not on file    Number of children: Not on file    Years of education: Not on file    Highest education level: Not on file   Occupational History    Not on file   Social Needs    Financial resource strain: Not on file    Food insecurity     Worry: Not on file     Inability: Not on file    Transportation needs     Medical: Not on file     Non-medical: Not on file   Tobacco Use    Smoking status: Former Smoker     Packs/day: 0.50     Years: 4.00     Pack years: 2.00     Last attempt to quit: 1966     Years since quittin.0    Smokeless tobacco: Never Used   Substance and Sexual Activity    Alcohol use: No    Drug use: No    Sexual activity: Yes     Partners: Female   Lifestyle    Physical activity     Days per week: Not on file     Minutes per session: Not on file    Stress: Not on file   Relationships    Social connections     Talks on phone: Not on file     Gets together: Not on file     Attends Gnosticist service: Not on file     Active member of club or organization: Not on file     Attends meetings of clubs or organizations: Not on file     Relationship status: Not on file    Intimate partner violence     Fear of current or ex partner: Not on file     Emotionally abused: Not on file     Physically abused: Not on file     Forced sexual activity: Not on file   Other Topics Concern    Not on file   Social History Narrative    Not on file            Current Medications/Allergies   Medications and Allergies reviewed:    Current Outpatient Medications   Medication Sig Dispense Refill    clopidogreL (Plavix) 75 mg tab Take 1 Tab by mouth daily.  90 Tab 1    furosemide (LASIX) 20 mg tablet Take 1 Tab by mouth daily. 30 Tab 11    traMADoL (ULTRAM) 50 mg tablet Take 2 Tabs by mouth every twelve (12) hours for 30 days. Max Daily Amount: 200 mg. 120 Tab 0    isosorbide mononitrate ER (IMDUR) 60 mg CR tablet TAKE ONE TABLET BY MOUTH EVERY DAY 30 Tab 5    rosuvastatin (CRESTOR) 40 mg tablet TAKE ONE TABLET BY MOUTH EVERY DAY 90 Tab 3    pantoprazole (PROTONIX) 40 mg tablet TAKE ONE TABLET BY MOUTH EVERY DAY FOR: GASTROESOPHAGEAL REFLUX 30 Tab 11    hydrALAZINE (APRESOLINE) 50 mg tablet TAKE ONE TABLET BY MOUTH THREE TIMES DAILY FOR HYPERTENSION 90 Tab 6    ezetimibe (ZETIA) 10 mg tablet Take 1 Tab by mouth daily. 90 Tab 3    famotidine (PEPCID) 40 mg tablet Take 1 Tab by mouth nightly. 90 Tab 3    felodipine (PLENDIL SR) 10 mg 24 hr tablet Take 1 Tab by mouth daily. Indications: high blood pressure 30 Tab 11    lisinopril (PRINIVIL, ZESTRIL) 40 mg tablet Take 1 Tab by mouth daily. 30 Tab 11    metFORMIN ER (GLUCOPHAGE XR) 500 mg tablet TAKE 1 TABLET by MOUTH two TIMES A DAY (Patient not taking: Reported on 1/24/2020) 180 Tab 3    triamcinolone acetonide (KENALOG) 0.1 % topical cream Apply  to affected area two (2) times a day. use thin layer to poison ivy 453 g 0    atenolol (TENORMIN) 25 mg tablet Take 1 Tab by mouth two (2) times a day. One tab twice daily. 180 Tab 2    albuterol (PROVENTIL HFA, VENTOLIN HFA, PROAIR HFA) 90 mcg/actuation inhaler Take 2 Puffs by inhalation every four (4) hours as needed for Wheezing for up to 360 days. 1 Inhaler 10    EPINEPHrine (EPIPEN 2-KARL) 0.3 mg/0.3 mL injection INJECT INTRAMUSCULARLY AS NEEDED FOR ONE DOSE. TAKE WITH 50MG OF BENADRYL AND CALL 911. Dispense Generic 1 Syringe 3    diclofenac (VOLTAREN) 1 % gel Apply 4 g to affected area four (4) times daily. Painful shoulder. 100 g 3    meclizine (ANTIVERT) 25 mg chewable tablet Take  by mouth three (3) times daily as needed.       aspirin delayed-release 81 mg tablet Take 1 Tab by mouth daily. 30 Tab 0    Peak Flow Meter eric Use prior and post nebulizer treatment 1 Device 0    albuterol (PROVENTIL VENTOLIN) 2.5 mg /3 mL (0.083 %) nebulizer solution 3 mL by Nebulization route every six (6) hours as needed for Wheezing or Shortness of Breath. 24 Each 5    omega-3 fatty acids-vitamin e (FISH OIL) 1,000 mg cap Take 1 Cap by mouth.  coenzyme q10 (CO Q-10) 100 mg Cap Take 100 mg by mouth daily.        Allergies   Allergen Reactions    Bee Sting [Sting, Bee] Anaphylaxis    Vytorin 10-10 [Ezetimibe-Simvastatin] Myalgia    Amlodipine Other (comments)     Creepy crawly sensation, twitches    Lipitor [Atorvastatin] Myalgia

## 2020-04-30 ENCOUNTER — HOSPITAL ENCOUNTER (OUTPATIENT)
Dept: LAB | Age: 75
Discharge: HOME | End: 2020-04-30

## 2020-04-30 ENCOUNTER — TELEPHONE (OUTPATIENT)
Dept: FAMILY MEDICINE CLINIC | Age: 75
End: 2020-04-30

## 2020-04-30 DIAGNOSIS — C61 PROSTATE CANCER (HCC): ICD-10-CM

## 2020-04-30 NOTE — TELEPHONE ENCOUNTER
----- Message from Deondre Mae sent at 4/30/2020 12:09 PM EDT -----  Regarding: Mily Barnes MD / telephone  General Message/Vendor Calls    Caller's first and last name: Blake Vladez, III      Reason for call: pt would like a call back from Nic Cooper in reference to diabetes      Callback required yes/no and why:      Best contact number(s):[phone]      Details to clarify the request:      Deondre Mae

## 2020-05-01 ENCOUNTER — VIRTUAL VISIT (OUTPATIENT)
Dept: FAMILY MEDICINE CLINIC | Age: 75
End: 2020-05-01

## 2020-05-01 DIAGNOSIS — I95.1 ORTHOSTASIS: ICD-10-CM

## 2020-05-01 DIAGNOSIS — E11.21 CONTROLLED TYPE 2 DIABETES MELLITUS WITH DIABETIC NEPHROPATHY, WITHOUT LONG-TERM CURRENT USE OF INSULIN (HCC): ICD-10-CM

## 2020-05-01 DIAGNOSIS — I10 ESSENTIAL HYPERTENSION: Primary | ICD-10-CM

## 2020-05-01 NOTE — PROGRESS NOTES
[address]  [phone]           Progress Note    Patient: Neil Lawrence MRN: [id number]  SSN: [id number]    YOB: 1945  Age: 66 y.o. Sex: male        Chief Complaint   Patient presents with    Dizziness    Blood Pressure Check         Melodie Linares III is a 66 y.o. male evaluated via telephone on 5/1/2020. Nurse involved in care:Nurse Yuko  Location of patient:Home  Location of physician:My office    Consent:  He and/or health care decision maker is aware that that he may receive a bill for this telephone service, depending on his insurance coverage, and has provided verbal consent to proceed: Yes      Documentation:  I communicated with the patient and/or health care decision maker about multiple problems. Details of this discussion including any medical advice provided: See Below. I affirm this is a Patient Initiated Episode with an Established Patient who has not had a related appointment within my department in the past 7 days or scheduled within the next 24 hours. Total Time: minutes: 21-30 minutes    Note: not billable if this call serves to triage the patient into an appointment for the relevant concern      Stevenson Bansal MD   __________________________________________________________________________________________    Subjective:     Encounter Diagnoses   Name Primary?  Essential hypertension; blood pressures are generally in the 500 systolic range. No more low blood pressures with systolics below 536 since changing his medication. BP Readings from Last 3 Encounters:   01/24/20 122/62   12/30/19 99/52   09/27/19 144/75     The patient reports:  taking medications as instructed, no medication side effects noted, no TIA's, no chest pain on exertion, no dyspnea on exertion, no swelling of ankles.     Key CAD CHF Meds             lisinopriL (PRINIVIL, ZESTRIL) 20 mg tablet Take 1 Tab by mouth daily. clopidogreL (Plavix) 75 mg tab Take 1 Tab by mouth daily. furosemide (LASIX) 20 mg tablet Take 1 Tab by mouth daily. isosorbide mononitrate ER (IMDUR) 60 mg CR tablet TAKE ONE TABLET BY MOUTH EVERY DAY    rosuvastatin (CRESTOR) 40 mg tablet TAKE ONE TABLET BY MOUTH EVERY DAY    hydrALAZINE (APRESOLINE) 50 mg tablet TAKE ONE TABLET BY MOUTH THREE TIMES DAILY FOR HYPERTENSION    ezetimibe (ZETIA) 10 mg tablet Take 1 Tab by mouth daily. felodipine (PLENDIL SR) 10 mg 24 hr tablet Take 1 Tab by mouth daily. Indications: high blood pressure    atenolol (TENORMIN) 25 mg tablet Take 1 Tab by mouth two (2) times a day. One tab twice daily. aspirin delayed-release 81 mg tablet Take 1 Tab by mouth daily. omega-3 fatty acids-vitamin e (FISH OIL) 1,000 mg cap Take 1 Cap by mouth. Lab Results   Component Value Date/Time    Sodium 140 12/30/2019 11:52 AM    Potassium 4.4 12/30/2019 11:52 AM    Chloride 107 12/30/2019 11:52 AM    CO2 28 12/30/2019 11:52 AM    Anion gap 5 12/30/2019 11:52 AM    Glucose 112 (H) 12/30/2019 11:52 AM    BUN 28 (H) 12/30/2019 11:52 AM    Creatinine 1.38 (H) 12/30/2019 11:52 AM    BUN/Creatinine ratio 20 12/30/2019 11:52 AM    GFR est AA >60 12/30/2019 11:52 AM    GFR est non-AA 50 (L) 12/30/2019 11:52 AM    Calcium 9.1 12/30/2019 11:52 AM    Calcium 8.9 12/30/2019 11:52 AM    Bilirubin, total 0.4 12/30/2019 11:52 AM    AST (SGOT) 17 12/30/2019 11:52 AM    Alk. phosphatase 51 12/30/2019 11:52 AM    Protein, total 6.3 (L) 12/30/2019 11:52 AM    Albumin 3.8 12/30/2019 11:52 AM    Globulin 2.5 12/30/2019 11:52 AM    A-G Ratio 1.5 12/30/2019 11:52 AM    ALT (SGPT) 20 12/30/2019 11:52 AM     Low salt diet? yes  Aerobic exercise? no  Our goal is to normalize the blood pressure to decrease the risks of strokes and heart attacks. The patient is in agreement with the plan. Yes    Orthostasis: resolved by history.          Controlled type 2 diabetes mellitus with diabetic nephropathy, without long-term current use of insulin [facility]): Ask him to make an appointment to see me fasting in 1 month. This patient is managed under a comprehensive plan of care for Diabetes. Overall the patient feels well with good energy level. Key Antihyperglycemic Medications             metFORMIN ER (GLUCOPHAGE XR) 500 mg tablet TAKE 1 TABLET by MOUTH two TIMES A DAY           Pertinent Labs:   Lab Results   Component Value Date/Time    Hemoglobin A1c 6.0 (H) 12/30/2019 11:52 AM    Hemoglobin A1c 5.8 (H) 09/27/2019 09:38 AM    Hemoglobin A1c 6.0 (H) 06/10/2019 11:46 AM      There is no height or weight on file to calculate BMI. Lab Results   Component Value Date/Time    LDL, calculated 66 12/30/2019 11:52 AM         Lab Results   Component Value Date/Time    Sodium 140 12/30/2019 11:52 AM    Potassium 4.4 12/30/2019 11:52 AM    Chloride 107 12/30/2019 11:52 AM    CO2 28 12/30/2019 11:52 AM    Anion gap 5 12/30/2019 11:52 AM    Glucose 112 (H) 12/30/2019 11:52 AM    BUN 28 (H) 12/30/2019 11:52 AM    Creatinine 1.38 (H) 12/30/2019 11:52 AM    BUN/Creatinine ratio 20 12/30/2019 11:52 AM    GFR est AA >60 12/30/2019 11:52 AM    GFR est non-AA 50 (L) 12/30/2019 11:52 AM    Calcium 9.1 12/30/2019 11:52 AM    Calcium 8.9 12/30/2019 11:52 AM    AST (SGOT) 17 12/30/2019 11:52 AM    Alk.  phosphatase 51 12/30/2019 11:52 AM    Protein, total 6.3 (L) 12/30/2019 11:52 AM    Albumin 3.8 12/30/2019 11:52 AM    Globulin 2.5 12/30/2019 11:52 AM    A-G Ratio 1.5 12/30/2019 11:52 AM    ALT (SGPT) 20 12/30/2019 11:52 AM     Lab Results   Component Value Date/Time    Microalbumin/Creat ratio (mg/g creat) 8 03/12/2009 09:00 AM    Microalb/Creat ratio (ug/mg creat.) 68.5 (H) 09/27/2019 10:01 AM    Microalbumin,urine random 1.47 03/12/2009 09:00 AM       Wt Readings from Last 3 Encounters:   01/24/20 202 lb 12.8 oz (92 kg)   12/30/19 200 lb 12.8 oz (91.1 kg)   09/27/19 196 lb (88.9 kg)        Social History Tobacco Use   Smoking Status Former Smoker    Packs/day: 0.50    Years: 4.00    Pack years: 2.00    Last attempt to quit: 1966    Years since quittin.0   Smokeless Tobacco Never Used     There is no height or weight on file to calculate BMI. All the patient's questions regarding medications, diet and exercise were answered. Goal of A1C of less than 7.5% is our goal.   Our overall goal is to reduce or eliminate the long term consequences of poorly controlled diabetes. Current and past medical information:    Current Medications after this visit[de-identified]     Current Outpatient Medications   Medication Sig    lisinopriL (PRINIVIL, ZESTRIL) 20 mg tablet Take 1 Tab by mouth daily.  clopidogreL (Plavix) 75 mg tab Take 1 Tab by mouth daily.  furosemide (LASIX) 20 mg tablet Take 1 Tab by mouth daily.  isosorbide mononitrate ER (IMDUR) 60 mg CR tablet TAKE ONE TABLET BY MOUTH EVERY DAY    rosuvastatin (CRESTOR) 40 mg tablet TAKE ONE TABLET BY MOUTH EVERY DAY    pantoprazole (PROTONIX) 40 mg tablet TAKE ONE TABLET BY MOUTH EVERY DAY FOR: GASTROESOPHAGEAL REFLUX    hydrALAZINE (APRESOLINE) 50 mg tablet TAKE ONE TABLET BY MOUTH THREE TIMES DAILY FOR HYPERTENSION    ezetimibe (ZETIA) 10 mg tablet Take 1 Tab by mouth daily.  famotidine (PEPCID) 40 mg tablet Take 1 Tab by mouth nightly.  felodipine (PLENDIL SR) 10 mg 24 hr tablet Take 1 Tab by mouth daily. Indications: high blood pressure    metFORMIN ER (GLUCOPHAGE XR) 500 mg tablet TAKE 1 TABLET by MOUTH two TIMES A DAY (Patient not taking: Reported on 2020)    triamcinolone acetonide (KENALOG) 0.1 % topical cream Apply  to affected area two (2) times a day. use thin layer to poison ivy    atenolol (TENORMIN) 25 mg tablet Take 1 Tab by mouth two (2) times a day. One tab twice daily.  EPINEPHrine (EPIPEN 2-KARL) 0.3 mg/0.3 mL injection INJECT INTRAMUSCULARLY AS NEEDED FOR ONE DOSE. TAKE WITH 50MG OF BENADRYL AND CALL 911. Dispense Generic    diclofenac (VOLTAREN) 1 % gel Apply 4 g to affected area four (4) times daily. Painful shoulder.  meclizine (ANTIVERT) 25 mg chewable tablet Take  by mouth three (3) times daily as needed.  aspirin delayed-release 81 mg tablet Take 1 Tab by mouth daily.  Peak Flow Meter eric Use prior and post nebulizer treatment    albuterol (PROVENTIL VENTOLIN) 2.5 mg /3 mL (0.083 %) nebulizer solution 3 mL by Nebulization route every six (6) hours as needed for Wheezing or Shortness of Breath.  omega-3 fatty acids-vitamin e (FISH OIL) 1,000 mg cap Take 1 Cap by mouth.  coenzyme q10 (CO Q-10) 100 mg Cap Take 100 mg by mouth daily. No current facility-administered medications for this visit.         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 ([facility] [address] 07/16/2019    Type 2 diabetes with nephropathy ([facility] [address] 06/29/2018    CKD stage 3 due to type 2 diabetes mellitus ([address] 06/29/2018    Age-related nuclear cataract of both eyes 03/27/2018    PVD (posterior vitreous detachment), left eye 03/27/2018    Controlled type 2 diabetes mellitus with diabetic nephropathy ([address] 12/03/2015    Prostate cancer ([facility] [address] 08/23/2013    S/P carotid endarterectomy 12/05/2012    Vitamin D deficiency 12/05/2012    Amaurosis fugax of left eye 05/06/2012    Carotid artery obstruction 04/17/2012    Allergy to bee sting 04/13/2012 Past Medical History:   Diagnosis Date    Amaurosis fugax of left eye 2012    Arthritis     OSTEO    CAD (coronary artery disease) 2012    CAROTID LEFT     Cancer ([facility] 2013    PROSTATE    Chronic pain     DJD lumbar    Diabetes ([facility] [address] 2010    Frequent nocturnal awakening     urinary frequency    GERD (gastroesophageal reflux disease) 2010    Hyperlipidemia 2010    Hypertension 2010    Kidney stone 2010    Nodular goiter     1.3 cm right , FNA 6/15    Obesity (BMI 30-39. 9)     Psychiatric disorder     ANXIETY    Vertigo        Allergies   Allergen Reactions    Bee Sting [Sting, Bee] Anaphylaxis    Vytorin 10-10 [Ezetimibe-Simvastatin] Myalgia    Amlodipine Other (comments)     Creepy crawly sensation, twitches    Lipitor [Atorvastatin] Myalgia       Past Surgical History:   Procedure Laterality Date    CARDIAC SURG PROCEDURE UNLIST  2019    3 stents placed    COLONOSCOPY  3/3/2016         EGD  3/3/2016         HX HEENT      tonsillectomy    HX MOHS PROCEDURES  2010    right    HX ORTHOPAEDIC      coccyx removed    HX UROLOGICAL  2010    left lithotripsy. basket  extraction,ureteral stent    HX VASECTOMY      NEUROLOGICAL PROCEDURE UNLISTED      Steroid injections in epidural    VASCULAR SURGERY PROCEDURE UNLIST      LEFT CAROTID       Social History     Socioeconomic History    Marital status:      Spouse name: Not on file    Number of children: Not on file    Years of education: Not on file    Highest education level: Not on file   Tobacco Use    Smoking status: Former Smoker     Packs/day: 0.50     Years: 4.00     Pack years: 2.00     Last attempt to quit: 1966     Years since quittin.0    Smokeless tobacco: Never Used   Substance and Sexual Activity    Alcohol use: No    Drug use: No    Sexual activity: Yes     Partners: Female       Review of Systems   Constitutional: Negative. HENT: Negative. Eyes: Negative. Respiratory: Negative. Cardiovascular: Negative. Gastrointestinal: Negative. Genitourinary: Negative. Worried about his PSA level. I cannot see it in the chart. Musculoskeletal: Negative. Skin: Negative. Objective: There were no vitals filed for this visit. There is no height or weight on file to calculate BMI. Health Maintenance Due   Topic Date Due    Shingrix Vaccine Age 49> (1 of 2) 04/13/1993    Eye Exam Retinal or Dilated  05/25/2019    GLAUCOMA SCREENING Q2Y  03/20/2020    Pneumococcal 65+ years (2 of 2 - PPSV23) 03/26/2020         Assessment and orders:     Encounter Diagnoses     ICD-10-CM ICD-9-CM   1. Essential hypertension I10 401.9   2. Orthostasis I95.1 458.0   3. Controlled type 2 diabetes mellitus with diabetic nephropathy, without long-term current use of insulin (HCC) E11.21 250.40     583.81     Diagnoses and all orders for this visit:    1. Essential hypertension-controlled. He has not had no spikes since changing his felodipine to at bedtime and decreasing his dose of lisinopril to 20 mg.    2. Orthostasis no blood pressures below 845 systolic. 3. Controlled type 2 diabetes mellitus with diabetic nephropathy, without long-term current use of insulin ([facility])-he will see me in a month fasting. Plan of care:  Discussed diagnoses in detail with patient. Medication risks/benefits/side effects discussed with patient. All of the patient's questions were addressed. The patient understands and agrees with our plan of care. The patient knows to call back if they are unsure of or forget any changes we discussed today or if the symptoms change. The patient received an After-Visit Summary which contains VS, orders, medication list and allergy list. This can be used as a \"mini-medical record\" should they have to seek medical care while out of town.     Patient Care Team:  Yarelis Olvera MD as PCP - Dulce Jama MD as PCP - [facility] Empaneled Provider  Santiago Tran MD (General and Vascular Surgery)  Gracy Collet., MD (Neurology)  Jordan Michael MD as Surgeon (Urology)  Aidee Salvador MD (Endocrinology)  Zara Coleman MD (Dermatology)  Jeannie Owen DO as Physician (Cardiology)  Flaca Chiu MD (Ophthalmology)          Signed By: Dusty Hammond MD     May 1, 2020      ATTENTION:   This medical record was transcribed using an electronic medical records/speech recognition system. Although proofread, it may and can contain electronic, spelling and other errors. Corrections may be executed at a later time. Please feel free to contact me for any clarifications as needed.

## 2020-05-02 LAB
PSA SERPL-MCNC: <0.1 NG/ML (ref 0–4)
REFLEX CRITERIA: NORMAL

## 2020-05-04 ENCOUNTER — TELEPHONE (OUTPATIENT)
Dept: FAMILY MEDICINE CLINIC | Age: 75
End: 2020-05-04

## 2020-05-04 NOTE — TELEPHONE ENCOUNTER
Spoke with patient and advised him of the following per Dr. Noel Blanca: \"Dear Rodger Velasco III:     Please find your most recent results below. Your PSA has actually gone down. Excellent test result. \"    Results faxed to Dr. Joy Galdamez. Patient verbalized understanding.

## 2020-05-20 DIAGNOSIS — G89.4 CHRONIC PAIN SYNDROME: ICD-10-CM

## 2020-05-20 DIAGNOSIS — M19.90 CHRONIC ARTHRITIS: ICD-10-CM

## 2020-05-20 NOTE — TELEPHONE ENCOUNTER
Triage for Controlled Substance Refill Request    Pain Diagnosis: Chronic Pain    Last Outpatient Visit: 5/1/2020    Next Outpatient Visit:6/8/2020    Reason for refill needed outside of office visit?prescribed only on a monthly basis    Pain escalation requiring increased medication- no    Pharmacy: See pain contract    Medication: See Prescription requested       reviewed:today by Dr. Francisco Reeder and filed in  folder, separate from chart    Date of Urine Drug Screen:  DUE      Opioid Safety Handout given: Always prints with the Patient AVS    Appropriate for refill: yes    Action:  Rx sent to Dr. Francisco Reeder

## 2020-05-21 ENCOUNTER — TELEPHONE (OUTPATIENT)
Dept: FAMILY MEDICINE CLINIC | Age: 75
End: 2020-05-21

## 2020-05-21 RX ORDER — TRAMADOL HYDROCHLORIDE 50 MG/1
100 TABLET ORAL EVERY 12 HOURS
Qty: 120 TAB | Refills: 2 | Status: SHIPPED | OUTPATIENT
Start: 2020-05-21 | End: 2020-06-20

## 2020-05-21 NOTE — TELEPHONE ENCOUNTER
Pt requesting to speak w/ Galina. He wants  to know if Dr. Aguilar Anders received the paperwork he dropped off the other day. Pt also wants to know if the paperwork has been completed and faxed.

## 2020-05-21 NOTE — TELEPHONE ENCOUNTER
The dept of [facility] faxed over a request for medical records. The signed released form was faxed to [facility]. It shows in epic that the request was received. I advised pt that the paperwork he dropped off is for his own record. It's nothing that Dr. Harl Holstein needs to complete. I also advised that [facility] has received the request and he can contact the dept of [facility] to see if the records have been received. Pt verbalized understanding and will call office back if he has any additional questions. I will scan documents pt dropped off at office in his chart.

## 2020-06-08 ENCOUNTER — HOSPITAL ENCOUNTER (OUTPATIENT)
Dept: LAB | Age: 75
Discharge: HOME | End: 2020-06-08

## 2020-06-08 ENCOUNTER — OFFICE VISIT (OUTPATIENT)
Dept: FAMILY MEDICINE CLINIC | Age: 75
End: 2020-06-08

## 2020-06-08 VITALS
RESPIRATION RATE: 20 BRPM | TEMPERATURE: 97.6 F | OXYGEN SATURATION: 98 % | HEIGHT: 71 IN | HEART RATE: 42 BPM | BODY MASS INDEX: 28.42 KG/M2 | DIASTOLIC BLOOD PRESSURE: 75 MMHG | SYSTOLIC BLOOD PRESSURE: 148 MMHG | WEIGHT: 203 LBS

## 2020-06-08 DIAGNOSIS — E11.21 TYPE 2 DIABETES WITH NEPHROPATHY (HCC): Primary | ICD-10-CM

## 2020-06-08 DIAGNOSIS — Z23 ENCOUNTER FOR IMMUNIZATION: ICD-10-CM

## 2020-06-08 DIAGNOSIS — K21.00 GASTROESOPHAGEAL REFLUX DISEASE WITH ESOPHAGITIS: ICD-10-CM

## 2020-06-08 DIAGNOSIS — E78.00 PURE HYPERCHOLESTEROLEMIA: ICD-10-CM

## 2020-06-08 DIAGNOSIS — E11.22 CKD STAGE 3 DUE TO TYPE 2 DIABETES MELLITUS (HCC): ICD-10-CM

## 2020-06-08 DIAGNOSIS — E11.21 TYPE 2 DIABETES WITH NEPHROPATHY (HCC): ICD-10-CM

## 2020-06-08 DIAGNOSIS — I10 ESSENTIAL HYPERTENSION: ICD-10-CM

## 2020-06-08 DIAGNOSIS — E55.9 VITAMIN D DEFICIENCY: ICD-10-CM

## 2020-06-08 DIAGNOSIS — I25.118 CORONARY ARTERY DISEASE OF NATIVE ARTERY OF NATIVE HEART WITH STABLE ANGINA PECTORIS (HCC): ICD-10-CM

## 2020-06-08 DIAGNOSIS — L57.0 AK (ACTINIC KERATOSIS): ICD-10-CM

## 2020-06-08 DIAGNOSIS — C61 PROSTATE CANCER (HCC): ICD-10-CM

## 2020-06-08 DIAGNOSIS — N18.30 CKD STAGE 3 DUE TO TYPE 2 DIABETES MELLITUS (HCC): ICD-10-CM
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
## 2020-06-08 LAB
ALBUMIN SERPL-MCNC: 3.9 G/DL (ref 3.5–5)
ALBUMIN/GLOB SERPL: 1.5 {RATIO} (ref 1.1–2.2)
ALP SERPL-CCNC: 57 U/L (ref 45–117)
ALT SERPL-CCNC: 21 U/L (ref 12–78)
ANION GAP SERPL CALC-SCNC: 7 MMOL/L (ref 5–15)
AST SERPL-CCNC: 22 U/L (ref 15–37)
BASOPHILS # BLD: 0 K/UL (ref 0–0.1)
BASOPHILS NFR BLD: 0 % (ref 0–1)
BILIRUB SERPL-MCNC: 0.4 MG/DL (ref 0.2–1)
BUN SERPL-MCNC: 27 MG/DL (ref 6–20)
BUN/CREAT SERPL: 18 (ref 12–20)
CALCIUM SERPL-MCNC: 9.1 MG/DL (ref 8.5–10.1)
CHLORIDE SERPL-SCNC: 108 MMOL/L (ref 97–108)
CHOLEST SERPL-MCNC: 167 MG/DL
CO2 SERPL-SCNC: 26 MMOL/L (ref 21–32)
CREAT SERPL-MCNC: 1.54 MG/DL (ref 0.7–1.3)
DIFFERENTIAL METHOD BLD: ABNORMAL
EOSINOPHIL # BLD: 0.1 K/UL (ref 0–0.4)
EOSINOPHIL NFR BLD: 1 % (ref 0–7)
ERYTHROCYTE [DISTWIDTH] IN BLOOD BY AUTOMATED COUNT: 14.2 % (ref 11.5–14.5)
EST. AVERAGE GLUCOSE BLD GHB EST-MCNC: 134 MG/DL
GLOBULIN SER CALC-MCNC: 2.6 G/DL (ref 2–4)
GLUCOSE SERPL-MCNC: 117 MG/DL (ref 65–100)
HBA1C MFR BLD: 6.3 % (ref 4–5.6)
HCT VFR BLD AUTO: 38.3 % (ref 36.6–50.3)
HDLC SERPL-MCNC: 66 MG/DL
HDLC SERPL: 2.5 {RATIO} (ref 0–5)
HGB BLD-MCNC: 11.5 G/DL (ref 12.1–17)
IMM GRANULOCYTES # BLD AUTO: 0 K/UL (ref 0–0.04)
IMM GRANULOCYTES NFR BLD AUTO: 0 % (ref 0–0.5)
LDLC SERPL CALC-MCNC: 79.2 MG/DL (ref 0–100)
LIPID PROFILE,FLP: NORMAL
LYMPHOCYTES # BLD: 1.5 K/UL (ref 0.8–3.5)
LYMPHOCYTES NFR BLD: 22 % (ref 12–49)
MCH RBC QN AUTO: 28.3 PG (ref 26–34)
MCHC RBC AUTO-ENTMCNC: 30 G/DL (ref 30–36.5)
MCV RBC AUTO: 94.3 FL (ref 80–99)
MONOCYTES # BLD: 0.6 K/UL (ref 0–1)
MONOCYTES NFR BLD: 8 % (ref 5–13)
NEUTS SEG # BLD: 4.6 K/UL (ref 1.8–8)
NEUTS SEG NFR BLD: 69 % (ref 32–75)
NRBC # BLD: 0 K/UL (ref 0–0.01)
NRBC BLD-RTO: 0 PER 100 WBC
PLATELET # BLD AUTO: 207 K/UL (ref 150–400)
PMV BLD AUTO: 10.5 FL (ref 8.9–12.9)
POTASSIUM SERPL-SCNC: 5.3 MMOL/L (ref 3.5–5.1)
PROT SERPL-MCNC: 6.5 G/DL (ref 6.4–8.2)
RBC # BLD AUTO: 4.06 M/UL (ref 4.1–5.7)
SODIUM SERPL-SCNC: 141 MMOL/L (ref 136–145)
TRIGL SERPL-MCNC: 109 MG/DL (ref ?–150)
VLDLC SERPL CALC-MCNC: 21.8 MG/DL
WBC # BLD AUTO: 6.8 K/UL (ref 4.1–11.1)

## 2020-06-08 NOTE — PATIENT INSTRUCTIONS
Animal Bites: Care Instructions  Your Care Instructions  After an animal bite, the biggest concern is infection. The chance of infection depends on the type of animal that bit you, where on your body you were bitten, and your general health. Many animal bites are not closed with stitches, because this can increase the chance of infection. Your bite may take as little as 7 days or as long as several months to heal, depending on how bad it is. Taking good care of your wound at home will help it heal and reduce your chance of infection. The doctor has checked you carefully, but problems can develop later. If you notice any problems or new symptoms, get medical treatment right away. Follow-up care is a key part of your treatment and safety. Be sure to make and go to all appointments, and call your doctor if you are having problems. It's also a good idea to know your test results and keep a list of the medicines you take. How can you care for yourself at home? · If your doctor told you how to care for your wound, follow your doctor's instructions. If you did not get instructions, follow this general advice:  ? After 24 to 48 hours, gently wash the wound with clean water 2 times a day. Do not scrub or soak the wound. Don't use hydrogen peroxide or alcohol, which can slow healing. ? You may cover the wound with a thin layer of petroleum jelly, such as Vaseline, and a nonstick bandage. ? Apply more petroleum jelly and replace the bandage as needed. · After you shower, gently dry the wound with a clean towel. · If your doctor has closed the wound, cover the bandage with a plastic bag before you take a shower. · A small amount of skin redness and swelling around the wound edges and the stitches or staples is normal. Your wound may itch or feel irritated. Do not scratch or rub the wound.   · Ask your doctor if you can take an over-the-counter pain medicine, such as acetaminophen (Tylenol), ibuprofen (Advil, Motrin), or naproxen (Aleve). Read and follow all instructions on the label. · Do not take two or more pain medicines at the same time unless the doctor told you to. Many pain medicines have acetaminophen, which is Tylenol. Too much acetaminophen (Tylenol) can be harmful. · If your bite puts you at risk for rabies, you will get a series of shots over the next few weeks to prevent rabies. Your doctor will tell you when to get the shots. It is very important that you get the full cycle of shots. Follow your doctor's instructions exactly. · You may need a tetanus shot if you have not received one in the last 5 years. · If your doctor prescribed antibiotics, take them as directed. Do not stop taking them just because you feel better. You need to take the full course of antibiotics. When should you call for help? Call your doctor now or seek immediate medical care if:  · The skin near the bite turns cold or pale or it changes color. · You lose feeling in the area near the bite, or it feels numb or tingly. · You have trouble moving a limb near the bite. · You have symptoms of infection, such as:  ? Increased pain, swelling, warmth, or redness near the wound. ? Red streaks leading from the wound. ? Pus draining from the wound. ? A fever. · Blood soaks through the bandage. Oozing small amounts of blood is normal.  · Your pain is getting worse. Watch closely for changes in your health, and be sure to contact your doctor if you are not getting better as expected. Where can you learn more? Go to http://elizabeth-antonieta.info/  Enter W775 in the search box to learn more about \"Animal Bites: Care Instructions. \"  Current as of: June 26, 2019               Content Version: 12.5  © 3663-2842 [employer]. Care instructions adapted under license by [employer] (which disclaims liability or warranty for this information).  If you have questions about a medical condition or this instruction, always ask your healthcare professional. [place] [address] any warranty or liability for your use of this information.

## 2020-06-08 NOTE — PROGRESS NOTES
[address]  [phone]           Progress Note    Patient: Butch Chow MRN: [id number]  SSN: [id number]    YOB: 1945  Age: 66 y.o. Sex: male        Chief Complaint   Patient presents with    Labs     Fasting     Arthritis    Cat bite         Subjective:     Encounter Diagnoses   Name Primary?  Type 2 diabetes with nephropathy ([facility] [address]): This patient is managed under a comprehensive plan of care for Diabetes. Overall the patient feels well with good energy level. Key Antihyperglycemic Medications             metFORMIN ER (GLUCOPHAGE XR) 500 mg tablet (Taking) TAKE 1 TABLET by MOUTH two TIMES A DAY           Pertinent Labs:   Lab Results   Component Value Date/Time    Hemoglobin A1c 6.0 (H) 12/30/2019 11:52 AM    Hemoglobin A1c 5.8 (H) 09/27/2019 09:38 AM    Hemoglobin A1c 6.0 (H) 06/10/2019 11:46 AM      Body mass index is 28.31 kg/m². Lab Results   Component Value Date/Time    LDL, calculated 66 12/30/2019 11:52 AM         Lab Results   Component Value Date/Time    Sodium 140 12/30/2019 11:52 AM    Potassium 4.4 12/30/2019 11:52 AM    Chloride 107 12/30/2019 11:52 AM    CO2 28 12/30/2019 11:52 AM    Anion gap 5 12/30/2019 11:52 AM    Glucose 112 (H) 12/30/2019 11:52 AM    BUN 28 (H) 12/30/2019 11:52 AM    Creatinine 1.38 (H) 12/30/2019 11:52 AM    BUN/Creatinine ratio 20 12/30/2019 11:52 AM    GFR est AA >60 12/30/2019 11:52 AM    GFR est non-AA 50 (L) 12/30/2019 11:52 AM    Calcium 9.1 12/30/2019 11:52 AM    Calcium 8.9 12/30/2019 11:52 AM    Alk.  phosphatase 51 12/30/2019 11:52 AM    Protein, total 6.3 (L) 12/30/2019 11:52 AM    Albumin 3.8 12/30/2019 11:52 AM    Globulin 2.5 12/30/2019 11:52 AM    A-G Ratio 1.5 12/30/2019 11:52 AM    ALT (SGPT) 20 12/30/2019 11:52 AM     Lab Results   Component Value Date/Time    Microalbumin/Creat ratio (mg/g creat) 8 03/12/2009 09:00 AM    Microalb/Creat ratio (ug/mg creat.) 68.5 (H) 2019 10:01 AM    Microalbumin,urine random 1.47 2009 09:00 AM      Frequency of home glucose testing: No logs. Blood Sugar range at home:    Last eye exam: In past 12 months. Last foot exam: This year. Polyuria, polyphagia or polydipsia: No   Retinopathy: No   Neuropathy SX: No    Low blood sugar symptoms: No   Dietary compliance: Good   Medication compliance:Good   On ASA: Yes   Depression: No   CKD:no     Wt Readings from Last 3 Encounters:   20 203 lb (92.1 kg)   20 202 lb 12.8 oz (92 kg)   19 200 lb 12.8 oz (91.1 kg)        Social History     Tobacco Use   Smoking Status Former Smoker    Packs/day: 0.50    Years: 4.00    Pack years: 2.00    Last attempt to quit: 1966    Years since quittin.1   Smokeless Tobacco Never Used     Body mass index is 28.31 kg/m². All the patient's questions regarding medications, diet and exercise were answered. Goal of A1C of less than 7.5% is our goal.   Our overall goal is to reduce or eliminate the long term consequences of poorly controlled diabetes.        Yes    CKD stage 3 due to type 2 diabetes mellitus (HCC):L    Lab Results   Component Value Date/Time    GFR est AA >60 2019 11:52 AM    GFR est non-AA 50 (L) 2019 11:52 AM    Creatinine (POC) 1.0 2017 09:20 AM    Creatinine 1.38 (H) 2019 11:52 AM    BUN 28 (H) 2019 11:52 AM    BUN (POC) 21 (H) 2017 09:20 AM    Sodium (POC) 139 2017 09:20 AM    Sodium 140 2019 11:52 AM    Potassium 4.4 2019 11:52 AM    Potassium (POC) 4.1 2017 09:20 AM    Chloride (POC) 101 2017 09:20 AM    Chloride 107 2019 11:52 AM    CO2 28 2019 11:52 AM     Lab Results   Component Value Date/Time    WBC 5.8 2019 11:26 AM    Hemoglobin (POC) 12.9 2017 09:20 AM    HGB 11.6 (L) 06/10/2019 11:46 AM    Hematocrit (POC) 38 2017 09:20 AM    HCT 34.8 (L) 2019 11:26 AM    PLATELET 892 03/29/2019 11:26 AM    MCV 87 03/29/2019 11:26 AM     Lab Results   Component Value Date/Time    Vitamin D 25-Hydroxy 36.5 12/30/2019 11:52 AM       Lab Results   Component Value Date/Time    Calcium 9.1 12/30/2019 11:52 AM    Calcium 8.9 12/30/2019 11:52 AM    Phosphorus 3.2 06/10/2019 11:46 AM    PTH, Intact 72.3 12/30/2019 11:52 AM              Prostate cancer ([facility] [address]): PSA is 0.1 with no evidence of recurrence.  Coronary artery disease of native artery of native heart with stable angina pectoris [facility]):  No chest pain, BATES or SOB. No PND or orthopnea.  Essential hypertension:  BP Readings from Last 3 Encounters:   06/08/20 148/75   01/24/20 122/62   12/30/19 99/52     The patient reports:  taking medications as instructed, no medication side effects noted, no TIA's, no chest pain on exertion, no dyspnea on exertion, no swelling of ankles. Key CAD CHF Meds             lisinopriL (PRINIVIL, ZESTRIL) 20 mg tablet (Taking) Take 1 Tab by mouth daily. clopidogreL (Plavix) 75 mg tab (Taking) Take 1 Tab by mouth daily. furosemide (LASIX) 20 mg tablet (Taking) Take 1 Tab by mouth daily. isosorbide mononitrate ER (IMDUR) 60 mg CR tablet (Taking) TAKE ONE TABLET BY MOUTH EVERY DAY    rosuvastatin (CRESTOR) 40 mg tablet (Taking) TAKE ONE TABLET BY MOUTH EVERY DAY    hydrALAZINE (APRESOLINE) 50 mg tablet (Taking) TAKE ONE TABLET BY MOUTH THREE TIMES DAILY FOR HYPERTENSION    ezetimibe (ZETIA) 10 mg tablet (Taking) Take 1 Tab by mouth daily. felodipine (PLENDIL SR) 10 mg 24 hr tablet (Taking) Take 1 Tab by mouth daily. Indications: high blood pressure    atenolol (TENORMIN) 25 mg tablet (Taking) Take 1 Tab by mouth two (2) times a day. One tab twice daily. aspirin delayed-release 81 mg tablet (Taking) Take 1 Tab by mouth daily. omega-3 fatty acids-vitamin e (FISH OIL) 1,000 mg cap (Taking) Take 1 Cap by mouth.            Lab Results   Component Value Date/Time    Sodium 140 12/30/2019 11:52 AM Potassium 4.4 12/30/2019 11:52 AM    Chloride 107 12/30/2019 11:52 AM    CO2 28 12/30/2019 11:52 AM    Anion gap 5 12/30/2019 11:52 AM    Glucose 112 (H) 12/30/2019 11:52 AM    BUN 28 (H) 12/30/2019 11:52 AM    Creatinine 1.38 (H) 12/30/2019 11:52 AM    BUN/Creatinine ratio 20 12/30/2019 11:52 AM    GFR est AA >60 12/30/2019 11:52 AM    GFR est non-AA 50 (L) 12/30/2019 11:52 AM    Calcium 9.1 12/30/2019 11:52 AM    Calcium 8.9 12/30/2019 11:52 AM    Bilirubin, total 0.4 12/30/2019 11:52 AM    Alk. phosphatase 51 12/30/2019 11:52 AM    Protein, total 6.3 (L) 12/30/2019 11:52 AM    Albumin 3.8 12/30/2019 11:52 AM    Globulin 2.5 12/30/2019 11:52 AM    A-G Ratio 1.5 12/30/2019 11:52 AM    ALT (SGPT) 20 12/30/2019 11:52 AM     Low salt diet? yes  Aerobic exercise? no  Our goal is to normalize the blood pressure to decrease the risks of strokes and heart attacks. The patient is in agreement with the plan.  Pure hypercholesterolemia:  Cardiovascular risks for him are: LDL goal is under 80  diabetic  hypertension  hyperlipidemia. Key Antihyperlipidemia Meds             rosuvastatin (CRESTOR) 40 mg tablet (Taking) TAKE ONE TABLET BY MOUTH EVERY DAY    ezetimibe (ZETIA) 10 mg tablet (Taking) Take 1 Tab by mouth daily. omega-3 fatty acids-vitamin e (FISH OIL) 1,000 mg cap (Taking) Take 1 Cap by mouth. Lab Results   Component Value Date/Time    Cholesterol, total 159 12/30/2019 11:52 AM    HDL Cholesterol 71 12/30/2019 11:52 AM    LDL, calculated 66 12/30/2019 11:52 AM    Triglyceride 110 12/30/2019 11:52 AM    CHOL/HDL Ratio 2.2 12/30/2019 11:52 AM     Lab Results   Component Value Date/Time    ALT (SGPT) 20 12/30/2019 11:52 AM    Alk.  phosphatase 51 12/30/2019 11:52 AM    Bilirubin, total 0.4 12/30/2019 11:52 AM      Myalgias: No   Fatigue: No   Other side effects: no  Wt Readings from Last 3 Encounters:   06/08/20 203 lb (92.1 kg)   01/24/20 202 lb 12.8 oz (92 kg)   12/30/19 200 lb 12.8 oz (91.1 kg) The patient is aware of our goal to reduce or eliminate the long term problems (such as strokes and heart attacks) related to poorly controlled hyperlipidemia.  Gastroesophageal reflux disease with esophagitis:  Current control of Symptoms:good  Hiatal Hernia:no  Current Medications: Protonix  The patient has no history melena or bright red blood in the stools. The patient avoids high dose aspirin and NSAID therapy. The patient is aware of diet changes needed, elevating the head of the bed and appropriate use of antacids.  Vitamin D deficiency:  No sx. Lab Results   Component Value Date/Time    Vitamin D 25-Hydroxy 36.5 12/30/2019 11:52 AM            AK (actinic keratosis): Early AK scalp. AK is also on his forearms from sun exposure. Plan on Efudex treatment this fall but will treat with liquid nitrogen if the get worse before then.  Cat bite, initial encounter: His cat bit him on the arm this morning.  Encounter for immunization: Pneumonia vaccine. Current and past medical information:    Current Medications after this visit[de-identified]     Current Outpatient Medications   Medication Sig    traMADoL (ULTRAM) 50 mg tablet Take 2 Tabs by mouth every twelve (12) hours for 30 days. Max Daily Amount: 200 mg.    lisinopriL (PRINIVIL, ZESTRIL) 20 mg tablet Take 1 Tab by mouth daily.  clopidogreL (Plavix) 75 mg tab Take 1 Tab by mouth daily.  furosemide (LASIX) 20 mg tablet Take 1 Tab by mouth daily.  isosorbide mononitrate ER (IMDUR) 60 mg CR tablet TAKE ONE TABLET BY MOUTH EVERY DAY    rosuvastatin (CRESTOR) 40 mg tablet TAKE ONE TABLET BY MOUTH EVERY DAY    pantoprazole (PROTONIX) 40 mg tablet TAKE ONE TABLET BY MOUTH EVERY DAY FOR: GASTROESOPHAGEAL REFLUX    hydrALAZINE (APRESOLINE) 50 mg tablet TAKE ONE TABLET BY MOUTH THREE TIMES DAILY FOR HYPERTENSION    ezetimibe (ZETIA) 10 mg tablet Take 1 Tab by mouth daily.     famotidine (PEPCID) 40 mg tablet Take 1 Tab by mouth nightly.  felodipine (PLENDIL SR) 10 mg 24 hr tablet Take 1 Tab by mouth daily. Indications: high blood pressure    metFORMIN ER (GLUCOPHAGE XR) 500 mg tablet TAKE 1 TABLET by MOUTH two TIMES A DAY    triamcinolone acetonide (KENALOG) 0.1 % topical cream Apply  to affected area two (2) times a day. use thin layer to poison ivy    atenolol (TENORMIN) 25 mg tablet Take 1 Tab by mouth two (2) times a day. One tab twice daily.  EPINEPHrine (EPIPEN 2-KARL) 0.3 mg/0.3 mL injection INJECT INTRAMUSCULARLY AS NEEDED FOR ONE DOSE. TAKE WITH 50MG OF BENADRYL AND CALL 911. Dispense Generic    diclofenac (VOLTAREN) 1 % gel Apply 4 g to affected area four (4) times daily. Painful shoulder.  meclizine (ANTIVERT) 25 mg chewable tablet Take  by mouth three (3) times daily as needed.  aspirin delayed-release 81 mg tablet Take 1 Tab by mouth daily.  Peak Flow Meter eric Use prior and post nebulizer treatment    albuterol (PROVENTIL VENTOLIN) 2.5 mg /3 mL (0.083 %) nebulizer solution 3 mL by Nebulization route every six (6) hours as needed for Wheezing or Shortness of Breath.  omega-3 fatty acids-vitamin e (FISH OIL) 1,000 mg cap Take 1 Cap by mouth.  coenzyme q10 (CO Q-10) 100 mg Cap Take 100 mg by mouth daily. No current facility-administered medications for this visit.         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 ([facility] [address] 07/16/2019    Type 2 diabetes with nephropathy ([facility] [address] 06/29/2018    CKD stage 3 due to type 2 diabetes mellitus ([address] 06/29/2018    Age-related nuclear cataract of both eyes 03/27/2018    PVD (posterior vitreous detachment), left eye 03/27/2018    Controlled type 2 diabetes mellitus with diabetic nephropathy ([facility] [address] 12/03/2015    [facility] 08/23/2013    S/P carotid endarterectomy 12/05/2012    Vitamin D deficiency 12/05/2012    Amaurosis fugax of left eye 05/06/2012    Carotid artery obstruction 04/17/2012    Allergy to bee sting 04/13/2012       Past Medical History:   Diagnosis Date    Amaurosis fugax of left eye 5/6/2012    Arthritis     OSTEO    CAD (coronary artery disease) 2012    CAROTID LEFT     Cancer ([facility] [address] 2013    PROSTATE    Chronic pain     DJD lumbar    Diabetes ([facility] [address] 5/22/2010    Frequent nocturnal awakening     urinary frequency    GERD (gastroesophageal reflux disease) 5/22/2010    Hyperlipidemia 5/22/2010    Hypertension 5/22/2010    Kidney stone 5/22/2010    Nodular goiter     1.3 cm right , FNA 6/15    Obesity (BMI 30-39. 9)     Psychiatric disorder     ANXIETY    Vertigo        Allergies   Allergen Reactions    Bee Sting [Sting, Bee] Anaphylaxis    Vytorin 10-10 [Ezetimibe-Simvastatin] Myalgia    Amlodipine Other (comments)     Creepy crawly sensation, twitches    Lipitor [Atorvastatin] Myalgia       Past Surgical History:   Procedure Laterality Date    CARDIAC SURG PROCEDURE UNLIST  04/2019    3 stents placed    COLONOSCOPY  3/3/2016         EGD  3/3/2016         HX HEENT      tonsillectomy    HX MOHS PROCEDURES  2010    right    HX ORTHOPAEDIC      coccyx removed    HX UROLOGICAL  2010    left lithotripsy. basket  extraction,ureteral stent    HX VASECTOMY      NEUROLOGICAL PROCEDURE UNLISTED  2011    Steroid injections in epidural    VASCULAR SURGERY PROCEDURE UNLIST  2012    LEFT CAROTID       Social History     Socioeconomic History    Marital status:      Spouse name: Not on file    Number of children: Not on file    Years of education: Not on file    Highest education level: Not on file   Tobacco Use    Smoking status: Former Smoker     Packs/day: 0.50     Years: 4.00     Pack years: 2.00     Last attempt to quit: 1966     Years since quittin.1    Smokeless tobacco: Never Used   Substance and Sexual Activity    Alcohol use: No    Drug use: No    Sexual activity: Yes     Partners: Female       Review of Systems   Constitutional: Negative. Negative for chills, fever, malaise/fatigue and weight loss. No blood pressure is always normal at home. HENT: Negative. Negative for hearing loss. Eyes: Negative. Negative for blurred vision and double vision. Respiratory: Negative. Negative for cough, sputum production and shortness of breath. Cardiovascular: Negative. Negative for chest pain and palpitations. Gastrointestinal: Negative. Negative for abdominal pain, blood in stool, heartburn, nausea and vomiting. Genitourinary: Negative. Negative for dysuria, frequency and urgency. Musculoskeletal: Negative. Negative for back pain, falls, myalgias and neck pain. Skin: Positive for rash. Actinic keratoses and cat bite   Neurological: Positive for dizziness. Negative for tingling, tremors, weakness and headaches. He has a touch of vertigo coming back. He may need Epley maneuvers again but he does not want to do that now. Endo/Heme/Allergies: Negative. Psychiatric/Behavioral: Negative. Negative for depression. Objective:     Vitals:    20 0818 20 0844   BP: 154/57 148/75   Pulse: (!) 42    Resp: 20    Temp: 97.6 °F (36.4 °C)    TempSrc: Oral    SpO2: 98%    Weight: 203 lb (92.1 kg)    Height: 5' 11\" (1.803 m)       Body mass index is 28.31 kg/m². Physical Exam  Vitals signs and nursing note reviewed.    Constitutional:       General: He is not in acute distress. Appearance: Normal appearance. He is well-developed. He is not toxic-appearing. HENT:      Head: Normocephalic and atraumatic. Nose: No congestion. Mouth/Throat:      Pharynx: No oropharyngeal exudate or posterior oropharyngeal erythema. Eyes:      General: No scleral icterus. Right eye: No discharge. Left eye: No discharge. Cardiovascular:      Rate and Rhythm: Normal rate and regular rhythm. Heart sounds: Murmur present. No friction rub. No gallop. Comments: Systolic murmur is unchanged. Pulmonary:      Effort: Pulmonary effort is normal. No respiratory distress. Breath sounds: Normal breath sounds. No wheezing, rhonchi or rales. Abdominal:      General: There is no distension. Musculoskeletal:         General: No swelling. Skin:     General: Skin is warm. Coloration: Skin is not jaundiced. Findings: No erythema or rash. Comments: Appears to have a superficial cat bite left mid forearm. No redness or swelling yet. He will watch for these things and call me if it appears to be developing infection but right now he can use topical triple antibiotic. Neurological:      General: No focal deficit present. Mental Status: He is alert and oriented to person, place, and time. Psychiatric:         Mood and Affect: Mood normal.         Behavior: Behavior normal.       Health Maintenance Due   Topic Date Due    Eye Exam Retinal or Dilated  05/25/2019    GLAUCOMA SCREENING Q2Y  03/20/2020         Assessment and orders:     Encounter Diagnoses     ICD-10-CM ICD-9-CM   1. Type 2 diabetes with nephropathy (HCC) E11.21 250.40     583.81   2. CKD stage 3 due to type 2 diabetes mellitus (HCC) E11.22 250.40    N18.3 585.3   3. Prostate cancer ([facility] [address] C61 185   4. Coronary artery disease of native artery of native heart with stable angina pectoris ([facility] [address] I25.118 414.01     413.9   5. Essential hypertension I10 401.9   6.  Pure hypercholesterolemia E78.00 272.0   7. Gastroesophageal reflux disease with esophagitis K21.0 530.11   8. Vitamin D deficiency E55.9 268.9   9. AK (actinic keratosis) L57.0 702.0   10. Cat bite, initial encounter W55. 01XA 879.8     E906.3   11. Encounter for immunization Z23 V03.89     Diagnoses and all orders for this visit:    1. Type 2 diabetes with nephropathy (HCC)-retest  -     HEMOGLOBIN A1C WITH EAG; Future  -     LIPID PANEL; Future  -     METABOLIC PANEL, COMPREHENSIVE; Future    2. CKD stage 3 due to type 2 diabetes mellitus (HCC)-retest  -     LIPID PANEL; Future  -     METABOLIC PANEL, COMPREHENSIVE; Future    3. Prostate cancer (HCC)-no evidence of recurrence    4. Coronary artery disease of native artery of native heart with stable angina pectoris (HCC)-stable without chest pain  -     LIPID PANEL; Future  -     METABOLIC PANEL, COMPREHENSIVE; Future    5. Essential hypertension-slightly elevated today  -     LIPID PANEL; Future  -     METABOLIC PANEL, COMPREHENSIVE; Future    6. Pure hypercholesterolemia  -     LIPID PANEL; Future  -     METABOLIC PANEL, COMPREHENSIVE; Future    7. Gastroesophageal reflux disease with esophagitis  -     CBC WITH AUTOMATED DIFF; Future    8. Vitamin D deficiency-corrected    9. AK (actinic keratosis)-scalp and forearms. Plan on using Efudex this fall    10. Cat bite, initial encounter-he is to apply triple antibiotic cream at least twice daily and cover this area with a Band-Aid. Notify me immediately of symptoms of infection    11. Encounter for immunization-due for pneumonia 23  -     THER/PROPH/DIAG INJECTION, SUBCUT/IM  -     PNEUMOCOCCAL POLYSACCHARIDE VACCINE, 23-VALENT, ADULT OR IMMUNOSUPPRESSED PT DOSE,      Plan of care:  Discussed diagnoses in detail with patient. Medication risks/benefits/side effects discussed with patient. All of the patient's questions were addressed. The patient understands and agrees with our plan of care.     The patient knows to call back if they are unsure of or forget any changes we discussed today or if the symptoms change. The patient received an After-Visit Summary which contains VS, orders, medication list and allergy list. This can be used as a \"mini-medical record\" should they have to seek medical care while out of town. Patient Care Team:  Adiel Alegre MD as PCP - General  Adiel Alegre MD as PCP - [facility] Empaneled Provider  Ayanna Andrade MD (General and Vascular Surgery)  Nguyen Astudillo MD (Neurology)  Roberto Carlos Tomlin MD as Surgeon (Urology)  Brodie Ramires MD (Endocrinology)  Stephane Cote MD (Dermatology)  Jitendra Portillo DO as Physician (Cardiology)  Roxana Cespedes MD (Ophthalmology)    Follow-up and Dispositions    · Return in about 4 months (around 10/8/2020). Signed By: John aMrtinez MD     June 8, 2020      ATTENTION:   This medical record was transcribed using an electronic medical records/speech recognition system. Although proofread, it may and can contain electronic, spelling and other errors. Corrections may be executed at a later time. Please feel free to contact me for any clarifications as needed.

## 2020-06-08 NOTE — PROGRESS NOTES
Chief Complaint   Patient presents with    Labs     Fasting     Arthritis    Cat bite     Body mass index is 28.31 kg/m². 1. Have you been to the ER, urgent care clinic since your last visit? Hospitalized since your last visit? No    2. Have you seen or consulted any other health care providers outside of the [address] [place] [address] since your last visit? Include any pap smears or colon screening.  No    Reviewed record in preparation for visit and have necessary documentation  Pt did not bring medication to office visit for review  Information was given to pt on Advanced Directives, Living Will  Information was given on Shingles Vaccine  Opportunity was given for questions  Goals that were addressed and/or need to be completed after this appointment include:     Health Maintenance Due   Topic Date Due    Shingrix Vaccine Age 49> (1 of 2) 04/13/1993    Eye Exam Retinal or Dilated  05/25/2019    GLAUCOMA SCREENING Q2Y  03/20/2020    Pneumococcal 65+ years (2 of 2 - PPSV23) 03/26/2020

## 2020-06-09 ENCOUNTER — PATIENT MESSAGE (OUTPATIENT)
Dept: FAMILY MEDICINE CLINIC | Age: 75
End: 2020-06-09

## 2020-06-09 DIAGNOSIS — E11.22 CKD STAGE 3 DUE TO TYPE 2 DIABETES MELLITUS (HCC): Primary | ICD-10-CM

## 2020-06-09 DIAGNOSIS — N18.30 CKD STAGE 3 DUE TO TYPE 2 DIABETES MELLITUS (HCC): Primary | ICD-10-CM

## 2020-06-11 ENCOUNTER — HOSPITAL ENCOUNTER (OUTPATIENT)
Dept: LAB | Age: 75
Discharge: HOME | End: 2020-06-11

## 2020-06-11 DIAGNOSIS — N18.30 CKD STAGE 3 DUE TO TYPE 2 DIABETES MELLITUS (HCC): ICD-10-CM

## 2020-06-11 DIAGNOSIS — E11.22 CKD STAGE 3 DUE TO TYPE 2 DIABETES MELLITUS (HCC): ICD-10-CM

## 2020-06-11 LAB — POTASSIUM SERPL-SCNC: 4.6 MMOL/L (ref 3.5–5.1)

## 2020-06-12 ENCOUNTER — TELEPHONE (OUTPATIENT)
Dept: FAMILY MEDICINE CLINIC | Age: 75
End: 2020-06-12

## 2020-06-12 NOTE — TELEPHONE ENCOUNTER
Attempted to call. No answer. Message left. Need to advise patient per Dr. Woodrow Humphrey:   \"potassium repeat normal. No changes needed. \"

## 2020-06-24 DIAGNOSIS — I10 ESSENTIAL HYPERTENSION: ICD-10-CM

## 2020-06-24 RX ORDER — ATENOLOL 25 MG/1
25 TABLET ORAL 2 TIMES DAILY
Qty: 180 TAB | Refills: 2 | Status: ON HOLD | OUTPATIENT
Start: 2020-06-24 | End: 2020-08-24

## 2020-07-08 DIAGNOSIS — I10 ESSENTIAL HYPERTENSION: ICD-10-CM

## 2020-07-08 RX ORDER — FUROSEMIDE 20 MG/1
20 TABLET ORAL DAILY
Qty: 90 TAB | Refills: 1 | Status: SHIPPED | OUTPATIENT
Start: 2020-07-08 | End: 2021-09-08 | Stop reason: SDUPTHER

## 2020-07-22 DIAGNOSIS — M19.90 CHRONIC ARTHRITIS: ICD-10-CM

## 2020-07-22 DIAGNOSIS — G89.4 CHRONIC PAIN SYNDROME: ICD-10-CM

## 2020-07-23 RX ORDER — TRAMADOL HYDROCHLORIDE 50 MG/1
100 TABLET ORAL EVERY 12 HOURS
Qty: 120 TAB | Refills: 1 | Status: SHIPPED | OUTPATIENT
Start: 2020-07-23 | End: 2020-09-21

## 2020-07-28 ENCOUNTER — DOCUMENTATION ONLY (OUTPATIENT)
Dept: CARDIOLOGY CLINIC | Age: 75
End: 2020-07-28

## 2020-08-10 ENCOUNTER — OFFICE VISIT (OUTPATIENT)
Dept: CARDIOLOGY CLINIC | Age: 75
End: 2020-08-10
Payer: MEDICARE

## 2020-08-10 VITALS
WEIGHT: 207 LBS | OXYGEN SATURATION: 97 % | HEIGHT: 71 IN | SYSTOLIC BLOOD PRESSURE: 120 MMHG | DIASTOLIC BLOOD PRESSURE: 74 MMHG | BODY MASS INDEX: 28.98 KG/M2

## 2020-08-10 DIAGNOSIS — I10 ESSENTIAL HYPERTENSION: ICD-10-CM

## 2020-08-10 DIAGNOSIS — I25.118 CORONARY ARTERY DISEASE OF NATIVE ARTERY OF NATIVE HEART WITH STABLE ANGINA PECTORIS (HCC): Primary | ICD-10-CM

## 2020-08-10 DIAGNOSIS — E11.22 CKD STAGE 3 DUE TO TYPE 2 DIABETES MELLITUS (HCC): ICD-10-CM

## 2020-08-10 DIAGNOSIS — E11.21 TYPE 2 DIABETES WITH NEPHROPATHY (HCC): ICD-10-CM

## 2020-08-10 DIAGNOSIS — N18.30 CKD STAGE 3 DUE TO TYPE 2 DIABETES MELLITUS (HCC): ICD-10-CM

## 2020-08-10 PROCEDURE — 93010 ELECTROCARDIOGRAM REPORT: CPT | Performed by: STUDENT IN AN ORGANIZED HEALTH CARE EDUCATION/TRAINING PROGRAM

## 2020-08-10 PROCEDURE — 99213 OFFICE O/P EST LOW 20 MIN: CPT | Performed by: STUDENT IN AN ORGANIZED HEALTH CARE EDUCATION/TRAINING PROGRAM

## 2020-08-10 RX ORDER — NITROGLYCERIN 0.3 MG/1
0.4 TABLET SUBLINGUAL
Qty: 1 BOTTLE | Refills: 3 | Status: SHIPPED | OUTPATIENT
Start: 2020-08-10 | End: 2022-03-23

## 2020-08-10 NOTE — PROGRESS NOTES
[facility] [address], [address] [facility], [address] [place] [address]    Office [phone],[facility] [phone]           Maverick Cheek III is a 66 y.o. presents for f/u of CAD      Assessment/Recommendations:      Coronary artery disease - stable angina symptoms  Distal LAD  filling via L-->L collaterals   Lcx  4/2019. 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 - cont DAPT, low bleeding risk with severe ASCVD  - Goal-directed medical therapy  - d/c atenolol due to bradycardia  - prn nitro    Palpitations-symptomatic PVCs based on Holter monitor. Brief 4 beat run of ventricular tachycardia noted on monitor. D/c BB for bradycardia      Hypertension-stable continue treatment per primary care. Diabetes-  Per Jovanna Lo MD      Hyperlipidemia  -LDL 66, on statin therapy. Previous myalgias with Vytorin. Tolerating rosuvastatin 40mg daily. Restarted zetia at last visit, d/c due to myalgias    GERD- PPI      Carotid artery stenosis- s/p carotid endarterectomy on aspirin and statin. followed by  Jada Burch MD             Primary Care Physician- Jovanna Lo MD    F/u 6 months sooner as needed      Subjective:  66 y.o. male presents for follow-up. Distal LAD  with retrograde filling of apical LAD, severe disease within the proximal AV groove circumflex supplies several very small disease marginals. 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 Overall doing fairly well. At last visit we started Zetia to optimize his lipoprotein's with severe ASCVD, however he did not tolerate Zetia therapy. Discontinue due to myalgias. Continues to have stable angina symptoms. No escalation in anginal symptoms. No recurrent amaurosis fugax symptoms.     Past Medical History:   Diagnosis Date    Amaurosis fugax of left eye 5/6/2012    Arthritis     OSTEO    CAD (coronary artery disease) 2012    CAROTID LEFT     Cancer ([facility] [address] 2013    PROSTATE    Chronic pain     DJD lumbar    Diabetes ([facility] [address] 5/22/2010    Frequent nocturnal awakening     urinary frequency    GERD (gastroesophageal reflux disease) 5/22/2010    Hyperlipidemia 5/22/2010    Hypertension 5/22/2010    Kidney stone 5/22/2010    Nodular goiter     1.3 cm right , FNA 6/15    Obesity (BMI 30-39. 9)     Psychiatric disorder     ANXIETY    Vertigo         Past Surgical History:   Procedure Laterality Date    CARDIAC SURG PROCEDURE UNLIST  04/2019    3 stents placed    COLONOSCOPY  3/3/2016         EGD  3/3/2016         HX HEENT      tonsillectomy    HX MOHS PROCEDURES  2010    right    HX ORTHOPAEDIC      coccyx removed    HX UROLOGICAL  2010    left lithotripsy. basket  extraction,ureteral stent    HX VASECTOMY      NEUROLOGICAL PROCEDURE UNLISTED  2011    Steroid injections in epidural    VASCULAR SURGERY PROCEDURE UNLIST  2012    LEFT CAROTID         Current Outpatient Medications:     traMADoL (ULTRAM) 50 mg tablet, Take 2 Tabs by mouth every twelve (12) hours for 60 days. Max Daily Amount: 200 mg. (Patient taking differently: Take 100 mg by mouth every twelve (12) hours. Two tablets daily), Disp: 120 Tab, Rfl: 1    furosemide (LASIX) 20 mg tablet, Take 1 Tab by mouth daily. , Disp: 90 Tab, Rfl: 1    atenoloL (TENORMIN) 25 mg tablet, Take 1 Tab by mouth two (2) times a day. One tab twice daily. , Disp: 180 Tab, Rfl: 2    lisinopriL (PRINIVIL, ZESTRIL) 20 mg tablet, Take 1 Tab by mouth daily. (Patient taking differently: Take 10 mg by mouth daily.), Disp: 1 Tab, Rfl: 0    clopidogreL (Plavix) 75 mg tab, Take 1 Tab by mouth daily. , Disp: 90 Tab, Rfl: 1    isosorbide mononitrate ER (IMDUR) 60 mg CR tablet, TAKE ONE TABLET BY MOUTH EVERY DAY, Disp: 30 Tab, Rfl: 5    rosuvastatin (CRESTOR) 40 mg tablet, TAKE ONE TABLET BY MOUTH EVERY DAY, Disp: 90 Tab, Rfl: 3    pantoprazole (PROTONIX) 40 mg tablet, TAKE ONE TABLET BY MOUTH EVERY DAY FOR: GASTROESOPHAGEAL REFLUX, Disp: 30 Tab, Rfl: 11    hydrALAZINE (APRESOLINE) 50 mg tablet, TAKE ONE TABLET BY MOUTH THREE TIMES DAILY FOR HYPERTENSION, Disp: 90 Tab, Rfl: 6    ezetimibe (ZETIA) 10 mg tablet, Take 1 Tab by mouth daily. , Disp: 90 Tab, Rfl: 3    famotidine (PEPCID) 40 mg tablet, Take 1 Tab by mouth nightly., Disp: 90 Tab, Rfl: 3    felodipine (PLENDIL SR) 10 mg 24 hr tablet, Take 1 Tab by mouth daily. Indications: high blood pressure, Disp: 30 Tab, Rfl: 11    triamcinolone acetonide (KENALOG) 0.1 % topical cream, Apply  to affected area two (2) times a day. use thin layer to poison ivy, Disp: 453 g, Rfl: 0    EPINEPHrine (EPIPEN 2-KARL) 0.3 mg/0.3 mL injection, INJECT INTRAMUSCULARLY AS NEEDED FOR ONE DOSE. TAKE WITH 50MG OF BENADRYL AND CALL 911. Dispense Generic, Disp: 1 Syringe, Rfl: 3    diclofenac (VOLTAREN) 1 % gel, Apply 4 g to affected area four (4) times daily. Painful shoulder., Disp: 100 g, Rfl: 3    meclizine (ANTIVERT) 25 mg chewable tablet, Take  by mouth three (3) times daily as needed. , Disp: , Rfl:     aspirin delayed-release 81 mg tablet, Take 1 Tab by mouth daily. , Disp: 30 Tab, Rfl: 0    Peak Flow Meter eric, Use prior and post nebulizer treatment, Disp: 1 Device, Rfl: 0    albuterol (PROVENTIL VENTOLIN) 2.5 mg /3 mL (0.083 %) nebulizer solution, 3 mL by Nebulization route every six (6) hours as needed for Wheezing or Shortness of Breath., Disp: 24 Each, Rfl: 5    omega-3 fatty acids-vitamin e (FISH OIL) 1,000 mg cap, Take 1 Cap by mouth., Disp: , Rfl:     coenzyme q10 (CO Q-10) 100 mg Cap, Take 100 mg by mouth daily. , Disp: , Rfl:     metFORMIN ER (GLUCOPHAGE XR) 500 mg tablet, TAKE 1 TABLET by MOUTH two TIMES A DAY (Patient not taking: Reported on 8/10/2020), Disp: 180 Tab, Rfl: 3    Allergies   Allergen Reactions    Bee Sting [Sting, Bee] Anaphylaxis    Vytorin 10-10 [Ezetimibe-Simvastatin] Myalgia    Amlodipine Other (comments)     Creepy crawly sensation, twitches  Lipitor [Atorvastatin] Myalgia        Family History   Problem Relation Age of Onset    Diabetes Mother     Obesity Mother     Heart Disease Mother    Margarette Stroke Father     Heart Disease Sister     Obesity Sister    Margarette Diabetes Sister    Mother  of a heart attack at age 62  Father had a stroke in his 76s    Social History     Tobacco Use    Smoking status: Former Smoker     Packs/day: 0.50     Years: 4.00     Pack years: 2.00     Last attempt to quit: 1966     Years since quittin.3    Smokeless tobacco: Never Used   Substance Use Topics    Alcohol use: No    Drug use: No       Review of Symptoms:  Pertinent Positive: atypical chest pain symptoms, transient left eye blurred vision  Pertinent Negative: No shortness of breath orthopnea PND. All Other systems reviewed and are negative for a Comprehensive ROS (10+)    Physical Exam    Blood pressure 120/74, height 5' 11\" (1.803 m), weight 207 lb (93.9 kg), SpO2 97 %. Constitutional:  well-developed and well-nourished. No distress. HENT: Normocephalic. Eyes: No scleral icterus. Neck:  Neck supple. No JVD present. Pulmonary/Chest: Effort normal and breath sounds normal. No respiratory distress, wheezes or rales. Cardiovascular: Normal rate, regular rhythm, S1 S2 .  2/6 systolic murmur   extremities:  Normal muscle tone  Abdominal:   No abnormal distension. Neurological:  Moving all extremities, cranial nerves appear grossly intact. Skin: Skin is not cold. Not diaphoretic. No erythema. Psychiatric:  Grossly normal mood and affect. Intact insight. Objective Data:     Lipids 19 - , HDL 64, LDL 76, , VLDL 22    Echo 2018: Normal LVEF, mild AS, mild MR/TR    Event monitor-2019 to 2019. Rare ventricular ectopy, 6 supraventricular runs of ventricular tachycardia the longest being 4 beats. Supraventricular ectopy involving 8.2% of beats.     19  NUCLEAR CARDIAC STRESS TEST 2019   Abnormal perfusion    4/2019  L Main: no significant disease  LAD: distal LAD  after large branching diagonal  LCx: proximal LCx just distal to origin of OM1 into mid-LCx with severe diffuse disease,  Involves origin of OM2 and OM3  RCA: anomalous origin from left cusp, serial focal moderate stenosis of mid-RCA and distal RCA that is not hemodynamically significant by FFR (0.92)    PCI  Stenting of proximal and mid-Lcx (distal to proximal) with 2.5x15, 3.0x12 and 3.0x8mm Xience Freida REINALDO.  Post-dilated with 3.0NC just inside distal stent edge and 3.75NC proximally at high pressure                       Isiah Castano DO

## 2020-08-10 NOTE — H&P (VIEW-ONLY)
[facility] [address], [address] [place], [address] [facility] Office [phone] Rhiannon Crain is a 66 y.o. presents for f/u of CAD Assessment/Recommendations: 
 
 
Coronary artery disease - stable angina symptoms Distal LAD  filling via L-->L collaterals Lcx  4/2019. 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 - cont DAPT, low bleeding risk with severe ASCVD 
- Goal-directed medical therapy 
- d/c atenolol due to bradycardia - prn nitro Palpitations-symptomatic PVCs based on Holter monitor. Brief 4 beat run of ventricular tachycardia noted on monitor. D/c BB for bradycardia Hypertension-stable continue treatment per primary care. Diabetes-  Per Alexei Sandhu MD 
 
 
Hyperlipidemia  -LDL 66, on statin therapy. Previous myalgias with Vytorin. Tolerating rosuvastatin 40mg daily. Restarted zetia at last visit, d/c due to myalgias GERD- PPI Carotid artery stenosis- s/p carotid endarterectomy on aspirin and statin. followed by  Garrick Steinberg MD  
 
 
 
 
 
Primary Care Physician- Alexei Sandhu MD 
 
F/u 6 months sooner as needed Subjective: 
66 y.o. male presents for follow-up. Distal LAD  with retrograde filling of apical LAD, severe disease within the proximal AV groove circumflex supplies several very small disease marginals. 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 Overall doing fairly well. At last visit we started Zetia to optimize his lipoprotein's with severe ASCVD, however he did not tolerate Zetia therapy. Discontinue due to myalgias. Continues to have stable angina symptoms. No escalation in anginal symptoms. No recurrent amaurosis fugax symptoms. Past Medical History:  
Diagnosis Date  Amaurosis fugax of left eye 5/6/2012  Arthritis OSTEO  
 CAD (coronary artery disease) 2012 CAROTID LEFT   
 Cancer [facility]) 2013 PROSTATE  Chronic pain DJD lumbar  Diabetes ([facility] [address] 5/22/2010  Frequent nocturnal awakening   
 urinary frequency  GERD (gastroesophageal reflux disease) 5/22/2010  Hyperlipidemia 5/22/2010  Hypertension 5/22/2010  Kidney stone 5/22/2010  Nodular goiter 1.3 cm right , FNA 6/15  Obesity (BMI 30-39. 9)  Psychiatric disorder ANXIETY  Vertigo Past Surgical History:  
Procedure Laterality Date  CARDIAC SURG PROCEDURE UNLIST  04/2019  
 3 stents placed  COLONOSCOPY  3/3/2016  EGD  3/3/2016  HX HEENT    
 tonsillectomy  HX MOHS PROCEDURES  2010  
 right  HX ORTHOPAEDIC    
 coccyx removed  HX UROLOGICAL  2010  
 left lithotripsy. basket  extraction,ureteral stent  HX VASECTOMY  NEUROLOGICAL PROCEDURE UNLISTED  2011 Steroid injections in epidural  
 VASCULAR SURGERY PROCEDURE UNLIST  2012 LEFT CAROTID Current Outpatient Medications:  
  traMADoL (ULTRAM) 50 mg tablet, Take 2 Tabs by mouth every twelve (12) hours for 60 days. Max Daily Amount: 200 mg. (Patient taking differently: Take 100 mg by mouth every twelve (12) hours. Two tablets daily), Disp: 120 Tab, Rfl: 1 
  furosemide (LASIX) 20 mg tablet, Take 1 Tab by mouth daily. , Disp: 90 Tab, Rfl: 1 
  atenoloL (TENORMIN) 25 mg tablet, Take 1 Tab by mouth two (2) times a day. One tab twice daily. , Disp: 180 Tab, Rfl: 2 
  lisinopriL (PRINIVIL, ZESTRIL) 20 mg tablet, Take 1 Tab by mouth daily. (Patient taking differently: Take 10 mg by mouth daily.), Disp: 1 Tab, Rfl: 0 
  clopidogreL (Plavix) 75 mg tab, Take 1 Tab by mouth daily. , Disp: 90 Tab, Rfl: 1 
  isosorbide mononitrate ER (IMDUR) 60 mg CR tablet, TAKE ONE TABLET BY MOUTH EVERY DAY, Disp: 30 Tab, Rfl: 5 
  rosuvastatin (CRESTOR) 40 mg tablet, TAKE ONE TABLET BY MOUTH EVERY DAY, Disp: 90 Tab, Rfl: 3 
  pantoprazole (PROTONIX) 40 mg tablet, TAKE ONE TABLET BY MOUTH EVERY DAY FOR: GASTROESOPHAGEAL REFLUX, Disp: 30 Tab, Rfl: 11 
  hydrALAZINE (APRESOLINE) 50 mg tablet, TAKE ONE TABLET BY MOUTH THREE TIMES DAILY FOR HYPERTENSION, Disp: 90 Tab, Rfl: 6 
  ezetimibe (ZETIA) 10 mg tablet, Take 1 Tab by mouth daily. , Disp: 90 Tab, Rfl: 3 
  famotidine (PEPCID) 40 mg tablet, Take 1 Tab by mouth nightly., Disp: 90 Tab, Rfl: 3 
  felodipine (PLENDIL SR) 10 mg 24 hr tablet, Take 1 Tab by mouth daily. Indications: high blood pressure, Disp: 30 Tab, Rfl: 11 
  triamcinolone acetonide (KENALOG) 0.1 % topical cream, Apply  to affected area two (2) times a day. use thin layer to poison ivy, Disp: 453 g, Rfl: 0 
  EPINEPHrine (EPIPEN 2-KARL) 0.3 mg/0.3 mL injection, INJECT INTRAMUSCULARLY AS NEEDED FOR ONE DOSE. TAKE WITH 50MG OF BENADRYL AND CALL 911. Dispense Generic, Disp: 1 Syringe, Rfl: 3 
  diclofenac (VOLTAREN) 1 % gel, Apply 4 g to affected area four (4) times daily. Painful shoulder., Disp: 100 g, Rfl: 3 
  meclizine (ANTIVERT) 25 mg chewable tablet, Take  by mouth three (3) times daily as needed. , Disp: , Rfl:  
  aspirin delayed-release 81 mg tablet, Take 1 Tab by mouth daily. , Disp: 30 Tab, Rfl: 0 
  Peak Flow Meter eric, Use prior and post nebulizer treatment, Disp: 1 Device, Rfl: 0 
  albuterol (PROVENTIL VENTOLIN) 2.5 mg /3 mL (0.083 %) nebulizer solution, 3 mL by Nebulization route every six (6) hours as needed for Wheezing or Shortness of Breath., Disp: 24 Each, Rfl: 5 
  omega-3 fatty acids-vitamin e (FISH OIL) 1,000 mg cap, Take 1 Cap by mouth., Disp: , Rfl:  
  coenzyme q10 (CO Q-10) 100 mg Cap, Take 100 mg by mouth daily. , Disp: , Rfl:  
  metFORMIN ER (GLUCOPHAGE XR) 500 mg tablet, TAKE 1 TABLET by MOUTH two TIMES A DAY (Patient not taking: Reported on 8/10/2020), Disp: 180 Tab, Rfl: 3 Allergies Allergen Reactions  Bee Sting [Sting, Bee] Anaphylaxis  Vytorin 10-10 [Ezetimibe-Simvastatin] Myalgia  Amlodipine Other (comments) Creepy crawly sensation, twitches  Lipitor [Atorvastatin] Myalgia Family History Problem Relation Age of Onset  Diabetes Mother  Obesity Mother  Heart Disease Mother  Stroke Father  Heart Disease Sister  Obesity Sister  Diabetes Sister Mother  of a heart attack at age 62 Father had a stroke in his 76s Social History Tobacco Use  Smoking status: Former Smoker Packs/day: 0.50 Years: 4.00 Pack years: 2.00 Last attempt to quit: 1966 Years since quittin.3  Smokeless tobacco: Never Used Substance Use Topics  Alcohol use: No  
 Drug use: No  
 
 
Review of Symptoms: 
Pertinent Positive: atypical chest pain symptoms, transient left eye blurred vision Pertinent Negative: No shortness of breath orthopnea PND. All Other systems reviewed and are negative for a Comprehensive ROS (10+) Physical Exam 
 
Blood pressure 120/74, height 5' 11\" (1.803 m), weight 207 lb (93.9 kg), SpO2 97 %. Constitutional:  well-developed and well-nourished. No distress. HENT: Normocephalic. Eyes: No scleral icterus. Neck:  Neck supple. No JVD present. Pulmonary/Chest: Effort normal and breath sounds normal. No respiratory distress, wheezes or rales. Cardiovascular: Normal rate, regular rhythm, S1 S2 .  2/6 systolic murmur  
extremities:  Normal muscle tone Abdominal:   No abnormal distension. Neurological:  Moving all extremities, cranial nerves appear grossly intact. Skin: Skin is not cold. Not diaphoretic. No erythema. Psychiatric:  Grossly normal mood and affect. Intact insight. Objective Data: 
  
Lipids 19 - , HDL 64, LDL 76, , VLDL 22 Echo 2018: Normal LVEF, mild AS, mild MR/TR Event monitor-2019 to 2019. Rare ventricular ectopy, 6 supraventricular runs of ventricular tachycardia the longest being 4 beats. Supraventricular ectopy involving 8.2% of beats. 19 NUCLEAR CARDIAC STRESS TEST 02/27/2019 Abnormal perfusion 4/2019 L Main: no significant disease LAD: distal LAD  after large branching diagonal 
LCx: proximal LCx just distal to origin of OM1 into mid-LCx with severe diffuse disease,  Involves origin of OM2 and OM3 
RCA: anomalous origin from left cusp, serial focal moderate stenosis of mid-RCA and distal RCA that is not hemodynamically significant by FFR (0.92) PCI Stenting of proximal and mid-Lcx (distal to proximal) with 2.5x15, 3.0x12 and 3.0x8mm Xience [address] REINALDO. Post-dilated with 3.0NC just inside distal stent edge and 3.75NC proximally at high pressure Conda Dom, DO

## 2020-08-10 NOTE — PROGRESS NOTES
Shani Teague is a 66 y.o. male    Chief Complaint   Patient presents with    Coronary Artery Disease    Hypertension    Diabetes     Patient states his HR runs low 40's. Chest pain patient states some discomfort tightness and burning. SOB No    Dizziness No    Swelling No    Refills No    Visit Vitals  /74 (BP 1 Location: Left arm, BP Patient Position: Sitting)   Ht 5' 11\" (1.803 m)   Wt 207 lb (93.9 kg)   SpO2 97%   BMI 28.87 kg/m²       1. Have you been to the ER, urgent care clinic since your last visit? Hospitalized since your last visit? No    2. Have you seen or consulted any other health care providers outside of the [address] [place] [address] since your last visit? Include any pap smears or colon screening.   No

## 2020-08-17 ENCOUNTER — TELEPHONE (OUTPATIENT)
Dept: FAMILY MEDICINE CLINIC | Age: 75
End: 2020-08-17

## 2020-08-18 NOTE — TELEPHONE ENCOUNTER
Returned phone call to patient. All questions addressed and answered during phone call regarding the letter he received in the mail.

## 2020-08-19 ENCOUNTER — TELEPHONE (OUTPATIENT)
Dept: CARDIOLOGY CLINIC | Age: 75
End: 2020-08-19

## 2020-08-19 DIAGNOSIS — Z01.810 PREPROCEDURAL CARDIOVASCULAR EXAMINATION: Primary | ICD-10-CM

## 2020-08-19 NOTE — TELEPHONE ENCOUNTER
Spoke with pt concerning [facility] procedure. Instructions given to pt per VO Dr. Deandra Healy. Pt. NPO after 0500. Informed patient to hold the Lasix the day of the procedure. Patient stated he no longer takes Metformin. Have someone available to drive pt to and from procedure; pack a bag in case an overnight stay is warranted. [facility] scheduled for 1 pm on 8/26/20. Patient advised to arrive 2 hrs prior to procedure for prep. Patient will have COVID testing done on 8/21/20 at [facility]. Patient verbalized understanding. Opportunities for questions, clarifications, and concerns provided.

## 2020-08-20 DIAGNOSIS — R07.9 CHEST PAIN, UNSPECIFIED TYPE: Primary | ICD-10-CM

## 2020-08-20 RX ORDER — ISOSORBIDE MONONITRATE 60 MG/1
TABLET, EXTENDED RELEASE ORAL
Qty: 30 TAB | Refills: 5 | Status: ON HOLD | OUTPATIENT
Start: 2020-08-20 | End: 2020-08-24 | Stop reason: SDUPTHER

## 2020-08-20 RX ORDER — SODIUM CHLORIDE 0.9 % (FLUSH) 0.9 %
5-40 SYRINGE (ML) INJECTION AS NEEDED
Status: CANCELLED | OUTPATIENT
Start: 2020-08-24

## 2020-08-20 RX ORDER — SODIUM CHLORIDE 9 MG/ML
100 INJECTION, SOLUTION INTRAVENOUS CONTINUOUS
Status: CANCELLED | OUTPATIENT
Start: 2020-08-24

## 2020-08-20 RX ORDER — SODIUM CHLORIDE 0.9 % (FLUSH) 0.9 %
5-40 SYRINGE (ML) INJECTION EVERY 8 HOURS
Status: CANCELLED | OUTPATIENT
Start: 2020-08-24

## 2020-08-21 NOTE — PROGRESS NOTES
1:48 PM    Spoke with patient to verify arrival time, npo after midnight, and to review covid screening questions. Patient denied any signs or symptoms of covid and verbalized understanding of instructions.

## 2020-08-24 ENCOUNTER — HOSPITAL ENCOUNTER (OUTPATIENT)
Age: 75
Setting detail: OUTPATIENT SURGERY
Discharge: HOME | End: 2020-08-24
Attending: STUDENT IN AN ORGANIZED HEALTH CARE EDUCATION/TRAINING PROGRAM | Admitting: STUDENT IN AN ORGANIZED HEALTH CARE EDUCATION/TRAINING PROGRAM
Payer: MEDICARE

## 2020-08-24 VITALS
RESPIRATION RATE: 16 BRPM | SYSTOLIC BLOOD PRESSURE: 145 MMHG | DIASTOLIC BLOOD PRESSURE: 62 MMHG | OXYGEN SATURATION: 99 % | WEIGHT: 203.8 LBS | HEIGHT: 71 IN | HEART RATE: 54 BPM | BODY MASS INDEX: 28.53 KG/M2 | TEMPERATURE: 98.6 F

## 2020-08-24 DIAGNOSIS — R07.9 CHEST PAIN, UNSPECIFIED TYPE: ICD-10-CM

## 2020-08-24 DIAGNOSIS — I25.118 ATHSCL HEART DISEASE OF NATIVE COR ART W OTH ANG PCTRS (HCC): ICD-10-CM

## 2020-08-24 DIAGNOSIS — I25.110 CORONARY ARTERY DISEASE INVOLVING NATIVE CORONARY ARTERY OF NATIVE HEART WITH UNSTABLE ANGINA PECTORIS (HCC): ICD-10-CM

## 2020-08-24 LAB
ACT BLD: 208 SECS (ref 79–138)
ANION GAP SERPL CALC-SCNC: 3 MMOL/L (ref 5–15)
BUN SERPL-MCNC: 21 MG/DL (ref 6–20)
BUN/CREAT SERPL: 15 (ref 12–20)
CALCIUM SERPL-MCNC: 9.2 MG/DL (ref 8.5–10.1)
CHLORIDE SERPL-SCNC: 110 MMOL/L (ref 97–108)
CO2 SERPL-SCNC: 28 MMOL/L (ref 21–32)
CREAT SERPL-MCNC: 1.44 MG/DL (ref 0.7–1.3)
ERYTHROCYTE [DISTWIDTH] IN BLOOD BY AUTOMATED COUNT: 13.7 % (ref 11.5–14.5)
GLUCOSE BLD STRIP.AUTO-MCNC: 119 MG/DL (ref 65–100)
GLUCOSE BLD STRIP.AUTO-MCNC: 94 MG/DL (ref 65–100)
GLUCOSE SERPL-MCNC: 113 MG/DL (ref 65–100)
HCT VFR BLD AUTO: 35.8 % (ref 36.6–50.3)
HGB BLD-MCNC: 11.7 G/DL (ref 12.1–17)
MCH RBC QN AUTO: 29.5 PG (ref 26–34)
MCHC RBC AUTO-ENTMCNC: 32.7 G/DL (ref 30–36.5)
MCV RBC AUTO: 90.2 FL (ref 80–99)
NRBC # BLD: 0 K/UL (ref 0–0.01)
NRBC BLD-RTO: 0 PER 100 WBC
PLATELET # BLD AUTO: 197 K/UL (ref 150–400)
PMV BLD AUTO: 9.4 FL (ref 8.9–12.9)
POTASSIUM SERPL-SCNC: 4.1 MMOL/L (ref 3.5–5.1)
RBC # BLD AUTO: 3.97 M/UL (ref 4.1–5.7)
SERVICE CMNT-IMP: ABNORMAL
SERVICE CMNT-IMP: NORMAL
SODIUM SERPL-SCNC: 141 MMOL/L (ref 136–145)
WBC # BLD AUTO: 7.1 K/UL (ref 4.1–11.1)

## 2020-08-24 PROCEDURE — 74011250637 HC RX REV CODE- 250/637: Performed by: STUDENT IN AN ORGANIZED HEALTH CARE EDUCATION/TRAINING PROGRAM

## 2020-08-24 PROCEDURE — C1753 CATH, INTRAVAS ULTRASOUND: HCPCS | Performed by: STUDENT IN AN ORGANIZED HEALTH CARE EDUCATION/TRAINING PROGRAM

## 2020-08-24 PROCEDURE — 99152 MOD SED SAME PHYS/QHP 5/>YRS: CPT | Performed by: STUDENT IN AN ORGANIZED HEALTH CARE EDUCATION/TRAINING PROGRAM

## 2020-08-24 PROCEDURE — 85027 COMPLETE CBC AUTOMATED: CPT

## 2020-08-24 PROCEDURE — 92928 PRQ TCAT PLMT NTRAC ST 1 LES: CPT | Performed by: STUDENT IN AN ORGANIZED HEALTH CARE EDUCATION/TRAINING PROGRAM

## 2020-08-24 PROCEDURE — C1894 INTRO/SHEATH, NON-LASER: HCPCS | Performed by: STUDENT IN AN ORGANIZED HEALTH CARE EDUCATION/TRAINING PROGRAM

## 2020-08-24 PROCEDURE — 93005 ELECTROCARDIOGRAM TRACING: CPT

## 2020-08-24 PROCEDURE — 74011000250 HC RX REV CODE- 250: Performed by: STUDENT IN AN ORGANIZED HEALTH CARE EDUCATION/TRAINING PROGRAM

## 2020-08-24 PROCEDURE — 99153 MOD SED SAME PHYS/QHP EA: CPT | Performed by: STUDENT IN AN ORGANIZED HEALTH CARE EDUCATION/TRAINING PROGRAM

## 2020-08-24 PROCEDURE — 85347 COAGULATION TIME ACTIVATED: CPT

## 2020-08-24 PROCEDURE — 77030012468 HC VLV BLEEDBK CNTRL ABBT -B: Performed by: STUDENT IN AN ORGANIZED HEALTH CARE EDUCATION/TRAINING PROGRAM

## 2020-08-24 PROCEDURE — 36415 COLL VENOUS BLD VENIPUNCTURE: CPT

## 2020-08-24 PROCEDURE — C1725 CATH, TRANSLUMIN NON-LASER: HCPCS | Performed by: STUDENT IN AN ORGANIZED HEALTH CARE EDUCATION/TRAINING PROGRAM

## 2020-08-24 PROCEDURE — C1887 CATHETER, GUIDING: HCPCS | Performed by: STUDENT IN AN ORGANIZED HEALTH CARE EDUCATION/TRAINING PROGRAM

## 2020-08-24 PROCEDURE — 74011250636 HC RX REV CODE- 250/636: Performed by: STUDENT IN AN ORGANIZED HEALTH CARE EDUCATION/TRAINING PROGRAM

## 2020-08-24 PROCEDURE — 92978 ENDOLUMINL IVUS OCT C 1ST: CPT | Performed by: STUDENT IN AN ORGANIZED HEALTH CARE EDUCATION/TRAINING PROGRAM

## 2020-08-24 PROCEDURE — 77030019569 HC BND COMPR RAD TERU -B: Performed by: STUDENT IN AN ORGANIZED HEALTH CARE EDUCATION/TRAINING PROGRAM

## 2020-08-24 PROCEDURE — 93454 CORONARY ARTERY ANGIO S&I: CPT | Performed by: STUDENT IN AN ORGANIZED HEALTH CARE EDUCATION/TRAINING PROGRAM

## 2020-08-24 PROCEDURE — 77030008543 HC TBNG MON PRSS MRTM -A: Performed by: STUDENT IN AN ORGANIZED HEALTH CARE EDUCATION/TRAINING PROGRAM

## 2020-08-24 PROCEDURE — 77030013715 HC INFL SYS MRTM -B: Performed by: STUDENT IN AN ORGANIZED HEALTH CARE EDUCATION/TRAINING PROGRAM

## 2020-08-24 PROCEDURE — 82962 GLUCOSE BLOOD TEST: CPT

## 2020-08-24 PROCEDURE — 74011000636 HC RX REV CODE- 636: Performed by: STUDENT IN AN ORGANIZED HEALTH CARE EDUCATION/TRAINING PROGRAM

## 2020-08-24 PROCEDURE — C1769 GUIDE WIRE: HCPCS | Performed by: STUDENT IN AN ORGANIZED HEALTH CARE EDUCATION/TRAINING PROGRAM

## 2020-08-24 PROCEDURE — C1874 STENT, COATED/COV W/DEL SYS: HCPCS | Performed by: STUDENT IN AN ORGANIZED HEALTH CARE EDUCATION/TRAINING PROGRAM

## 2020-08-24 PROCEDURE — 93458 L HRT ARTERY/VENTRICLE ANGIO: CPT | Performed by: STUDENT IN AN ORGANIZED HEALTH CARE EDUCATION/TRAINING PROGRAM

## 2020-08-24 PROCEDURE — 80048 BASIC METABOLIC PNL TOTAL CA: CPT

## 2020-08-24 PROCEDURE — 77030029065 HC DRSG HEMO QCLOT ZMED -B

## 2020-08-24 DEVICE — XIENCE SIERRA™ EVEROLIMUS ELUTING CORONARY STENT SYSTEM 4.00 MM X 23 MM / RAPID-EXCHANGE
Type: IMPLANTABLE DEVICE | Status: FUNCTIONAL
Brand: XIENCE SIERRA™

## 2020-08-24 RX ORDER — SODIUM CHLORIDE 0.9 % (FLUSH) 0.9 %
5-40 SYRINGE (ML) INJECTION EVERY 8 HOURS
Status: DISCONTINUED | OUTPATIENT
Start: 2020-08-24 | End: 2020-08-24 | Stop reason: HOSPADM

## 2020-08-24 RX ORDER — HEPARIN SODIUM 1000 [USP'U]/ML
INJECTION, SOLUTION INTRAVENOUS; SUBCUTANEOUS AS NEEDED
Status: DISCONTINUED | OUTPATIENT
Start: 2020-08-24 | End: 2020-08-24 | Stop reason: HOSPADM

## 2020-08-24 RX ORDER — SODIUM CHLORIDE 9 MG/ML
100 INJECTION, SOLUTION INTRAVENOUS CONTINUOUS
Status: DISCONTINUED | OUTPATIENT
Start: 2020-08-24 | End: 2020-08-24 | Stop reason: HOSPADM

## 2020-08-24 RX ORDER — HEPARIN SODIUM 200 [USP'U]/100ML
INJECTION, SOLUTION INTRAVENOUS
Status: COMPLETED | OUTPATIENT
Start: 2020-08-24 | End: 2020-08-24

## 2020-08-24 RX ORDER — FENTANYL CITRATE 50 UG/ML
INJECTION, SOLUTION INTRAMUSCULAR; INTRAVENOUS AS NEEDED
Status: DISCONTINUED | OUTPATIENT
Start: 2020-08-24 | End: 2020-08-24 | Stop reason: HOSPADM

## 2020-08-24 RX ORDER — SODIUM CHLORIDE 0.9 % (FLUSH) 0.9 %
5-40 SYRINGE (ML) INJECTION AS NEEDED
Status: DISCONTINUED | OUTPATIENT
Start: 2020-08-24 | End: 2020-08-24 | Stop reason: HOSPADM

## 2020-08-24 RX ORDER — VERAPAMIL HYDROCHLORIDE 2.5 MG/ML
INJECTION, SOLUTION INTRAVENOUS AS NEEDED
Status: DISCONTINUED | OUTPATIENT
Start: 2020-08-24 | End: 2020-08-24 | Stop reason: HOSPADM

## 2020-08-24 RX ORDER — GUAIFENESIN 100 MG/5ML
LIQUID (ML) ORAL AS NEEDED
Status: DISCONTINUED | OUTPATIENT
Start: 2020-08-24 | End: 2020-08-24 | Stop reason: HOSPADM

## 2020-08-24 RX ORDER — CLOPIDOGREL 300 MG/1
TABLET, FILM COATED ORAL AS NEEDED
Status: DISCONTINUED | OUTPATIENT
Start: 2020-08-24 | End: 2020-08-24 | Stop reason: HOSPADM

## 2020-08-24 RX ORDER — ISOSORBIDE MONONITRATE 60 MG/1
120 TABLET, EXTENDED RELEASE ORAL
Qty: 90 TAB | Refills: 1 | Status: SHIPPED | OUTPATIENT
Start: 2020-08-24 | End: 2020-09-09 | Stop reason: SDUPTHER

## 2020-08-24 RX ORDER — LIDOCAINE HYDROCHLORIDE 10 MG/ML
INJECTION INFILTRATION; PERINEURAL AS NEEDED
Status: DISCONTINUED | OUTPATIENT
Start: 2020-08-24 | End: 2020-08-24 | Stop reason: HOSPADM

## 2020-08-24 RX ORDER — RANOLAZINE 500 MG/1
500 TABLET, EXTENDED RELEASE ORAL 2 TIMES DAILY
Qty: 60 TAB | Refills: 3 | Status: SHIPPED | OUTPATIENT
Start: 2020-08-24 | End: 2020-11-24

## 2020-08-24 RX ORDER — MIDAZOLAM HYDROCHLORIDE 1 MG/ML
INJECTION, SOLUTION INTRAMUSCULAR; INTRAVENOUS AS NEEDED
Status: DISCONTINUED | OUTPATIENT
Start: 2020-08-24 | End: 2020-08-24 | Stop reason: HOSPADM

## 2020-08-24 NOTE — Clinical Note
Lesion: Located in the Distal RCA. Stent inserted. Single technique used. First inflation pressure = 20 cesilia; inflation time: 60 sec.

## 2020-08-24 NOTE — DISCHARGE INSTRUCTIONS
Cardiac Catheterization/Angiography Discharge Instructions    *Check the puncture site frequently for swelling or bleeding. If you see any bleeding, lie down and apply pressure over the area with a clean town or washcloth. Notify your doctor for any redness, swelling, drainage or oozing from the puncture site. Notify your doctor for any fever or chills. *If the leg with the puncture becomes cold, numb or painful, call Dr Shannon Saini at  [phone]. *Activity should be limited for the next 48 hours. Climb stairs as little as possible and avoid any stooping, bending or strenuous activity for 48 hours. No heavy lifting (anything over 10 pounds) for three days. *Do not drive for 48 hours. *You may resume your usual diet. Drink more fluids than usual.    *Have a responsible person drive you home and stay with you for at least 24 hours after your heart catheterization/angiography. *You may remove the bandage from your Right Groin in 24 hours. You may shower in 24 hours. No tub baths, hot tubs or swimming for one week. Do not place any lotions, creams, powders, ointments over the puncture site for one week. You may place a clean band-aid over the puncture site each day for 5 days. Change this daily.

## 2020-08-24 NOTE — Clinical Note
Lesion located in the Distal RCA. Balloon inserted. Balloon inflated using multiple inflations inflation technique. Lesion #1: Pressure = 18 cesilia; Duration = 13 sec. Inflation 2: Pressure = 20 cesilia; Duration = 13 sec.

## 2020-08-24 NOTE — INTERVAL H&P NOTE
Update History & Physical 
History and physical has been reviewed. The patient has been examined. There have been no significant clinical changes since the completion of the originally dated History and Physical. 
 
Risk and benefit of cardiac catheterization/PCI was described in detail to patient.  (risk of vascular access complication with potential surgical intervention for management, stroke, myocardial infarction, emergent open heart surgery and death). Patient wishes to proceed with coronary angiography with possible intervention. ASA 2 Dvaid Lo Electronically signed by Yulia Garg DO on 8/24/2020 at 1:24 PM

## 2020-08-24 NOTE — PROCEDURES
BRIEF PROCEDURE NOTE    Date of Procedure: 8/24/2020   Preoperative Diagnosis: unstable angina  Postoperative Diagnosis: unstable angina    Procedure: Left heart cath, coronary angiography, PCI with KEISHA  Interventional Cardiologist: Jean Claude Leach DO  Assistant: None  Anesthesia: local + IV moderate sedation   I administered moderate sedation throughout this procedure. An independent trained observer pushed medications at my direction, and monitored the patients level of consciousness and physiological status throughout. Estimated Blood Loss: Minimal    Access: right radial artery, 6F  Catheters:  Left coronary: JL 3.5 5F  Right coronary: AL1, 5F    Findings:   L Main: large caliber, normal  LAD: large caliber, tubular 70% mid-vessel disease,  distal LAD  LCx: large caliber, previously placed proximal and mid-Lcx stents widely patent, several small caliber OM branches. OM1 small caliber proximal 50%, OM2 very small caliber proximal 95% stenosis with karon 1 flow, OM3 very small caliber with ostial 95% stenosis with karon 1 flow, OM4 small caliber with proximal 50% stenosis (OM stable compared to cath in 2019)  RCA: anomalous from left cusp, large caliber, distal 70% stenosis      PCI:    AL1, 6F guide  candy blue   Heparin    Dilated with 3.5 compliant balloon    IVUS used for sizing    4.0x23mm Xience Freida KEISHA deployed at [facility]. Post-dilated with 4.5 NC balloon at 18 WINDY. karon 3 flow w/o evidence of dissection or perforation           Specimens Removed: None    Implants: xience freida keisha    Closure Device: radial TR band    See full cath note. Complications: none      Findings:  1. Multivessel CAD   2. Distal RCA stenting with 4.0x23mm Xience [facility] KEISHA deployed at 20 WINDY. Post-dilated with 4.5 NC balloon at 18 WINDY.     Plan:    Cont dapt  Cont ismn 120mg daily  Add ranexa 500mg bid    Jean Claude Leach DO        [address], DO  [facility] [address] [place] [address] [address] [place] [address], [id number] [place] [address]                                              Office [phone],EEW [phone]

## 2020-08-24 NOTE — Clinical Note
Lesion located in the Distal RCA. Balloon inserted. Balloon inflated using multiple inflations inflation technique. Lesion #1: Pressure = 15 cesilia; Duration = 30 sec.

## 2020-08-24 NOTE — Clinical Note
TRANSFER - OUT REPORT:     Verbal report given to: Marsha Pickering. Report consisted of patient's Situation, Background, Assessment and   Recommendations(SBAR). Opportunity for questions and clarification was provided. Patient transported with a Registered Nurse and [address] [place] [address] / [facility]. Patient transported to: Stanton Rodriguez.

## 2020-08-24 NOTE — ROUTINE PROCESS
12:26 PM 
Patient arrived. ID and allergies verified verbally with patient. Pt voices understanding of procedure to be performed. Consent obtained. Pt prepped for procedure. 1:34 PM 
TRANSFER - OUT REPORT: 
 
Verbal report given to Ed, RN(name) on Adrianne Fleming III  being transferred to Cath Lab(unit) for ordered procedure Report consisted of patients Situation, Background, Assessment and  
Recommendations(SBAR). Information from the following report(s) Procedure Summary was reviewed with the receiving nurse. Lines:  
Peripheral IV 08/24/20 Left Antecubital (Active) Site Assessment Clean, dry, & intact 08/24/20 1250 Phlebitis Assessment 0 08/24/20 1250 Infiltration Assessment 0 08/24/20 1250 Dressing Status Clean, dry, & intact 08/24/20 1250 Dressing Type Tape;Transparent 08/24/20 1250 Hub Color/Line Status Pink 08/24/20 1250 Opportunity for questions and clarification was provided. Patient transported with: 
 Registered Nurse 5:10 PM 
2 ml air released from TR Band. No bleeding or hematoma noted. Radial and Ulnar pulse on R wrist palpable. Pt tolerated well. Will continue to monitor. 12 mL remaining. 5:15 PM 
2 ml air released from TR Band. No bleeding or hematoma noted. Radial and Ulnar pulse on R wrist palpable. Pt tolerated well. Will continue to monitor. 10 mL remaining. 
 
5:20 PM 
3 ml air released from TR Band. No bleeding or hematoma noted. Radial and Ulnar pulse on R wrist palpable. Pt tolerated well. Will continue to monitor. Site started to ooze, replaced 3 mL of air. 10 mL remaining. 5:40 PM 
2 ml air released from TR Band. No bleeding or hematoma noted. Radial and Ulnar pulse on R wrist palpable. Pt tolerated well. Will continue to monitor. 8 mL remaining. 5:45 PM 
2 ml air released from TR Band. No bleeding or hematoma noted. Radial and Ulnar pulse on R wrist palpable. Pt tolerated well. Will continue to monitor.  6 mL remaining. 
 
5:50 PM 
 3 ml air released from TR Band. No bleeding or hematoma noted. Radial and Ulnar pulse on R wrist palpable. Pt tolerated well. Will continue to monitor. 3 mL remaining. 
 
5:55 PM  
3 ml air released from TR Band. No bleeding or hematoma noted. Radial and Ulnar pulse on R wrist palpable. Pt tolerated well. Will continue to monitor. Air release completed. TR Band removed from R wrist. No bleeding or  Hematoma. Dressing applied. Wrist immobilizer in place. Radial and ulnar pulse remain palpable on affected extremity. Pt tolerated well. Instructions given to pt regarding movement and activity restrictions. Pt voiced understanding. 7:00 PM 
Patient arrived. ID and allergies verified verbally with patient. Pt voices understanding of procedure to be performed. Consent obtained. Pt prepped for procedure. [facility] MEDICATION AND SIDE EFFECT GUIDE The [address] [place], [place] [address]d EFFECT GUIDE was provided to the PATIENT AND CARE PROVIDER. Information provided includes instruction about drug purpose and common side effects for the following medications: 
 
· Ranexa Gave verbal report to ZURDO Connors.

## 2020-08-24 NOTE — PROGRESS NOTES
2:57 PM    TRANSFER - IN REPORT:    Verbal report received from [place] (name) on Leann Frazier III  being received from [facility] for routine progression of care      Report consisted of patients Situation, Background, Assessment and   Recommendations(SBAR). Information from the following report(s) Procedure Summary was reviewed with the receiving nurse. Opportunity for questions and clarification was provided. Assessment completed upon patients arrival to unit and care assumed. 7:40 PM    Report received from RN   Assuming care of patient      2000    Pt discharged via wheelchair with Jl Kauffman RN. Personal belongings with patient upon discharge.

## 2020-08-25 ENCOUNTER — TELEPHONE (OUTPATIENT)
Dept: CARDIOLOGY CLINIC | Age: 75
End: 2020-08-25

## 2020-08-25 LAB
ATRIAL RATE: 58 BPM
CALCULATED P AXIS, ECG09: 12 DEGREES
CALCULATED R AXIS, ECG10: -9 DEGREES
CALCULATED T AXIS, ECG11: 66 DEGREES
DIAGNOSIS, 93000: NORMAL
P-R INTERVAL, ECG05: 174 MS
Q-T INTERVAL, ECG07: 506 MS
QRS DURATION, ECG06: 94 MS
QTC CALCULATION (BEZET), ECG08: 496 MS
VENTRICULAR RATE, ECG03: 58 BPM

## 2020-08-25 NOTE — TELEPHONE ENCOUNTER
Patient states that he had a procedure done yesterday and that he received a call and accidentally deleted the message and wanted to ensure no one needed to speak with him. Please advise.     Phone: [phone]

## 2020-09-01 ENCOUNTER — VIRTUAL VISIT (OUTPATIENT)
Dept: FAMILY MEDICINE CLINIC | Age: 75
End: 2020-09-01
Payer: MEDICARE

## 2020-09-01 DIAGNOSIS — E11.22 CKD STAGE 3 DUE TO TYPE 2 DIABETES MELLITUS (HCC): ICD-10-CM

## 2020-09-01 DIAGNOSIS — K21.00 GASTROESOPHAGEAL REFLUX DISEASE WITH ESOPHAGITIS: Chronic | ICD-10-CM

## 2020-09-01 DIAGNOSIS — E55.9 VITAMIN D DEFICIENCY: Chronic | ICD-10-CM

## 2020-09-01 DIAGNOSIS — I10 ESSENTIAL HYPERTENSION: Chronic | ICD-10-CM

## 2020-09-01 DIAGNOSIS — N18.30 CKD STAGE 3 DUE TO TYPE 2 DIABETES MELLITUS (HCC): ICD-10-CM

## 2020-09-01 DIAGNOSIS — E11.21 TYPE 2 DIABETES WITH NEPHROPATHY (HCC): Primary | ICD-10-CM

## 2020-09-01 DIAGNOSIS — I25.110 CORONARY ARTERY DISEASE INVOLVING NATIVE CORONARY ARTERY OF NATIVE HEART WITH UNSTABLE ANGINA PECTORIS (HCC): ICD-10-CM

## 2020-09-01 DIAGNOSIS — E11.21 CONTROLLED TYPE 2 DIABETES MELLITUS WITH DIABETIC NEPHROPATHY, WITHOUT LONG-TERM CURRENT USE OF INSULIN (HCC): ICD-10-CM

## 2020-09-01 DIAGNOSIS — E78.00 PURE HYPERCHOLESTEROLEMIA: Chronic | ICD-10-CM

## 2020-09-01 DIAGNOSIS — I25.118 CORONARY ARTERY DISEASE OF NATIVE ARTERY OF NATIVE HEART WITH STABLE ANGINA PECTORIS (HCC): ICD-10-CM

## 2020-09-01 PROCEDURE — 99443 PR PHYS/QHP TELEPHONE EVALUATION 21-30 MIN: CPT | Performed by: FAMILY MEDICINE

## 2020-09-01 NOTE — PROGRESS NOTES
[address]  [phone]           Progress Note    Patient: Vincent Broussard MRN: [id number]  SSN: [id number]    YOB: 1945  Age: 66 y.o. Sex: male        Chief Complaint   Patient presents with    Follow Up Chronic Condition         Vincent Broussard is a 66 y.o. male evaluated via telephone on 9/1/2020. Nurse involved in care:Nurse Yuko  Location of patient:Home  Location of physician:My office    Consent:  He and/or health care decision maker is aware that that he may receive a bill for this telephone service, depending on his insurance coverage, and has provided verbal consent to proceed: Yes      Documentation:  I communicated with the patient and/or health care decision maker about multiple problems. Details of this discussion including any medical advice provided: See Below. I affirm this is a Patient Initiated Episode with an Established Patient who has not had a related appointment within my department in the past 7 days or scheduled within the next 24 hours. Total Time: minutes: 21-30 minutes    Note: not billable if this call serves to triage the patient into an appointment for the relevant concern      Renita Grande MD   __________________________________________________________________________________________    Subjective:     Encounter Diagnoses   Name Primary?     Type 2 diabetes with nephropathy (HCC) Yes    Controlled type 2 diabetes mellitus with diabetic nephropathy, without long-term current use of insulin (HCC)     CKD stage 3 due to type 2 diabetes mellitus ([address]     Coronary artery disease of native artery of native heart with stable angina pectoris ([address]     Essential hypertension     Pure hypercholesterolemia     Gastroesophageal reflux disease with esophagitis     Vitamin D deficiency              Current and past medical information:    Current Medications after this visit[de-identified]     Current Outpatient Medications   Medication Sig    isosorbide mononitrate ER (IMDUR) 60 mg CR tablet Take 2 Tabs by mouth every morning.  ranolazine ER (Ranexa) 500 mg SR tablet Take 1 Tab by mouth two (2) times a day.  nitroglycerin (NITROSTAT) 0.3 mg SL tablet 1 Tab by SubLINGual route every five (5) minutes as needed for Chest Pain.  traMADoL (ULTRAM) 50 mg tablet Take 2 Tabs by mouth every twelve (12) hours for 60 days. Max Daily Amount: 200 mg. (Patient taking differently: Take 100 mg by mouth every twelve (12) hours. Two tablets daily)    furosemide (LASIX) 20 mg tablet Take 1 Tab by mouth daily.  lisinopriL (PRINIVIL, ZESTRIL) 20 mg tablet Take 1 Tab by mouth daily. (Patient taking differently: Take 10 mg by mouth daily.)    clopidogreL (Plavix) 75 mg tab Take 1 Tab by mouth daily.  rosuvastatin (CRESTOR) 40 mg tablet TAKE ONE TABLET BY MOUTH EVERY DAY    pantoprazole (PROTONIX) 40 mg tablet TAKE ONE TABLET BY MOUTH EVERY DAY FOR: GASTROESOPHAGEAL REFLUX    hydrALAZINE (APRESOLINE) 50 mg tablet TAKE ONE TABLET BY MOUTH THREE TIMES DAILY FOR HYPERTENSION    ezetimibe (ZETIA) 10 mg tablet Take 1 Tab by mouth daily.  famotidine (PEPCID) 40 mg tablet Take 1 Tab by mouth nightly.  felodipine (PLENDIL SR) 10 mg 24 hr tablet Take 1 Tab by mouth daily. Indications: high blood pressure    triamcinolone acetonide (KENALOG) 0.1 % topical cream Apply  to affected area two (2) times a day. use thin layer to poison ivy    EPINEPHrine (EPIPEN 2-KARL) 0.3 mg/0.3 mL injection INJECT INTRAMUSCULARLY AS NEEDED FOR ONE DOSE. TAKE WITH 50MG OF BENADRYL AND CALL 911. Dispense Generic    diclofenac (VOLTAREN) 1 % gel Apply 4 g to affected area four (4) times daily. Painful shoulder.  meclizine (ANTIVERT) 25 mg chewable tablet Take  by mouth three (3) times daily as needed.  aspirin delayed-release 81 mg tablet Take 1 Tab by mouth daily.     Peak Flow Meter eric Use prior and post nebulizer treatment    albuterol (PROVENTIL VENTOLIN) 2.5 mg /3 mL (0.083 %) nebulizer solution 3 mL by Nebulization route every six (6) hours as needed for Wheezing or Shortness of Breath.  omega-3 fatty acids-vitamin e (FISH OIL) 1,000 mg cap Take 1 Cap by mouth.  coenzyme q10 (CO Q-10) 100 mg Cap Take 100 mg by mouth daily. No current facility-administered medications for this visit.         Patient Active Problem List    Diagnosis Date Noted    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 ([facility] 07/16/2019     Priority: 1 - One    History of angina 04/11/2019     Priority: 1 - One    CAD (coronary artery disease) 04/11/2019     Priority: 1 - One    Type 2 diabetes with nephropathy ([facility] 06/29/2018     Priority: 1 - One    CKD stage 3 due to type 2 diabetes mellitus [facility] 06/29/2018     Priority: 1 - One    Controlled type 2 diabetes mellitus with diabetic nephropathy ([facility] 12/03/2015     Priority: 1 - One    Vitamin D deficiency 12/05/2012     Priority: 1 - One    Bone spur 10/04/2010     Priority: 1 - One    Hypertension 05/22/2010     Priority: 1 - One    Hyperlipidemia 05/22/2010     Priority: 1 - One    GERD (gastroesophageal reflux disease) 05/22/2010     Priority: 1 - One    Kidney stone 05/22/2010     Priority: 1 - One    Heart murmur 07/23/2018     Priority: 4 - Four    Age-related nuclear cataract of both eyes 03/27/2018    PVD (posterior vitreous detachment), left eye 03/27/2018    Prostate cancer ([facility] 08/23/2013    S/P carotid endarterectomy 12/05/2012    Amaurosis fugax of left eye 05/06/2012    Carotid artery obstruction 04/17/2012    Allergy to bee sting 04/13/2012       Past Medical History:   Diagnosis Date    Amaurosis fugax of left eye 5/6/2012    Arthritis     OSTEO    CAD (coronary artery disease) 2012    CAROTID LEFT     Cancer ([facility] 2013    PROSTATE    Chronic pain     DJD lumbar    Diabetes ([facility] 5/22/2010    Frequent nocturnal awakening urinary frequency    GERD (gastroesophageal reflux disease) 2010    Hyperlipidemia 2010    Hypertension 2010    Kidney stone 2010    Nodular goiter     1.3 cm right , FNA 6/15    Obesity (BMI 30-39. 9)     Psychiatric disorder     ANXIETY    Vertigo        Allergies   Allergen Reactions    Bee Sting [Sting, Bee] Anaphylaxis    Vytorin 10-10 [Ezetimibe-Simvastatin] Myalgia    Amlodipine Other (comments)     Creepy crawly sensation, twitches    Lipitor [Atorvastatin] Myalgia       Past Surgical History:   Procedure Laterality Date    CARDIAC SURG PROCEDURE UNLIST  2019    3 stents placed    COLONOSCOPY  3/3/2016         EGD  3/3/2016         HX HEENT      tonsillectomy    HX MOHS PROCEDURES  2010    right    HX ORTHOPAEDIC      coccyx removed    HX UROLOGICAL      left lithotripsy. basket  extraction,ureteral stent    HX VASECTOMY      NEUROLOGICAL PROCEDURE UNLISTED      Steroid injections in epidural    VASCULAR SURGERY PROCEDURE UNLIST      LEFT CAROTID       Social History     Socioeconomic History    Marital status:      Spouse name: Not on file    Number of children: Not on file    Years of education: Not on file    Highest education level: Not on file   Tobacco Use    Smoking status: Former Smoker     Packs/day: 0.50     Years: 4.00     Pack years: 2.00     Last attempt to quit: 1966     Years since quittin.4    Smokeless tobacco: Never Used   Substance and Sexual Activity    Alcohol use: No    Drug use: No    Sexual activity: Yes     Partners: Female       Review of Systems   Constitutional: Negative. Negative for chills, fever, malaise/fatigue and weight loss. HENT: Negative. Negative for hearing loss. Eyes: Negative. Negative for blurred vision and double vision. Respiratory: Negative. Cardiovascular: Positive for chest pain. Negative for palpitations. Recent cardiac stenting due to unstable angina. Gastrointestinal: Negative. Negative for abdominal pain, blood in stool, heartburn, nausea and vomiting. Genitourinary: Negative. Musculoskeletal: Positive for joint pain. Skin: Negative. Negative for rash. Neurological: Negative. Negative for dizziness, tingling, tremors and headaches. Endo/Heme/Allergies: Negative. Psychiatric/Behavioral: Negative. Objective: There were no vitals filed for this visit. There is no height or weight on file to calculate BMI. Health Maintenance Due   Topic Date Due    Eye Exam Retinal or Dilated  05/25/2019    GLAUCOMA SCREENING Q2Y  03/20/2020    Medicare Yearly Exam  07/16/2020    Flu Vaccine (1) 09/01/2020    MICROALBUMIN Q1  09/27/2020         Assessment and orders:     Encounter Diagnoses     ICD-10-CM ICD-9-CM   1. Type 2 diabetes with nephropathy (HCC)  E11.21 250.40     583.81   2. Controlled type 2 diabetes mellitus with diabetic nephropathy, without long-term current use of insulin (HCC)  E11.21 250.40     583.81   3. CKD stage 3 due to type 2 diabetes mellitus (HCC)  E11.22 250.40    N18.3 585.3   4. Coronary artery disease of native artery of native heart with stable angina pectoris ([facility]  I25.118 414.01     413.9   5. Essential hypertension  I10 401.9   6. Pure hypercholesterolemia  E78.00 272.0   7. Gastroesophageal reflux disease with esophagitis  K21.0 530.11   8. Vitamin D deficiency  E55.9 268.9     Diagnoses and all orders for this visit:    1. Type 2 diabetes with nephropathy (HCC)-A1c has been excellent. 2 months ago it was 6.3 so it does not need to be repeated. -     MICROALBUMIN, UR, RAND W/ MICROALB/CREAT RATIO; Future  -     METABOLIC PANEL, COMPREHENSIVE; Future  -     NMR LIPOPROFILE WITH LIPIDS (WITHOUT GRAPH); Future    2.  Controlled type 2 diabetes mellitus with diabetic nephropathy, without long-term current use of insulin (HCC)  -     MICROALBUMIN, UR, RAND W/ MICROALB/CREAT RATIO; Future  -     METABOLIC PANEL, COMPREHENSIVE; Future  -     NMR LIPOPROFILE WITH LIPIDS (WITHOUT GRAPH); Future    3. CKD stage 3 due to type 2 diabetes mellitus (HCC)-retest  -     METABOLIC PANEL, COMPREHENSIVE; Future  -     PTH INTACT; Future  -     VITAMIN D, 25 HYDROXY; Future  -     HEMOGLOBIN; Future    4. Coronary artery disease of native artery of native heart with stable angina pectoris (HCC)  -     METABOLIC PANEL, COMPREHENSIVE; Future  -     NMR LIPOPROFILE WITH LIPIDS (WITHOUT GRAPH); Future    5. Essential hypertension-controlled  -     METABOLIC PANEL, COMPREHENSIVE; Future  -     NMR LIPOPROFILE WITH LIPIDS (WITHOUT GRAPH); Future  -     HEMOGLOBIN; Future    6. Pure hypercholesterolemia-with his recent cardiac stent we will do an NMR to see if his particles are adequately controlled. -     METABOLIC PANEL, COMPREHENSIVE; Future  -     NMR LIPOPROFILE WITH LIPIDS (WITHOUT GRAPH); Future    7. Gastroesophageal reflux disease with esophagitis  -     HEMOGLOBIN; Future    8. Vitamin D deficiency  -     VITAMIN D, 25 HYDROXY; Future    9. CAD-recent distal stent required due to chest pain/unstable angina. He is still working part-time as an  and I have strongly recommended that he follow-up for but discontinue his work schedule. He has had some mild chest discomfort since his stent nothing that lasts. Plan of care:  Discussed diagnoses in detail with patient. Medication risks/benefits/side effects discussed with patient. All of the patient's questions were addressed. The patient understands and agrees with our plan of care. The patient knows to call back if they are unsure of or forget any changes we discussed today or if the symptoms change. The patient received an After-Visit Summary which contains VS, orders, medication list and allergy list. This can be used as a \"mini-medical record\" should they have to seek medical care while out of town.     Patient Care Team:  Joshua Foster MD as PCP - General  Gabriel Tavarez MD as PCP - [facility] Empaneled Provider  Wing Darling MD (General and Vascular Surgery)  Yusuf Guevara MD (Neurology)  Zeus Child MD as Surgeon (Urology)  Jimmy Johnson MD (Endocrinology)  Vikas Schroeder MD (Dermatology)  Andre Moore DO as Physician (Cardiology)  Sunday MD Eloy (Ophthalmology)          Signed By: Ramos Flores MD     September 1, 2020      ATTENTION:   This medical record was transcribed using an electronic medical records/speech recognition system. Although proofread, it may and can contain electronic, spelling and other errors. Corrections may be executed at a later time. Please feel free to contact me for any clarifications as needed.

## 2020-09-03 ENCOUNTER — CLINICAL SUPPORT (OUTPATIENT)
Dept: FAMILY MEDICINE CLINIC | Age: 75
End: 2020-09-03
Payer: MEDICARE

## 2020-09-03 DIAGNOSIS — Z23 ENCOUNTER FOR IMMUNIZATION: Primary | ICD-10-CM

## 2020-09-03 PROCEDURE — 90653 IIV ADJUVANT VACCINE IM: CPT | Performed by: FAMILY MEDICINE

## 2020-09-03 PROCEDURE — G0008 ADMIN INFLUENZA VIRUS VAC: HCPCS | Performed by: FAMILY MEDICINE

## 2020-09-08 ENCOUNTER — TELEPHONE (OUTPATIENT)
Dept: FAMILY MEDICINE CLINIC | Age: 75
End: 2020-09-08

## 2020-09-08 NOTE — TELEPHONE ENCOUNTER
Unable to reach patient by telephone. Letter mailed to patient informing him of the following per Dr. Jerod Duncan: \"Dear Maverick Cheek III:     Please find your most recent results below. Your LDL particle number is slightly high. I would like you to try taking 1 and 1/2 of the rosuvastatin nightly for a week. Call me if you ache more or have other side effects. If you can tolerate it I will change your prescription and you need repeat NMR lipids in 6 weeks.     Your other labs are stable with exception of low vitamin D. Confirm your current dose. \"

## 2020-09-09 ENCOUNTER — OFFICE VISIT (OUTPATIENT)
Dept: CARDIOLOGY CLINIC | Age: 75
End: 2020-09-09
Payer: MEDICARE

## 2020-09-09 VITALS
SYSTOLIC BLOOD PRESSURE: 134 MMHG | BODY MASS INDEX: 28.24 KG/M2 | WEIGHT: 201.7 LBS | OXYGEN SATURATION: 94 % | DIASTOLIC BLOOD PRESSURE: 86 MMHG | HEIGHT: 71 IN | HEART RATE: 64 BPM

## 2020-09-09 DIAGNOSIS — E78.00 PURE HYPERCHOLESTEROLEMIA: ICD-10-CM

## 2020-09-09 DIAGNOSIS — I65.22 OBSTRUCTION OF LEFT CAROTID ARTERY: Chronic | ICD-10-CM

## 2020-09-09 DIAGNOSIS — I35.0 NONRHEUMATIC AORTIC VALVE STENOSIS: ICD-10-CM

## 2020-09-09 DIAGNOSIS — I20.8 STABLE ANGINA PECTORIS (HCC): ICD-10-CM

## 2020-09-09 DIAGNOSIS — I25.118 CORONARY ARTERY DISEASE OF NATIVE ARTERY OF NATIVE HEART WITH STABLE ANGINA PECTORIS (HCC): Primary | ICD-10-CM

## 2020-09-09 DIAGNOSIS — E11.22 CKD STAGE 3 DUE TO TYPE 2 DIABETES MELLITUS (HCC): ICD-10-CM

## 2020-09-09 DIAGNOSIS — N18.30 CKD STAGE 3 DUE TO TYPE 2 DIABETES MELLITUS (HCC): ICD-10-CM

## 2020-09-09 DIAGNOSIS — I10 ESSENTIAL HYPERTENSION: ICD-10-CM

## 2020-09-09 PROCEDURE — 93005 ELECTROCARDIOGRAM TRACING: CPT | Performed by: STUDENT IN AN ORGANIZED HEALTH CARE EDUCATION/TRAINING PROGRAM

## 2020-09-09 PROCEDURE — G0463 HOSPITAL OUTPT CLINIC VISIT: HCPCS | Performed by: STUDENT IN AN ORGANIZED HEALTH CARE EDUCATION/TRAINING PROGRAM

## 2020-09-09 PROCEDURE — 99214 OFFICE O/P EST MOD 30 MIN: CPT | Performed by: STUDENT IN AN ORGANIZED HEALTH CARE EDUCATION/TRAINING PROGRAM

## 2020-09-09 PROCEDURE — 93010 ELECTROCARDIOGRAM REPORT: CPT | Performed by: STUDENT IN AN ORGANIZED HEALTH CARE EDUCATION/TRAINING PROGRAM

## 2020-09-09 RX ORDER — LISINOPRIL 20 MG/1
10 TABLET ORAL DAILY
Qty: 30 TAB | Refills: 3
Start: 2020-09-09 | End: 2020-11-30 | Stop reason: SDUPTHER

## 2020-09-09 RX ORDER — ISOSORBIDE MONONITRATE 60 MG/1
60 TABLET, EXTENDED RELEASE ORAL
Qty: 90 TAB | Refills: 1
Start: 2020-09-09 | End: 2020-11-04

## 2020-09-09 NOTE — PROGRESS NOTES
Preston Rodriguez is a 66 y.o. male    Chief Complaint   Patient presents with    Coronary Artery Disease    Hypertension    Follow-up     3 wk f/u        Chest pain No pain patient states a burning sensation     SOB No    Dizziness patient states some dizziness due to medication     Swelling No    Refills No    Visit Vitals  /86 (BP 1 Location: Left arm, BP Patient Position: Sitting)   Pulse 64   Ht 5' 11\" (1.803 m)   Wt 201 lb 11.2 oz (91.5 kg)   SpO2 94%   BMI 28.13 kg/m²       1. Have you been to the ER, urgent care clinic since your last visit? Hospitalized since your last visit? no    2. Have you seen or consulted any other health care providers outside of the [address] [place] [address] since your last visit? Include any pap smears or colon screening.   no

## 2020-09-09 NOTE — PROGRESS NOTES
[facility] [address], [facility], [address] [place] [address] Nw    Office [phone],PLI [phone]           Greta Keen III is a 66 y.o. presents for f/u of CAD      Assessment/Recommendations:      Coronary artery disease - stable angina symptoms  Distal LAD  filling via L-->L collaterals   Lcx stenting 4/2019. 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 Brief 4 beat run of ventricular tachycardia noted on monitor. D/c BB for bradycardia      Aortic stenosis- mild on echo 2018. Repeat echo at f/up visit in 4 months      Hypertension-stable continue treatment per primary care. Diabetes-  Per Boogie Chicas MD      Hyperlipidemia  -LDL 66, on statin therapy. Previous myalgias with Vytorin. Tolerating rosuvastatin 40mg daily. Restarted zetia, subsequently d/c due to myalgias  - cont rosuvastatin 40mg      GERD- PPI      Carotid artery stenosis- s/p carotid endarterectomy on aspirin and statin. followed by  Newton Pepper MD             Primary Care Physician- Boogie Chicas MD    F/u 6 months sooner as needed      Subjective:  66 y.o. male presents for follow-up. Presents for follow-up evaluation. Recently underwent cardiac catheterization for evaluation of chest pain symptoms. 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 Left coronary artery showed distal LAD  which is chronic in nature, circumflex also shows ostial pinching of several very small caliber obtuse marginals in relation to his prior circumflex stenting. Ranexa was added after his cardiac catheterization. Played 18 holes of golf on Monday without any anginal chest pain.   Very mild short-lived burning in his chest.  Symptoms lasted less than 1 minute. He has not used any sublingual nitroglycerin. Past Medical History:   Diagnosis Date    Amaurosis fugax of left eye 5/6/2012    Arthritis     OSTEO    CAD (coronary artery disease) 2012    CAROTID LEFT     Cancer ([facility] [address] 2013    PROSTATE    Chronic pain     DJD lumbar    Diabetes ([facility] [address] 5/22/2010    Frequent nocturnal awakening     urinary frequency    GERD (gastroesophageal reflux disease) 5/22/2010    Hyperlipidemia 5/22/2010    Hypertension 5/22/2010    Kidney stone 5/22/2010    Nodular goiter     1.3 cm right , FNA 6/15    Obesity (BMI 30-39. 9)     Psychiatric disorder     ANXIETY    Vertigo         Past Surgical History:   Procedure Laterality Date    CARDIAC SURG PROCEDURE UNLIST  04/2019    3 stents placed    COLONOSCOPY  3/3/2016         EGD  3/3/2016         HX HEENT      tonsillectomy    HX MOHS PROCEDURES  2010    right    HX ORTHOPAEDIC      coccyx removed    HX UROLOGICAL  2010    left lithotripsy. basket  extraction,ureteral stent    HX VASECTOMY      NEUROLOGICAL PROCEDURE UNLISTED  2011    Steroid injections in epidural    VASCULAR SURGERY PROCEDURE UNLIST  2012    LEFT CAROTID         Current Outpatient Medications:     isosorbide mononitrate ER (IMDUR) 60 mg CR tablet, Take 2 Tabs by mouth every morning., Disp: 90 Tab, Rfl: 1    ranolazine ER (Ranexa) 500 mg SR tablet, Take 1 Tab by mouth two (2) times a day., Disp: 60 Tab, Rfl: 3    nitroglycerin (NITROSTAT) 0.3 mg SL tablet, 1 Tab by SubLINGual route every five (5) minutes as needed for Chest Pain., Disp: 1 Bottle, Rfl: 3    traMADoL (ULTRAM) 50 mg tablet, Take 2 Tabs by mouth every twelve (12) hours for 60 days. Max Daily Amount: 200 mg. (Patient taking differently: Take 100 mg by mouth as needed. Two tablets daily), Disp: 120 Tab, Rfl: 1    furosemide (LASIX) 20 mg tablet, Take 1 Tab by mouth daily. , Disp: 90 Tab, Rfl: 1    lisinopriL (PRINIVIL, ZESTRIL) 20 mg tablet, Take 1 Tab by mouth daily. (Patient taking differently: Take 10 mg by mouth daily.), Disp: 1 Tab, Rfl: 0    clopidogreL (Plavix) 75 mg tab, Take 1 Tab by mouth daily. , Disp: 90 Tab, Rfl: 1    rosuvastatin (CRESTOR) 40 mg tablet, TAKE ONE TABLET BY MOUTH EVERY DAY, Disp: 90 Tab, Rfl: 3    pantoprazole (PROTONIX) 40 mg tablet, TAKE ONE TABLET BY MOUTH EVERY DAY FOR: GASTROESOPHAGEAL REFLUX, Disp: 30 Tab, Rfl: 11    hydrALAZINE (APRESOLINE) 50 mg tablet, TAKE ONE TABLET BY MOUTH THREE TIMES DAILY FOR HYPERTENSION, Disp: 90 Tab, Rfl: 6    ezetimibe (ZETIA) 10 mg tablet, Take 1 Tab by mouth daily. , Disp: 90 Tab, Rfl: 3    famotidine (PEPCID) 40 mg tablet, Take 1 Tab by mouth nightly., Disp: 90 Tab, Rfl: 3    felodipine (PLENDIL SR) 10 mg 24 hr tablet, Take 1 Tab by mouth daily. Indications: high blood pressure, Disp: 30 Tab, Rfl: 11    triamcinolone acetonide (KENALOG) 0.1 % topical cream, Apply  to affected area two (2) times a day. use thin layer to poison ivy, Disp: 453 g, Rfl: 0    EPINEPHrine (EPIPEN 2-KARL) 0.3 mg/0.3 mL injection, INJECT INTRAMUSCULARLY AS NEEDED FOR ONE DOSE. TAKE WITH 50MG OF BENADRYL AND CALL 911. Dispense Generic, Disp: 1 Syringe, Rfl: 3    diclofenac (VOLTAREN) 1 % gel, Apply 4 g to affected area four (4) times daily. Painful shoulder., Disp: 100 g, Rfl: 3    aspirin delayed-release 81 mg tablet, Take 1 Tab by mouth daily. , Disp: 30 Tab, Rfl: 0    Peak Flow Meter eric, Use prior and post nebulizer treatment, Disp: 1 Device, Rfl: 0    albuterol (PROVENTIL VENTOLIN) 2.5 mg /3 mL (0.083 %) nebulizer solution, 3 mL by Nebulization route every six (6) hours as needed for Wheezing or Shortness of Breath., Disp: 24 Each, Rfl: 5    omega-3 fatty acids-vitamin e (FISH OIL) 1,000 mg cap, Take 1 Cap by mouth., Disp: , Rfl:     coenzyme q10 (CO Q-10) 100 mg Cap, Take 100 mg by mouth daily. , Disp: , Rfl:     meclizine (ANTIVERT) 25 mg chewable tablet, Take  by mouth three (3) times daily as needed. , Disp: , Rfl:     Allergies   Allergen Reactions    Bee Sting [Sting, Bee] Anaphylaxis    Vytorin 10-10 [Ezetimibe-Simvastatin] Myalgia    Amlodipine Other (comments)     Creepy crawly sensation, twitches    Lipitor [Atorvastatin] Myalgia        Family History   Problem Relation Age of Onset    Diabetes Mother     Obesity Mother     Heart Disease Mother    [facility] Stroke Father     Heart Disease Sister     Obesity Sister    [facility] [place] Diabetes Sister    Mother  of a heart attack at age 62  Father had a stroke in his 76s    Social History     Tobacco Use    Smoking status: Former Smoker     Packs/day: 0.50     Years: 4.00     Pack years: 2.00     Last attempt to quit: 1966     Years since quittin.4    Smokeless tobacco: Never Used   Substance Use Topics    Alcohol use: No    Drug use: No       Review of Symptoms:  Pertinent Positive: very mild exertional angina  Pertinent Negative: No shortness of breath orthopnea PND. All Other systems reviewed and are negative for a Comprehensive ROS (10+)    Physical Exam    Blood pressure 134/86, pulse 64, height 5' 11\" (1.803 m), weight 201 lb 11.2 oz (91.5 kg), SpO2 94 %. Constitutional:  well-developed and well-nourished. No distress. HENT: Normocephalic. Eyes: No scleral icterus. Neck:  Neck supple. No JVD present. Pulmonary/Chest: Effort normal and breath sounds normal. No respiratory distress, wheezes or rales. Cardiovascular: Normal rate, regular rhythm, S1 S2 .  2/6 systolic murmur   extremities:  Normal muscle tone  Abdominal:   No abnormal distension. Neurological:  Moving all extremities, cranial nerves appear grossly intact. Skin: Skin is not cold. Not diaphoretic. No erythema. Psychiatric:  Grossly normal mood and affect. Intact insight. Objective Data:     Lipids 19 - , HDL 64, LDL 76, , VLDL 22    Echo 2018: Normal LVEF, mild AS, mild MR/TR    Event monitor-2019 to 2019.  Rare ventricular ectopy, 6 supraventricular runs of ventricular tachycardia the longest being 4 beats. Supraventricular ectopy involving 8.2% of beats. 02/25/19  NUCLEAR CARDIAC STRESS TEST 02/27/2019   Abnormal perfusion    4/2019  L Main: no significant disease  LAD: distal LAD  after large branching diagonal  LCx: proximal LCx just distal to origin of OM1 into mid-LCx with severe diffuse disease,  Involves origin of OM2 and OM3  RCA: anomalous origin from left cusp, serial focal moderate stenosis of mid-RCA and distal RCA that is not hemodynamically significant by FFR (0.92)    PCI  Stenting of proximal and mid-Lcx (distal to proximal) with 2.5x15, 3.0x12 and 3.0x8mm Xience Freida REINALDO. Post-dilated with 3.0NC just inside distal stent edge and 3.75NC proximally at high pressure           08/24/20   CARDIAC PROCEDURE 08/24/2020 8/24/2020    Narrative · Multivessel CAD  · Distal anomalous RCA stenting with 4.0x23mm Xience Freida REINALDO deployed   at 20 WINDY. Post-dilated with 4.5 NC balloon at 18 WINDY. Findings:   L Main: large caliber, normal  LAD: large caliber, tubular 70% mid-vessel disease,  distal LAD  LCx: large caliber, previously placed proximal and mid-Lcx stents widely   patent, several small caliber OM branches. OM1 small caliber proximal   50%, OM2 very small caliber proximal 95% stenosis with karon 1 flow, OM3   very small caliber with ostial 95% stenosis with karon 1 flow, OM4 small   caliber with proximal 50% stenosis (OM stable compared to cath in 2019)  RCA: anomalous from left cusp, large caliber, distal 70% stenosis        PCI:     AL1, 6F guide  candy blue   Heparin     Dilated with 3.5 compliant balloon     IVUS used for sizing     4.0x23mm Xience Freida REINALDO deployed at 20 WINDY. Post-dilated with 4.5 NC   balloon at 18 WINDY.      karon 3 flow w/o evidence of dissection or perforation       Signed by: DO Alphonso Staples DO

## 2020-10-01 NOTE — Clinical Note
-Baseline 1.3-1.5  -Presented with creatinine of 1.7  -Patient was given 2 L IV fluid bolus in the ED  -Continue IV fluid 100 mL's per hour  -Repeat labs in the a.m.  -Hold additional nephrotoxic agents Lesion 1: Guidewire removed.

## 2020-10-20 RX ORDER — CLOPIDOGREL BISULFATE 75 MG/1
TABLET ORAL
Qty: 90 TAB | Refills: 1 | Status: SHIPPED | OUTPATIENT
Start: 2020-10-20 | End: 2021-04-20

## 2020-11-04 RX ORDER — ISOSORBIDE MONONITRATE 60 MG/1
60 TABLET, EXTENDED RELEASE ORAL DAILY
Qty: 90 TAB | Refills: 0 | Status: SHIPPED | OUTPATIENT
Start: 2020-11-04 | End: 2021-01-20 | Stop reason: SDUPTHER

## 2020-11-09 DIAGNOSIS — I10 ACCELERATED HYPERTENSION: ICD-10-CM

## 2020-11-09 RX ORDER — HYDRALAZINE HYDROCHLORIDE 50 MG/1
TABLET, FILM COATED ORAL
Qty: 90 TAB | Refills: 6 | Status: SHIPPED | OUTPATIENT
Start: 2020-11-09 | End: 2021-07-30 | Stop reason: SDUPTHER

## 2020-11-24 RX ORDER — RANOLAZINE 500 MG/1
TABLET, EXTENDED RELEASE ORAL
Qty: 60 TAB | Refills: 3 | Status: SHIPPED | OUTPATIENT
Start: 2020-11-24 | End: 2021-01-20

## 2020-11-30 DIAGNOSIS — I10 ESSENTIAL HYPERTENSION: ICD-10-CM

## 2020-11-30 NOTE — TELEPHONE ENCOUNTER
----- Message from Rahul Munoz sent at 11/28/2020 10:12 AM EST -----  Regarding: Dr. Stevenson Men (if not patient): n/a      Relationship of caller (if not patient): n/a      Best contact number(s): [phone]      Name of medication and dosage if known: lisinopril 40 mg      Is patient out of this medication (yes/no): yes      Pharmacy name: [address] [place] [address] [place] listed in chart? (yes/no): yes  Pharmacy phone number: n/a      Details to clarify the request: n/a      Rahul Munoz

## 2020-12-01 RX ORDER — EPINEPHRINE 0.3 MG/.3ML
INJECTION SUBCUTANEOUS
Qty: 1 SYRINGE | Refills: 3 | Status: SHIPPED | OUTPATIENT
Start: 2020-12-01

## 2020-12-01 RX ORDER — LISINOPRIL 20 MG/1
10 TABLET ORAL DAILY
Qty: 45 TAB | Refills: 1 | Status: SHIPPED | OUTPATIENT
Start: 2020-12-01 | End: 2021-01-20 | Stop reason: SDUPTHER

## 2020-12-01 NOTE — TELEPHONE ENCOUNTER
----- Message from [facility] sent at 12/1/2020 11:10 AM EST -----  Regarding: /REfill  Contact: [phone]  Medication Refill    Caller (if not patient):pt      Relationship of caller (if not patient):n/a      Best contact number(s):[phone]      Name of medication and dosage if known:EPINEPHrine (EPIPEN 2-KARL) 0.3 mg/0.3 mL injection       Is patient out of this medication (yes/no):yes      Pharmacy name:[facility]    Pharmacy listed in chart? (yes/no):yes  Pharmacy phone number:n/a      Details to clarify the request:n/a       [facility]

## 2020-12-04 DIAGNOSIS — I10 ESSENTIAL HYPERTENSION: Chronic | ICD-10-CM

## 2020-12-04 RX ORDER — FELODIPINE 10 MG/1
10 TABLET, EXTENDED RELEASE ORAL DAILY
Qty: 30 TAB | Refills: 2 | Status: SHIPPED | OUTPATIENT
Start: 2020-12-04 | End: 2021-03-05 | Stop reason: SDUPTHER

## 2020-12-29 RX ORDER — FAMOTIDINE 40 MG/1
40 TABLET, FILM COATED ORAL
Qty: 90 TAB | Refills: 0 | Status: SHIPPED | OUTPATIENT
Start: 2020-12-29 | End: 2021-04-01 | Stop reason: SDUPTHER

## 2021-01-19 DIAGNOSIS — I10 ESSENTIAL HYPERTENSION: ICD-10-CM

## 2021-01-19 RX ORDER — LISINOPRIL 20 MG/1
10 TABLET ORAL DAILY
Qty: 45 TAB | Refills: 1 | OUTPATIENT
Start: 2021-01-19

## 2021-01-19 NOTE — TELEPHONE ENCOUNTER
Got a call from Nataliya about his LISINOPRIL 20 mg. Is he suppose to be taking only 10 mg daily? He has run out and they are saying it is too soon. Please call Pt. He would be glad to split the 20 mg if that is what he wants him to do?

## 2021-01-20 ENCOUNTER — OFFICE VISIT (OUTPATIENT)
Dept: CARDIOLOGY CLINIC | Age: 76
End: 2021-01-20
Payer: MEDICARE

## 2021-01-20 ENCOUNTER — ANCILLARY PROCEDURE (OUTPATIENT)
Dept: CARDIOLOGY CLINIC | Age: 76
End: 2021-01-20
Payer: MEDICARE

## 2021-01-20 VITALS
WEIGHT: 211 LBS | OXYGEN SATURATION: 93 % | SYSTOLIC BLOOD PRESSURE: 138 MMHG | DIASTOLIC BLOOD PRESSURE: 82 MMHG | HEART RATE: 58 BPM | HEIGHT: 71 IN | BODY MASS INDEX: 29.54 KG/M2

## 2021-01-20 VITALS
WEIGHT: 211 LBS | SYSTOLIC BLOOD PRESSURE: 160 MMHG | HEIGHT: 71 IN | DIASTOLIC BLOOD PRESSURE: 80 MMHG | BODY MASS INDEX: 29.54 KG/M2

## 2021-01-20 DIAGNOSIS — E11.22 CKD STAGE 3 DUE TO TYPE 2 DIABETES MELLITUS (HCC): ICD-10-CM

## 2021-01-20 DIAGNOSIS — I10 ESSENTIAL HYPERTENSION: ICD-10-CM

## 2021-01-20 DIAGNOSIS — E11.21 TYPE 2 DIABETES WITH NEPHROPATHY (HCC): ICD-10-CM

## 2021-01-20 DIAGNOSIS — N18.30 CKD STAGE 3 DUE TO TYPE 2 DIABETES MELLITUS (HCC): ICD-10-CM

## 2021-01-20 DIAGNOSIS — I35.0 AORTIC VALVE STENOSIS, ETIOLOGY OF CARDIAC VALVE DISEASE UNSPECIFIED: ICD-10-CM

## 2021-01-20 DIAGNOSIS — I25.118 CORONARY ARTERY DISEASE OF NATIVE ARTERY OF NATIVE HEART WITH STABLE ANGINA PECTORIS (HCC): ICD-10-CM

## 2021-01-20 DIAGNOSIS — I35.0 AORTIC VALVE STENOSIS, ETIOLOGY OF CARDIAC VALVE DISEASE UNSPECIFIED: Primary | ICD-10-CM

## 2021-01-20 DIAGNOSIS — I20.8 STABLE ANGINA PECTORIS (HCC): ICD-10-CM

## 2021-01-20 LAB
ECHO AO ASC DIAM: 3.39 CM
ECHO AV AREA PEAK VELOCITY: 1.72 CM2
ECHO AV AREA VTI: 1.82 CM2
ECHO AV AREA/BSA PEAK VELOCITY: 0.8 CM2/M2
ECHO AV AREA/BSA VTI: 0.8 CM2/M2
ECHO AV MEAN GRADIENT: 15.18 MMHG
ECHO AV PEAK GRADIENT: 31.53 MMHG
ECHO AV PEAK VELOCITY: 280.74 CM/S
ECHO AV VTI: 58.35 CM
ECHO IVC PROX: 1.56 CM
ECHO LA AREA 4C: 21.04 CM2
ECHO LA MAJOR AXIS: 3.53 CM
ECHO LA MINOR AXIS: 1.64 CM
ECHO LA VOL 2C: 82.56 ML (ref 18–58)
ECHO LA VOL 4C: 61.69 ML (ref 18–58)
ECHO LA VOL BP: 78.65 ML (ref 18–58)
ECHO LA VOL/BSA BIPLANE: 36.46 ML/M2 (ref 16–28)
ECHO LA VOLUME INDEX A2C: 38.27 ML/M2 (ref 16–28)
ECHO LA VOLUME INDEX A4C: 28.59 ML/M2 (ref 16–28)
ECHO LV E' LATERAL VELOCITY: 7.23 CM/S
ECHO LV E' SEPTAL VELOCITY: 4.9 CM/S
ECHO LV INTERNAL DIMENSION DIASTOLIC: 5.75 CM (ref 4.2–5.9)
ECHO LV INTERNAL DIMENSION SYSTOLIC: 3.78 CM
ECHO LV IVSD: 0.94 CM (ref 0.6–1)
ECHO LV MASS 2D: 214.9 G (ref 88–224)
ECHO LV MASS INDEX 2D: 99.6 G/M2 (ref 49–115)
ECHO LV POSTERIOR WALL DIASTOLIC: 0.96 CM (ref 0.6–1)
ECHO LVOT DIAM: 2.21 CM
ECHO LVOT PEAK GRADIENT: 6.31 MMHG
ECHO LVOT PEAK VELOCITY: 125.57 CM/S
ECHO LVOT SV: 106.2 ML
ECHO LVOT VTI: 27.59 CM
ECHO MV A VELOCITY: 91.59 CM/S
ECHO MV E DECELERATION TIME (DT): 252.71 MS
ECHO MV E VELOCITY: 63.69 CM/S
ECHO MV E/A RATIO: 0.7
ECHO MV E/E' LATERAL: 8.81
ECHO MV E/E' RATIO (AVERAGED): 10.9
ECHO MV E/E' SEPTAL: 13
ECHO MV MAX VELOCITY: 104.15 CM/S
ECHO MV MEAN GRADIENT: 1.25 MMHG
ECHO MV PEAK GRADIENT: 4.34 MMHG
ECHO MV PRESSURE HALF TIME (PHT): 73.28 MS
ECHO MV VTI: 25.77 CM
ECHO RA AREA 4C: 16.72 CM2
ECHO RV INTERNAL DIMENSION: 4.06 CM
ECHO RV TAPSE: 2.88 CM (ref 1.5–2)

## 2021-01-20 PROCEDURE — G0463 HOSPITAL OUTPT CLINIC VISIT: HCPCS | Performed by: STUDENT IN AN ORGANIZED HEALTH CARE EDUCATION/TRAINING PROGRAM

## 2021-01-20 PROCEDURE — 93306 TTE W/DOPPLER COMPLETE: CPT | Performed by: STUDENT IN AN ORGANIZED HEALTH CARE EDUCATION/TRAINING PROGRAM

## 2021-01-20 PROCEDURE — 99214 OFFICE O/P EST MOD 30 MIN: CPT | Performed by: STUDENT IN AN ORGANIZED HEALTH CARE EDUCATION/TRAINING PROGRAM

## 2021-01-20 RX ORDER — RANOLAZINE 500 MG/1
1000 TABLET, EXTENDED RELEASE ORAL 2 TIMES DAILY
Qty: 180 TAB | Refills: 1 | Status: SHIPPED | OUTPATIENT
Start: 2021-01-20 | End: 2021-03-19 | Stop reason: DRUGHIGH

## 2021-01-20 RX ORDER — LISINOPRIL 20 MG/1
20 TABLET ORAL DAILY
Qty: 90 TAB | Refills: 1 | Status: SHIPPED | OUTPATIENT
Start: 2021-01-20 | End: 2021-04-26

## 2021-01-20 RX ORDER — ISOSORBIDE MONONITRATE 60 MG/1
60 TABLET, EXTENDED RELEASE ORAL DAILY
Qty: 90 TAB | Refills: 2 | Status: SHIPPED | OUTPATIENT
Start: 2021-01-20 | End: 2021-10-14

## 2021-01-20 NOTE — PROGRESS NOTES
Chrissy Javed is a 66 y.o. male    Chief Complaint   Patient presents with    Coronary Artery Disease    Hypertension    Cholesterol Problem     Patient states he does have to take a gasp for breath every once in a while. Chest pain patient states some CP over the weekend    SOB patient states some SOB with exertions    Dizziness No    Swelling No     Refills No  ? On how he should be taking the lisinopril 20 or 40mg    Visit Vitals  /82 (BP 1 Location: Left arm, BP Patient Position: Sitting)   Pulse (!) 58   Ht 5' 11\" (1.803 m)   Wt 211 lb (95.7 kg)   SpO2 93%   BMI 29.43 kg/m²       1. Have you been to the ER, urgent care clinic since your last visit? Hospitalized since your last visit? No    2. Have you seen or consulted any other health care providers outside of the [address] [place] [address] since your last visit? Include any pap smears or colon screening.   No

## 2021-01-21 NOTE — PROGRESS NOTES
[facility]  [address], [address]  [place] [address]    Office [phone],Fax [phone]           Zac Rodríguez III is a 75 y.o. presents for f/u of CAD      Assessment/Recommendations:      Coronary artery disease - stable angina symptoms  Distal LAD  filling via L-->L collaterals   Lcx stenting 4/2019.  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  Brief 4 beat run of ventricular tachycardia noted on monitor. D/c BB for bradycardia      Aortic stenosis-stable mild aortic stenosis on today's echo 1/20/2021, mean gradient 15 mmHg      Hypertension-stable continue treatment per primary care.      Diabetes-  Per Catracho Johnston MD      Hyperlipidemia  -recent LDL 81, previously in the mid 60s.  Does have some degree of LDL resistance to high-dose statin therapy, wonder if he has elevated activity of LP(a).  Can consider testing, with subsequent PCSK9 inhibitor if elevated.  - cont rosuvastatin 40mg      GERD- PPI      Carotid artery stenosis- s/p carotid endarterectomy on aspirin and statin. followed by  Quoc Camacho MD             Primary Care Physician- Catracho Johnston MD    F/u 6 months sooner as needed      Subjective:  75 y.o. male presents for follow-up.       Presents for follow-up evaluation.  Continues to have stable angina symptoms with excellent functional capacity.  He will developed some substernal chest pressure if he overdoes it.  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 He will occasionally have chest pain if he works too hard. No chest pain with routine day-to-day activity. No adverse bleeding with DAPT therapy. Echocardiogram performed today shows stable aortic valve disease. Past Medical History:   Diagnosis Date    Amaurosis fugax of left eye 5/6/2012    Arthritis     OSTEO    CAD (coronary artery disease) 2012    CAROTID LEFT     Cancer ([facility] [address] 2013    PROSTATE    Chronic pain     DJD lumbar    Diabetes ([facility] [address] 5/22/2010    Frequent nocturnal awakening     urinary frequency    GERD (gastroesophageal reflux disease) 5/22/2010    Hyperlipidemia 5/22/2010    Hypertension 5/22/2010    Kidney stone 5/22/2010    Nodular goiter     1.3 cm right , FNA 6/15    Obesity (BMI 30-39. 9)     Psychiatric disorder     ANXIETY    Vertigo         Past Surgical History:   Procedure Laterality Date    COLONOSCOPY  3/3/2016         EGD  3/3/2016         HX HEENT      tonsillectomy    HX MOHS PROCEDURES  2010    right    HX ORTHOPAEDIC      coccyx removed    HX UROLOGICAL  2010    left lithotripsy. basket  extraction,ureteral stent    HX VASECTOMY      NEUROLOGICAL PROCEDURE UNLISTED  2011    Steroid injections in epidural    [place] CARDIAC SURG PROCEDURE UNLIST  04/2019    3 stents placed    VASCULAR SURGERY PROCEDURE UNLIST  2012    LEFT CAROTID         Current Outpatient Medications:     isosorbide mononitrate ER (IMDUR) 60 mg CR tablet, Take 1 Tab by mouth daily. , Disp: 90 Tab, Rfl: 2    ranolazine ER (RANEXA) 500 mg SR tablet, Take 2 Tabs by mouth two (2) times a day., Disp: 180 Tab, Rfl: 1    lisinopriL (PRINIVIL, ZESTRIL) 20 mg tablet, Take 1 Tab by mouth daily. , Disp: 90 Tab, Rfl: 1    famotidine (PEPCID) 40 mg tablet, Take 1 Tab by mouth nightly., Disp: 90 Tab, Rfl: 0    felodipine (PLENDIL SR) 10 mg 24 hr tablet, Take 1 Tab by mouth daily.  Indications: high blood pressure, Disp: 30 Tab, Rfl: 2    EPINEPHrine (EpiPen 2-Francisco Javier) 0.3 mg/0.3 mL injection, INJECT INTRAMUSCULARLY AS NEEDED FOR ONE DOSE. TAKE WITH 50MG OF BENADRYL AND CALL 911. Dispense Generic, Disp: 1 Syringe, Rfl: 3    hydrALAZINE (APRESOLINE) 50 mg tablet, Take one tablet by mouth three times daily for hypertension, Disp: 90 Tab, Rfl: 6    clopidogreL (PLAVIX) 75 mg tab, TAKE ONE TABLET BY MOUTH EVERY DAY, Disp: 90 Tab, Rfl: 1    nitroglycerin (NITROSTAT) 0.3 mg SL tablet, 1 Tab by SubLINGual route every five (5) minutes as needed for Chest Pain., Disp: 1 Bottle, Rfl: 3    furosemide (LASIX) 20 mg tablet, Take 1 Tab by mouth daily. , Disp: 90 Tab, Rfl: 1    rosuvastatin (CRESTOR) 40 mg tablet, TAKE ONE TABLET BY MOUTH EVERY DAY, Disp: 90 Tab, Rfl: 3    pantoprazole (PROTONIX) 40 mg tablet, TAKE ONE TABLET BY MOUTH EVERY DAY FOR: GASTROESOPHAGEAL REFLUX, Disp: 30 Tab, Rfl: 11    triamcinolone acetonide (KENALOG) 0.1 % topical cream, Apply  to affected area two (2) times a day. use thin layer to poison ivy, Disp: 453 g, Rfl: 0    diclofenac (VOLTAREN) 1 % gel, Apply 4 g to affected area four (4) times daily. Painful shoulder., Disp: 100 g, Rfl: 3    meclizine (ANTIVERT) 25 mg chewable tablet, Take  by mouth three (3) times daily as needed. , Disp: , Rfl:     aspirin delayed-release 81 mg tablet, Take 1 Tab by mouth daily. , Disp: 30 Tab, Rfl: 0    Peak Flow Meter eric, Use prior and post nebulizer treatment, Disp: 1 Device, Rfl: 0    albuterol (PROVENTIL VENTOLIN) 2.5 mg /3 mL (0.083 %) nebulizer solution, 3 mL by Nebulization route every six (6) hours as needed for Wheezing or Shortness of Breath., Disp: 24 Each, Rfl: 5    omega-3 fatty acids-vitamin e (FISH OIL) 1,000 mg cap, Take 1 Cap by mouth., Disp: , Rfl:     coenzyme q10 (CO Q-10) 100 mg Cap, Take 100 mg by mouth daily. , Disp: , Rfl:     Allergies   Allergen Reactions    Bee Sting [Sting, Bee] Anaphylaxis    Vytorin 10-10 [Ezetimibe-Simvastatin] Myalgia    Amlodipine Other (comments)     Creepy crawly sensation, twitches    Lipitor [Atorvastatin] Myalgia        Family History   Problem Relation Age of Onset    Diabetes Mother     Obesity Mother     Heart Disease Mother    [facility] Stroke Father     Heart Disease Sister     Obesity Sister    [facility] Diabetes Sister    Mother  of a heart attack at age 62  Father had a stroke in his 76s    Social History     Tobacco Use    Smoking status: Former Smoker     Packs/day: 0.50     Years: 4.00     Pack years: 2.00     Quit date: 1966     Years since quittin.8    Smokeless tobacco: Never Used   Substance Use Topics    Alcohol use: No    Drug use: No       Review of Symptoms:  Pertinent Positive: very mild exertional angina  Pertinent Negative: No shortness of breath orthopnea PND. All Other systems reviewed and are negative for a Comprehensive ROS (10+)    Physical Exam    Blood pressure 138/82, pulse (!) 58, height 5' 11\" (1.803 m), weight 211 lb (95.7 kg), SpO2 93 %. Constitutional:  well-developed and well-nourished. No distress. HENT: Normocephalic. Eyes: No scleral icterus. Neck:  Neck supple. No JVD present. Pulmonary/Chest: Effort normal and breath sounds normal. No respiratory distress, wheezes or rales. Cardiovascular: Normal rate, regular rhythm, S1 S2 .  2/6 systolic murmur   extremities:  Normal muscle tone  Abdominal:   No abnormal distension. Neurological:  Moving all extremities, cranial nerves appear grossly intact. Skin: Skin is not cold. Not diaphoretic. No erythema. Psychiatric:  Grossly normal mood and affect. Intact insight. Objective Data:     Lipids 19 - , HDL 64, LDL 76, , VLDL 22    Echo 2018: Normal LVEF, mild AS, mild MR/TR    Event monitor-2019 to 2019. Rare ventricular ectopy, 6 supraventricular runs of ventricular tachycardia the longest being 4 beats. Supraventricular ectopy involving 8.2% of beats.     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 Post-dilated with 3.0NC just inside distal stent edge and 3.75NC proximally at high pressure           08/24/20   CARDIAC PROCEDURE 08/24/2020 8/24/2020    Narrative · Multivessel CAD  · Distal anomalous RCA stenting with 4.0x23mm Xience Freida REINALDO deployed   at 20 WINDY. Post-dilated with 4.5 NC balloon at 18 WINDY. Findings:   L Main: large caliber, normal  LAD: large caliber, tubular 70% mid-vessel disease,  distal LAD  LCx: large caliber, previously placed proximal and mid-Lcx stents widely   patent, several small caliber OM branches. OM1 small caliber proximal   50%, OM2 very small caliber proximal 95% stenosis with karon 1 flow, OM3   very small caliber with ostial 95% stenosis with karon 1 flow, OM4 small   caliber with proximal 50% stenosis (OM stable compared to cath in 2019)  RCA: anomalous from left cusp, large caliber, distal 70% stenosis        PCI:     AL1, 6F guide  candy blue   Heparin     Dilated with 3.5 compliant balloon     IVUS used for sizing     4.0x23mm Xience Freida REINALDO deployed at 20 WINDY. Post-dilated with 4.5 NC   balloon at 18 WINDY.    karon 3 flow w/o evidence of dissection or perforation       Signed by: Sandy Jarrett,      01/20/21   ECHO ADULT COMPLETE 01/20/2021 1/20/2021    Narrative · LV: Calculated LVEF is 60%. Visually measured ejection fraction. Normal   cavity size, wall thickness and systolic function (ejection fraction   normal). Wall motion: normal. Mild (grade 1) left ventricular diastolic   dysfunction.   · AV: Aortic valve leaflet calcification present. Aortic valve mean   gradient is 15.2 mmHg. Aortic valve area is 1.8 cm2. Mild aortic valve   stenosis is present. · LA: Mildly dilated left atrium.         Signed by: Gwenn Bower, DO Matthews Cheadle, DO

## 2021-02-11 ENCOUNTER — VIRTUAL VISIT (OUTPATIENT)
Dept: FAMILY MEDICINE CLINIC | Age: 76
End: 2021-02-11
Payer: MEDICARE

## 2021-02-11 DIAGNOSIS — I10 ESSENTIAL HYPERTENSION: Chronic | ICD-10-CM

## 2021-02-11 DIAGNOSIS — Z85.46 HISTORY OF PROSTATE CANCER: ICD-10-CM

## 2021-02-11 DIAGNOSIS — E11.21 CONTROLLED TYPE 2 DIABETES MELLITUS WITH DIABETIC NEPHROPATHY, WITHOUT LONG-TERM CURRENT USE OF INSULIN (HCC): ICD-10-CM

## 2021-02-11 DIAGNOSIS — E78.00 PURE HYPERCHOLESTEROLEMIA: Chronic | ICD-10-CM

## 2021-02-11 DIAGNOSIS — I25.118 CORONARY ARTERY DISEASE OF NATIVE ARTERY OF NATIVE HEART WITH STABLE ANGINA PECTORIS (HCC): Primary | ICD-10-CM

## 2021-02-11 PROCEDURE — 99442 PR PHYS/QHP TELEPHONE EVALUATION 11-20 MIN: CPT | Performed by: FAMILY MEDICINE

## 2021-02-11 NOTE — PROGRESS NOTES
Chief Complaint   Patient presents with    Labs     1. Have you been to the ER, urgent care clinic since your last visit? Hospitalized since your last visit? No    2. Have you seen or consulted any other health care providers outside of the [address] [place] [address] since your last visit? Include any pap smears or colon screening.  No    Reviewed record in preparation for visit and have necessary documentation  Pt did not bring medication to office visit for review  opportunity was given for questions  Goals that were addressed and/or need to be completed during or after this appointment include   Health Maintenance Due   Topic Date Due    COVID-19 Vaccine (1 of 2) 04/13/1961    Shingrix Vaccine Age 50> (1 of 2) 04/13/1993    Eye Exam Retinal or Dilated  05/25/2019    GLAUCOMA SCREENING Q2Y  03/20/2020    Medicare Yearly Exam  07/16/2020    A1C test (Diabetic or Prediabetic)  12/08/2020    Foot Exam Q1  12/30/2020

## 2021-02-11 NOTE — PROGRESS NOTES
Mariely Cartagena is a 66 y.o. male evaluated via Telephone on 21. Patient Identity confirmed by . Consent:  He and/or health care decision maker is aware that that he may receive a bill for this telephone service, depending on his insurance coverage, and has provided verbal consent to proceed: Yes    Physician Location: Office  Patient Location: Home  Nurse Assisting with Encounter: Elenita Garay LPN    Chief Complaint   Patient presents with    Labs      Information gathered from patient and/or health care decision maker. HPI:   Congestive Heart Failure/CAD:   Patient is following up for monitoring of Chronic Diastolic CHF. At this time patient reports his CHF symptoms are well controlled. Current symptoms include no limitation with ordinary activity not causing undue fatigue.  -Lifestyle/Dietary Compliance: compliant most of the time    Limits Salt Intake? No    Limits Fluid intake? No    Use of Compression stockings? No     Exercise? Yes    Tobacco Use? No     Weight Stable? Yes   - Current medications: Isosorbide Mononitrate, Lisinopril 20 mg Hydralazine 50 mg TID, Lasix 20 mg Daily, Felodipine 10 mg daily, Plavix 75 mg daily, Ranexa 500 mg Daily. Compliance: compliant most of the time  - Associated conditions: Coronary Artery Disease (CAD), High Blood Pressure  - Last CHF exacerbation: Not recently  - Last Echo:2021  - Cardiologist: Amarjit Granger. Last Visit: this year. Diabetes: Diet Controlled, occasional BG checked for Dizziness. GERD: Patient takes Protonix and Famotidine. HLD: Taking Crestor, Ranexa     Review of Systems   Constitutional: Negative for chills and fever. HENT: Negative for congestion. Cardiovascular: Negative for chest pain and palpitations. Gastrointestinal: Negative for abdominal pain, nausea and vomiting. Neurological: Negative for headaches.      Limited Exam:  Due to this being a TeleHealth evaluation, many elements of the physical examination are unable to be assessed. Constitutional: Appears well-developed and well-nourished in no apparent distress   Mental status: Alert and awake, Oriented to person/place/time, Able to follow commands  Psychiatric: Normal affect, normal judgment/insight. No hallucinations     Current Outpatient Medications on File Prior to Visit   Medication Sig Dispense Refill    isosorbide mononitrate ER (IMDUR) 60 mg CR tablet Take 1 Tab by mouth daily. 90 Tab 2    ranolazine ER (RANEXA) 500 mg SR tablet Take 2 Tabs by mouth two (2) times a day. 180 Tab 1    lisinopriL (PRINIVIL, ZESTRIL) 20 mg tablet Take 1 Tab by mouth daily. 90 Tab 1    famotidine (PEPCID) 40 mg tablet Take 1 Tab by mouth nightly. 90 Tab 0    felodipine (PLENDIL SR) 10 mg 24 hr tablet Take 1 Tab by mouth daily. Indications: high blood pressure 30 Tab 2    EPINEPHrine (EpiPen 2-Francisco Javier) 0.3 mg/0.3 mL injection INJECT INTRAMUSCULARLY AS NEEDED FOR ONE DOSE. TAKE WITH 50MG OF BENADRYL AND CALL 911. Dispense Generic 1 Syringe 3    hydrALAZINE (APRESOLINE) 50 mg tablet Take one tablet by mouth three times daily for hypertension 90 Tab 6    clopidogreL (PLAVIX) 75 mg tab TAKE ONE TABLET BY MOUTH EVERY DAY 90 Tab 1    nitroglycerin (NITROSTAT) 0.3 mg SL tablet 1 Tab by SubLINGual route every five (5) minutes as needed for Chest Pain. 1 Bottle 3    furosemide (LASIX) 20 mg tablet Take 1 Tab by mouth daily. 90 Tab 1    rosuvastatin (CRESTOR) 40 mg tablet TAKE ONE TABLET BY MOUTH EVERY DAY 90 Tab 3    pantoprazole (PROTONIX) 40 mg tablet TAKE ONE TABLET BY MOUTH EVERY DAY FOR: GASTROESOPHAGEAL REFLUX 30 Tab 11    triamcinolone acetonide (KENALOG) 0.1 % topical cream Apply  to affected area two (2) times a day. use thin layer to poison ivy 453 g 0    diclofenac (VOLTAREN) 1 % gel Apply 4 g to affected area four (4) times daily. Painful shoulder. 100 g 3    aspirin delayed-release 81 mg tablet Take 1 Tab by mouth daily.  30 Tab 0    Peak Flow Meter eric Use prior and post nebulizer treatment 1 Device 0    albuterol (PROVENTIL VENTOLIN) 2.5 mg /3 mL (0.083 %) nebulizer solution 3 mL by Nebulization route every six (6) hours as needed for Wheezing or Shortness of Breath. 24 Each 5    omega-3 fatty acids-vitamin e (FISH OIL) 1,000 mg cap Take 1 Cap by mouth.  coenzyme q10 (CO Q-10) 100 mg Cap Take 100 mg by mouth daily.  meclizine (ANTIVERT) 25 mg chewable tablet Take  by mouth three (3) times daily as needed. No current facility-administered medications on file prior to visit.          Allergies   Allergen Reactions    Bee Sting [Sting, Bee] Anaphylaxis    Vytorin 10-10 [Ezetimibe-Simvastatin] Myalgia    Amlodipine Other (comments)     Creepy crawly sensation, twitches    Lipitor [Atorvastatin] Myalgia        Patient Active Problem List    Diagnosis Date Noted    History of angina 04/11/2019     Priority: 1 - One    CAD (coronary artery disease) 04/11/2019     Priority: 1 - One    History of prostate cancer 02/11/2021    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 ([address] 07/16/2019    Heart murmur 07/23/2018    Type 2 diabetes with nephropathy ([address] 06/29/2018    CKD stage 3 due to type 2 diabetes mellitus ([address] 06/29/2018    Age-related nuclear cataract of both eyes 03/27/2018    PVD (posterior vitreous detachment), left eye 03/27/2018    Controlled type 2 diabetes mellitus with diabetic nephropathy ([address] 12/03/2015    Prostate cancer ([facility] 08/23/2013    S/P carotid endarterectomy 12/05/2012    Vitamin D deficiency 12/05/2012    Amaurosis fugax of left eye 05/06/2012    Carotid artery obstruction 04/17/2012    Allergy to bee sting 04/13/2012    Bone spur 10/04/2010    Hypertension 05/22/2010    Hyperlipidemia 05/22/2010    GERD (gastroesophageal reflux disease) 05/22/2010    Kidney stone 05/22/2010        Health Maintenance Due   Topic Date Due    COVID-19 Vaccine (1 of 2) 04/13/1961    Shingrix Vaccine Age 50> (1 of 2) 04/13/1993    Eye Exam Retinal or Dilated  05/25/2019    GLAUCOMA SCREENING Q2Y  03/20/2020    Medicare Yearly Exam  07/16/2020    A1C test (Diabetic or Prediabetic)  12/08/2020    Foot Exam Q1  12/30/2020        Assessment/Plan:  Details of this discussion including any medical advice provided: Overall doing well, getting labs ordered. ICD-10-CM ICD-9-CM    1. Coronary artery disease of native artery of native heart with stable angina pectoris ([facility]  I25.118 414.01      413.9    2. Essential hypertension  I10 401.9 CBC W/O DIFF      METABOLIC PANEL, COMPREHENSIVE   3. Controlled type 2 diabetes mellitus with diabetic nephropathy, without long-term current use of insulin (HCC)  E11.21 250.40 HEMOGLOBIN A1C WITH EAG     583.81    4. Pure hypercholesterolemia  E78.00 272.0 LIPID PANEL   5. History of prostate cancer  Z85.46 V10.46 PSA W/ REFLX FREE PSA     Follow-up and Dispositions    · Return in about 3 months (around 5/11/2021) for In office Medicare Wellness visit. Total Time: minutes: 11-20 minutes was spent on telemedicine encounter discussing above problems and plans. Patient Problem list, medications, and Allergies were reviewed during this encounter. Pursuant to the emergency declaration under the [facility], [facility] waiver authority and the [employer] and [employer] Act, this Telephone Visit was conducted, with patient's consent, to reduce the patient's risk of exposure to COVID-19 and provide continuity of care for an established patient. I affirm this is a Patient Initiated Episode with an Established Patient who has not had a related appointment within my department in the past 7 days or scheduled within the next 24 hours. Discussed diagnoses in detail with patient. Medication risks/benefits/side effects discussed with patient. All of the patient's questions were addressed.  The patient understands and agrees with our plan of care. The patient knows to call back if they are unsure of or forget any changes we discussed today or if the symptoms change.     Note: not billable if this call serves to triage the patient into an appointment for the relevant concern    MD YONI Lamar & BALBIR SALDIVAR [facility]  02/11/21

## 2021-02-25 ENCOUNTER — LAB ONLY (OUTPATIENT)
Dept: FAMILY MEDICINE CLINIC | Age: 76
End: 2021-02-25

## 2021-02-25 DIAGNOSIS — I10 ESSENTIAL HYPERTENSION: Chronic | ICD-10-CM

## 2021-02-25 DIAGNOSIS — E11.21 CONTROLLED TYPE 2 DIABETES MELLITUS WITH DIABETIC NEPHROPATHY, WITHOUT LONG-TERM CURRENT USE OF INSULIN (HCC): ICD-10-CM

## 2021-02-25 DIAGNOSIS — Z85.46 HISTORY OF PROSTATE CANCER: ICD-10-CM

## 2021-02-25 DIAGNOSIS — E78.00 PURE HYPERCHOLESTEROLEMIA: Chronic | ICD-10-CM

## 2021-02-26 LAB
ALBUMIN SERPL-MCNC: 3.9 G/DL (ref 3.5–5)
ALBUMIN/GLOB SERPL: 1.4 {RATIO} (ref 1.1–2.2)
ALP SERPL-CCNC: 65 U/L (ref 45–117)
ALT SERPL-CCNC: 25 U/L (ref 12–78)
ANION GAP SERPL CALC-SCNC: 8 MMOL/L (ref 5–15)
AST SERPL-CCNC: 26 U/L (ref 15–37)
BILIRUB SERPL-MCNC: 0.4 MG/DL (ref 0.2–1)
BUN SERPL-MCNC: 34 MG/DL (ref 6–20)
BUN/CREAT SERPL: 18 (ref 12–20)
CALCIUM SERPL-MCNC: 9 MG/DL (ref 8.5–10.1)
CHLORIDE SERPL-SCNC: 110 MMOL/L (ref 97–108)
CHOLEST SERPL-MCNC: 174 MG/DL
CO2 SERPL-SCNC: 23 MMOL/L (ref 21–32)
CREAT SERPL-MCNC: 1.94 MG/DL (ref 0.7–1.3)
ERYTHROCYTE [DISTWIDTH] IN BLOOD BY AUTOMATED COUNT: 13.3 % (ref 11.5–14.5)
EST. AVERAGE GLUCOSE BLD GHB EST-MCNC: 131 MG/DL
GLOBULIN SER CALC-MCNC: 2.7 G/DL (ref 2–4)
GLUCOSE SERPL-MCNC: 126 MG/DL (ref 65–100)
HBA1C MFR BLD: 6.2 % (ref 4–5.6)
HCT VFR BLD AUTO: 36.2 % (ref 36.6–50.3)
HDLC SERPL-MCNC: 74 MG/DL
HDLC SERPL: 2.4 {RATIO} (ref 0–5)
HGB BLD-MCNC: 11.4 G/DL (ref 12.1–17)
LDLC SERPL CALC-MCNC: 77.6 MG/DL (ref 0–100)
LIPID PROFILE,FLP: NORMAL
MCH RBC QN AUTO: 29.5 PG (ref 26–34)
MCHC RBC AUTO-ENTMCNC: 31.5 G/DL (ref 30–36.5)
MCV RBC AUTO: 93.8 FL (ref 80–99)
NRBC # BLD: 0 K/UL (ref 0–0.01)
NRBC BLD-RTO: 0 PER 100 WBC
PLATELET # BLD AUTO: 235 K/UL (ref 150–400)
PMV BLD AUTO: 10.5 FL (ref 8.9–12.9)
POTASSIUM SERPL-SCNC: 4.6 MMOL/L (ref 3.5–5.1)
PROT SERPL-MCNC: 6.6 G/DL (ref 6.4–8.2)
RBC # BLD AUTO: 3.86 M/UL (ref 4.1–5.7)
SODIUM SERPL-SCNC: 141 MMOL/L (ref 136–145)
TRIGL SERPL-MCNC: 112 MG/DL (ref ?–150)
VLDLC SERPL CALC-MCNC: 22.4 MG/DL
WBC # BLD AUTO: 5.8 K/UL (ref 4.1–11.1)

## 2021-02-27 LAB
PSA SERPL-MCNC: <0.1 NG/ML (ref 0–4)
REFLEX CRITERIA: NORMAL

## 2021-03-01 NOTE — PROGRESS NOTES
PSA negative as appropriate, BG stable, CRT increased from previously. Hgb Stable, Cholesterol is wnl, A1c is controlled.

## 2021-03-02 ENCOUNTER — TELEPHONE (OUTPATIENT)
Dept: FAMILY MEDICINE CLINIC | Age: 76
End: 2021-03-02

## 2021-03-02 DIAGNOSIS — N18.30 CKD STAGE 3 DUE TO TYPE 2 DIABETES MELLITUS (HCC): Primary | ICD-10-CM

## 2021-03-02 DIAGNOSIS — E11.22 CKD STAGE 3 DUE TO TYPE 2 DIABETES MELLITUS (HCC): Primary | ICD-10-CM

## 2021-03-02 DIAGNOSIS — K21.00 GASTROESOPHAGEAL REFLUX DISEASE WITH ESOPHAGITIS: ICD-10-CM

## 2021-03-02 RX ORDER — PANTOPRAZOLE SODIUM 40 MG/1
40 TABLET, DELAYED RELEASE ORAL DAILY
Qty: 90 TAB | Refills: 1 | Status: SHIPPED | OUTPATIENT
Start: 2021-03-02 | End: 2021-09-02 | Stop reason: SDUPTHER

## 2021-03-02 NOTE — TELEPHONE ENCOUNTER
Called and spoke to patient. Advised per Dr. Julieth Plascencia: \"Overall your labs look good, but your Creatinine did increase. THis is a measure of your Kidney function and when it goes up it means your kidneys are not filtering as well. When you got your labs did you do it without drinking any fluids in the morning before getting your blood drawn. Sometimes if you have been not drinking it can cause a temporary increase in you creatinine. Or has your Blood Pressure been elevated for the last week or so? I would like to repeat this lab in about a month to monitor it, if it remains elevated, I may need to send you to a kidney specialist.  Otherwise your labs do look really good though, we will just have to moniotr this particular result. \"    He states that he has not noticed elevated BP recently and unsure how hydrated he was when labs were drawn. He would like lab orders entered and follow up after drawn in a month if needed.

## 2021-03-05 DIAGNOSIS — I10 ESSENTIAL HYPERTENSION: Chronic | ICD-10-CM

## 2021-03-05 RX ORDER — FELODIPINE 10 MG/1
10 TABLET, EXTENDED RELEASE ORAL DAILY
Qty: 90 TAB | Refills: 1 | Status: SHIPPED | OUTPATIENT
Start: 2021-03-05 | End: 2021-09-02 | Stop reason: SDUPTHER

## 2021-03-17 RX ORDER — ROSUVASTATIN CALCIUM 40 MG/1
TABLET, COATED ORAL
Qty: 90 TAB | Refills: 2 | Status: SHIPPED | OUTPATIENT
Start: 2021-03-17 | End: 2021-11-08 | Stop reason: SDUPTHER

## 2021-03-18 ENCOUNTER — TELEPHONE (OUTPATIENT)
Dept: CARDIOLOGY CLINIC | Age: 76
End: 2021-03-18

## 2021-03-18 NOTE — TELEPHONE ENCOUNTER
Faxed additional questions and office notes to [facility]. [phone] for Ranolazine  mg for prior auth.

## 2021-03-19 RX ORDER — RANOLAZINE 1000 MG/1
1000 TABLET, EXTENDED RELEASE ORAL 2 TIMES DAILY
Qty: 60 TAB | Refills: 3 | Status: SHIPPED | OUTPATIENT
Start: 2021-03-19 | End: 2021-06-17 | Stop reason: SDUPTHER

## 2021-03-19 NOTE — TELEPHONE ENCOUNTER
Medication refill per VO Dr Marcellus Harmon \"cont ranexa and titrated to 1 g twice daily\"    Requested Prescriptions     Pending Prescriptions Disp Refills    ranolazine ER (RANEXA) 1,000 mg 60 Tab 3     Sig: Take 1 Tab by mouth two (2) times a day.

## 2021-04-01 RX ORDER — FAMOTIDINE 40 MG/1
40 TABLET, FILM COATED ORAL
Qty: 90 TAB | Refills: 1 | Status: SHIPPED | OUTPATIENT
Start: 2021-04-01 | End: 2021-10-01 | Stop reason: SDUPTHER

## 2021-04-13 ENCOUNTER — TELEPHONE (OUTPATIENT)
Dept: FAMILY MEDICINE CLINIC | Age: 76
End: 2021-04-13

## 2021-04-13 NOTE — TELEPHONE ENCOUNTER
Patient called per Dr. Jenny Renteria: Your Kidney function returned to it's Baseline, keep up the good work! Patient verbalized understanding and appreciative.

## 2021-04-20 RX ORDER — CLOPIDOGREL BISULFATE 75 MG/1
TABLET ORAL
Qty: 90 TAB | Refills: 0 | Status: SHIPPED | OUTPATIENT
Start: 2021-04-20 | End: 2021-08-25 | Stop reason: SDUPTHER

## 2021-04-26 DIAGNOSIS — I10 ESSENTIAL HYPERTENSION: ICD-10-CM

## 2021-04-26 RX ORDER — LISINOPRIL 20 MG/1
TABLET ORAL
Qty: 90 TAB | Refills: 1 | Status: SHIPPED | OUTPATIENT
Start: 2021-04-26 | End: 2021-10-18

## 2021-06-17 RX ORDER — RANOLAZINE 1000 MG/1
1000 TABLET, EXTENDED RELEASE ORAL 2 TIMES DAILY
Qty: 60 TABLET | Refills: 3 | Status: SHIPPED | OUTPATIENT
Start: 2021-06-17 | End: 2021-10-18 | Stop reason: DRUGHIGH

## 2021-06-17 NOTE — TELEPHONE ENCOUNTER
Medication refill per VO Dr Guido Mclean    Requested Prescriptions     Pending Prescriptions Disp Refills    ranolazine ER (RANEXA) 1,000 mg 60 Tablet 3     Sig: Take 1 Tablet by mouth two (2) times a day.

## 2021-07-30 DIAGNOSIS — I10 ACCELERATED HYPERTENSION: ICD-10-CM

## 2021-07-30 RX ORDER — HYDRALAZINE HYDROCHLORIDE 50 MG/1
TABLET, FILM COATED ORAL
Qty: 270 TABLET | Refills: 0 | Status: SHIPPED | OUTPATIENT
Start: 2021-07-30 | End: 2021-10-27

## 2021-08-11 ENCOUNTER — OFFICE VISIT (OUTPATIENT)
Dept: CARDIOLOGY CLINIC | Age: 76
End: 2021-08-11
Payer: MEDICARE

## 2021-08-11 VITALS
WEIGHT: 212 LBS | OXYGEN SATURATION: 99 % | HEART RATE: 56 BPM | BODY MASS INDEX: 29.68 KG/M2 | DIASTOLIC BLOOD PRESSURE: 82 MMHG | SYSTOLIC BLOOD PRESSURE: 134 MMHG | HEIGHT: 71 IN

## 2021-08-11 DIAGNOSIS — N18.30 CKD STAGE 3 DUE TO TYPE 2 DIABETES MELLITUS (HCC): ICD-10-CM

## 2021-08-11 DIAGNOSIS — I25.118 CORONARY ARTERY DISEASE OF NATIVE ARTERY OF NATIVE HEART WITH STABLE ANGINA PECTORIS (HCC): Primary | ICD-10-CM

## 2021-08-11 DIAGNOSIS — I10 ESSENTIAL HYPERTENSION: ICD-10-CM

## 2021-08-11 DIAGNOSIS — E11.22 CKD STAGE 3 DUE TO TYPE 2 DIABETES MELLITUS (HCC): ICD-10-CM

## 2021-08-11 DIAGNOSIS — E78.00 PURE HYPERCHOLESTEROLEMIA: ICD-10-CM

## 2021-08-11 DIAGNOSIS — E11.21 TYPE 2 DIABETES WITH NEPHROPATHY (HCC): ICD-10-CM

## 2021-08-11 DIAGNOSIS — I35.0 AORTIC VALVE STENOSIS, ETIOLOGY OF CARDIAC VALVE DISEASE UNSPECIFIED: ICD-10-CM

## 2021-08-11 DIAGNOSIS — I65.22 OBSTRUCTION OF LEFT CAROTID ARTERY: ICD-10-CM

## 2021-08-11 PROCEDURE — 93005 ELECTROCARDIOGRAM TRACING: CPT | Performed by: STUDENT IN AN ORGANIZED HEALTH CARE EDUCATION/TRAINING PROGRAM

## 2021-08-11 PROCEDURE — 93010 ELECTROCARDIOGRAM REPORT: CPT | Performed by: STUDENT IN AN ORGANIZED HEALTH CARE EDUCATION/TRAINING PROGRAM

## 2021-08-11 PROCEDURE — 99214 OFFICE O/P EST MOD 30 MIN: CPT | Performed by: STUDENT IN AN ORGANIZED HEALTH CARE EDUCATION/TRAINING PROGRAM

## 2021-08-11 PROCEDURE — G0463 HOSPITAL OUTPT CLINIC VISIT: HCPCS | Performed by: STUDENT IN AN ORGANIZED HEALTH CARE EDUCATION/TRAINING PROGRAM

## 2021-08-11 RX ORDER — RANOLAZINE 500 MG/1
500 TABLET, EXTENDED RELEASE ORAL 2 TIMES DAILY
COMMUNITY
End: 2021-10-18 | Stop reason: SDUPTHER

## 2021-08-11 NOTE — PROGRESS NOTES
Room 8    Visit Vitals  /82 (BP 1 Location: Left upper arm, BP Patient Position: Sitting)   Pulse (!) 56   Ht 5' 11\" (1.803 m)   Wt 212 lb (96.2 kg)   SpO2 99%   BMI 29.57 kg/m²         Discuss gasping while sitting still or sleep     Chest pain: DISCOMFORT, every now and then   Shortness of breath: no  Edema: no  Palpitations: no  Dizziness: YES    New diagnosis/Surgeries: no    ER/Hospitalizations: no    Refills:  no

## 2021-08-11 NOTE — PROGRESS NOTES
[facility] [address], [address] [facility], [address] [place] [address]    Office [phone],[facility] [phone]           Grisel Colindres III is a 64 y.o. presents for f/u of CAD      Assessment/Recommendations:      Coronary artery disease - stable angina symptoms  Distal LAD  filling via L-->L collaterals   Lcx stenting 4/2019. 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 Brief 4 beat run of ventricular tachycardia noted on monitor. D/c BB for bradycardia      Aortic stenosis-stable mild aortic stenosis on today's echo 1/20/2021, mean gradient 15 mmHg      Hypertension-stable continue treatment per primary care. Diabetes-  Per Kaveh Diaz MD      Hyperlipidemia  -recent LDL 80, previously in the mid 62s. Does have some degree of LDL resistance to high-dose statin therapy, wonder if he has elevated activity of LP(a). Can consider testing, with subsequent PCSK9 inhibitor if elevated. - cont rosuvastatin 40mg      GERD- PPI      Carotid artery stenosis- s/p carotid endarterectomy on aspirin and statin. followed by  Violet Cuevas MD       Positional related dizzinesssuspect dehydration in the setting of nitroglycerin. Advised on hydration during summer months        Primary Care Physician- Kaveh Diaz MD    F/u 6 months sooner as needed      Subjective:  64 y.o. male presents for follow-up. Presents for follow-up evaluation. Continues to have stable angina symptoms with excellent functional capacity. No unstable anginal symptoms. No escalation in stable angina symptoms. Recently has developed more positional related dizziness. Continues to work as , and will develop more passing out spells with quick positional changes. No adverse bleeding with DAPT. Past Medical History:   Diagnosis Date    Amaurosis fugax of left eye 5/6/2012    Arthritis     OSTEO    CAD (coronary artery disease) 2012    CAROTID LEFT     Cancer ([facility] 2013    PROSTATE    Chronic pain     DJD lumbar    Diabetes ([facility] 5/22/2010    Frequent nocturnal awakening     urinary frequency    GERD (gastroesophageal reflux disease) 5/22/2010    Hyperlipidemia 5/22/2010    Hypertension 5/22/2010    Kidney stone 5/22/2010    Nodular goiter     1.3 cm right , FNA 6/15    Obesity (BMI 30-39. 9)     Psychiatric disorder     ANXIETY    Vertigo         Past Surgical History:   Procedure Laterality Date    COLONOSCOPY  3/3/2016         EGD  3/3/2016         HX HEENT      tonsillectomy    HX MOHS PROCEDURES  2010    right    HX ORTHOPAEDIC      coccyx removed    HX UROLOGICAL  2010    left lithotripsy. basket  extraction,ureteral stent    HX VASECTOMY      NEUROLOGICAL PROCEDURE UNLISTED  2011    Steroid injections in epidural    [place] CARDIAC SURG PROCEDURE UNLIST  04/2019    3 stents placed    VASCULAR SURGERY PROCEDURE UNLIST  2012    LEFT CAROTID         Current Outpatient Medications:     ranolazine ER (RANEXA) 500 mg SR tablet, Take 500 mg by mouth two (2) times a day., Disp: , Rfl:     hydrALAZINE (APRESOLINE) 50 mg tablet, Take one tablet by mouth three times daily for hypertension, Disp: 270 Tablet, Rfl: 0    ranolazine ER (RANEXA) 1,000 mg, Take 1 Tablet by mouth two (2) times a day., Disp: 60 Tablet, Rfl: 3    lisinopriL (PRINIVIL, ZESTRIL) 20 mg tablet, TAKE ONE TABLET BY MOUTH EVERY DAY, Disp: 90 Tab, Rfl: 1    clopidogreL (PLAVIX) 75 mg tab, TAKE ONE TABLET BY MOUTH EVERY DAY, Disp: 90 Tab, Rfl: 0    famotidine (PEPCID) 40 mg tablet, Take 1 Tab by mouth nightly., Disp: 90 Tab, Rfl: 1    rosuvastatin (CRESTOR) 40 mg tablet, TAKE ONE TABLET BY MOUTH EVERY DAY, Disp: 90 Tab, Rfl: 2    felodipine (PLENDIL SR) 10 mg 24 hr tablet, Take 1 Tab by mouth daily. Indications: high blood pressure, Disp: 90 Tab, Rfl: 1    pantoprazole (PROTONIX) 40 mg tablet, Take 1 Tab by mouth daily. , Disp: 90 Tab, Rfl: 1    isosorbide mononitrate ER (IMDUR) 60 mg CR tablet, Take 1 Tab by mouth daily. , Disp: 90 Tab, Rfl: 2    EPINEPHrine (EpiPen 2-Francisco Javier) 0.3 mg/0.3 mL injection, INJECT INTRAMUSCULARLY AS NEEDED FOR ONE DOSE. TAKE WITH 50MG OF BENADRYL AND CALL 911. Dispense Generic, Disp: 1 Syringe, Rfl: 3    nitroglycerin (NITROSTAT) 0.3 mg SL tablet, 1 Tab by SubLINGual route every five (5) minutes as needed for Chest Pain., Disp: 1 Bottle, Rfl: 3    furosemide (LASIX) 20 mg tablet, Take 1 Tab by mouth daily. , Disp: 90 Tab, Rfl: 1    triamcinolone acetonide (KENALOG) 0.1 % topical cream, Apply  to affected area two (2) times a day. use thin layer to poison ivy, Disp: 453 g, Rfl: 0    diclofenac (VOLTAREN) 1 % gel, Apply 4 g to affected area four (4) times daily. Painful shoulder., Disp: 100 g, Rfl: 3    meclizine (ANTIVERT) 25 mg chewable tablet, Take  by mouth three (3) times daily as needed. , Disp: , Rfl:     aspirin delayed-release 81 mg tablet, Take 1 Tab by mouth daily. , Disp: 30 Tab, Rfl: 0    albuterol (PROVENTIL VENTOLIN) 2.5 mg /3 mL (0.083 %) nebulizer solution, 3 mL by Nebulization route every six (6) hours as needed for Wheezing or Shortness of Breath., Disp: 24 Each, Rfl: 5    omega-3 fatty acids-vitamin e (FISH OIL) 1,000 mg cap, Take 1 Cap by mouth., Disp: , Rfl:     coenzyme q10 (CO Q-10) 100 mg Cap, Take 100 mg by mouth daily. , Disp: , Rfl:     Peak Flow Meter eric, Use prior and post nebulizer treatment, Disp: 1 Device, Rfl: 0    Allergies   Allergen Reactions    Bee Sting [Sting, Bee] Anaphylaxis    Vytorin 10-10 [Ezetimibe-Simvastatin] Myalgia    Amlodipine Other (comments)     Creepy crawly sensation, twitches    Lipitor [Atorvastatin] Myalgia        Family History   Problem Relation Age of Onset    Diabetes Mother     Obesity Mother     Heart Disease Mother     Stroke Father     Heart Disease Sister     Obesity Sister    Paulette Diaz Diabetes Sister    Mother  of a heart attack at age 62  Father had a stroke in his 76s    Social History     Tobacco Use    Smoking status: Former Smoker     Packs/day: 0.50     Years: 4.00     Pack years: 2.00     Quit date: 1966     Years since quittin.3    Smokeless tobacco: Never Used   Substance Use Topics    Alcohol use: No    Drug use: No       Review of Symptoms:  Pertinent Positive: very mild exertional angina  Pertinent Negative: No shortness of breath orthopnea PND. All Other systems reviewed and are negative for a Comprehensive ROS (10+)    Physical Exam    Blood pressure 134/82, pulse (!) 56, height 5' 11\" (1.803 m), weight 212 lb (96.2 kg), SpO2 99 %. Constitutional:  well-developed and well-nourished. No distress. HENT: Normocephalic. Eyes: No scleral icterus. Neck:  Neck supple. No JVD present. Pulmonary/Chest: Effort normal and breath sounds normal. No respiratory distress, wheezes or rales. Cardiovascular: Normal rate, regular rhythm, S1 S2 .  2/6 systolic murmur   extremities:  Normal muscle tone  Abdominal:   No abnormal distension. Neurological:  Moving all extremities, cranial nerves appear grossly intact. Skin: Skin is not cold. Not diaphoretic. No erythema. Psychiatric:  Grossly normal mood and affect. Intact insight. Objective Data:     Lipids 19 - , HDL 64, LDL 76, , VLDL 22    Echo 2018: Normal LVEF, mild AS, mild MR/TR    Event monitor-2019 to 2019. Rare ventricular ectopy, 6 supraventricular runs of ventricular tachycardia the longest being 4 beats. Supraventricular ectopy involving 8.2% of beats.     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 Post-dilated with 3.0NC just inside distal stent edge and 3.75NC proximally at high pressure           08/24/20   CARDIAC PROCEDURE 08/24/2020 8/24/2020    Narrative · Multivessel CAD  · Distal anomalous RCA stenting with 4.0x23mm Xience Freida REINALDO deployed   at 20 WINDY. Post-dilated with 4.5 NC balloon at 18 WINDY. Findings:   L Main: large caliber, normal  LAD: large caliber, tubular 70% mid-vessel disease,  distal LAD  LCx: large caliber, previously placed proximal and mid-Lcx stents widely   patent, several small caliber OM branches. 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 Post-dilated with 4.5 NC   balloon at 18 WINDY.    karon 3 flow w/o evidence of dissection or perforation       Signed by: Michell Peña DO     01/20/21   ECHO ADULT COMPLETE 01/20/2021 1/20/2021    Narrative · LV: Calculated LVEF is 60%. Visually measured ejection fraction. Normal   cavity size, wall thickness and systolic function (ejection fraction   normal).  Wall motion: normal. Mild (grade 1) left ventricular diastolic   dysfunction. · AV: Aortic valve leaflet calcification present. Aortic valve mean   gradient is 15.2 mmHg. Aortic valve area is 1.8 cm2. Mild aortic valve   stenosis is present. · LA: Mildly dilated left atrium.         Signed by: Tavon Chirinos, DO                 Billie Life, DO

## 2021-08-25 RX ORDER — CLOPIDOGREL BISULFATE 75 MG/1
75 TABLET ORAL DAILY
Qty: 90 TABLET | Refills: 2 | Status: SHIPPED | OUTPATIENT
Start: 2021-08-25 | End: 2022-02-22

## 2021-08-25 NOTE — TELEPHONE ENCOUNTER
Nancy Siddiqui states patient is currently out of medication. Pharmacy confirmed.     PHONE: [phone]

## 2021-09-01 ENCOUNTER — TELEPHONE (OUTPATIENT)
Dept: FAMILY MEDICINE CLINIC | Age: 76
End: 2021-09-01

## 2021-09-01 NOTE — TELEPHONE ENCOUNTER
----- Message from Gabbie Washington sent at 9/1/2021  1:09 PM EDT -----  Regarding: Dr. Patel Prim: [phone]  Medication Refill    Caller (if not patient): N/A      Relationship of caller (if not patient): N/A      Best contact number(s): [phone]        Name of medication and dosage if known: \"All Medications\"       Is patient out of this medication (yes/no): yes      Pharm[facility] name: [facility] listed in chart? (yes/no): Yes  Pharmacy phone number: [phone]      Details to clarify the request: PT states he takes about 17 or 18 pills daily and does not have list with him. PT would like to know if he is due for blood work.        Gabbie Washington

## 2021-09-02 DIAGNOSIS — I10 ESSENTIAL HYPERTENSION: Chronic | ICD-10-CM

## 2021-09-02 DIAGNOSIS — K21.00 GASTROESOPHAGEAL REFLUX DISEASE WITH ESOPHAGITIS: ICD-10-CM

## 2021-09-02 RX ORDER — FELODIPINE 10 MG/1
10 TABLET, EXTENDED RELEASE ORAL DAILY
Qty: 90 TABLET | Refills: 0 | Status: SHIPPED | OUTPATIENT
Start: 2021-09-02 | End: 2021-11-29

## 2021-09-02 RX ORDER — PANTOPRAZOLE SODIUM 40 MG/1
40 TABLET, DELAYED RELEASE ORAL DAILY
Qty: 90 TABLET | Refills: 0 | Status: SHIPPED | OUTPATIENT
Start: 2021-09-02 | End: 2021-11-29

## 2021-09-08 DIAGNOSIS — I10 ESSENTIAL HYPERTENSION: ICD-10-CM

## 2021-09-08 RX ORDER — FUROSEMIDE 20 MG/1
20 TABLET ORAL DAILY
Qty: 90 TABLET | Refills: 0 | Status: SHIPPED | OUTPATIENT
Start: 2021-09-08 | End: 2021-11-10

## 2021-10-01 ENCOUNTER — TRANSCRIBE ORDER (OUTPATIENT)
Dept: INTERNAL MEDICINE CLINIC | Age: 76
End: 2021-10-01

## 2021-10-01 NOTE — TELEPHONE ENCOUNTER
----- Message from Susy Michelle Page sent at 10/1/2021  4:29 PM EDT -----  Regarding: Dr. Shane Carrillo (if not patient): n/a      Relationship of caller (if not patient): n/a      Best contact number(s): [phone]      Name of medication and dosage if known: famotidine      Is patient out of this medication (yes/no): Yes      Ph[facility] name[facility]      Pharmacy listed in chart? (yes/no): Yes  Pharmacy phone number:      Details to clarify the request:      Susy Michelle Page

## 2021-10-04 RX ORDER — FAMOTIDINE 40 MG/1
40 TABLET, FILM COATED ORAL
Qty: 90 TABLET | Refills: 1 | Status: SHIPPED | OUTPATIENT
Start: 2021-10-04 | End: 2022-01-04

## 2021-10-14 RX ORDER — ISOSORBIDE MONONITRATE 60 MG/1
TABLET, EXTENDED RELEASE ORAL
Qty: 90 TABLET | Refills: 2 | Status: SHIPPED | OUTPATIENT
Start: 2021-10-14 | End: 2022-07-11

## 2021-10-18 DIAGNOSIS — I10 ESSENTIAL HYPERTENSION: ICD-10-CM

## 2021-10-18 RX ORDER — LISINOPRIL 20 MG/1
TABLET ORAL
Qty: 90 TABLET | Refills: 1 | Status: SHIPPED | OUTPATIENT
Start: 2021-10-18 | End: 2022-04-26

## 2021-10-18 RX ORDER — RANOLAZINE 500 MG/1
500 TABLET, EXTENDED RELEASE ORAL 2 TIMES DAILY
Qty: 60 TABLET | Refills: 0 | Status: SHIPPED | OUTPATIENT
Start: 2021-10-18 | End: 2021-11-10

## 2021-10-18 NOTE — TELEPHONE ENCOUNTER
Pt made aware not been seen in office since 2/21 so would need an appt. Appt scheduled. Pt stated he needed the medication refilled right away though because he only has 1 pill left.

## 2021-10-27 DIAGNOSIS — I10 ACCELERATED HYPERTENSION: ICD-10-CM

## 2021-10-27 RX ORDER — HYDRALAZINE HYDROCHLORIDE 50 MG/1
TABLET, FILM COATED ORAL
Qty: 270 TABLET | Refills: 0 | Status: SHIPPED | OUTPATIENT
Start: 2021-10-27 | End: 2022-02-01

## 2021-11-03 DIAGNOSIS — E11.21 CONTROLLED TYPE 2 DIABETES MELLITUS WITH DIABETIC NEPHROPATHY, WITHOUT LONG-TERM CURRENT USE OF INSULIN (HCC): Primary | ICD-10-CM

## 2021-11-03 DIAGNOSIS — Z11.59 NEED FOR HEPATITIS C SCREENING TEST: ICD-10-CM

## 2021-11-03 DIAGNOSIS — E78.00 PURE HYPERCHOLESTEROLEMIA: ICD-10-CM

## 2021-11-04 ENCOUNTER — OFFICE VISIT (OUTPATIENT)
Dept: FAMILY MEDICINE CLINIC | Age: 76
End: 2021-11-04
Payer: MEDICARE

## 2021-11-04 VITALS
BODY MASS INDEX: 30.66 KG/M2 | HEIGHT: 71 IN | DIASTOLIC BLOOD PRESSURE: 62 MMHG | OXYGEN SATURATION: 98 % | SYSTOLIC BLOOD PRESSURE: 105 MMHG | RESPIRATION RATE: 20 BRPM | TEMPERATURE: 97.1 F | WEIGHT: 219 LBS | HEART RATE: 64 BPM

## 2021-11-04 DIAGNOSIS — E78.00 PURE HYPERCHOLESTEROLEMIA: ICD-10-CM

## 2021-11-04 DIAGNOSIS — E11.21 CONTROLLED TYPE 2 DIABETES MELLITUS WITH DIABETIC NEPHROPATHY, WITHOUT LONG-TERM CURRENT USE OF INSULIN (HCC): ICD-10-CM

## 2021-11-04 DIAGNOSIS — I65.22 OBSTRUCTION OF LEFT CAROTID ARTERY: ICD-10-CM

## 2021-11-04 DIAGNOSIS — I10 PRIMARY HYPERTENSION: Primary | ICD-10-CM

## 2021-11-04 DIAGNOSIS — I25.118 CORONARY ARTERY DISEASE OF NATIVE ARTERY OF NATIVE HEART WITH STABLE ANGINA PECTORIS (HCC): ICD-10-CM

## 2021-11-04 PROCEDURE — G8427 DOCREV CUR MEDS BY ELIG CLIN: HCPCS | Performed by: FAMILY MEDICINE

## 2021-11-04 PROCEDURE — 1101F PT FALLS ASSESS-DOCD LE1/YR: CPT | Performed by: FAMILY MEDICINE

## 2021-11-04 PROCEDURE — 99214 OFFICE O/P EST MOD 30 MIN: CPT | Performed by: FAMILY MEDICINE

## 2021-11-04 PROCEDURE — G8510 SCR DEP NEG, NO PLAN REQD: HCPCS | Performed by: FAMILY MEDICINE

## 2021-11-04 PROCEDURE — G8417 CALC BMI ABV UP PARAM F/U: HCPCS | Performed by: FAMILY MEDICINE

## 2021-11-04 PROCEDURE — G8754 DIAS BP LESS 90: HCPCS | Performed by: FAMILY MEDICINE

## 2021-11-04 PROCEDURE — G8536 NO DOC ELDER MAL SCRN: HCPCS | Performed by: FAMILY MEDICINE

## 2021-11-04 PROCEDURE — G8752 SYS BP LESS 140: HCPCS | Performed by: FAMILY MEDICINE

## 2021-11-04 NOTE — PATIENT INSTRUCTIONS
- Cut Hydralazine in half, still take 3 times a day for the next week and check BP 2-3 times a day for the next week  - If improving Will try to switch Lasix to the Morning and and maybe. - Call Dr. Selena Ya about Statin Medications.

## 2021-11-04 NOTE — PROGRESS NOTES
1. Have you been to the ER, urgent care clinic since your last visit? Hospitalized since your last visit? No    2. Have you seen or consulted any other health care providers outside of the [address] [place] [address] since your last visit? Include any pap smears or colon screening. No    Reviewed record in preparation for visit and have necessary documentation  Goals that were addressed and/or need to be completed during or after this appointment include     Health Maintenance Due   Topic Date Due    COVID-19 Vaccine (1) Never done    Shingrix Vaccine Age 50> (1 of 2) Never done    Eye Exam Retinal or Dilated  05/25/2019    Medicare Yearly Exam  07/16/2020    Foot Exam Q1  12/30/2020    DTaP/Tdap/Td series (2 - Td or Tdap) 06/23/2021    Flu Vaccine (1) 09/01/2021       Patient is accompanied by self I have received verbal consent from Rm Mcghee III to discuss any/all medical information while they are present in the room.

## 2021-11-04 NOTE — PROGRESS NOTES
[facility]    History of Present Illness:   Canovanas Yaniv PRESSLEY is a 64 y.o. male with history of HTN, CAD, Carotid artery disease, GERD, Diabetes, CKD, Prostate, HLD,   CC: Follow up Chronic Conditions  History provided by patient and Records    HPI:  Patient noting that he has had multiple episodes of near syncope in the morning after taking medications. Patient noting that he also lasix at night and having to wake 2-3 times nightly to urinate. Coronary Artery Disease Follow up: Today patient reports he is feeling well and overall his symptoms are controlled on his current medications. he  has not had a history of acute myocardial infarction in the past.  Prior management has included:   - Coronary stenting: YES  - Coronary bypass: NO    he has not had CHF   01/20/21    ECHO ADULT COMPLETE 01/20/2021 1/20/2021    Interpretation Summary  · LV: Calculated LVEF is 60%. Visually measured ejection fraction. Normal cavity size, wall thickness and systolic function (ejection fraction normal). Wall motion: normal. Mild (grade 1) left ventricular diastolic dysfunction. · AV: Aortic valve leaflet calcification present. Aortic valve mean gradient is 15.2 mmHg. Aortic valve area is 1.8 cm2. Mild aortic valve stenosis is present. · LA: Mildly dilated left atrium. Signed by: Monico Gomez DO on 1/20/2021  3:55 PM     Key CAD CHF Meds             hydrALAZINE (APRESOLINE) 50 mg tablet (Taking) Take one tablet by mouth three times daily for hypertension    lisinopriL (PRINIVIL, ZESTRIL) 20 mg tablet (Taking) TAKE ONE TABLET BY MOUTH EVERY DAY    ranolazine ER (RANEXA) 500 mg SR tablet (Taking) Take 1 Tablet by mouth two (2) times a day. isosorbide mononitrate ER (IMDUR) 60 mg CR tablet (Taking) TAKE ONE TABLET BY MOUTH EVERY DAY    furosemide (LASIX) 20 mg tablet (Taking) Take 1 Tablet by mouth daily. felodipine (PLENDIL SR) 10 mg 24 hr tablet (Taking) Take 1 Tablet by mouth daily. Indications: high blood pressure    clopidogreL (PLAVIX) 75 mg tab (Taking) Take 1 Tablet by mouth daily. rosuvastatin (CRESTOR) 40 mg tablet (Taking) TAKE ONE TABLET BY MOUTH EVERY DAY    nitroglycerin (NITROSTAT) 0.3 mg SL tablet (Taking) 1 Tab by SubLINGual route every five (5) minutes as needed for Chest Pain. aspirin delayed-release 81 mg tablet (Taking) Take 1 Tab by mouth daily. omega-3 fatty acids-vitamin e (FISH OIL) 1,000 mg cap (Taking) Take 1 Cap by mouth. he is on a statin  he is on antiplatelet therapy. he is on a beta blocker. he is on a regular Nitrate therapy. he does use Nitroglycerin as needed for chest pain. Residual symptoms of CAD include none. Patient denies any current chest pain, dyspnea, exertional chest pressure/discomfort. Risk factors are controlled or at goal.  Primary risk factors include diabetes, elevated cholesterol and hypertension. Diabetes Follow up: Overall the patient feels well with good energy level. Current Medications:   Key Antihyperglycemic Medications     Patient is on no antihyperglycemic meds. .   Insulin dependence: NO   Frequency of home glucose testing: prn   Blood Sugar range at home: Controlled    Last eye exam: In past 12 months. Last foot exam: This year.    Polyuria, polyphagia or polydipsia: NO   Retinopathy: NO   Neuropathy SX: NO   Low blood sugar symptoms: NO   Dietary compliance: compliant most of the time   Medication compliance: compliant most of the time   On ASA: Yes   Tobacco Use: NO   Depression: NO     Wt Readings from Last 3 Encounters:   11/04/21 219 lb (99.3 kg)   08/11/21 212 lb (96.2 kg)   01/20/21 211 lb (95.7 kg)        Lab Results   Component Value Date/Time    Hemoglobin A1c 6.4 (H) 11/03/2021 11:20 AM    Hemoglobin A1c (POC) 5.8 04/20/2015 08:44 AM      Lab Results   Component Value Date/Time    Microalbumin/Creat ratio (mg/g creat) 24 11/03/2021 11:20 AM    Microalbumin,urine random 4.50 11/03/2021 11:20 AM    Microalbumin urine (POC) 10 10/04/2010 01:54 PM      Lab Results   Component Value Date/Time    Creatinine (POC) 1.0 02/24/2017 09:20 AM    Creatinine 1.60 (H) 11/03/2021 11:20 AM        Health Maintenance  Health Maintenance Due   Topic Date Due    Shingrix Vaccine Age 49> (1 of 2) Never done    Eye Exam Retinal or Dilated  05/25/2019    Medicare Yearly Exam  07/16/2020    DTaP/Tdap/Td series (2 - Td or Tdap) 06/23/2021    Flu Vaccine (1) 09/01/2021       Past Medical, Family, and Social History:     Current Outpatient Medications on File Prior to Visit   Medication Sig Dispense Refill    hydrALAZINE (APRESOLINE) 50 mg tablet Take one tablet by mouth three times daily for hypertension 270 Tablet 0    lisinopriL (PRINIVIL, ZESTRIL) 20 mg tablet TAKE ONE TABLET BY MOUTH EVERY DAY 90 Tablet 1    ranolazine ER (RANEXA) 500 mg SR tablet Take 1 Tablet by mouth two (2) times a day. 60 Tablet 0    isosorbide mononitrate ER (IMDUR) 60 mg CR tablet TAKE ONE TABLET BY MOUTH EVERY DAY 90 Tablet 2    famotidine (PEPCID) 40 mg tablet Take 1 Tablet by mouth nightly. 90 Tablet 1    furosemide (LASIX) 20 mg tablet Take 1 Tablet by mouth daily. 90 Tablet 0    pantoprazole (PROTONIX) 40 mg tablet Take 1 Tablet by mouth daily. 90 Tablet 0    felodipine (PLENDIL SR) 10 mg 24 hr tablet Take 1 Tablet by mouth daily. Indications: high blood pressure 90 Tablet 0    clopidogreL (PLAVIX) 75 mg tab Take 1 Tablet by mouth daily. 90 Tablet 2    rosuvastatin (CRESTOR) 40 mg tablet TAKE ONE TABLET BY MOUTH EVERY DAY 90 Tab 2    EPINEPHrine (EpiPen 2-Francisco Javier) 0.3 mg/0.3 mL injection INJECT INTRAMUSCULARLY AS NEEDED FOR ONE DOSE. TAKE WITH 50MG OF BENADRYL AND CALL 911. Dispense Generic 1 Syringe 3    nitroglycerin (NITROSTAT) 0.3 mg SL tablet 1 Tab by SubLINGual route every five (5) minutes as needed for Chest Pain.  1 Bottle 3    triamcinolone acetonide (KENALOG) 0.1 % topical cream Apply  to affected area two (2) times a day. use thin layer to poison ivy 453 g 0    diclofenac (VOLTAREN) 1 % gel Apply 4 g to affected area four (4) times daily. Painful shoulder. 100 g 3    aspirin delayed-release 81 mg tablet Take 1 Tab by mouth daily. 30 Tab 0    Peak Flow Meter eric Use prior and post nebulizer treatment 1 Device 0    albuterol (PROVENTIL VENTOLIN) 2.5 mg /3 mL (0.083 %) nebulizer solution 3 mL by Nebulization route every six (6) hours as needed for Wheezing or Shortness of Breath. 24 Each 5    omega-3 fatty acids-vitamin e (FISH OIL) 1,000 mg cap Take 1 Cap by mouth.  coenzyme q10 (CO Q-10) 100 mg Cap Take 100 mg by mouth daily.  meclizine (ANTIVERT) 25 mg chewable tablet Take  by mouth three (3) times daily as needed. (Patient not taking: Reported on 11/4/2021)       No current facility-administered medications on file prior to visit.        Patient Active Problem List   Diagnosis Code    Hypertension I10    Hyperlipidemia E78.5    GERD (gastroesophageal reflux disease) K21.9    Kidney stone N20.0    Bone spur M77.9    Allergy to bee sting Z91.030    Carotid artery obstruction I65.29    Amaurosis fugax of left eye G45.3    S/P carotid endarterectomy Z98.890    Vitamin D deficiency E55.9    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.21    Type 2 diabetes with nephropathy (HCC) E11.21    CKD stage 3 due to type 2 diabetes mellitus (HCC) E11.22, N18.30    Heart murmur R01.1    Age-related nuclear cataract of both eyes H25.13    PVD (posterior vitreous detachment), left eye H43.812    History of angina Z86.79    CAD (coronary artery disease) I25.10    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 (HCC) I25.118    History of prostate cancer Z85.46       Social History     Socioeconomic History    Marital status:      Spouse name: Not on file    Number of children: Not on file    Years of education: Not on file    Highest education level: Not on file Occupational History    Not on file   Tobacco Use    Smoking status: Former Smoker     Packs/day: 0.50     Years: 4.00     Pack years: 2.00     Quit date: 1966     Years since quittin.5    Smokeless tobacco: Never Used   Substance and Sexual Activity    Alcohol use: No    Drug use: No    Sexual activity: Yes     Partners: Female   Other Topics Concern    Not on file   Social History Narrative    Not on file     Social Determinants of Health     Financial Resource Strain:     Difficulty of Paying Living Expenses: Not on file   Food Insecurity:     Worried About Running Out of Food in the Last Year: Not on file    Tamara of Food in the Last Year: Not on file   Transportation Needs:     Lack of Transportation (Medical): Not on file    Lack of Transportation (Non-Medical): Not on file   Physical Activity:     Days of Exercise per Week: Not on file    Minutes of Exercise per Session: Not on file   Stress:     Feeling of Stress : Not on file   Social Connections:     Frequency of Communication with Friends and Family: Not on file    Frequency of Social Gatherings with Friends and Family: Not on file    Attends Oriental orthodox Services: Not on file    Active Member of [address] [place] [address] or Organizations: Not on file    Attends Club or Organization Meetings: Not on file    Marital Status: Not on file   Intimate Partner Violence:     Fear of Current or Ex-Partner: Not on file    Emotionally Abused: Not on file    Physically Abused: Not on file    Sexually Abused: Not on file   Housing Stability:     Unable to Pay for Housing in the Last Year: Not on file    Number of [place] in the Last Year: Not on file    Unstable Housing in the Last Year: Not on file       Review of Systems   Review of Systems   Constitutional: Negative for chills and fever. HENT: Negative for congestion. Respiratory: Negative for cough. Cardiovascular: Negative for chest pain.        Objective:     Visit Vitals  BP 105/62 (BP 1 Location: Right arm, BP Patient Position: Standing, BP Cuff Size: Adult)   Pulse 64   Temp 97.1 °F (36.2 °C) (Oral)   Resp 20   Ht 5' 11\" (1.803 m)   Wt 219 lb (99.3 kg)   SpO2 98%   BMI 30.54 kg/m²        Physical Exam  Vitals and nursing note reviewed. Constitutional:       Appearance: Normal appearance. HENT:      Head: Normocephalic and atraumatic. Cardiovascular:      Rate and Rhythm: Normal rate and regular rhythm. Pulses: Normal pulses. Heart sounds: Murmur heard. No friction rub. No gallop. Pulmonary:      Effort: Pulmonary effort is normal.      Breath sounds: Normal breath sounds. Abdominal:      General: Abdomen is flat. Bowel sounds are normal.      Palpations: Abdomen is soft. Musculoskeletal:         General: Normal range of motion. Cervical back: Normal range of motion and neck supple. Skin:     General: Skin is warm and dry. Neurological:      Mental Status: He is alert. Diabetic foot exam:     Left Foot:   Visual Exam: normal    Pulse DP: 2+ (normal)   Filament test: normal sensation    Vibratory sensation: normal      Right Foot:   Visual Exam: normal    Pulse DP: 2+ (normal)   Filament test: normal sensation    Vibratory sensation: normal      Pertinent Labs/Studies:      Assessment and orders:       ICD-10-CM ICD-9-CM    1. Primary hypertension  I10 401.9    2. Obstruction of left carotid artery  I65.22 433.10    3. Coronary artery disease of native artery of native heart with stable angina pectoris [facility]  I25.118 414.01      413.9    4. Controlled type 2 diabetes mellitus with diabetic nephropathy, without long-term current use of insulin ([facility])  E11.21 250.40  DIABETES FOOT EXAM     583.81    5. Pure hypercholesterolemia  E78.00 272.0      Diagnoses and all orders for this visit:    1. Primary hypertension: Our goal is to normalize the blood pressure to decrease the risks of strokes and heart attacks.  The patient is in agreement with the plan.     2. Obstruction of left carotid artery    3. Coronary artery disease of native artery of native heart with stable angina pectoris [facility]): Decreasing Hydralazine gradually and plan to change Lasix to the morning so he can sleep. Paitent asking about Statin and if able to decrease, will message Cardiology, see if considering Laurent Blackmon still, would that allow decrease of Statin due to side effects? 4. Controlled type 2 diabetes mellitus with diabetic nephropathy, without long-term current use of insulin [facility]): Discussed with patient today that the goal for their diabetes is to have a HgA1C<7 and ideally as close to 6.5 as possible. We discussed diet and medications. The goal for the cholesterol LDL is less than 70 and HDL>40. Patient is aware of the need for yearly eye exams and to take care of their feet daily. Discussed with patient that blood pressure should be less than 130/80 and watching salt intake is very important.    -      DIABETES FOOT EXAM    5. Pure hypercholesterolemia: The patient is aware of our goal to reduce or eliminate the long term problems (such as strokes and heart attacks) related to poorly controlled Triglycerides, LDL, Cholesterol. Follow-up and Dispositions    · Return in about 4 weeks (around 12/2/2021) for MWE. I have discussed the diagnosis with the patient and the intended plan as seen in the above orders. Social history, medical history, and labs were reviewed. The patient has received an after-visit summary and questions were answered concerning future plans. I have discussed medication side effects and warnings with the patient as well.     MD YONI Mora & BALBIR SALDIVAR [facility]  11/04/21

## 2021-11-05 ENCOUNTER — TELEPHONE (OUTPATIENT)
Dept: FAMILY MEDICINE CLINIC | Age: 76
End: 2021-11-05

## 2021-11-05 DIAGNOSIS — E78.00 PURE HYPERCHOLESTEROLEMIA: Primary | ICD-10-CM

## 2021-11-05 NOTE — TELEPHONE ENCOUNTER
Returned call to pt. Pt states at times he \"gasps\" for air while sitting still.  No other symptoms but wants to know what may cause that

## 2021-11-08 RX ORDER — ROSUVASTATIN CALCIUM 40 MG/1
40 TABLET, COATED ORAL DAILY
Qty: 90 TABLET | Refills: 1 | Status: SHIPPED | OUTPATIENT
Start: 2021-11-08 | End: 2022-02-14

## 2021-11-09 NOTE — TELEPHONE ENCOUNTER
Called and discussed, likely a benign function, and transient. If more persistent will evaluate. Discussed considering on Repatha for 3 months. Will follow up after Holidays.      MD YONI Victoria & BALBIR SALDIVAR [facility]  11/08/21

## 2022-01-04 RX ORDER — FAMOTIDINE 40 MG/1
TABLET, FILM COATED ORAL
Qty: 90 TABLET | Refills: 1 | Status: SHIPPED | OUTPATIENT
Start: 2022-01-04 | End: 2022-06-30

## 2022-02-14 DIAGNOSIS — E78.00 PURE HYPERCHOLESTEROLEMIA: ICD-10-CM

## 2022-02-14 RX ORDER — ROSUVASTATIN CALCIUM 40 MG/1
TABLET, COATED ORAL
Qty: 90 TABLET | Refills: 1 | Status: SHIPPED | OUTPATIENT
Start: 2022-02-14 | End: 2022-09-06

## 2022-02-22 RX ORDER — CLOPIDOGREL BISULFATE 75 MG/1
TABLET ORAL
Qty: 90 TABLET | Refills: 2 | Status: SHIPPED | OUTPATIENT
Start: 2022-02-22

## 2022-03-01 DIAGNOSIS — I10 ESSENTIAL HYPERTENSION: ICD-10-CM

## 2022-03-01 DIAGNOSIS — K21.00 GASTROESOPHAGEAL REFLUX DISEASE WITH ESOPHAGITIS: ICD-10-CM

## 2022-03-01 RX ORDER — FUROSEMIDE 20 MG/1
TABLET ORAL
Qty: 90 TABLET | Refills: 1 | Status: SHIPPED | OUTPATIENT
Start: 2022-03-01 | End: 2022-09-06

## 2022-03-01 RX ORDER — FELODIPINE 10 MG/1
TABLET, EXTENDED RELEASE ORAL
Qty: 90 TABLET | Refills: 1 | Status: SHIPPED | OUTPATIENT
Start: 2022-03-01 | End: 2022-08-29

## 2022-03-01 RX ORDER — PANTOPRAZOLE SODIUM 40 MG/1
TABLET, DELAYED RELEASE ORAL
Qty: 90 TABLET | Refills: 1 | Status: SHIPPED | OUTPATIENT
Start: 2022-03-01 | End: 2022-08-29

## 2022-03-08 ENCOUNTER — OFFICE VISIT (OUTPATIENT)
Dept: FAMILY MEDICINE CLINIC | Age: 77
End: 2022-03-08
Payer: MEDICARE

## 2022-03-08 ENCOUNTER — TELEPHONE (OUTPATIENT)
Dept: FAMILY MEDICINE CLINIC | Age: 77
End: 2022-03-08

## 2022-03-08 VITALS
OXYGEN SATURATION: 97 % | BODY MASS INDEX: 30.18 KG/M2 | HEART RATE: 52 BPM | SYSTOLIC BLOOD PRESSURE: 127 MMHG | HEIGHT: 71 IN | WEIGHT: 215.6 LBS | DIASTOLIC BLOOD PRESSURE: 71 MMHG | TEMPERATURE: 97 F | RESPIRATION RATE: 18 BRPM

## 2022-03-08 DIAGNOSIS — E11.22 CKD STAGE 3 DUE TO TYPE 2 DIABETES MELLITUS (HCC): ICD-10-CM

## 2022-03-08 DIAGNOSIS — E11.21 TYPE 2 DIABETES WITH NEPHROPATHY (HCC): ICD-10-CM

## 2022-03-08 DIAGNOSIS — N18.30 CKD STAGE 3 DUE TO TYPE 2 DIABETES MELLITUS (HCC): ICD-10-CM

## 2022-03-08 DIAGNOSIS — Z00.00 MEDICARE ANNUAL WELLNESS VISIT, SUBSEQUENT: Primary | ICD-10-CM

## 2022-03-08 DIAGNOSIS — I10 PRIMARY HYPERTENSION: ICD-10-CM

## 2022-03-08 DIAGNOSIS — E78.00 PURE HYPERCHOLESTEROLEMIA: ICD-10-CM

## 2022-03-08 DIAGNOSIS — I25.118 CORONARY ARTERY DISEASE OF NATIVE ARTERY OF NATIVE HEART WITH STABLE ANGINA PECTORIS (HCC): ICD-10-CM

## 2022-03-08 PROCEDURE — 99214 OFFICE O/P EST MOD 30 MIN: CPT | Performed by: FAMILY MEDICINE

## 2022-03-08 PROCEDURE — G8427 DOCREV CUR MEDS BY ELIG CLIN: HCPCS | Performed by: FAMILY MEDICINE

## 2022-03-08 PROCEDURE — G8417 CALC BMI ABV UP PARAM F/U: HCPCS | Performed by: FAMILY MEDICINE

## 2022-03-08 PROCEDURE — G8536 NO DOC ELDER MAL SCRN: HCPCS | Performed by: FAMILY MEDICINE

## 2022-03-08 PROCEDURE — G8752 SYS BP LESS 140: HCPCS | Performed by: FAMILY MEDICINE

## 2022-03-08 PROCEDURE — G8510 SCR DEP NEG, NO PLAN REQD: HCPCS | Performed by: FAMILY MEDICINE

## 2022-03-08 PROCEDURE — G0439 PPPS, SUBSEQ VISIT: HCPCS | Performed by: FAMILY MEDICINE

## 2022-03-08 PROCEDURE — G8754 DIAS BP LESS 90: HCPCS | Performed by: FAMILY MEDICINE

## 2022-03-08 PROCEDURE — 1101F PT FALLS ASSESS-DOCD LE1/YR: CPT | Performed by: FAMILY MEDICINE

## 2022-03-08 NOTE — TELEPHONE ENCOUNTER
Pt was in today and had labs, he wants to know if Dr Rula Rosen can also run labs to test his PSA Levels. Please advise pt if he needs to come back for more labs.

## 2022-03-08 NOTE — PROGRESS NOTES
[facility]    History of Present Illness:   Kari Figueredo is a 64 y.o. male with history of HTN, CAD, Carotid artery disease, GERD, Diabetes, CKD, Prostate, HLD  CC: Follow up Chronic Conditions  History provided by patient and Records    HPI:  Chronic Kidney Disease:  Patient reports overall doing well, patient denies any current complaints at this time. - Etiology: HTN/DM   - Symptoms: None  - CKD Stage: III  - Nephrologist: None  - Current Treatments: renal diet  - Dialysis Status: na  - Cardiovascular risk factor reduction including hypertension    Coronary Artery Disease Follow up: Today patient reports he is feeling well and overall his symptoms are controlled on his current medications. he  has not had a history of acute myocardial infarction in the past.  Prior management has included:   - Coronary stenting: YES  - Coronary bypass: NO    he has had CHF   01/20/21    ECHO ADULT COMPLETE 01/20/2021 1/20/2021    Interpretation Summary  · LV: Calculated LVEF is 60%. Visually measured ejection fraction. Normal cavity size, wall thickness and systolic function (ejection fraction normal). Wall motion: normal. Mild (grade 1) left ventricular diastolic dysfunction. · AV: Aortic valve leaflet calcification present. Aortic valve mean gradient is 15.2 mmHg. Aortic valve area is 1.8 cm2. Mild aortic valve stenosis is present. · LA: Mildly dilated left atrium.     Signed by: Rigoberto Diaz DO on 1/20/2021  3:55 PM     Key CAD CHF Meds             furosemide (LASIX) 20 mg tablet (Taking) TAKE ONE TABLET BY MOUTH DAILY    felodipine (PLENDIL SR) 10 mg 24 hr tablet (Taking) TAKE ONE TABLET BY MOUTH DAILY FOR high BLOOD PRESSURE    clopidogreL (PLAVIX) 75 mg tab (Taking) TAKE ONE TABLET BY MOUTH EVERY DAY    rosuvastatin (CRESTOR) 40 mg tablet (Taking) TAKE ONE TABLET BY MOUTH EVERY DAY    ranolazine ER (RANEXA) 500 mg SR tablet (Taking) TAKE ONE TABLET BY MOUTH TWICE DAILY    hydrALAZINE (APRESOLINE) 50 mg tablet (Taking) TAKE ONE TABLET BY MOUTH THREE TIMES DAILY FOR hypertension    lisinopriL (PRINIVIL, ZESTRIL) 20 mg tablet (Taking) TAKE ONE TABLET BY MOUTH EVERY DAY    isosorbide mononitrate ER (IMDUR) 60 mg CR tablet (Taking) TAKE ONE TABLET BY MOUTH EVERY DAY    nitroglycerin (NITROSTAT) 0.3 mg SL tablet (Taking) 1 Tab by SubLINGual route every five (5) minutes as needed for Chest Pain. aspirin delayed-release 81 mg tablet (Taking) Take 1 Tab by mouth daily. omega-3 fatty acids-vitamin e (FISH OIL) 1,000 mg cap (Taking) Take 1 Cap by mouth. he is on a statin  he is on antiplatelet therapy. he is on a beta blocker. he is on a regular Nitrate therapy. he does use Nitroglycerin as needed for chest pain.     Residual symptoms of CAD include none. Patient denies any current chest pain, dyspnea, exertional chest pressure/discomfort. Risk factors are controlled or at goal.  Primary risk factors include diabetes, elevated cholesterol and hypertension    Diabetes Follow up: Overall the patient feels well with good energy level. Current Medications:   Key Antihyperglycemic Medications     Patient is on no antihyperglycemic meds. Insulin dependence: NO   Frequency of home glucose testing: prn   Blood Sugar range at home: N/A    Last eye exam: In past 12 months. Last foot exam: This year.    Polyuria, polyphagia or polydipsia: NO   Retinopathy: NO   Neuropathy SX: NO   Low blood sugar symptoms: NO   Dietary compliance: compliant most of the time   Medication compliance: compliant most of the time   On ASA: YES   Tobacco Use: NO   Depression: NO     Wt Readings from Last 3 Encounters:   03/08/22 215 lb 9.6 oz (97.8 kg)   11/04/21 219 lb (99.3 kg)   08/11/21 212 lb (96.2 kg)      Lab Results   Component Value Date/Time    Hemoglobin A1c 6.4 (H) 11/03/2021 11:20 AM    Hemoglobin A1c (POC) 5.8 04/20/2015 08:44 AM      Lab Results   Component Value Date/Time Microalbumin/Creat ratio (mg/g creat) 24 11/03/2021 11:20 AM    Microalbumin,urine random 4.50 11/03/2021 11:20 AM    Microalbumin urine (POC) 10 10/04/2010 01:54 PM       Lab Results   Component Value Date/Time    Creatinine (POC) 1.0 02/24/2017 09:20 AM    Creatinine 1.60 (H) 11/03/2021 11:20 AM         Health Maintenance  Health Maintenance Due   Topic Date Due    Shingrix Vaccine Age 49> (1 of 2) Never done    Eye Exam Retinal or Dilated  05/25/2019    Medicare Yearly Exam  07/16/2020    DTaP/Tdap/Td series (2 - Td or Tdap) 06/23/2021    Flu Vaccine (1) 09/01/2021       Past Medical, Family, and Social History:     Current Outpatient Medications on File Prior to Visit   Medication Sig Dispense Refill    furosemide (LASIX) 20 mg tablet TAKE ONE TABLET BY MOUTH DAILY 90 Tablet 1    felodipine (PLENDIL SR) 10 mg 24 hr tablet TAKE ONE TABLET BY MOUTH DAILY FOR high BLOOD PRESSURE 90 Tablet 1    pantoprazole (PROTONIX) 40 mg tablet TAKE ONE TABLET BY MOUTH DAILY 90 Tablet 1    clopidogreL (PLAVIX) 75 mg tab TAKE ONE TABLET BY MOUTH EVERY DAY 90 Tablet 2    rosuvastatin (CRESTOR) 40 mg tablet TAKE ONE TABLET BY MOUTH EVERY DAY 90 Tablet 1    ranolazine ER (RANEXA) 500 mg SR tablet TAKE ONE TABLET BY MOUTH TWICE DAILY 180 Tablet 1    hydrALAZINE (APRESOLINE) 50 mg tablet TAKE ONE TABLET BY MOUTH THREE TIMES DAILY FOR hypertension 270 Tablet 1    famotidine (PEPCID) 40 mg tablet Take one Tablet by mouth nightly. 90 Tablet 1    lisinopriL (PRINIVIL, ZESTRIL) 20 mg tablet TAKE ONE TABLET BY MOUTH EVERY DAY 90 Tablet 1    isosorbide mononitrate ER (IMDUR) 60 mg CR tablet TAKE ONE TABLET BY MOUTH EVERY DAY 90 Tablet 2    EPINEPHrine (EpiPen 2-Francisco Javier) 0.3 mg/0.3 mL injection INJECT INTRAMUSCULARLY AS NEEDED FOR ONE DOSE. TAKE WITH 50MG OF BENADRYL AND CALL 911. Dispense Generic 1 Syringe 3    nitroglycerin (NITROSTAT) 0.3 mg SL tablet 1 Tab by SubLINGual route every five (5) minutes as needed for Chest Pain.  1 Bottle 3    triamcinolone acetonide (KENALOG) 0.1 % topical cream Apply  to affected area two (2) times a day. use thin layer to poison ivy 453 g 0    aspirin delayed-release 81 mg tablet Take 1 Tab by mouth daily. 30 Tab 0    Peak Flow Meter eric Use prior and post nebulizer treatment 1 Device 0    albuterol (PROVENTIL VENTOLIN) 2.5 mg /3 mL (0.083 %) nebulizer solution 3 mL by Nebulization route every six (6) hours as needed for Wheezing or Shortness of Breath. 24 Each 5    omega-3 fatty acids-vitamin e (FISH OIL) 1,000 mg cap Take 1 Cap by mouth.  coenzyme q10 (CO Q-10) 100 mg Cap Take 100 mg by mouth daily.  diclofenac (VOLTAREN) 1 % gel Apply 4 g to affected area four (4) times daily. Painful shoulder. (Patient not taking: Reported on 3/8/2022) 100 g 3    meclizine (ANTIVERT) 25 mg chewable tablet Take  by mouth three (3) times daily as needed. (Patient not taking: Reported on 11/4/2021)       No current facility-administered medications on file prior to visit.        Patient Active Problem List   Diagnosis Code    Hypertension I10    Hyperlipidemia E78.5    GERD (gastroesophageal reflux disease) K21.9    Kidney stone N20.0    Bone spur M77.9    Allergy to bee sting Z91.030    Carotid artery obstruction I65.29    Amaurosis fugax of left eye G45.3    S/P carotid endarterectomy Z98.890    Vitamin D deficiency E55.9    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.21    Type 2 diabetes with nephropathy (HCC) E11.21    CKD stage 3 due to type 2 diabetes mellitus (HCC) E11.22, N18.30    Heart murmur R01.1    Age-related nuclear cataract of both eyes H25.13    PVD (posterior vitreous detachment), left eye H43.812    History of angina Z86.79    CAD (coronary artery disease) I25.10    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 (HCC) I25.118    History of prostate cancer Z85.46       Social History     Socioeconomic History    Marital status:  Tobacco Use    Smoking status: Former Smoker     Packs/day: 0.50     Years: 4.00     Pack years: 2.00     Quit date: 1966     Years since quittin.9    Smokeless tobacco: Never Used   Substance and Sexual Activity    Alcohol use: No    Drug use: No    Sexual activity: Yes     Partners: Female        Review of Systems   Review of Systems   Constitutional: Negative for chills and fever. Cardiovascular: Negative for chest pain and palpitations. Gastrointestinal: Negative for abdominal pain, nausea and vomiting. Objective:     Visit Vitals  /71 (BP 1 Location: Left arm, BP Patient Position: Sitting)   Pulse (!) 52   Temp 97 °F (36.1 °C) (Oral)   Resp 18   Ht 5' 11\" (1.803 m)   Wt 215 lb 9.6 oz (97.8 kg)   SpO2 97%   BMI 30.07 kg/m²        Physical Exam  Vitals and nursing note reviewed. Constitutional:       Appearance: Normal appearance. HENT:      Head: Normocephalic and atraumatic. Cardiovascular:      Rate and Rhythm: Normal rate and regular rhythm. Pulses: Normal pulses. Heart sounds: Normal heart sounds. No murmur heard. No friction rub. No gallop. Pulmonary:      Effort: Pulmonary effort is normal.      Breath sounds: Normal breath sounds. Abdominal:      General: Abdomen is flat. Bowel sounds are normal.      Palpations: Abdomen is soft. Musculoskeletal:         General: Normal range of motion. Cervical back: Normal range of motion and neck supple. Skin:     General: Skin is warm and dry. Neurological:      Mental Status: He is alert. Pertinent Labs/Studies:      Assessment and orders:       ICD-10-CM ICD-9-CM    1. Primary hypertension  I10 401.9    2. Coronary artery disease of native artery of native heart with stable angina pectoris ([facility]  I25.118 414.01 CBC W/O DIFF     003.3 METABOLIC PANEL, COMPREHENSIVE   3. Type 2 diabetes with nephropathy (HCC)  E11.21 250.40 HEMOGLOBIN A1C WITH EAG     583.81    4.  Pure hypercholesterolemia E78.00 272.0 LIPID PANEL   5. CKD stage 3 due to type 2 diabetes mellitus ([facility])  E11.22 250.40     N18.30 585. 3      Diagnoses and all orders for this visit:    1. Primary hypertension: Our goal is to normalize the blood pressure to decrease the risks of strokes and heart attacks. The patient is in agreement with the plan. 2. Coronary artery disease of native artery of native heart with stable angina pectoris [facility]): Currently symptoms are well managed on current medication regimen. Risk factors are being controlled at this time. Today we will not be making any changes to his treatment plan. Recommendations at this time include None. Keep up the good work! .     -     CBC W/O DIFF; Future  -     METABOLIC PANEL, COMPREHENSIVE; Future    3. Type 2 diabetes with nephropathy [facility]): Discussed with patient today that the goal for their diabetes is to have a HgA1C<7 and ideally as close to 6.5 as possible. We discussed diet and medications. The goal for the cholesterol LDL is less than 70 and HDL>40. Patient is aware of the need for yearly eye exams and to take care of their feet daily. Discussed with patient that blood pressure should be less than 130/80 and watching salt intake is very important.    -     HEMOGLOBIN A1C WITH EAG; Future    4. Pure hypercholesterolemia: The patient is aware of our goal to reduce or eliminate the long term problems (such as strokes and heart attacks) related to poorly controlled Triglycerides, LDL, Cholesterol.   -     LIPID PANEL; Future    5. CKD stage 3 due to type 2 diabetes mellitus ([facility]      Follow-up and Dispositions    · Return in about 3 months (around 6/8/2022). I have discussed the diagnosis with the patient and the intended plan as seen in the above orders. Social history, medical history, and labs were reviewed. The patient has received an after-visit summary and questions were answered concerning future plans.   I have discussed medication side effects and warnings with the patient as well.     MD YONI Ramos & BALBIR SALDIVAR [facility]  03/08/22

## 2022-03-08 NOTE — PROGRESS NOTES
This is the Subsequent Medicare Annual Wellness Exam, performed 12 months or more after the Initial AWV or the last Subsequent AWV    I have reviewed the patient's medical history in detail and updated the computerized patient record. Assessment/Plan   Education and counseling provided:  Are appropriate based on today's review and evaluation    1. Medicare annual wellness visit, subsequent  2. Primary hypertension  3. Coronary artery disease of native artery of native heart with stable angina pectoris (HCC)  -     CBC W/O DIFF; Future  -     METABOLIC PANEL, COMPREHENSIVE; Future  4. Type 2 diabetes with nephropathy (HCC)  -     HEMOGLOBIN A1C WITH EAG; Future  5. Pure hypercholesterolemia  -     LIPID PANEL; Future  6. CKD stage 3 due to type 2 diabetes mellitus (HCC)       Depression Risk Factor Screening     3 most recent PHQ Screens 3/8/2022   Little interest or pleasure in doing things Not at all   Feeling down, depressed, irritable, or hopeless Not at all   Total Score PHQ 2 0       Alcohol & Drug Abuse Risk Screen    Do you average more than 1 drink per night or more than 7 drinks a week: No    In the past three months have you have had more than 4 drinks containing alcohol on one occasion: No          Functional Ability and Level of Safety    Hearing: Hearing is good. Activities of Daily Living: The home contains: handrails  Patient does total self care      Ambulation: with no difficulty     Fall Risk:  Fall Risk Assessment, last 12 mths 3/8/2022   Able to walk? Yes   Fall in past 12 months? 0   Do you feel unsteady?  0   Are you worried about falling 0      Abuse Screen:  Patient is not abused       Cognitive Screening    Has your family/caregiver stated any concerns about your memory: no     Cognitive Screening: Normal - MMSE (Mini Mental Status Exam)    Health Maintenance Due     Health Maintenance Due   Topic Date Due    Shingrix Vaccine Age 50> (1 of 2) Never done    Eye Exam Retinal or Dilated  05/25/2019    DTaP/Tdap/Td series (2 - Td or Tdap) 06/23/2021    Flu Vaccine (1) 09/01/2021       Patient Care Team   Patient Care Team:  Sudheer Tenorio MD as PCP - General (Family Medicine)  Sudheer Tenorio MD as PCP - [facility]  Casandra Winter MD (General and Vascular Surgery)  Kaiden Dutta MD (Neurology)  Taras Yu MD as Surgeon (Urology)  Ismael Pelaez MD (Endocrinology)  Will Trinidad MD (Dermatology)  Lety Nogueira DO as Physician (Cardiology)  Wu Pratt MD (Ophthalmology)    History     Patient Active Problem List   Diagnosis Code    Hypertension I10    Hyperlipidemia E78.5    GERD (gastroesophageal reflux disease) K21.9    Kidney stone N20.0    Bone spur M77.9    Allergy to bee sting Z91.030    Carotid artery obstruction I65.29    Amaurosis fugax of left eye G45.3    S/P carotid endarterectomy Z98.890    Vitamin D deficiency E55.9    Controlled type 2 diabetes mellitus with diabetic nephropathy ([address] E11.21    Type 2 diabetes with nephropathy ([address] E11.21    CKD stage 3 due to type 2 diabetes mellitus ([address] E11.22, N18.30    Heart murmur R01.1    Age-related nuclear cataract of both eyes H25.13    PVD (posterior vitreous detachment), left eye H43.812    History of angina Z86.79    CAD (coronary artery disease) I25.10    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 ([address] I25.118    History of prostate cancer Z85.46     Past Medical History:   Diagnosis Date    Amaurosis fugax of left eye 5/6/2012    Arthritis     OSTEO    CAD (coronary artery disease) 2012    CAROTID LEFT     Cancer ([address] 2013    PROSTATE    Chronic pain     DJD lumbar    Diabetes ([address] 5/22/2010    Frequent nocturnal awakening     urinary frequency    GERD (gastroesophageal reflux disease) 5/22/2010    Hyperlipidemia 5/22/2010    Hypertension 5/22/2010    Kidney stone 5/22/2010    Nodular goiter     1.3 cm right , FNA 6/15    Obesity (BMI 30-39. 9)     Psychiatric disorder     ANXIETY    Vertigo       Past Surgical History:   Procedure Laterality Date    COLONOSCOPY  3/3/2016         EGD  3/3/2016         HX HEENT      tonsillectomy    HX MOHS PROCEDURES  2010    right    HX ORTHOPAEDIC      coccyx removed    HX UROLOGICAL  2010    left lithotripsy. basket  extraction,ureteral stent    HX VASECTOMY      NEUROLOGICAL PROCEDURE UNLISTED  2011    Steroid injections in epidural    HI CARDIAC SURG PROCEDURE UNLIST  04/2019    3 stents placed    VASCULAR SURGERY PROCEDURE UNLIST  2012    LEFT CAROTID     Current Outpatient Medications   Medication Sig Dispense Refill    furosemide (LASIX) 20 mg tablet TAKE ONE TABLET BY MOUTH DAILY 90 Tablet 1    felodipine (PLENDIL SR) 10 mg 24 hr tablet TAKE ONE TABLET BY MOUTH DAILY FOR high BLOOD PRESSURE 90 Tablet 1    pantoprazole (PROTONIX) 40 mg tablet TAKE ONE TABLET BY MOUTH DAILY 90 Tablet 1    clopidogreL (PLAVIX) 75 mg tab TAKE ONE TABLET BY MOUTH EVERY DAY 90 Tablet 2    rosuvastatin (CRESTOR) 40 mg tablet TAKE ONE TABLET BY MOUTH EVERY DAY 90 Tablet 1    ranolazine ER (RANEXA) 500 mg SR tablet TAKE ONE TABLET BY MOUTH TWICE DAILY 180 Tablet 1    hydrALAZINE (APRESOLINE) 50 mg tablet TAKE ONE TABLET BY MOUTH THREE TIMES DAILY FOR hypertension 270 Tablet 1    famotidine (PEPCID) 40 mg tablet Take one Tablet by mouth nightly. 90 Tablet 1    lisinopriL (PRINIVIL, ZESTRIL) 20 mg tablet TAKE ONE TABLET BY MOUTH EVERY DAY 90 Tablet 1    isosorbide mononitrate ER (IMDUR) 60 mg CR tablet TAKE ONE TABLET BY MOUTH EVERY DAY 90 Tablet 2    EPINEPHrine (EpiPen 2-Francisco Javier) 0.3 mg/0.3 mL injection INJECT INTRAMUSCULARLY AS NEEDED FOR ONE DOSE. TAKE WITH 50MG OF BENADRYL AND CALL 911. Dispense Generic 1 Syringe 3    nitroglycerin (NITROSTAT) 0.3 mg SL tablet 1 Tab by SubLINGual route every five (5) minutes as needed for Chest Pain.  1 Bottle 3    triamcinolone acetonide (KENALOG) 0.1 % topical cream Apply  to affected area two (2) times a day. use thin layer to poison ivy 453 g 0    aspirin delayed-release 81 mg tablet Take 1 Tab by mouth daily. 30 Tab 0    Peak Flow Meter eric Use prior and post nebulizer treatment 1 Device 0    albuterol (PROVENTIL VENTOLIN) 2.5 mg /3 mL (0.083 %) nebulizer solution 3 mL by Nebulization route every six (6) hours as needed for Wheezing or Shortness of Breath. 24 Each 5    omega-3 fatty acids-vitamin e (FISH OIL) 1,000 mg cap Take 1 Cap by mouth.  coenzyme q10 (CO Q-10) 100 mg Cap Take 100 mg by mouth daily.  diclofenac (VOLTAREN) 1 % gel Apply 4 g to affected area four (4) times daily. Painful shoulder. (Patient not taking: Reported on 3/8/2022) 100 g 3    meclizine (ANTIVERT) 25 mg chewable tablet Take  by mouth three (3) times daily as needed.  (Patient not taking: Reported on 2021)       Allergies   Allergen Reactions    Bee Sting [Sting, Bee] Anaphylaxis    Vytorin 10-10 [Ezetimibe-Simvastatin] Myalgia    Amlodipine Other (comments)     Creepy crawly sensation, twitches    Lipitor [Atorvastatin] Myalgia       Family History   Problem Relation Age of Onset    Diabetes Mother     Obesity Mother     Heart Disease Mother     Stroke Father     Heart Disease Sister     Obesity Sister     Diabetes Sister      Social History     Tobacco Use    Smoking status: Former Smoker     Packs/day: 0.50     Years: 4.00     Pack years: 2.00     Quit date: 1966     Years since quittin.9    Smokeless tobacco: Never Used   Substance Use Topics    Alcohol use: No         Connie Edmonds MD

## 2022-03-08 NOTE — PROGRESS NOTES
1. Have you been to the ER, urgent care clinic since your last visit? Hospitalized since your last visit? No    2. Have you seen or consulted any other health care providers outside of the [address] [place] [address] since your last visit? Including any pap smears or colon screening.  No      Health Maintenance Due   Topic Date Due    Shingrix Vaccine Age 49> (1 of 2) Never done    Eye Exam Retinal or Dilated  05/25/2019    Medicare Yearly Exam  07/16/2020    DTaP/Tdap/Td series (2 - Td or Tdap) 06/23/2021    Flu Vaccine (1) 09/01/2021

## 2022-03-08 NOTE — PATIENT INSTRUCTIONS
Medicare Wellness Visit, Male    The best way to live healthy is to have a lifestyle where you eat a well-balanced diet, exercise regularly, limit alcohol use, and quit all forms of tobacco/nicotine, if applicable. Regular preventive services are another way to keep healthy. Preventive services (vaccines, screening tests, monitoring & exams) can help personalize your care plan, which helps you manage your own care. Screening tests can find health problems at the earliest stages, when they are easiest to treat. [facility] follows the current, evidence-based guidelines published by the [facility] ([facility]) when recommending preventive services for our patients. Because we follow these guidelines, sometimes recommendations change over time as research supports it. (For example, a prostate screening blood test is no longer routinely recommended for men with no symptoms). Of course, you and your doctor may decide to screen more often for some diseases, based on your risk and co-morbidities (chronic disease you are already diagnosed with). Preventive services for you include:  - Medicare offers their members a free annual wellness visit, which is time for you and your primary care provider to discuss and plan for your preventive service needs. Take advantage of this benefit every year!  -All adults over age 72 should receive the recommended pneumonia vaccines. Current USPSTF guidelines recommend a series of two vaccines for the best pneumonia protection.   -All adults should have a flu vaccine yearly and tetanus vaccine every 10 years.  -All adults age 48 and older should receive the shingles vaccines (series of two vaccines).        -All adults age 38-68 who are overweight should have a diabetes screening test once every three years.   -Other screening tests & preventive services for persons with diabetes include: an eye exam to screen for diabetic retinopathy, a kidney function test, a foot exam, and stricter control over your cholesterol.   -Cardiovascular screening for adults with routine risk involves an electrocardiogram (ECG) at intervals determined by the provider.   -Colorectal cancer screening should be done for adults age 54-65 with no increased risk factors for colorectal cancer. There are a number of acceptable methods of screening for this type of cancer. Each test has its own benefits and drawbacks. Discuss with your provider what is most appropriate for you during your annual wellness visit. The different tests include: colonoscopy (considered the best screening method), a fecal occult blood test, a fecal DNA test, and sigmoidoscopy.  -All adults born between [facility] should be screened once for Hepatitis C.  -An Abdominal Aortic Aneurysm (AAA) Screening is recommended for men age 73-68 who has ever smoked in their lifetime.      Here is a list of your current Health Maintenance items (your personalized list of preventive services) with a due date:  Health Maintenance Due   Topic Date Due    Shingles Vaccine (1 of 2) Never done    Eye Exam  05/25/2019    DTaP/Tdap/Td  (2 - Td or Tdap) 06/23/2021    Yearly Flu Vaccine (1) 09/01/2021

## 2022-03-09 LAB
ALBUMIN SERPL-MCNC: 3.7 G/DL (ref 3.5–5)
ALBUMIN/GLOB SERPL: 1.5 {RATIO} (ref 1.1–2.2)
ALP SERPL-CCNC: 60 U/L (ref 45–117)
ALT SERPL-CCNC: 20 U/L (ref 12–78)
ANION GAP SERPL CALC-SCNC: 5 MMOL/L (ref 5–15)
AST SERPL-CCNC: 17 U/L (ref 15–37)
BILIRUB SERPL-MCNC: 0.4 MG/DL (ref 0.2–1)
BUN SERPL-MCNC: 27 MG/DL (ref 6–20)
BUN/CREAT SERPL: 17 (ref 12–20)
CALCIUM SERPL-MCNC: 8.9 MG/DL (ref 8.5–10.1)
CHLORIDE SERPL-SCNC: 108 MMOL/L (ref 97–108)
CHOLEST SERPL-MCNC: 166 MG/DL
CO2 SERPL-SCNC: 24 MMOL/L (ref 21–32)
CREAT SERPL-MCNC: 1.57 MG/DL (ref 0.7–1.3)
ERYTHROCYTE [DISTWIDTH] IN BLOOD BY AUTOMATED COUNT: 14.2 % (ref 11.5–14.5)
EST. AVERAGE GLUCOSE BLD GHB EST-MCNC: 151 MG/DL
GLOBULIN SER CALC-MCNC: 2.5 G/DL (ref 2–4)
GLUCOSE SERPL-MCNC: 143 MG/DL (ref 65–100)
HBA1C MFR BLD: 6.9 % (ref 4–5.6)
HCT VFR BLD AUTO: 39.6 % (ref 36.6–50.3)
HDLC SERPL-MCNC: 68 MG/DL
HDLC SERPL: 2.4 {RATIO} (ref 0–5)
HGB BLD-MCNC: 11.8 G/DL (ref 12.1–17)
LDLC SERPL CALC-MCNC: 74.4 MG/DL (ref 0–100)
MCH RBC QN AUTO: 28.9 PG (ref 26–34)
MCHC RBC AUTO-ENTMCNC: 29.8 G/DL (ref 30–36.5)
MCV RBC AUTO: 96.8 FL (ref 80–99)
NRBC # BLD: 0 K/UL (ref 0–0.01)
NRBC BLD-RTO: 0 PER 100 WBC
PLATELET # BLD AUTO: 194 K/UL (ref 150–400)
PMV BLD AUTO: 10.4 FL (ref 8.9–12.9)
POTASSIUM SERPL-SCNC: 4.5 MMOL/L (ref 3.5–5.1)
PROT SERPL-MCNC: 6.2 G/DL (ref 6.4–8.2)
RBC # BLD AUTO: 4.09 M/UL (ref 4.1–5.7)
SODIUM SERPL-SCNC: 137 MMOL/L (ref 136–145)
TRIGL SERPL-MCNC: 118 MG/DL (ref ?–150)
VLDLC SERPL CALC-MCNC: 23.6 MG/DL
WBC # BLD AUTO: 5.5 K/UL (ref 4.1–11.1)

## 2022-03-09 NOTE — TELEPHONE ENCOUNTER
Call placed to pt and informed per ,pass along that his labs look good right now and I will send a letter with them. Lisa Marcela did not do a PSA, and I can order that for him, but I think we can safely wait until we check his labs next to do since it has been stable for the last few years. Yola Garcia he want though I will order it so he can come in, but I would just say to wait for now. Pt verbalized understanding and stated he will keep this in mind.

## 2022-03-10 ENCOUNTER — TELEPHONE (OUTPATIENT)
Dept: FAMILY MEDICINE CLINIC | Age: 77
End: 2022-03-10

## 2022-03-11 NOTE — TELEPHONE ENCOUNTER
Call returned to pt ,informed him per . Overall your labs look good. Your A1C has gone up a little, but iti is still showing your diabetes is controlled. Pt verbalized understanding,but was concerned if he needed to restart his Metformin.

## 2022-03-18 ENCOUNTER — OFFICE VISIT (OUTPATIENT)
Dept: CARDIOLOGY CLINIC | Age: 77
End: 2022-03-18
Payer: MEDICARE

## 2022-03-18 VITALS
WEIGHT: 214 LBS | HEART RATE: 59 BPM | SYSTOLIC BLOOD PRESSURE: 106 MMHG | BODY MASS INDEX: 29.96 KG/M2 | HEIGHT: 71 IN | DIASTOLIC BLOOD PRESSURE: 60 MMHG | OXYGEN SATURATION: 99 %

## 2022-03-18 DIAGNOSIS — I10 ESSENTIAL HYPERTENSION: ICD-10-CM

## 2022-03-18 DIAGNOSIS — I35.0 AORTIC VALVE STENOSIS, ETIOLOGY OF CARDIAC VALVE DISEASE UNSPECIFIED: Primary | ICD-10-CM

## 2022-03-18 DIAGNOSIS — I25.118 CORONARY ARTERY DISEASE OF NATIVE ARTERY OF NATIVE HEART WITH STABLE ANGINA PECTORIS (HCC): ICD-10-CM

## 2022-03-18 DIAGNOSIS — R42 DIZZINESS: ICD-10-CM

## 2022-03-18 PROBLEM — H25.13 AGE-RELATED NUCLEAR CATARACT OF BOTH EYES: Status: ACTIVE | Noted: 2018-03-27

## 2022-03-18 PROBLEM — E11.21 TYPE 2 DIABETES WITH NEPHROPATHY (HCC): Status: ACTIVE | Noted: 2018-06-29

## 2022-03-18 PROCEDURE — G8417 CALC BMI ABV UP PARAM F/U: HCPCS | Performed by: STUDENT IN AN ORGANIZED HEALTH CARE EDUCATION/TRAINING PROGRAM

## 2022-03-18 PROCEDURE — G8754 DIAS BP LESS 90: HCPCS | Performed by: STUDENT IN AN ORGANIZED HEALTH CARE EDUCATION/TRAINING PROGRAM

## 2022-03-18 PROCEDURE — G8432 DEP SCR NOT DOC, RNG: HCPCS | Performed by: STUDENT IN AN ORGANIZED HEALTH CARE EDUCATION/TRAINING PROGRAM

## 2022-03-18 PROCEDURE — G8752 SYS BP LESS 140: HCPCS | Performed by: STUDENT IN AN ORGANIZED HEALTH CARE EDUCATION/TRAINING PROGRAM

## 2022-03-18 PROCEDURE — G8536 NO DOC ELDER MAL SCRN: HCPCS | Performed by: STUDENT IN AN ORGANIZED HEALTH CARE EDUCATION/TRAINING PROGRAM

## 2022-03-18 PROCEDURE — G8427 DOCREV CUR MEDS BY ELIG CLIN: HCPCS | Performed by: STUDENT IN AN ORGANIZED HEALTH CARE EDUCATION/TRAINING PROGRAM

## 2022-03-18 PROCEDURE — 99214 OFFICE O/P EST MOD 30 MIN: CPT | Performed by: STUDENT IN AN ORGANIZED HEALTH CARE EDUCATION/TRAINING PROGRAM

## 2022-03-18 PROCEDURE — 1101F PT FALLS ASSESS-DOCD LE1/YR: CPT | Performed by: STUDENT IN AN ORGANIZED HEALTH CARE EDUCATION/TRAINING PROGRAM

## 2022-03-18 PROCEDURE — G0463 HOSPITAL OUTPT CLINIC VISIT: HCPCS | Performed by: STUDENT IN AN ORGANIZED HEALTH CARE EDUCATION/TRAINING PROGRAM

## 2022-03-18 NOTE — PROGRESS NOTES
[facility] [address], [address] [facility], [address] [place] [address]    Office [phone],O [phone]           Elodia Au is a 64 y.o. presents for f/u of CAD      Assessment/Recommendations:      Coronary artery disease - stable angina symptoms  Distal LAD  filling via L-->L collaterals   Lcx stenting 4/2019. 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 - cont ranexa  - Can consider LAD  intervention, however is a very distal stenosis likely not provide much anginal relief      Hx of Palpitations-symptomatic PVCs based on Holter monitor. Brief 4 beat run of ventricular tachycardia noted on monitor. D/c BB for bradycardia      Aortic stenosis-stable, mild aortic stenosis on echo 1/20/2021, mean gradient 15 mmHg. Rainer echo at follow-up visit in 6 months      Hypertension-low end of normal with symptomatic symptoms of orthostasis. Hydralazine recently decreased, recommend to discontinue. Diabetes-  Per Faith Jean MD       Hyperlipidemia   Lab Results   Component Value Date/Time    Cholesterol, total 166 03/08/2022 09:40 AM    HDL Cholesterol 68 03/08/2022 09:40 AM    LDL, calculated 74.4 03/08/2022 09:40 AM    VLDL, calculated 23.6 03/08/2022 09:40 AM    Triglyceride 118 03/08/2022 09:40 AM    CHOL/HDL Ratio 2.4 03/08/2022 09:40 AM     - cont rosuvastatin 40mg      GERD- PPI      Carotid artery stenosis- s/p carotid endarterectomy on aspirin and statin. followed by  Vanessa Haines MD       Positional related dizziness- d/c hydralazine. Cont hydration.   Consider decreasing his nitrate therapy if he continues to have dizziness symptoms. Primary Care Physician- Sudheer Tenorio MD    F/u 6 months sooner as needed        Subjective:  64 y.o. male presents for follow-up. Reports one episode of anginal chest pain about 1 month ago. Hydralazine recently decreased to 25 mg 3 times daily due to dizziness. Continues to have low normal blood pressures. No adverse bleeding with DAPT    Past Medical History:   Diagnosis Date    Amaurosis fugax of left eye 5/6/2012    Arthritis     OSTEO    CAD (coronary artery disease) 2012    CAROTID LEFT     Cancer ([facility] [address] 2013    PROSTATE    Chronic pain     DJD lumbar    Diabetes ([facility] [address] 5/22/2010    Frequent nocturnal awakening     urinary frequency    GERD (gastroesophageal reflux disease) 5/22/2010    Hyperlipidemia 5/22/2010    Hypertension 5/22/2010    Kidney stone 5/22/2010    Nodular goiter     1.3 cm right , FNA 6/15    Obesity (BMI 30-39. 9)     Psychiatric disorder     ANXIETY    Vertigo         Past Surgical History:   Procedure Laterality Date    COLONOSCOPY  3/3/2016         EGD  3/3/2016         HX HEENT      tonsillectomy    HX MOHS PROCEDURES  2010    right    HX ORTHOPAEDIC      coccyx removed    HX UROLOGICAL  2010    left lithotripsy. basket  extraction,ureteral stent    HX VASECTOMY      NEUROLOGICAL PROCEDURE UNLISTED  2011    Steroid injections in epidural    NM CARDIAC SURG PROCEDURE UNLIST  04/2019    3 stents placed    VASCULAR SURGERY PROCEDURE UNLIST  2012    LEFT CAROTID         Current Outpatient Medications:     furosemide (LASIX) 20 mg tablet, TAKE ONE TABLET BY MOUTH DAILY, Disp: 90 Tablet, Rfl: 1    felodipine (PLENDIL SR) 10 mg 24 hr tablet, TAKE ONE TABLET BY MOUTH DAILY FOR high BLOOD PRESSURE, Disp: 90 Tablet, Rfl: 1    pantoprazole (PROTONIX) 40 mg tablet, TAKE ONE TABLET BY MOUTH DAILY, Disp: 90 Tablet, Rfl: 1    clopidogreL (PLAVIX) 75 mg tab, TAKE ONE TABLET BY MOUTH EVERY DAY, Disp: 90 Tablet, Rfl: 2    rosuvastatin (CRESTOR) 40 mg tablet, TAKE ONE TABLET BY MOUTH EVERY DAY, Disp: 90 Tablet, Rfl: 1    ranolazine ER (RANEXA) 500 mg SR tablet, TAKE ONE TABLET BY MOUTH TWICE DAILY, Disp: 180 Tablet, Rfl: 1    hydrALAZINE (APRESOLINE) 50 mg tablet, TAKE ONE TABLET BY MOUTH THREE TIMES DAILY FOR hypertension (Patient taking differently: 25 mg. Take one tablet by mouth three times daily for hypertension  TOTAL OF 75MG DAILY), Disp: 270 Tablet, Rfl: 1    famotidine (PEPCID) 40 mg tablet, Take one Tablet by mouth nightly., Disp: 90 Tablet, Rfl: 1    lisinopriL (PRINIVIL, ZESTRIL) 20 mg tablet, TAKE ONE TABLET BY MOUTH EVERY DAY, Disp: 90 Tablet, Rfl: 1    isosorbide mononitrate ER (IMDUR) 60 mg CR tablet, TAKE ONE TABLET BY MOUTH EVERY DAY, Disp: 90 Tablet, Rfl: 2    EPINEPHrine (EpiPen 2-Francisco Javier) 0.3 mg/0.3 mL injection, INJECT INTRAMUSCULARLY AS NEEDED FOR ONE DOSE. TAKE WITH 50MG OF BENADRYL AND CALL 911. Dispense Generic, Disp: 1 Syringe, Rfl: 3    nitroglycerin (NITROSTAT) 0.3 mg SL tablet, 1 Tab by SubLINGual route every five (5) minutes as needed for Chest Pain., Disp: 1 Bottle, Rfl: 3    triamcinolone acetonide (KENALOG) 0.1 % topical cream, Apply  to affected area two (2) times a day. use thin layer to poison ivy, Disp: 453 g, Rfl: 0    diclofenac (VOLTAREN) 1 % gel, Apply 4 g to affected area four (4) times daily. Painful shoulder., Disp: 100 g, Rfl: 3    meclizine (ANTIVERT) 25 mg chewable tablet, Take  by mouth three (3) times daily as needed. , Disp: , Rfl:     aspirin delayed-release 81 mg tablet, Take 1 Tab by mouth daily. , Disp: 30 Tab, Rfl: 0    Peak Flow Meter eric, Use prior and post nebulizer treatment, Disp: 1 Device, Rfl: 0    albuterol (PROVENTIL VENTOLIN) 2.5 mg /3 mL (0.083 %) nebulizer solution, 3 mL by Nebulization route every six (6) hours as needed for Wheezing or Shortness of Breath., Disp: 24 Each, Rfl: 5    omega-3 fatty acids-vitamin e (FISH OIL) 1,000 mg cap, Take 1 Cap by mouth., Disp: , Rfl:    coenzyme q10 (CO Q-10) 100 mg Cap, Take 100 mg by mouth daily. , Disp: , Rfl:     Allergies   Allergen Reactions    Bee Sting [Sting, Bee] Anaphylaxis    Vytorin 10-10 [Ezetimibe-Simvastatin] Myalgia    Amlodipine Other (comments)     Creepy crawly sensation, twitches    Lipitor [Atorvastatin] Myalgia        Family History   Problem Relation Age of Onset    Diabetes Mother     Obesity Mother     Heart Disease Mother     Stroke Father     Heart Disease Sister     Obesity Sister    [facility] Diabetes Sister    Mother  of a heart attack at age 62  Father had a stroke in his 76s    Social History     Tobacco Use    Smoking status: Former Smoker     Packs/day: 0.50     Years: 4.00     Pack years: 2.00     Quit date: 1966     Years since quittin.9    Smokeless tobacco: Never Used   Substance Use Topics    Alcohol use: No    Drug use: No       Review of Symptoms:  Pertinent Positive: very mild exertional angina  Pertinent Negative: No shortness of breath orthopnea PND. All Other systems reviewed and are negative for a Comprehensive ROS (10+)    Physical Exam    Height 5' 11\" (1.803 m), weight 214 lb (97.1 kg). Constitutional:  well-developed and well-nourished. No distress. HENT: Normocephalic. Eyes: No scleral icterus. Neck:  Neck supple. No JVD present. Pulmonary/Chest: Effort normal and breath sounds normal. No respiratory distress, wheezes or rales. Cardiovascular: Normal rate, regular rhythm, S1 S2 .  2/6 systolic murmur   extremities:  Normal muscle tone  Abdominal:   No abnormal distension. Neurological:  Moving all extremities, cranial nerves appear grossly intact. Skin: Skin is not cold. Not diaphoretic. No erythema. Psychiatric:  Grossly normal mood and affect. Intact insight. Objective Data:     Lipids 19 - , HDL 64, LDL 76, , VLDL 22    Echo 2018: Normal LVEF, mild AS, mild MR/TR    Event monitor-2019 to 2019.  Rare ventricular ectopy, 6 supraventricular runs of ventricular tachycardia the longest being 4 beats. Supraventricular ectopy involving 8.2% of beats. 02/25/19  NUCLEAR CARDIAC STRESS TEST 02/27/2019   Abnormal perfusion    4/2019  L Main: no significant disease  LAD: distal LAD  after large branching diagonal  LCx: proximal LCx just distal to origin of OM1 into mid-LCx with severe diffuse disease,  Involves origin of OM2 and OM3  RCA: anomalous origin from left cusp, serial focal moderate stenosis of mid-RCA and distal RCA that is not hemodynamically significant by FFR (0.92)    PCI  Stenting of proximal and mid-Lcx (distal to proximal) with 2.5x15, 3.0x12 and 3.0x8mm Xience Freida REINALDO. Post-dilated with 3.0NC just inside distal stent edge and 3.75NC proximally at high pressure           08/24/20   CARDIAC PROCEDURE 08/24/2020 8/24/2020    Narrative · Multivessel CAD  · Distal anomalous RCA stenting with 4.0x23mm Xience Freida REINALDO deployed   at 20 WINDY. Post-dilated with 4.5 NC balloon at 18 WINDY. Findings:   L Main: large caliber, normal  LAD: large caliber, tubular 70% mid-vessel disease,  distal LAD  LCx: large caliber, previously placed proximal and mid-Lcx stents widely   patent, several small caliber OM branches. OM1 small caliber proximal   50%, OM2 very small caliber proximal 95% stenosis with karon 1 flow, OM3   very small caliber with ostial 95% stenosis with karon 1 flow, OM4 small   caliber with proximal 50% stenosis (OM stable compared to cath in 2019)  RCA: anomalous from left cusp, large caliber, distal 70% stenosis        PCI:     AL1, 6F guide  candy blue   Heparin     Dilated with 3.5 compliant balloon     IVUS used for sizing     4.0x23mm Xience Freida REINALDO deployed at 20 WINDY. Post-dilated with 4.5 NC   balloon at 18 WINDY.    karon 3 flow w/o evidence of dissection or perforation       Signed by: Livier East DO     01/20/21   ECHO ADULT COMPLETE 01/20/2021 1/20/2021    Narrative · LV: Calculated LVEF is 60%. Visually measured ejection fraction. Normal   cavity size, wall thickness and systolic function (ejection fraction   normal). Wall motion: normal. Mild (grade 1) left ventricular diastolic   dysfunction. · AV: Aortic valve leaflet calcification present. Aortic valve mean   gradient is 15.2 mmHg. Aortic valve area is 1.8 cm2. Mild aortic valve   stenosis is present. · LA: Mildly dilated left atrium.         Signed by: DO Annabel Saavedra,

## 2022-03-18 NOTE — PROGRESS NOTES
Buffy Soto is a 64 y.o. male    Visit Vitals  /60 (BP 1 Location: Left upper arm, BP Patient Position: Sitting, BP Cuff Size: Adult)   Pulse (!) 59   Ht 5' 11\" (1.803 m)   Wt 214 lb (97.1 kg)   SpO2 99%   BMI 29.85 kg/m²       Chief Complaint   Patient presents with    Coronary Artery Disease    Hypertension    Cholesterol Problem    Chronic Kidney Disease       Chest pain NO  SOB YES  Dizziness NO  Swelling LEFT FOOT  Recent hospital visit NO  Refills NO  COVID VACCINE STATUS YES  HAD COVID?  NO    QUESTION ABOUT REDUCING DOSAGE OF ROSUVASTATIN

## 2022-03-19 PROBLEM — Z86.79 HISTORY OF ANGINA: Status: ACTIVE | Noted: 2019-04-11

## 2022-03-19 PROBLEM — N18.30 CKD STAGE 3 DUE TO TYPE 2 DIABETES MELLITUS (HCC): Status: ACTIVE | Noted: 2018-06-29

## 2022-03-19 PROBLEM — R01.1 HEART MURMUR: Status: ACTIVE | Noted: 2018-07-23

## 2022-03-19 PROBLEM — E11.22 CKD STAGE 3 DUE TO TYPE 2 DIABETES MELLITUS (HCC): Status: ACTIVE | Noted: 2018-06-29

## 2022-03-19 PROBLEM — Z85.46 HISTORY OF PROSTATE CANCER: Status: ACTIVE | Noted: 2021-02-11

## 2022-03-19 PROBLEM — I25.118 CORONARY ARTERY DISEASE WITH STABLE ANGINA PECTORIS (HCC): Status: ACTIVE | Noted: 2019-07-16

## 2022-03-19 PROBLEM — H43.812 PVD (POSTERIOR VITREOUS DETACHMENT), LEFT EYE: Status: ACTIVE | Noted: 2018-03-27

## 2022-03-19 PROBLEM — I25.10 CAD (CORONARY ARTERY DISEASE): Status: ACTIVE | Noted: 2019-04-11

## 2022-03-23 RX ORDER — NITROGLYCERIN 0.3 MG/1
TABLET SUBLINGUAL
Qty: 100 TABLET | Refills: 0 | Status: SHIPPED | OUTPATIENT
Start: 2022-03-23

## 2022-04-25 DIAGNOSIS — I10 ESSENTIAL HYPERTENSION: ICD-10-CM

## 2022-04-25 NOTE — Clinical Note
TRANSFER - OUT REPORT:  
 
Verbal report given to: Dellis Goldberg RN. Report consisted of patient's Situation, Background, Assessment and  
Recommendations(SBAR). Opportunity for questions and clarification was provided. Patient transported with a Registered Nurse and [address] [place] [address] / [facility]. Patient transported to: Yaima Hand. impairments found

## 2022-04-26 RX ORDER — LISINOPRIL 20 MG/1
TABLET ORAL
Qty: 90 TABLET | Refills: 1 | Status: SHIPPED | OUTPATIENT
Start: 2022-04-26

## 2022-06-04 NOTE — TELEPHONE ENCOUNTER
----- Message from Darlene Jeter sent at 7/27/2018  4:50 PM EDT -----  Regarding: Dr Ochoa Res  Patient requesting call back to [phone]. He saw on my chart that Dr Cris Muir will not refill \"antenol\". He wants to know if he should keep on taking it if he cannot get it filled? He has extra bottles of it at home.
Spoke with patient and advised him that Dr. Mara Morel sent in a refill of his Atenolol to his pharmacy. Patient verbalized understanding.
106

## 2022-06-09 ENCOUNTER — OFFICE VISIT (OUTPATIENT)
Dept: FAMILY MEDICINE CLINIC | Age: 77
End: 2022-06-09
Payer: MEDICARE

## 2022-06-09 VITALS
DIASTOLIC BLOOD PRESSURE: 71 MMHG | TEMPERATURE: 97 F | SYSTOLIC BLOOD PRESSURE: 126 MMHG | HEIGHT: 71 IN | BODY MASS INDEX: 29.26 KG/M2 | WEIGHT: 209 LBS | OXYGEN SATURATION: 97 % | HEART RATE: 69 BPM | RESPIRATION RATE: 16 BRPM

## 2022-06-09 DIAGNOSIS — E11.21 CONTROLLED TYPE 2 DIABETES MELLITUS WITH DIABETIC NEPHROPATHY, WITHOUT LONG-TERM CURRENT USE OF INSULIN (HCC): ICD-10-CM

## 2022-06-09 DIAGNOSIS — R22.1 MASS OF THYROID REGION: ICD-10-CM

## 2022-06-09 DIAGNOSIS — E78.00 PURE HYPERCHOLESTEROLEMIA: Chronic | ICD-10-CM

## 2022-06-09 DIAGNOSIS — I25.118 CORONARY ARTERY DISEASE OF NATIVE ARTERY OF NATIVE HEART WITH STABLE ANGINA PECTORIS (HCC): ICD-10-CM

## 2022-06-09 DIAGNOSIS — K21.00 GASTROESOPHAGEAL REFLUX DISEASE WITH ESOPHAGITIS WITHOUT HEMORRHAGE: Chronic | ICD-10-CM

## 2022-06-09 DIAGNOSIS — N18.30 STAGE 3 CHRONIC KIDNEY DISEASE, UNSPECIFIED WHETHER STAGE 3A OR 3B CKD (HCC): ICD-10-CM

## 2022-06-09 DIAGNOSIS — I10 PRIMARY HYPERTENSION: Chronic | ICD-10-CM

## 2022-06-09 DIAGNOSIS — R22.1 PALPABLE MASS OF NECK: Primary | ICD-10-CM

## 2022-06-09 PROCEDURE — 1101F PT FALLS ASSESS-DOCD LE1/YR: CPT | Performed by: FAMILY MEDICINE

## 2022-06-09 PROCEDURE — G8510 SCR DEP NEG, NO PLAN REQD: HCPCS | Performed by: FAMILY MEDICINE

## 2022-06-09 PROCEDURE — G8427 DOCREV CUR MEDS BY ELIG CLIN: HCPCS | Performed by: FAMILY MEDICINE

## 2022-06-09 PROCEDURE — G8754 DIAS BP LESS 90: HCPCS | Performed by: FAMILY MEDICINE

## 2022-06-09 PROCEDURE — G8536 NO DOC ELDER MAL SCRN: HCPCS | Performed by: FAMILY MEDICINE

## 2022-06-09 PROCEDURE — 3044F HG A1C LEVEL LT 7.0%: CPT | Performed by: FAMILY MEDICINE

## 2022-06-09 PROCEDURE — G8417 CALC BMI ABV UP PARAM F/U: HCPCS | Performed by: FAMILY MEDICINE

## 2022-06-09 PROCEDURE — 99214 OFFICE O/P EST MOD 30 MIN: CPT | Performed by: FAMILY MEDICINE

## 2022-06-09 PROCEDURE — G8752 SYS BP LESS 140: HCPCS | Performed by: FAMILY MEDICINE

## 2022-06-09 PROCEDURE — 1123F ACP DISCUSS/DSCN MKR DOCD: CPT | Performed by: FAMILY MEDICINE

## 2022-06-09 NOTE — PROGRESS NOTES
[facility]    History of Present Illness:   Gagandeep Dela Cruz is a 76 y.o. male with history of HTN, CAD, Carotid artery disease, GERD, Diabetes, CKD, Prostate, HLD  CC: Follow up Chronic conditions  History provided by patient and Records    HPI:  Masses of neck: Noting for the last 2 months a soft, obile mass on the back base of the neck. Olso noting a sensation of someone \"Pushing against my windpipe from the anterior neck that happens with bending over. No obvious mas noted. Coronary Artery Disease Follow up:   Today patient reports he is feeling well and overall his symptoms are controlled on his current medications. he  has not had a history of acute myocardial infarction in the past.  Prior management has included:   - Coronary stenting: YES  - Coronary bypass: NO     he has had CHF   01/20/21    ECHO ADULT COMPLETE 01/20/2021 1/20/2021    Interpretation Summary  · LV: Calculated LVEF is 60%. Visually measured ejection fraction. Normal cavity size, wall thickness and systolic function (ejection fraction normal). Wall motion: normal. Mild (grade 1) left ventricular diastolic dysfunction. · AV: Aortic valve leaflet calcification present. Aortic valve mean gradient is 15.2 mmHg. Aortic valve area is 1.8 cm2. Mild aortic valve stenosis is present. · LA: Mildly dilated left atrium. Signed by: Tbiurcio Segovia DO on 1/20/2021  3:55 PM     Key CAD CHF Meds             lisinopriL (PRINIVIL, ZESTRIL) 20 mg tablet (Taking) TAKE ONE TABLET BY MOUTH EVERY DAY    nitroglycerin (NITROSTAT) 0.3 mg SL tablet (Taking) DISSOLVE ONE TABLET UNDER THE TONGUE every 5 minutes AS needed FOR CHEST PAIN.     furosemide (LASIX) 20 mg tablet (Taking) TAKE ONE TABLET BY MOUTH DAILY    felodipine (PLENDIL SR) 10 mg 24 hr tablet (Taking) TAKE ONE TABLET BY MOUTH DAILY FOR high BLOOD PRESSURE    clopidogreL (PLAVIX) 75 mg tab (Taking) TAKE ONE TABLET BY MOUTH EVERY DAY    rosuvastatin (CRESTOR) 40 mg tablet (Taking) TAKE ONE TABLET BY MOUTH EVERY DAY    ranolazine ER (RANEXA) 500 mg SR tablet (Taking) TAKE ONE TABLET BY MOUTH TWICE DAILY    isosorbide mononitrate ER (IMDUR) 60 mg CR tablet (Taking) TAKE ONE TABLET BY MOUTH EVERY DAY    aspirin delayed-release 81 mg tablet (Taking) Take 1 Tab by mouth daily. omega-3 fatty acids-vitamin e (FISH OIL) 1,000 mg cap (Taking) Take 1 Cap by mouth. he is on a statin  he is on antiplatelet therapy. he is on a beta blocker. he is on a regular Nitrate therapy. he does use Nitroglycerin as needed for chest pain. Residual symptoms of CAD include none.  Patient denies any current chest pain, dyspnea, exertional chest pressure/discomfort. Risk factors are controlled or at goal.  Primary risk factors include diabetes, elevated cholesterol and hypertension    Diabetes Follow up: Overall the patient feels well with good energy level. Current Medications:       Key Antihyperglycemic Medications      Patient is on no antihyperglycemic meds.          Insulin dependence: NO              Frequency of home glucose testing: prn              Blood Sugar range at home: N/A                  Last eye exam: In past 12 months. Last foot exam: This year.               Polyuria, polyphagia or polydipsia: NO              Retinopathy: NO              Neuropathy SX: NO              Low blood sugar symptoms: NO              Dietary compliance: compliant most of the time              Medication compliance: compliant most of the time              On ASA: YES              Tobacco Use: NO              Depression: NO       Wt Readings from Last 3 Encounters:   06/09/22 209 lb (94.8 kg)   03/18/22 214 lb (97.1 kg)   03/08/22 215 lb 9.6 oz (97.8 kg)        Lab Results   Component Value Date/Time    Hemoglobin A1c 6.9 (H) 03/08/2022 09:40 AM    Hemoglobin A1c (POC) 5.8 04/20/2015 08:44 AM        Lab Results   Component Value Date/Time    Microalbumin/Creat ratio (mg/g creat) 24 11/03/2021 11:20 AM    Microalbumin,urine random 4.50 11/03/2021 11:20 AM    Microalbumin urine (POC) 10 10/04/2010 01:54 PM         Lab Results   Component Value Date/Time    Creatinine (POC) 1.0 02/24/2017 09:20 AM    Creatinine 1.57 (H) 03/08/2022 09:40 AM      Chronic Kidney Disease:  Patient reports overall doing well, patient denies any current complaints at this time. - Etiology: HTN/DM   - Symptoms: None  - CKD Stage: III  - Nephrologist: None  - Current Treatments: renal diet  - Dialysis Status: na  - Cardiovascular risk factor reduction including hypertension        Health Maintenance  Health Maintenance Due   Topic Date Due    Shingrix Vaccine Age 49> (1 of 2) Never done    Eye Exam Retinal or Dilated  05/25/2019    DTaP/Tdap/Td series (2 - Td or Tdap) 06/23/2021       Past Medical, Family, and Social History:     Current Outpatient Medications on File Prior to Visit   Medication Sig Dispense Refill    lisinopriL (PRINIVIL, ZESTRIL) 20 mg tablet TAKE ONE TABLET BY MOUTH EVERY DAY 90 Tablet 1    nitroglycerin (NITROSTAT) 0.3 mg SL tablet DISSOLVE ONE TABLET UNDER THE TONGUE every 5 minutes AS needed FOR CHEST PAIN. 100 Tablet 0    furosemide (LASIX) 20 mg tablet TAKE ONE TABLET BY MOUTH DAILY 90 Tablet 1    felodipine (PLENDIL SR) 10 mg 24 hr tablet TAKE ONE TABLET BY MOUTH DAILY FOR high BLOOD PRESSURE 90 Tablet 1    pantoprazole (PROTONIX) 40 mg tablet TAKE ONE TABLET BY MOUTH DAILY 90 Tablet 1    clopidogreL (PLAVIX) 75 mg tab TAKE ONE TABLET BY MOUTH EVERY DAY 90 Tablet 2    rosuvastatin (CRESTOR) 40 mg tablet TAKE ONE TABLET BY MOUTH EVERY DAY 90 Tablet 1    ranolazine ER (RANEXA) 500 mg SR tablet TAKE ONE TABLET BY MOUTH TWICE DAILY 180 Tablet 1    famotidine (PEPCID) 40 mg tablet Take one Tablet by mouth nightly.  90 Tablet 1    isosorbide mononitrate ER (IMDUR) 60 mg CR tablet TAKE ONE TABLET BY MOUTH EVERY DAY 90 Tablet 2    EPINEPHrine (EpiPen 2-Francisco Javier) 0.3 mg/0.3 mL injection INJECT INTRAMUSCULARLY AS NEEDED FOR ONE DOSE. TAKE WITH 50MG OF BENADRYL AND CALL 911. Dispense Generic 1 Syringe 3    triamcinolone acetonide (KENALOG) 0.1 % topical cream Apply  to affected area two (2) times a day. use thin layer to poison ivy 453 g 0    diclofenac (VOLTAREN) 1 % gel Apply 4 g to affected area four (4) times daily. Painful shoulder. 100 g 3    meclizine (ANTIVERT) 25 mg chewable tablet Take  by mouth three (3) times daily as needed.  aspirin delayed-release 81 mg tablet Take 1 Tab by mouth daily. 30 Tab 0    Peak Flow Meter eric Use prior and post nebulizer treatment 1 Device 0    albuterol (PROVENTIL VENTOLIN) 2.5 mg /3 mL (0.083 %) nebulizer solution 3 mL by Nebulization route every six (6) hours as needed for Wheezing or Shortness of Breath. 24 Each 5    omega-3 fatty acids-vitamin e (FISH OIL) 1,000 mg cap Take 1 Cap by mouth.  coenzyme q10 (CO Q-10) 100 mg Cap Take 100 mg by mouth daily. No current facility-administered medications on file prior to visit.        Patient Active Problem List   Diagnosis Code    Hypertension I10    Hyperlipidemia E78.5    GERD (gastroesophageal reflux disease) K21.9    Kidney stone N20.0    Bone spur M77.9    Allergy to bee sting Z91.030    Carotid artery obstruction I65.29    Amaurosis fugax of left eye G45.3    S/P carotid endarterectomy Z98.890    Vitamin D deficiency E55.9    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.21    Type 2 diabetes with nephropathy (HCC) E11.21    CKD stage 3 due to type 2 diabetes mellitus (HCC) E11.22, N18.30    Heart murmur R01.1    Age-related nuclear cataract of both eyes H25.13    PVD (posterior vitreous detachment), left eye H43.812    History of angina Z86.79    CAD (coronary artery disease) I25.10    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 (HCC) I25.118    History of prostate cancer Z85.46    Chronic renal disease, stage III N18.30 Social History     Socioeconomic History    Marital status:    Tobacco Use    Smoking status: Former Smoker     Packs/day: 0.50     Years: 4.00     Pack years: 2.00     Quit date: 1966     Years since quittin.1    Smokeless tobacco: Never Used   Substance and Sexual Activity    Alcohol use: No    Drug use: No    Sexual activity: Yes     Partners: Female        Review of Systems   Review of Systems   Constitutional: Negative for chills and fever. HENT: Negative for hearing loss and tinnitus. Cardiovascular: Negative for chest pain and palpitations. Genitourinary: Negative for dysuria and urgency. Neurological: Negative for dizziness and headaches. Objective:     Visit Vitals  /71   Pulse 69   Temp 97 °F (36.1 °C)   Resp 16   Ht 5' 11\" (1.803 m)   Wt 209 lb (94.8 kg)   SpO2 97%   BMI 29.15 kg/m²        Physical Exam  Vitals and nursing note reviewed. Constitutional:       Appearance: Normal appearance. HENT:      Head: Normocephalic and atraumatic. Neck:      Thyroid: Thyroid mass present. No thyromegaly or thyroid tenderness. Comments: Soft mobile and non tender subcutaneous mass on the back of the neck. Cardiovascular:      Rate and Rhythm: Normal rate and regular rhythm. Pulses: Normal pulses. Heart sounds: Normal heart sounds. Pulmonary:      Effort: Pulmonary effort is normal.      Breath sounds: Normal breath sounds. Abdominal:      General: Abdomen is flat. Bowel sounds are normal.      Palpations: Abdomen is soft. Musculoskeletal:      Cervical back: Normal range of motion and neck supple. Lymphadenopathy:      Cervical: No cervical adenopathy. Neurological:      Mental Status: He is alert. Pertinent Labs/Studies:      Assessment and orders:       ICD-10-CM ICD-9-CM    1. Palpable mass of neck  R22.1 784.2 US THYROID/PARATHYROID/SOFT TISS    Posterior neck area, upper back   2.  Mass of thyroid region  R22.1 784.2 US THYROID/PARATHYROID/SOFT TISS   3. Stage 3 chronic kidney disease, unspecified whether stage 3a or 3b CKD ([facility])  N18.30 585.3    4. Primary hypertension  I10 401.9 CBC W/O DIFF      METABOLIC PANEL, COMPREHENSIVE   5. Coronary artery disease of native artery of native heart with stable angina pectoris ([facility]  I25.118 414.01      413.9    6. Gastroesophageal reflux disease with esophagitis without hemorrhage  K21.00 530.81      530.10    7. Controlled type 2 diabetes mellitus with diabetic nephropathy, without long-term current use of insulin ([facility])  E11.21 250.40 HEMOGLOBIN A1C WITH EAG     583.81    8. Pure hypercholesterolemia  E78.00 272.0      Diagnoses and all orders for this visit:    1. Palpable mass of neck: Mass on neck, likely lipoma, but getting US as US of THyroid region as well  Comments:  Posterior neck area, upper back  Orders:  -     US THYROID/PARATHYROID/SOFT TISS; Future    2. Mass of thyroid region: Abnormal exam of the thyroid region, noting palbable midle mass and getting US now.  -     US THYROID/PARATHYROID/SOFT TISS; Future    3. Stage 3 chronic kidney disease, unspecified whether stage 3a or 3b CKD ([facility]    4. Primary hypertension: Our goal is to normalize the blood pressure to decrease the risks of strokes and heart attacks. The patient is in agreement with the plan. -     CBC W/O DIFF; Future  -     METABOLIC PANEL, COMPREHENSIVE; Future    5. Coronary artery disease of native artery of native heart with stable angina pectoris ([facility]): Stable at this time    6. Gastroesophageal reflux disease with esophagitis without hemorrhage    7. Controlled type 2 diabetes mellitus with diabetic nephropathy, without long-term current use of insulin [facility]): Discussed with patient today that the goal for their diabetes is to have a HgA1C<7 and ideally as close to 6.5 as possible. We discussed diet and medications. The goal for the cholesterol LDL is less than 70 and HDL>40.   Patient is aware of the need for yearly eye exams and to take care of their feet daily. Discussed with patient that blood pressure should be less than 130/80 and watching salt intake is very important.    -     HEMOGLOBIN A1C WITH EAG; Future    8. Pure hypercholesterolemia: The patient is aware of our goal to reduce or eliminate the long term problems (such as strokes and heart attacks) related to poorly controlled Triglycerides, LDL, Cholesterol. Follow-up and Dispositions    · Return in about 3 months (around 9/9/2022). I have discussed the diagnosis with the patient and the intended plan as seen in the above orders. Social history, medical history, and labs were reviewed. The patient has received an after-visit summary and questions were answered concerning future plans. I have discussed medication side effects and warnings with the patient as well.     MD YONI Mendez & BALBIR SALDIVAR [facility]  06/09/22

## 2022-06-09 NOTE — PROGRESS NOTES
1. Have you been to the ER, urgent care clinic since your last visit? Hospitalized since your last visit? No    2. Have you seen or consulted any other health care providers outside of the [address] [place] [address] since your last visit? Include any pap smears or colon screening. No  Reviewed record in preparation for visit and have necessary documentation  Pt did not bring medication to office visit for review  opportunity was given for questions      3. For patients aged 39-70: Has the patient had a colonoscopy / FIT/ Cologuard? NA - based on age      If the patient is female:    4. For patients aged 41-77: Has the patient had a mammogram within the past 2 years? NA - based on age or sex      11. For patients aged 21-65: Has the patient had a pap smear?  NA - based on age or sex      Goals that were addressed and/or need to be completed during or after this appointment include   Health Maintenance Due   Topic Date Due    Shingrix Vaccine Age 49> (1 of 2) Never done    Eye Exam Retinal or Dilated  05/25/2019    DTaP/Tdap/Td series (2 - Td or Tdap) 06/23/2021

## 2022-06-10 DIAGNOSIS — R22.1 PALPABLE MASS OF NECK: Primary | ICD-10-CM

## 2022-06-10 LAB
ALBUMIN SERPL-MCNC: 3.7 G/DL (ref 3.5–5)
ALBUMIN/GLOB SERPL: 1.6 {RATIO} (ref 1.1–2.2)
ALP SERPL-CCNC: 52 U/L (ref 45–117)
ALT SERPL-CCNC: 19 U/L (ref 12–78)
ANION GAP SERPL CALC-SCNC: 4 MMOL/L (ref 5–15)
AST SERPL-CCNC: 18 U/L (ref 15–37)
BILIRUB SERPL-MCNC: 0.4 MG/DL (ref 0.2–1)
BUN SERPL-MCNC: 26 MG/DL (ref 6–20)
BUN/CREAT SERPL: 14 (ref 12–20)
CALCIUM SERPL-MCNC: 9.2 MG/DL (ref 8.5–10.1)
CHLORIDE SERPL-SCNC: 107 MMOL/L (ref 97–108)
CO2 SERPL-SCNC: 27 MMOL/L (ref 21–32)
CREAT SERPL-MCNC: 1.83 MG/DL (ref 0.7–1.3)
ERYTHROCYTE [DISTWIDTH] IN BLOOD BY AUTOMATED COUNT: 14.6 % (ref 11.5–14.5)
EST. AVERAGE GLUCOSE BLD GHB EST-MCNC: 151 MG/DL
GLOBULIN SER CALC-MCNC: 2.3 G/DL (ref 2–4)
GLUCOSE SERPL-MCNC: 133 MG/DL (ref 65–100)
HBA1C MFR BLD: 6.9 % (ref 4–5.6)
HCT VFR BLD AUTO: 37.6 % (ref 36.6–50.3)
HGB BLD-MCNC: 11.8 G/DL (ref 12.1–17)
MCH RBC QN AUTO: 30.4 PG (ref 26–34)
MCHC RBC AUTO-ENTMCNC: 31.4 G/DL (ref 30–36.5)
MCV RBC AUTO: 96.9 FL (ref 80–99)
NRBC # BLD: 0 K/UL (ref 0–0.01)
NRBC BLD-RTO: 0 PER 100 WBC
PLATELET # BLD AUTO: 200 K/UL (ref 150–400)
PMV BLD AUTO: 10.4 FL (ref 8.9–12.9)
POTASSIUM SERPL-SCNC: 4.7 MMOL/L (ref 3.5–5.1)
PROT SERPL-MCNC: 6 G/DL (ref 6.4–8.2)
RBC # BLD AUTO: 3.88 M/UL (ref 4.1–5.7)
SODIUM SERPL-SCNC: 138 MMOL/L (ref 136–145)
WBC # BLD AUTO: 6 K/UL (ref 4.1–11.1)

## 2022-06-10 NOTE — PROGRESS NOTES
Placed order for Head, neck, soft tissue for mass of neck.     MD YONI Chowdhury & BALBIR SALDIVAR [facility]  06/10/22

## 2022-06-13 ENCOUNTER — HOSPITAL ENCOUNTER (OUTPATIENT)
Dept: ULTRASOUND IMAGING | Age: 77
Discharge: HOME | End: 2022-06-13
Attending: FAMILY MEDICINE
Payer: MEDICARE

## 2022-06-13 ENCOUNTER — TELEPHONE (OUTPATIENT)
Dept: FAMILY MEDICINE CLINIC | Age: 77
End: 2022-06-13

## 2022-06-13 DIAGNOSIS — R22.1 MASS OF THYROID REGION: ICD-10-CM

## 2022-06-13 DIAGNOSIS — R22.1 PALPABLE MASS OF NECK: ICD-10-CM

## 2022-06-13 PROCEDURE — 76536 US EXAM OF HEAD AND NECK: CPT

## 2022-06-13 NOTE — TELEPHONE ENCOUNTER
Called and reviewed US images and advised nothing to be concerned about at this time.     Suzzane Duane, MD PRISCILLA CHAN & BALBIR SALDIVAR [facility]  06/13/22

## 2022-06-30 RX ORDER — FAMOTIDINE 40 MG/1
TABLET, FILM COATED ORAL
Qty: 90 TABLET | Refills: 1 | Status: SHIPPED | OUTPATIENT
Start: 2022-06-30

## 2022-07-11 RX ORDER — ISOSORBIDE MONONITRATE 60 MG/1
TABLET, EXTENDED RELEASE ORAL
Qty: 90 TABLET | Refills: 1 | Status: SHIPPED | OUTPATIENT
Start: 2022-07-11

## 2022-07-11 RX ORDER — RANOLAZINE 500 MG/1
TABLET, EXTENDED RELEASE ORAL
Qty: 180 TABLET | Refills: 1 | Status: SHIPPED | OUTPATIENT
Start: 2022-07-11

## 2022-08-19 ENCOUNTER — TELEPHONE (OUTPATIENT)
Dept: FAMILY MEDICINE CLINIC | Age: 77
End: 2022-08-19

## 2022-08-29 DIAGNOSIS — I10 ESSENTIAL HYPERTENSION: ICD-10-CM

## 2022-08-29 DIAGNOSIS — K21.00 GASTROESOPHAGEAL REFLUX DISEASE WITH ESOPHAGITIS: ICD-10-CM

## 2022-08-29 RX ORDER — FELODIPINE 10 MG/1
TABLET, EXTENDED RELEASE ORAL
Qty: 90 TABLET | Refills: 1 | Status: SHIPPED | OUTPATIENT
Start: 2022-08-29

## 2022-08-29 RX ORDER — PANTOPRAZOLE SODIUM 40 MG/1
TABLET, DELAYED RELEASE ORAL
Qty: 90 TABLET | Refills: 1 | Status: SHIPPED | OUTPATIENT
Start: 2022-08-29

## 2022-09-06 DIAGNOSIS — E78.00 PURE HYPERCHOLESTEROLEMIA: ICD-10-CM

## 2022-09-06 DIAGNOSIS — I10 ESSENTIAL HYPERTENSION: ICD-10-CM

## 2022-09-06 RX ORDER — ROSUVASTATIN CALCIUM 40 MG/1
TABLET, COATED ORAL
Qty: 90 TABLET | Refills: 1 | Status: SHIPPED | OUTPATIENT
Start: 2022-09-06 | End: 2022-09-23 | Stop reason: ALTCHOICE

## 2022-09-06 RX ORDER — FUROSEMIDE 20 MG/1
TABLET ORAL
Qty: 90 TABLET | Refills: 1 | Status: SHIPPED | OUTPATIENT
Start: 2022-09-06

## 2022-09-13 ENCOUNTER — TELEPHONE (OUTPATIENT)
Dept: FAMILY MEDICINE CLINIC | Age: 77
End: 2022-09-13

## 2022-09-23 ENCOUNTER — OFFICE VISIT (OUTPATIENT)
Dept: CARDIOLOGY CLINIC | Age: 77
End: 2022-09-23
Payer: MEDICARE

## 2022-09-23 ENCOUNTER — ANCILLARY PROCEDURE (OUTPATIENT)
Dept: CARDIOLOGY CLINIC | Age: 77
End: 2022-09-23
Payer: MEDICARE

## 2022-09-23 VITALS
HEIGHT: 71 IN | BODY MASS INDEX: 29.26 KG/M2 | DIASTOLIC BLOOD PRESSURE: 78 MMHG | SYSTOLIC BLOOD PRESSURE: 132 MMHG | WEIGHT: 209 LBS

## 2022-09-23 VITALS
HEIGHT: 71 IN | DIASTOLIC BLOOD PRESSURE: 78 MMHG | SYSTOLIC BLOOD PRESSURE: 132 MMHG | BODY MASS INDEX: 29.26 KG/M2 | WEIGHT: 209 LBS

## 2022-09-23 DIAGNOSIS — I35.0 AORTIC VALVE STENOSIS, ETIOLOGY OF CARDIAC VALVE DISEASE UNSPECIFIED: ICD-10-CM

## 2022-09-23 DIAGNOSIS — I25.118 CORONARY ARTERY DISEASE OF NATIVE ARTERY OF NATIVE HEART WITH STABLE ANGINA PECTORIS (HCC): Primary | ICD-10-CM

## 2022-09-23 LAB
ECHO AO ROOT DIAM: 3.8 CM
ECHO AO ROOT INDEX: 1.77 CM/M2
ECHO AV AREA PEAK VELOCITY: 1.9 CM2
ECHO AV AREA VTI: 1.8 CM2
ECHO AV AREA/BSA PEAK VELOCITY: 0.9 CM2/M2
ECHO AV AREA/BSA VTI: 0.8 CM2/M2
ECHO AV MEAN GRADIENT: 10 MMHG
ECHO AV MEAN VELOCITY: 1.5 M/S
ECHO AV PEAK GRADIENT: 20 MMHG
ECHO AV PEAK VELOCITY: 2.2 M/S
ECHO AV VELOCITY RATIO: 0.55
ECHO AV VTI: 44.8 CM
ECHO LA DIAMETER INDEX: 2 CM/M2
ECHO LA DIAMETER: 4.3 CM
ECHO LA TO AORTIC ROOT RATIO: 1.13
ECHO LA VOL 2C: 72 ML (ref 18–58)
ECHO LA VOL 4C: 46 ML (ref 18–58)
ECHO LA VOL BP: 58 ML (ref 18–58)
ECHO LA VOL/BSA BIPLANE: 27 ML/M2 (ref 16–34)
ECHO LA VOLUME AREA LENGTH: 64 ML
ECHO LA VOLUME INDEX A2C: 33 ML/M2 (ref 16–34)
ECHO LA VOLUME INDEX A4C: 21 ML/M2 (ref 16–34)
ECHO LA VOLUME INDEX AREA LENGTH: 30 ML/M2 (ref 16–34)
ECHO LV E' LATERAL VELOCITY: 8 CM/S
ECHO LV E' SEPTAL VELOCITY: 7 CM/S
ECHO LV EDV A2C: 87 ML
ECHO LV EDV A4C: 91 ML
ECHO LV EDV BP: 88 ML (ref 67–155)
ECHO LV EDV INDEX A4C: 42 ML/M2
ECHO LV EDV INDEX BP: 41 ML/M2
ECHO LV EDV NDEX A2C: 40 ML/M2
ECHO LV EJECTION FRACTION A2C: 56 %
ECHO LV EJECTION FRACTION A4C: 57 %
ECHO LV EJECTION FRACTION BIPLANE: 56 % (ref 55–100)
ECHO LV ESV A2C: 38 ML
ECHO LV ESV A4C: 39 ML
ECHO LV ESV BP: 39 ML (ref 22–58)
ECHO LV ESV INDEX A2C: 18 ML/M2
ECHO LV ESV INDEX A4C: 18 ML/M2
ECHO LV ESV INDEX BP: 18 ML/M2
ECHO LV FRACTIONAL SHORTENING: 36 % (ref 28–44)
ECHO LV INTERNAL DIMENSION DIASTOLE INDEX: 2.74 CM/M2
ECHO LV INTERNAL DIMENSION DIASTOLIC: 5.9 CM (ref 4.2–5.9)
ECHO LV INTERNAL DIMENSION SYSTOLIC INDEX: 1.77 CM/M2
ECHO LV INTERNAL DIMENSION SYSTOLIC: 3.8 CM
ECHO LV IVSD: 1 CM (ref 0.6–1)
ECHO LV MASS 2D: 224.6 G (ref 88–224)
ECHO LV MASS INDEX 2D: 104.4 G/M2 (ref 49–115)
ECHO LV POSTERIOR WALL DIASTOLIC: 0.9 CM (ref 0.6–1)
ECHO LV RELATIVE WALL THICKNESS RATIO: 0.31
ECHO LVOT AREA: 3.5 CM2
ECHO LVOT AV VTI INDEX: 0.49
ECHO LVOT DIAM: 2.1 CM
ECHO LVOT MEAN GRADIENT: 3 MMHG
ECHO LVOT PEAK GRADIENT: 5 MMHG
ECHO LVOT PEAK VELOCITY: 1.2 M/S
ECHO LVOT STROKE VOLUME INDEX: 35.4 ML/M2
ECHO LVOT SV: 76.2 ML
ECHO LVOT VTI: 22 CM
ECHO MV A VELOCITY: 0.94 M/S
ECHO MV AREA PHT: 1.8 CM2
ECHO MV E DECELERATION TIME (DT): 414.1 MS
ECHO MV E VELOCITY: 0.49 M/S
ECHO MV E/A RATIO: 0.52
ECHO MV E/E' LATERAL: 6.13
ECHO MV E/E' RATIO (AVERAGED): 6.56
ECHO MV E/E' SEPTAL: 7
ECHO MV PRESSURE HALF TIME (PHT): 120.1 MS
ECHO RV FREE WALL PEAK S': 10 CM/S
ECHO RV INTERNAL DIMENSION: 3 CM
ECHO RV TAPSE: 2.7 CM (ref 1.7–?)

## 2022-09-23 PROCEDURE — G8536 NO DOC ELDER MAL SCRN: HCPCS | Performed by: STUDENT IN AN ORGANIZED HEALTH CARE EDUCATION/TRAINING PROGRAM

## 2022-09-23 PROCEDURE — 1123F ACP DISCUSS/DSCN MKR DOCD: CPT | Performed by: STUDENT IN AN ORGANIZED HEALTH CARE EDUCATION/TRAINING PROGRAM

## 2022-09-23 PROCEDURE — G8427 DOCREV CUR MEDS BY ELIG CLIN: HCPCS | Performed by: STUDENT IN AN ORGANIZED HEALTH CARE EDUCATION/TRAINING PROGRAM

## 2022-09-23 PROCEDURE — 93306 TTE W/DOPPLER COMPLETE: CPT | Performed by: STUDENT IN AN ORGANIZED HEALTH CARE EDUCATION/TRAINING PROGRAM

## 2022-09-23 PROCEDURE — G8752 SYS BP LESS 140: HCPCS | Performed by: STUDENT IN AN ORGANIZED HEALTH CARE EDUCATION/TRAINING PROGRAM

## 2022-09-23 PROCEDURE — G8417 CALC BMI ABV UP PARAM F/U: HCPCS | Performed by: STUDENT IN AN ORGANIZED HEALTH CARE EDUCATION/TRAINING PROGRAM

## 2022-09-23 PROCEDURE — G8432 DEP SCR NOT DOC, RNG: HCPCS | Performed by: STUDENT IN AN ORGANIZED HEALTH CARE EDUCATION/TRAINING PROGRAM

## 2022-09-23 PROCEDURE — 1101F PT FALLS ASSESS-DOCD LE1/YR: CPT | Performed by: STUDENT IN AN ORGANIZED HEALTH CARE EDUCATION/TRAINING PROGRAM

## 2022-09-23 PROCEDURE — 93005 ELECTROCARDIOGRAM TRACING: CPT | Performed by: STUDENT IN AN ORGANIZED HEALTH CARE EDUCATION/TRAINING PROGRAM

## 2022-09-23 PROCEDURE — G8754 DIAS BP LESS 90: HCPCS | Performed by: STUDENT IN AN ORGANIZED HEALTH CARE EDUCATION/TRAINING PROGRAM

## 2022-09-23 PROCEDURE — 93010 ELECTROCARDIOGRAM REPORT: CPT | Performed by: STUDENT IN AN ORGANIZED HEALTH CARE EDUCATION/TRAINING PROGRAM

## 2022-09-23 PROCEDURE — 99214 OFFICE O/P EST MOD 30 MIN: CPT | Performed by: STUDENT IN AN ORGANIZED HEALTH CARE EDUCATION/TRAINING PROGRAM

## 2022-09-23 PROCEDURE — G0463 HOSPITAL OUTPT CLINIC VISIT: HCPCS | Performed by: STUDENT IN AN ORGANIZED HEALTH CARE EDUCATION/TRAINING PROGRAM

## 2022-09-23 RX ORDER — ROSUVASTATIN CALCIUM 40 MG/1
40 TABLET, COATED ORAL DAILY
COMMUNITY

## 2022-09-23 NOTE — PROGRESS NOTES
Bryant Zaman is a 76 y.o. male    Chief Complaint   Patient presents with    Follow-up     6 month     Hypertension    Coronary Artery Disease    Dizziness     Patient states he feels a gasp for air once in a while     Eye surgery - cataract with DR. Young Cabezas in [place] 10/27/2022  fax [phone]    Chest pain slight twinge once in a while    SOB no    Dizziness once in a while if bending over and standing too fast     Swelling no    Refills no    Visit Vitals  /78 (BP 1 Location: Left upper arm, BP Patient Position: Sitting)   Ht 5' 11\" (1.803 m)   Wt 209 lb (94.8 kg)   BMI 29.15 kg/m²       1. Have you been to the ER, urgent care clinic since your last visit? Hospitalized since your last visit? No    2. Have you seen or consulted any other health care providers outside of the [address] [place] [address] since your last visit? Include any pap smears or colon screening.  No

## 2022-09-23 NOTE — PROGRESS NOTES
[facility] [address], [address], [address] [place] [address] [address]    Office [phone],PRW [phone]           Rodger Velasco is a 76 y.o. presents for f/u of CAD      Assessment/Recommendations:      Coronary artery disease - stable angina symptoms  Distal LAD  filling via L-->L collaterals   Lcx stenting 4/2019. 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 Repeat echo fall 2024. Hypertension- stable, cont current therapy    Diabetes-  Per Orlin Rinne, MD       Hyperlipidemia   Lab Results   Component Value Date/Time    Cholesterol, total 166 03/08/2022 09:40 AM    HDL Cholesterol 68 03/08/2022 09:40 AM    LDL, calculated 74.4 03/08/2022 09:40 AM    VLDL, calculated 23.6 03/08/2022 09:40 AM    Triglyceride 118 03/08/2022 09:40 AM    CHOL/HDL Ratio 2.4 03/08/2022 09:40 AM     - cont statin therapy      GERD- PPI      Carotid artery stenosis- s/p carotid endarterectomy on aspirin and statin. followed by  Kary Merlos MD       Positional related dizziness- d/c hydralazine. Cont hydration. Consider decreasing his nitrate therapy if he continues to have dizziness symptoms. Primary Care Physician- Orlin Rinne, MD    F/u 6 months sooner as needed        Subjective:  76 y.o. male presents for follow-up. Rare stable angina chest pains.    No adverse bleeding with DAPT    Past Medical History:   Diagnosis Date    Amaurosis fugax of left eye 5/6/2012    Arthritis     OSTEO    CAD (coronary artery disease) 2012    CAROTID LEFT     Cancer ([facility] [address] 2013    PROSTATE    Chronic pain     DJD lumbar    Diabetes ([facility] [address] 5/22/2010    Frequent nocturnal awakening     urinary frequency    GERD (gastroesophageal reflux disease) 5/22/2010    Hyperlipidemia 5/22/2010    Hypertension 5/22/2010    Kidney stone 5/22/2010    Nodular goiter     1.3 cm right , FNA 6/15    Obesity (BMI 30-39. 9)     Psychiatric disorder     ANXIETY    Vertigo         Past Surgical History:   Procedure Laterality Date    COLONOSCOPY  3/3/2016         EGD  3/3/2016         HX HEENT      tonsillectomy    HX MOHS PROCEDURES  2010    right    HX ORTHOPAEDIC      coccyx removed    HX UROLOGICAL  2010    left lithotripsy. basket  extraction,ureteral stent    HX VASECTOMY      NEUROLOGICAL PROCEDURE UNLISTED  2011    Steroid injections in epidural    MN CARDIAC SURG PROCEDURE UNLIST  04/2019    3 stents placed    VASCULAR SURGERY PROCEDURE UNLIST  2012    LEFT CAROTID         Current Outpatient Medications:     furosemide (LASIX) 20 mg tablet, TAKE ONE TABLET BY MOUTH DAILY, Disp: 90 Tablet, Rfl: 1    felodipine (PLENDIL SR) 10 mg 24 hr tablet, TAKE ONE TABLET BY MOUTH DAILY FOR high BLOOD PRESSURE, Disp: 90 Tablet, Rfl: 1    pantoprazole (PROTONIX) 40 mg tablet, TAKE ONE TABLET BY MOUTH DAILY, Disp: 90 Tablet, Rfl: 1    isosorbide mononitrate ER (IMDUR) 60 mg CR tablet, TAKE ONE TABLET BY MOUTH EVERY DAY, Disp: 90 Tablet, Rfl: 1    ranolazine ER (RANEXA) 500 mg SR tablet, TAKE ONE TABLET BY MOUTH TWICE DAILY, Disp: 180 Tablet, Rfl: 1    famotidine (PEPCID) 40 mg tablet, Take one Tablet by mouth nightly., Disp: 90 Tablet, Rfl: 1    lisinopriL (PRINIVIL, ZESTRIL) 20 mg tablet, TAKE ONE TABLET BY MOUTH EVERY DAY, Disp: 90 Tablet, Rfl: 1    nitroglycerin (NITROSTAT) 0.3 mg SL tablet, DISSOLVE ONE TABLET UNDER THE TONGUE every 5 minutes AS needed FOR CHEST PAIN., Disp: 100 Tablet, Rfl: 0    clopidogreL (PLAVIX) 75 mg tab, TAKE ONE TABLET BY MOUTH EVERY DAY, Disp: 90 Tablet, Rfl: 2    EPINEPHrine (EpiPen 2-Francisco Javier) 0.3 mg/0.3 mL injection, INJECT INTRAMUSCULARLY AS NEEDED FOR ONE DOSE. TAKE WITH 50MG OF BENADRYL AND CALL 911. Dispense Generic, Disp: 1 Syringe, Rfl: 3    triamcinolone acetonide (KENALOG) 0.1 % topical cream, Apply  to affected area two (2) times a day. use thin layer to poison ivy, Disp: 453 g, Rfl: 0    diclofenac (VOLTAREN) 1 % gel, Apply 4 g to affected area four (4) times daily. Painful shoulder., Disp: 100 g, Rfl: 3    meclizine (ANTIVERT) 25 mg chewable tablet, Take  by mouth three (3) times daily as needed. , Disp: , Rfl:     aspirin delayed-release 81 mg tablet, Take 1 Tab by mouth daily. , Disp: 30 Tab, Rfl: 0    Peak Flow Meter eric, Use prior and post nebulizer treatment, Disp: 1 Device, Rfl: 0    albuterol (PROVENTIL VENTOLIN) 2.5 mg /3 mL (0.083 %) nebulizer solution, 3 mL by Nebulization route every six (6) hours as needed for Wheezing or Shortness of Breath., Disp: 24 Each, Rfl: 5    omega-3 fatty acids-vitamin e 1,000 mg cap, Take 1 Cap by mouth., Disp: , Rfl:     co-enzyme Q-10 (CO Q-10) 100 mg capsule, Take 100 mg by mouth daily. , Disp: , Rfl:     Allergies   Allergen Reactions    Bee Sting [Sting, Bee] Anaphylaxis    Vytorin 10-10 [Ezetimibe-Simvastatin] Myalgia    Amlodipine Other (comments)     Creepy crawly sensation, twitches    Lipitor [Atorvastatin] Myalgia        Family History   Problem Relation Age of Onset    Diabetes Mother     Obesity Mother     Heart Disease Mother     Stroke Father     Heart Disease Sister     Obesity Sister     Diabetes Sister    Mother  of a heart attack at age 62  Father had a stroke in his 76s    Social History     Tobacco Use    Smoking status: Former     Packs/day: 0.50     Years: 4.00     Pack years: 2.00     Types: Cigarettes     Quit date: 1966     Years since quittin.4    Smokeless tobacco: Never   Substance Use Topics    Alcohol use: No    Drug use: No       Review of Symptoms:  Pertinent Positive: very mild exertional angina  Pertinent Negative: No shortness of breath orthopnea PND.   All Other systems reviewed and are negative for a Comprehensive ROS (10+)    Physical Exam    Blood pressure 132/78, height 5' 11\" (1.803 m), weight 209 lb (94.8 kg). Constitutional:  well-developed and well-nourished. No distress. HENT: Normocephalic. Eyes: No scleral icterus. Neck:  Neck supple. No JVD present. Pulmonary/Chest: Effort normal and breath sounds normal. No respiratory distress, wheezes or rales. Cardiovascular: Normal rate, regular rhythm, S1 S2 .  2/6 systolic murmur   extremities:  Normal muscle tone  Abdominal:   No abnormal distension. Neurological:  Moving all extremities, cranial nerves appear grossly intact. Skin: Skin is not cold. Not diaphoretic. No erythema. Psychiatric:  Grossly normal mood and affect. Intact insight. Objective Data:     Lipids 1/25/19 - , HDL 64, LDL 76, , VLDL 22    Echo 7/2018: Normal LVEF, mild AS, mild MR/TR    Event monitor-1/31/2019 to 2/7/2019. Rare ventricular ectopy, 6 supraventricular runs of ventricular tachycardia the longest being 4 beats. Supraventricular ectopy involving 8.2% of beats. 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 Post-dilated with 3.0NC just inside distal stent edge and 3.75NC proximally at high pressure           08/24/20   CARDIAC PROCEDURE 08/24/2020 8/24/2020    Narrative · Multivessel CAD  · Distal anomalous RCA stenting with 4.0x23mm Xience Freida REINALDO deployed   at 20 WINDY. Post-dilated with 4.5 NC balloon at 18 WINDY. Findings:   L Main: large caliber, normal  LAD: large caliber, tubular 70% mid-vessel disease,  distal LAD  LCx: large caliber, previously placed proximal and mid-Lcx stents widely   patent, several small caliber OM branches. 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 Post-dilated with 4.5 NC   balloon at 18 WINDY. karon 3 flow w/o evidence of dissection or perforation       Signed by: Tammy Azar DO     01/20/21   ECHO ADULT COMPLETE 01/20/2021 1/20/2021    Narrative · LV: Calculated LVEF is 60%. Visually measured ejection fraction. Normal   cavity size, wall thickness and systolic function (ejection fraction   normal). Wall motion: normal. Mild (grade 1) left ventricular diastolic   dysfunction. · AV: Aortic valve leaflet calcification present. Aortic valve mean   gradient is 15.2 mmHg. Aortic valve area is 1.8 cm2. Mild aortic valve   stenosis is present. · LA: Mildly dilated left atrium. Signed by: Tammy Azar DO     09/23/22    ECHO ADULT COMPLETE 09/23/2022 9/23/2022    Interpretation Summary    Left Ventricle: Normal left ventricular systolic function with an estimated EF of 55 - 60%. Left ventricle size is normal. Normal wall thickness. Normal wall motion. Grade I diastolic dysfunction. Aortic Valve: Tricuspid valve. Calcified cusp. Mild stenosis of the aortic valve. AV mean gradient is 10 mmHg. AV peak gradient is 20 mmHg. AV peak velocity is 2.2 m/s. AV area by continuity VTI is 1.8 cm2.     Signed by: Isha Long DO on 9/23/2022  1:22 PM                Kannan Frazier, DO

## 2022-10-19 ENCOUNTER — OFFICE VISIT (OUTPATIENT)
Dept: FAMILY MEDICINE CLINIC | Age: 77
End: 2022-10-19
Payer: MEDICARE

## 2022-10-19 VITALS
OXYGEN SATURATION: 98 % | SYSTOLIC BLOOD PRESSURE: 119 MMHG | WEIGHT: 209 LBS | HEART RATE: 56 BPM | DIASTOLIC BLOOD PRESSURE: 71 MMHG | BODY MASS INDEX: 29.26 KG/M2 | RESPIRATION RATE: 18 BRPM | HEIGHT: 71 IN | TEMPERATURE: 96.9 F

## 2022-10-19 DIAGNOSIS — Z85.46 HISTORY OF PROSTATE CANCER: ICD-10-CM

## 2022-10-19 DIAGNOSIS — I10 PRIMARY HYPERTENSION: ICD-10-CM

## 2022-10-19 DIAGNOSIS — I25.118 CORONARY ARTERY DISEASE OF NATIVE ARTERY OF NATIVE HEART WITH STABLE ANGINA PECTORIS (HCC): ICD-10-CM

## 2022-10-19 DIAGNOSIS — E78.00 PURE HYPERCHOLESTEROLEMIA: ICD-10-CM

## 2022-10-19 DIAGNOSIS — E11.21 CONTROLLED TYPE 2 DIABETES MELLITUS WITH DIABETIC NEPHROPATHY, WITHOUT LONG-TERM CURRENT USE OF INSULIN (HCC): ICD-10-CM

## 2022-10-19 DIAGNOSIS — H25.13 AGE-RELATED NUCLEAR CATARACT OF BOTH EYES: Primary | ICD-10-CM

## 2022-10-19 DIAGNOSIS — R35.1 NOCTURIA: ICD-10-CM

## 2022-10-19 PROCEDURE — G8417 CALC BMI ABV UP PARAM F/U: HCPCS | Performed by: FAMILY MEDICINE

## 2022-10-19 PROCEDURE — G8510 SCR DEP NEG, NO PLAN REQD: HCPCS | Performed by: FAMILY MEDICINE

## 2022-10-19 PROCEDURE — 1101F PT FALLS ASSESS-DOCD LE1/YR: CPT | Performed by: FAMILY MEDICINE

## 2022-10-19 PROCEDURE — G8754 DIAS BP LESS 90: HCPCS | Performed by: FAMILY MEDICINE

## 2022-10-19 PROCEDURE — 1123F ACP DISCUSS/DSCN MKR DOCD: CPT | Performed by: FAMILY MEDICINE

## 2022-10-19 PROCEDURE — 99214 OFFICE O/P EST MOD 30 MIN: CPT | Performed by: FAMILY MEDICINE

## 2022-10-19 PROCEDURE — G8536 NO DOC ELDER MAL SCRN: HCPCS | Performed by: FAMILY MEDICINE

## 2022-10-19 PROCEDURE — G8427 DOCREV CUR MEDS BY ELIG CLIN: HCPCS | Performed by: FAMILY MEDICINE

## 2022-10-19 PROCEDURE — G8752 SYS BP LESS 140: HCPCS | Performed by: FAMILY MEDICINE

## 2022-10-19 PROCEDURE — 3044F HG A1C LEVEL LT 7.0%: CPT | Performed by: FAMILY MEDICINE

## 2022-10-19 NOTE — PROGRESS NOTES
Gloria Morley is a 76 y.o. male who present for pre-operative evaluation. Gloria Morley was referred for evaluation by:Dr. Sasha Mathews for Pre- Op Evaluation. Surgeon:  Dr. Sasha Mathews  Surgery:  Cataract Surgery  Anesthesia type: Topical  Indication:  Cataracts  Date of Surgery: 10/27/22 and 11/10/22    Signs and symptoms:  Issues with vision, issues with driving at night. Constant issue  Duration:  6 months of getting worse, left worse than right    Allergies: Allergies   Allergen Reactions    Bee Sting [Sting, Bee] Anaphylaxis    Vytorin 10-10 [Ezetimibe-Simvastatin] Myalgia    Amlodipine Other (comments)     Creepy crawly sensation, twitches    Lipitor [Atorvastatin] Myalgia     Latex allergy: no    Surgical risk:  Low  Low:  Cataract, breast, dental, endoscopy  Intermediate:  Orthopedic, abdominal, thoracic, carotid endarterctomy, prostate  High:  Vascular, aortic, emergent, high risk of blood loss    Patient risks:    Age:  76 y.o. Abnormal EKG:  Yes  Obesity:  no, Body mass index is 29.15 kg/m². CAD:  yes  MI < 6 weeks:  no  Chest pain or exertional dyspnea:  no  Heart rhythm problems:  no  Syncope:  no  Heart valve problems:  no  CHF:  no    Diagnosed diabetes:  Yes  H/o CVA:  no kidney disease:  no  Screening for ETOH use:  Done and low risk  Smoking status:  No    Functional assessment:   can walk 2 blocks and up a flight of steps carrying groceries  Low functional capacity (< 4 METS):  no    Personal or FH of bleeding problems:  no  Personal or FH of blood clots:  no  Personal or FH of anesthesia problems:  no    Pulmonary Risk:  Asthma or COPD:  no  Obesity:  Body mass index is 29.15 kg/m².   Surgery close to diaphragm:  no  Known KINGS:  no  Albumin normal, BUN normal  Must quit smoking > 8 weeks pre-op        Past Medical History:   Diagnosis Date    Amaurosis fugax of left eye 5/6/2012    Arthritis     OSTEO    CAD (coronary artery disease) 2012    CAROTID LEFT     Cancer ([facility] [address] 2013    PROSTATE Chronic pain     DJD lumbar    Diabetes ([facility] [address] 5/22/2010    Frequent nocturnal awakening     urinary frequency    GERD (gastroesophageal reflux disease) 5/22/2010    Hyperlipidemia 5/22/2010    Hypertension 5/22/2010    Kidney stone 5/22/2010    Nodular goiter     1.3 cm right , FNA 6/15    Obesity (BMI 30-39. 9)     Psychiatric disorder     ANXIETY    Vertigo      Past Surgical History:   Procedure Laterality Date    COLONOSCOPY  3/3/2016         EGD  3/3/2016         HX HEENT      tonsillectomy    HX MOHS PROCEDURES  2010    right    HX ORTHOPAEDIC      coccyx removed    HX UROLOGICAL  2010    left lithotripsy. basket  extraction,ureteral stent    HX VASECTOMY      NEUROLOGICAL PROCEDURE UNLISTED  2011    Steroid injections in epidural    DC CARDIAC SURG PROCEDURE UNLIST  04/2019    3 stents placed    VASCULAR SURGERY PROCEDURE UNLIST  2012    LEFT CAROTID       Medications:  Current Outpatient Medications   Medication Sig Dispense Refill    rosuvastatin (CRESTOR) 40 mg tablet Take 40 mg by mouth daily. furosemide (LASIX) 20 mg tablet TAKE ONE TABLET BY MOUTH DAILY 90 Tablet 1    felodipine (PLENDIL SR) 10 mg 24 hr tablet TAKE ONE TABLET BY MOUTH DAILY FOR high BLOOD PRESSURE 90 Tablet 1    pantoprazole (PROTONIX) 40 mg tablet TAKE ONE TABLET BY MOUTH DAILY 90 Tablet 1    isosorbide mononitrate ER (IMDUR) 60 mg CR tablet TAKE ONE TABLET BY MOUTH EVERY DAY 90 Tablet 1    ranolazine ER (RANEXA) 500 mg SR tablet TAKE ONE TABLET BY MOUTH TWICE DAILY 180 Tablet 1    famotidine (PEPCID) 40 mg tablet Take one Tablet by mouth nightly. 90 Tablet 1    lisinopriL (PRINIVIL, ZESTRIL) 20 mg tablet TAKE ONE TABLET BY MOUTH EVERY DAY 90 Tablet 1    nitroglycerin (NITROSTAT) 0.3 mg SL tablet DISSOLVE ONE TABLET UNDER THE TONGUE every 5 minutes AS needed FOR CHEST PAIN.  100 Tablet 0    clopidogreL (PLAVIX) 75 mg tab TAKE ONE TABLET BY MOUTH EVERY DAY 90 Tablet 2    EPINEPHrine (EpiPen 2-Francisco Javier) 0.3 mg/0.3 mL injection INJECT INTRAMUSCULARLY AS NEEDED FOR ONE DOSE. TAKE WITH 50MG OF BENADRYL AND CALL 911. Dispense Generic 1 Syringe 3    triamcinolone acetonide (KENALOG) 0.1 % topical cream Apply  to affected area two (2) times a day. use thin layer to poison ivy 453 g 0    diclofenac (VOLTAREN) 1 % gel Apply 4 g to affected area four (4) times daily. Painful shoulder. 100 g 3    meclizine (ANTIVERT) 25 mg chewable tablet Take  by mouth three (3) times daily as needed. aspirin delayed-release 81 mg tablet Take 1 Tab by mouth daily. 30 Tab 0    Peak Flow Meter eric Use prior and post nebulizer treatment 1 Device 0    albuterol (PROVENTIL VENTOLIN) 2.5 mg /3 mL (0.083 %) nebulizer solution 3 mL by Nebulization route every six (6) hours as needed for Wheezing or Shortness of Breath. 24 Each 5    omega-3 fatty acids-vitamin e 1,000 mg cap Take 1 Cap by mouth. co-enzyme Q-10 (CO Q-10) 100 mg capsule Take 100 mg by mouth daily. Allergies: Allergies   Allergen Reactions    Bee Sting [Sting, Bee] Anaphylaxis    Vytorin 10-10 [Ezetimibe-Simvastatin] Myalgia    Amlodipine Other (comments)     Creepy crawly sensation, twitches    Lipitor [Atorvastatin] Myalgia       LMP:  No LMP for male patient.     Social History     Socioeconomic History    Marital status:      Spouse name: Not on file    Number of children: Not on file    Years of education: Not on file    Highest education level: Not on file   Occupational History    Not on file   Tobacco Use    Smoking status: Former     Packs/day: 0.50     Years: 4.00     Pack years: 2.00     Types: Cigarettes     Quit date: 1966     Years since quittin.5    Smokeless tobacco: Never   Substance and Sexual Activity    Alcohol use: No    Drug use: No    Sexual activity: Yes     Partners: Female   Other Topics Concern    Not on file   Social History Narrative    Not on file     Social Determinants of Health     Financial Resource Strain: Not on file   Food Insecurity: Not on file   Transportation Needs: Not on file   Physical Activity: Not on file   Stress: Not on file   Social Connections: Not on file   Intimate Partner Violence: Not on file   Housing Stability: Not on file       Family History   Problem Relation Age of Onset    Diabetes Mother     Obesity Mother     Heart Disease Mother     Stroke Father     Heart Disease Sister     Obesity Sister     Diabetes Sister          Consider EST if   -any cardiac symptoms  -PTCA < 6 months OR > 5 years ago    (NO EST if normal EST < 2 years, CABG and NO SX , 5 years, PTCA and NO SX 6 mo to 5 years)    Coumadin   high risk= mechanical heart valve, VTE with hypercoag state, VTE < 3 months  Low risk= AF w/o CVA, h/o DVT > 3 months and NO hypercoag state  high risk:  Bridge with Lovenox  Low risk:  Stop 5 days prior to surgery    Revised Cardiac Risk Index Score: one point for each  High-risk surgery  CAD  CVD  Renal insufficiency  DM    If RCRS > 2 provide beta blockade    ROS:  Headaches:  Yes  Chest Pain:  Yes  SOB:  Yes  Fevers:  Yes  Other significant ROS:  Yes      Physical Exam  Visit Vitals  /71 (BP 1 Location: Right arm, BP Patient Position: Sitting, BP Cuff Size: Large adult)   Pulse (!) 56   Temp 96.9 °F (36.1 °C) (Oral)   Resp 18   Ht 5' 11\" (1.803 m)   Wt 209 lb (94.8 kg)   SpO2 98%   BMI 29.15 kg/m²     Physical Exam  Vitals and nursing note reviewed. Constitutional:       Appearance: Normal appearance. HENT:      Head: Normocephalic and atraumatic. Cardiovascular:      Rate and Rhythm: Normal rate and regular rhythm. Pulses: Normal pulses. Heart sounds: Normal heart sounds. Pulmonary:      Effort: Pulmonary effort is normal.      Breath sounds: Normal breath sounds. Abdominal:      General: Abdomen is flat. Bowel sounds are normal.      Palpations: Abdomen is soft. Musculoskeletal:         General: Normal range of motion. Cervical back: Normal range of motion and neck supple.    Skin:     General: Skin is warm and dry. Neurological:      General: No focal deficit present. Mental Status: He is alert and oriented to person, place, and time. Psychiatric:         Mood and Affect: Mood normal.         Behavior: Behavior normal.         Lab Results:  No results found for this or any previous visit (from the past 24 hour(s)). EKG Results       None              EKG:    Indication:  Male > 39, female > 50, one cardiac risk factor    HGB:  If risk of bleeding    Glucose > age 39 or DM    BHCG in females    Assessment and Plan:    ICD-10-CM ICD-9-CM    1. Age-related nuclear cataract of both eyes  H25.13 366.16       2. Coronary artery disease of native artery of native heart with stable angina pectoris ([facility] [address]  I25.118 414.01      413.9       3. Primary hypertension  I10 401.9 CBC W/O DIFF      METABOLIC PANEL, COMPREHENSIVE      4. Controlled type 2 diabetes mellitus with diabetic nephropathy, without long-term current use of insulin ([facility])  E11.21 250.40 HEMOGLOBIN A1C WITH EAG     583.81 MICROALBUMIN, UR, RAND W/ MICROALB/CREAT RATIO      5. Pure hypercholesterolemia  E78.00 272.0 LIPID PANEL      6. History of prostate cancer  Z85.46 V10.46 PSA W/ REFLX FREE PSA      7. Nocturia  R35.1 788.43 PSA W/ REFLX FREE PSA          Assessment:  Patient is acceptable risk for surgery    Diagnoses and all orders for this visit:    1. Age-related nuclear cataract of both eyes    2. Coronary artery disease of native artery of native heart with stable angina pectoris ([facility] [address]    3. Primary hypertension  -     CBC W/O DIFF; Future  -     METABOLIC PANEL, COMPREHENSIVE; Future    4. Controlled type 2 diabetes mellitus with diabetic nephropathy, without long-term current use of insulin (HCC)  -     HEMOGLOBIN A1C WITH EAG; Future  -     MICROALBUMIN, UR, RAND W/ MICROALB/CREAT RATIO; Future    5. Pure hypercholesterolemia  -     LIPID PANEL; Future    6. History of prostate cancer  -     PSA W/ REFLX FREE PSA; Future    7. Nocturia  -     PSA W/ REFLX FREE PSA; Future      Follow-up and Dispositions    Return in about 3 months (around 1/19/2023).          MD YONI Chang & BALBIR SALDIVAR [facility]  10/19/22

## 2022-10-19 NOTE — PROGRESS NOTES
1. Have you been to the ER, urgent care clinic since your last visit? Hospitalized since your last visit? Yes [facility]    2. Have you seen or consulted any other health care providers outside of the [address] [place] [address] since your last visit? Including any pap smears or colon screening.  No      Health Maintenance Due   Topic Date Due    Shingrix Vaccine Age 49> (1 of 2) Never done    DTaP/Tdap/Td series (2 - Td or Tdap) 06/23/2021    COVID-19 Vaccine (4 - Booster for Moderna series) 02/28/2022    Flu Vaccine (1) 08/01/2022    MICROALBUMIN Q1  11/03/2022    Foot Exam Q1  11/04/2022

## 2022-10-20 ENCOUNTER — TELEPHONE (OUTPATIENT)
Dept: FAMILY MEDICINE CLINIC | Age: 77
End: 2022-10-20

## 2022-10-20 LAB
ALBUMIN SERPL-MCNC: 4 G/DL (ref 3.5–5)
ALBUMIN/GLOB SERPL: 1.5 {RATIO} (ref 1.1–2.2)
ALP SERPL-CCNC: 62 U/L (ref 45–117)
ALT SERPL-CCNC: 22 U/L (ref 12–78)
ANION GAP SERPL CALC-SCNC: 5 MMOL/L (ref 5–15)
AST SERPL-CCNC: 16 U/L (ref 15–37)
BILIRUB SERPL-MCNC: 0.5 MG/DL (ref 0.2–1)
BUN SERPL-MCNC: 27 MG/DL (ref 6–20)
BUN/CREAT SERPL: 15 (ref 12–20)
CALCIUM SERPL-MCNC: 9.5 MG/DL (ref 8.5–10.1)
CHLORIDE SERPL-SCNC: 106 MMOL/L (ref 97–108)
CHOLEST SERPL-MCNC: 174 MG/DL
CO2 SERPL-SCNC: 28 MMOL/L (ref 21–32)
CREAT SERPL-MCNC: 1.82 MG/DL (ref 0.7–1.3)
CREAT UR-MCNC: 67.2 MG/DL
ERYTHROCYTE [DISTWIDTH] IN BLOOD BY AUTOMATED COUNT: 13.6 % (ref 11.5–14.5)
EST. AVERAGE GLUCOSE BLD GHB EST-MCNC: 154 MG/DL
GLOBULIN SER CALC-MCNC: 2.6 G/DL (ref 2–4)
GLUCOSE SERPL-MCNC: 150 MG/DL (ref 65–100)
HBA1C MFR BLD: 7 % (ref 4–5.6)
HCT VFR BLD AUTO: 41 % (ref 36.6–50.3)
HDLC SERPL-MCNC: 61 MG/DL
HDLC SERPL: 2.9 {RATIO} (ref 0–5)
HGB BLD-MCNC: 12.8 G/DL (ref 12.1–17)
LDLC SERPL CALC-MCNC: 80.6 MG/DL (ref 0–100)
MCH RBC QN AUTO: 29.8 PG (ref 26–34)
MCHC RBC AUTO-ENTMCNC: 31.2 G/DL (ref 30–36.5)
MCV RBC AUTO: 95.6 FL (ref 80–99)
MICROALBUMIN UR-MCNC: 1.79 MG/DL
MICROALBUMIN/CREAT UR-RTO: 27 MG/G (ref 0–30)
NRBC # BLD: 0 K/UL (ref 0–0.01)
NRBC BLD-RTO: 0 PER 100 WBC
PLATELET # BLD AUTO: 231 K/UL (ref 150–400)
PMV BLD AUTO: 10.4 FL (ref 8.9–12.9)
POTASSIUM SERPL-SCNC: 4.4 MMOL/L (ref 3.5–5.1)
PROT SERPL-MCNC: 6.6 G/DL (ref 6.4–8.2)
RBC # BLD AUTO: 4.29 M/UL (ref 4.1–5.7)
SODIUM SERPL-SCNC: 139 MMOL/L (ref 136–145)
TRIGL SERPL-MCNC: 162 MG/DL (ref ?–150)
VLDLC SERPL CALC-MCNC: 32.4 MG/DL
WBC # BLD AUTO: 7.1 K/UL (ref 4.1–11.1)

## 2022-10-20 NOTE — LETTER
10/20/2022 11:31 AM    Mr. Jocelyne Valerio [id number]        Current Outpatient Medications:     rosuvastatin (CRESTOR) 40 mg tablet, Take 40 mg by mouth daily. , Disp: , Rfl:     furosemide (LASIX) 20 mg tablet, TAKE ONE TABLET BY MOUTH DAILY, Disp: 90 Tablet, Rfl: 1    felodipine (PLENDIL SR) 10 mg 24 hr tablet, TAKE ONE TABLET BY MOUTH DAILY FOR high BLOOD PRESSURE, Disp: 90 Tablet, Rfl: 1    pantoprazole (PROTONIX) 40 mg tablet, TAKE ONE TABLET BY MOUTH DAILY, Disp: 90 Tablet, Rfl: 1    isosorbide mononitrate ER (IMDUR) 60 mg CR tablet, TAKE ONE TABLET BY MOUTH EVERY DAY, Disp: 90 Tablet, Rfl: 1    ranolazine ER (RANEXA) 500 mg SR tablet, TAKE ONE TABLET BY MOUTH TWICE DAILY, Disp: 180 Tablet, Rfl: 1    famotidine (PEPCID) 40 mg tablet, Take one Tablet by mouth nightly., Disp: 90 Tablet, Rfl: 1    lisinopriL (PRINIVIL, ZESTRIL) 20 mg tablet, TAKE ONE TABLET BY MOUTH EVERY DAY, Disp: 90 Tablet, Rfl: 1    nitroglycerin (NITROSTAT) 0.3 mg SL tablet, DISSOLVE ONE TABLET UNDER THE TONGUE every 5 minutes AS needed FOR CHEST PAIN., Disp: 100 Tablet, Rfl: 0    clopidogreL (PLAVIX) 75 mg tab, TAKE ONE TABLET BY MOUTH EVERY DAY, Disp: 90 Tablet, Rfl: 2    EPINEPHrine (EpiPen 2-Francisco Javier) 0.3 mg/0.3 mL injection, INJECT INTRAMUSCULARLY AS NEEDED FOR ONE DOSE. TAKE WITH 50MG OF BENADRYL AND CALL 911. Dispense Generic, Disp: 1 Syringe, Rfl: 3    triamcinolone acetonide (KENALOG) 0.1 % topical cream, Apply  to affected area two (2) times a day. use thin layer to poison ivy, Disp: 453 g, Rfl: 0    diclofenac (VOLTAREN) 1 % gel, Apply 4 g to affected area four (4) times daily. Painful shoulder., Disp: 100 g, Rfl: 3    meclizine (ANTIVERT) 25 mg chewable tablet, Take  by mouth three (3) times daily as needed. , Disp: , Rfl:     aspirin delayed-release 81 mg tablet, Take 1 Tab by mouth daily. , Disp: 30 Tab, Rfl: 0    Peak Flow Meter eric, Use prior and post nebulizer treatment, Disp: 1 Device, Rfl: 0    albuterol (PROVENTIL VENTOLIN) 2.5 mg /3 mL (0.083 %) nebulizer solution, 3 mL by Nebulization route every six (6) hours as needed for Wheezing or Shortness of Breath., Disp: 24 Each, Rfl: 5    omega-3 fatty acids-vitamin e 1,000 mg cap, Take 1 Cap by mouth., Disp: , Rfl:     co-enzyme Q-10 (CO Q-10) 100 mg capsule, Take 100 mg by mouth daily. , Disp: , Rfl:                 Sincerely,      Ruben Frazier MD

## 2022-10-20 NOTE — TELEPHONE ENCOUNTER
Pt states he needs a copy of his medication list sent to [facility] at [phone].

## 2022-10-21 LAB
PSA SERPL-MCNC: <0.1 NG/ML (ref 0–4)
REFLEX CRITERIA: NORMAL

## 2022-11-18 RX ORDER — CLOPIDOGREL BISULFATE 75 MG/1
TABLET ORAL
Qty: 90 TABLET | Refills: 2 | Status: SHIPPED | OUTPATIENT
Start: 2022-11-18

## 2023-01-03 RX ORDER — FAMOTIDINE 40 MG/1
TABLET, FILM COATED ORAL
Qty: 90 TABLET | Refills: 1 | Status: SHIPPED | OUTPATIENT
Start: 2023-01-03

## 2023-01-09 RX ORDER — ISOSORBIDE MONONITRATE 60 MG/1
TABLET, EXTENDED RELEASE ORAL
Qty: 90 TABLET | Refills: 1 | Status: SHIPPED | OUTPATIENT
Start: 2023-01-09

## 2023-01-21 DIAGNOSIS — I10 ESSENTIAL HYPERTENSION: ICD-10-CM

## 2023-01-23 RX ORDER — LISINOPRIL 20 MG/1
TABLET ORAL
Qty: 90 TABLET | Refills: 1 | Status: SHIPPED | OUTPATIENT
Start: 2023-01-23

## 2023-01-30 ENCOUNTER — OFFICE VISIT (OUTPATIENT)
Dept: FAMILY MEDICINE CLINIC | Age: 78
End: 2023-01-30
Payer: MEDICARE

## 2023-01-30 ENCOUNTER — TELEPHONE (OUTPATIENT)
Dept: FAMILY MEDICINE CLINIC | Age: 78
End: 2023-01-30

## 2023-01-30 VITALS
HEIGHT: 71 IN | BODY MASS INDEX: 28.22 KG/M2 | HEART RATE: 66 BPM | SYSTOLIC BLOOD PRESSURE: 94 MMHG | WEIGHT: 201.6 LBS | TEMPERATURE: 97 F | RESPIRATION RATE: 18 BRPM | DIASTOLIC BLOOD PRESSURE: 56 MMHG

## 2023-01-30 DIAGNOSIS — R05.1 ACUTE COUGH: ICD-10-CM

## 2023-01-30 DIAGNOSIS — U07.1 COVID-19: Primary | ICD-10-CM

## 2023-01-30 DIAGNOSIS — I25.118 CORONARY ARTERY DISEASE OF NATIVE ARTERY OF NATIVE HEART WITH STABLE ANGINA PECTORIS (HCC): ICD-10-CM

## 2023-01-30 DIAGNOSIS — R50.9 FEVER, UNSPECIFIED FEVER CAUSE: ICD-10-CM

## 2023-01-30 DIAGNOSIS — J40 BRONCHITIS: ICD-10-CM

## 2023-01-30 LAB
QUICKVUE INFLUENZA TEST: NEGATIVE
VALID INTERNAL CONTROL?: YES

## 2023-01-30 PROCEDURE — G8536 NO DOC ELDER MAL SCRN: HCPCS | Performed by: FAMILY MEDICINE

## 2023-01-30 PROCEDURE — 87804 INFLUENZA ASSAY W/OPTIC: CPT | Performed by: FAMILY MEDICINE

## 2023-01-30 PROCEDURE — 1123F ACP DISCUSS/DSCN MKR DOCD: CPT | Performed by: FAMILY MEDICINE

## 2023-01-30 PROCEDURE — 1101F PT FALLS ASSESS-DOCD LE1/YR: CPT | Performed by: FAMILY MEDICINE

## 2023-01-30 PROCEDURE — G8432 DEP SCR NOT DOC, RNG: HCPCS | Performed by: FAMILY MEDICINE

## 2023-01-30 PROCEDURE — 3078F DIAST BP <80 MM HG: CPT | Performed by: FAMILY MEDICINE

## 2023-01-30 PROCEDURE — 3074F SYST BP LT 130 MM HG: CPT | Performed by: FAMILY MEDICINE

## 2023-01-30 PROCEDURE — 99214 OFFICE O/P EST MOD 30 MIN: CPT | Performed by: FAMILY MEDICINE

## 2023-01-30 PROCEDURE — G8417 CALC BMI ABV UP PARAM F/U: HCPCS | Performed by: FAMILY MEDICINE

## 2023-01-30 PROCEDURE — G8427 DOCREV CUR MEDS BY ELIG CLIN: HCPCS | Performed by: FAMILY MEDICINE

## 2023-01-30 RX ORDER — AZITHROMYCIN 250 MG/1
TABLET, FILM COATED ORAL
Qty: 6 TABLET | Refills: 0 | Status: SHIPPED | OUTPATIENT
Start: 2023-01-30 | End: 2023-02-04

## 2023-01-30 RX ORDER — ALBUTEROL SULFATE 0.83 MG/ML
2.5 SOLUTION RESPIRATORY (INHALATION)
Qty: 40 EACH | Refills: 1 | Status: SHIPPED | OUTPATIENT
Start: 2023-01-30

## 2023-01-30 RX ORDER — BENZONATATE 200 MG/1
200 CAPSULE ORAL
Qty: 60 CAPSULE | Refills: 1 | Status: SHIPPED | OUTPATIENT
Start: 2023-01-30

## 2023-01-30 NOTE — PROGRESS NOTES
1. Have you been to the ER, urgent care clinic since your last visit? Hospitalized since your last visit? No    2. Have you seen or consulted any other health care providers outside of the [address] [place] [address] since your last visit? Including any pap smears or colon screening.  No      Health Maintenance Due   Topic Date Due    Shingles Vaccine (1 of 2) Never done    DTaP/Tdap/Td series (2 - Td or Tdap) 06/23/2021    COVID-19 Vaccine (4 - Booster for Moderna series) 12/23/2021    Flu Vaccine (1) 08/01/2022    Foot Exam Q1  11/04/2022

## 2023-01-30 NOTE — TELEPHONE ENCOUNTER
Pt has a cough for about 4 days. Mucus and low-grade temp. Coughed so much he is sore. Nothing available. Please call Pt.

## 2023-01-30 NOTE — PROGRESS NOTES
[address] [facility]    History of Present Illness:   Lisa Parker is a 76 y.o. male with history of  HTN, CAD, Carotid artery disease, GERD, Diabetes, CKD, Prostate, HLD  CC: Cough/COVID-19  History provided by patient and Records    HPI:  COVID: Patient with 5 days of cough, congestion, elevated temperature for a few days, congestion. Patient wife was diagnosed with COVID-19 today, though symptoms started recently. Patient states that overall his energy is less than normal.  Has had reduced appetite as well. Tested negative for flu. CAD: Stable at this time. No issues at this time. Health Maintenance  Health Maintenance Due   Topic Date Due    Shingles Vaccine (1 of 2) Never done    DTaP/Tdap/Td series (2 - Td or Tdap) 06/23/2021    COVID-19 Vaccine (4 - Booster for Moderna series) 12/23/2021    Flu Vaccine (1) 08/01/2022    Foot Exam Q1  11/04/2022       Past Medical, Family, and Social History:     Current Outpatient Medications on File Prior to Visit   Medication Sig Dispense Refill    lisinopriL (PRINIVIL, ZESTRIL) 20 mg tablet TAKE ONE TABLET BY MOUTH EVERY DAY 90 Tablet 1    isosorbide mononitrate ER (IMDUR) 60 mg CR tablet TAKE ONE TABLET BY MOUTH EVERY DAY 90 Tablet 1    famotidine (PEPCID) 40 mg tablet TAKE ONE TABLET BY MOUTH NIGHTLY 90 Tablet 1    clopidogreL (PLAVIX) 75 mg tab TAKE ONE TABLET BY MOUTH EVERY DAY 90 Tablet 2    rosuvastatin (CRESTOR) 40 mg tablet Take 40 mg by mouth daily.       furosemide (LASIX) 20 mg tablet TAKE ONE TABLET BY MOUTH DAILY 90 Tablet 1    felodipine (PLENDIL SR) 10 mg 24 hr tablet TAKE ONE TABLET BY MOUTH DAILY FOR high BLOOD PRESSURE 90 Tablet 1    pantoprazole (PROTONIX) 40 mg tablet TAKE ONE TABLET BY MOUTH DAILY 90 Tablet 1    ranolazine ER (RANEXA) 500 mg SR tablet TAKE ONE TABLET BY MOUTH TWICE DAILY 180 Tablet 1    nitroglycerin (NITROSTAT) 0.3 mg SL tablet DISSOLVE ONE TABLET UNDER THE TONGUE every 5 minutes AS needed FOR CHEST PAIN. 100 Tablet 0    EPINEPHrine (EpiPen 2-Francisco Javier) 0.3 mg/0.3 mL injection INJECT INTRAMUSCULARLY AS NEEDED FOR ONE DOSE. TAKE WITH 50MG OF BENADRYL AND CALL 911. Dispense Generic 1 Syringe 3    triamcinolone acetonide (KENALOG) 0.1 % topical cream Apply  to affected area two (2) times a day. use thin layer to poison ivy 453 g 0    diclofenac (VOLTAREN) 1 % gel Apply 4 g to affected area four (4) times daily. Painful shoulder. 100 g 3    meclizine (ANTIVERT) 25 mg chewable tablet Take  by mouth three (3) times daily as needed. aspirin delayed-release 81 mg tablet Take 1 Tab by mouth daily. 30 Tab 0    omega-3 fatty acids-vitamin e 1,000 mg cap Take 1 Cap by mouth. Peak Flow Meter eric Use prior and post nebulizer treatment 1 Device 0    [DISCONTINUED] albuterol (PROVENTIL VENTOLIN) 2.5 mg /3 mL (0.083 %) nebulizer solution 3 mL by Nebulization route every six (6) hours as needed for Wheezing or Shortness of Breath. 24 Each 5    co-enzyme Q-10 (CO Q-10) 100 mg capsule Take 100 mg by mouth daily. No current facility-administered medications on file prior to visit.        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.21    Type 2 diabetes with nephropathy (HCC) E11.21    CKD stage 3 due to type 2 diabetes mellitus (HCC) E11.22, N18.30    Heart murmur R01.1    Age-related nuclear cataract of both eyes H25.13    PVD (posterior vitreous detachment), left eye H43.812    History of angina Z86.79    CAD (coronary artery disease) I25.10    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 (HCC) I25.118    History of prostate cancer Z85.46 Chronic renal disease, stage III N18.30       Social History     Socioeconomic History    Marital status:    Tobacco Use    Smoking status: Former     Packs/day: 0.50     Years: 4.00     Pack years: 2.00     Types: Cigarettes     Quit date: 1966     Years since quittin.8    Smokeless tobacco: Never   Substance and Sexual Activity    Alcohol use: No    Drug use: No    Sexual activity: Yes     Partners: Female        Review of Systems   Review of Systems   Constitutional:  Positive for malaise/fatigue. Negative for chills and fever. Cardiovascular:  Negative for chest pain and palpitations. Gastrointestinal:  Negative for diarrhea, heartburn and nausea. Musculoskeletal:  Positive for myalgias. Neurological:  Negative for dizziness and headaches. Objective:   Visit Vitals  BP (!) 94/56 (BP 1 Location: Left arm, BP Patient Position: Sitting, BP Cuff Size: Adult)   Pulse 66   Temp 97 °F (36.1 °C) (Oral)   Resp 18   Ht 5' 11\" (1.803 m)   Wt 201 lb 9.6 oz (91.4 kg)   BMI 28.12 kg/m²        Physical Exam  Vitals and nursing note reviewed. Constitutional:       Appearance: Normal appearance. HENT:      Head: Normocephalic and atraumatic. Cardiovascular:      Rate and Rhythm: Normal rate and regular rhythm. Pulses: Normal pulses. Heart sounds: Normal heart sounds. No murmur heard. No friction rub. No gallop. Pulmonary:      Breath sounds: Wheezing and rales present. Abdominal:      General: Abdomen is flat. Bowel sounds are normal.      Palpations: Abdomen is soft. Musculoskeletal:      Cervical back: Normal range of motion and neck supple. Skin:     General: Skin is warm and dry. Neurological:      Mental Status: He is alert. Pertinent Labs/Studies:      Assessment and orders:       ICD-10-CM ICD-9-CM    1. COVID-19  U07.1 079.89 benzonatate (TESSALON) 200 mg capsule      albuterol (PROVENTIL VENTOLIN) 2.5 mg /3 mL (0.083 %) nebu      2.  Bronchitis  J40 490 azithromycin (ZITHROMAX) 250 mg tablet      3. Coronary artery disease of native artery of native heart with stable angina pectoris ([facility]  I25.118 414.01      413.9         Diagnoses and all orders for this visit:    1. COVID-19: Avoiding Paxlovid due to drug interactions. Symptomatic therapies. -     benzonatate (TESSALON) 200 mg capsule; Take 1 Capsule by mouth three (3) times daily as needed for Cough. -     albuterol (PROVENTIL VENTOLIN) 2.5 mg /3 mL (0.083 %) nebu; 3 mL by Nebulization route every six (6) hours as needed for Wheezing or Shortness of Breath. 2. Bronchitis: Avoiding Paxlovid, Azithromycin for potential PNA development  -     azithromycin (ZITHROMAX) 250 mg tablet; Take 2 tablets today, then take 1 tablet daily    3. Coronary artery disease of native artery of native heart with stable angina pectoris [facility])    Follow-up and Dispositions    Return in about 4 weeks (around 2/27/2023) for Chronic Follow up. I have discussed the diagnosis with the patient and the intended plan as seen in the above orders. Social history, medical history, and labs were reviewed. The patient has received an after-visit summary and questions were answered concerning future plans. I have discussed medication side effects and warnings with the patient as well.     MD YONI Cruz & BALBIR SALDIVAR [facility]  01/30/23

## 2023-01-31 ENCOUNTER — TELEPHONE (OUTPATIENT)
Dept: FAMILY MEDICINE CLINIC | Age: 78
End: 2023-01-31

## 2023-02-01 ENCOUNTER — TELEPHONE (OUTPATIENT)
Dept: FAMILY MEDICINE CLINIC | Age: 78
End: 2023-02-01

## 2023-02-01 LAB
SARS-COV-2 RNA RESP QL NAA+PROBE: DETECTED
SARS-COV-2, NAA 2 DAY TAT: NORMAL

## 2023-02-06 RX ORDER — RANOLAZINE 500 MG/1
TABLET, EXTENDED RELEASE ORAL
Qty: 180 TABLET | Refills: 1 | Status: SHIPPED | OUTPATIENT
Start: 2023-02-06

## 2023-02-27 DIAGNOSIS — I10 ESSENTIAL HYPERTENSION: ICD-10-CM

## 2023-02-27 DIAGNOSIS — K21.00 GASTROESOPHAGEAL REFLUX DISEASE WITH ESOPHAGITIS: ICD-10-CM

## 2023-02-27 DIAGNOSIS — E78.00 PURE HYPERCHOLESTEROLEMIA, UNSPECIFIED: ICD-10-CM

## 2023-02-27 RX ORDER — FELODIPINE 10 MG/1
TABLET, EXTENDED RELEASE ORAL
Qty: 90 TABLET | Refills: 1 | Status: SHIPPED | OUTPATIENT
Start: 2023-02-27

## 2023-02-27 RX ORDER — FUROSEMIDE 20 MG/1
TABLET ORAL
Qty: 90 TABLET | Refills: 1 | Status: SHIPPED | OUTPATIENT
Start: 2023-02-27

## 2023-02-27 RX ORDER — ROSUVASTATIN CALCIUM 40 MG/1
TABLET, COATED ORAL
Qty: 90 TABLET | Refills: 1 | Status: SHIPPED | OUTPATIENT
Start: 2023-02-27

## 2023-02-27 RX ORDER — PANTOPRAZOLE SODIUM 40 MG/1
TABLET, DELAYED RELEASE ORAL
Qty: 90 TABLET | Refills: 1 | Status: SHIPPED | OUTPATIENT
Start: 2023-02-27

## 2023-03-09 ENCOUNTER — OFFICE VISIT (OUTPATIENT)
Dept: FAMILY MEDICINE CLINIC | Age: 78
End: 2023-03-09
Payer: MEDICARE

## 2023-03-09 VITALS
HEART RATE: 58 BPM | RESPIRATION RATE: 18 BRPM | OXYGEN SATURATION: 100 % | BODY MASS INDEX: 29.4 KG/M2 | TEMPERATURE: 97 F | DIASTOLIC BLOOD PRESSURE: 76 MMHG | WEIGHT: 210 LBS | HEIGHT: 71 IN | SYSTOLIC BLOOD PRESSURE: 129 MMHG

## 2023-03-09 DIAGNOSIS — R01.1 HEART MURMUR: Chronic | ICD-10-CM

## 2023-03-09 DIAGNOSIS — E11.21 CONTROLLED TYPE 2 DIABETES MELLITUS WITH DIABETIC NEPHROPATHY, WITHOUT LONG-TERM CURRENT USE OF INSULIN (HCC): ICD-10-CM

## 2023-03-09 DIAGNOSIS — N18.30 STAGE 3 CHRONIC KIDNEY DISEASE, UNSPECIFIED WHETHER STAGE 3A OR 3B CKD (HCC): ICD-10-CM

## 2023-03-09 DIAGNOSIS — I25.118 CORONARY ARTERY DISEASE OF NATIVE ARTERY OF NATIVE HEART WITH STABLE ANGINA PECTORIS (HCC): ICD-10-CM

## 2023-03-09 DIAGNOSIS — E78.00 PURE HYPERCHOLESTEROLEMIA: Chronic | ICD-10-CM

## 2023-03-09 DIAGNOSIS — K21.00 GASTROESOPHAGEAL REFLUX DISEASE WITH ESOPHAGITIS WITHOUT HEMORRHAGE: Chronic | ICD-10-CM

## 2023-03-09 DIAGNOSIS — E55.9 VITAMIN D DEFICIENCY: Chronic | ICD-10-CM

## 2023-03-09 DIAGNOSIS — Z00.00 MEDICARE ANNUAL WELLNESS VISIT, SUBSEQUENT: Primary | ICD-10-CM

## 2023-03-09 PROCEDURE — 99214 OFFICE O/P EST MOD 30 MIN: CPT | Performed by: FAMILY MEDICINE

## 2023-03-09 PROCEDURE — 1101F PT FALLS ASSESS-DOCD LE1/YR: CPT | Performed by: FAMILY MEDICINE

## 2023-03-09 PROCEDURE — G8510 SCR DEP NEG, NO PLAN REQD: HCPCS | Performed by: FAMILY MEDICINE

## 2023-03-09 PROCEDURE — G0439 PPPS, SUBSEQ VISIT: HCPCS | Performed by: FAMILY MEDICINE

## 2023-03-09 PROCEDURE — 1123F ACP DISCUSS/DSCN MKR DOCD: CPT | Performed by: FAMILY MEDICINE

## 2023-03-09 PROCEDURE — 3078F DIAST BP <80 MM HG: CPT | Performed by: FAMILY MEDICINE

## 2023-03-09 PROCEDURE — G8536 NO DOC ELDER MAL SCRN: HCPCS | Performed by: FAMILY MEDICINE

## 2023-03-09 PROCEDURE — 3074F SYST BP LT 130 MM HG: CPT | Performed by: FAMILY MEDICINE

## 2023-03-09 PROCEDURE — G8417 CALC BMI ABV UP PARAM F/U: HCPCS | Performed by: FAMILY MEDICINE

## 2023-03-09 PROCEDURE — G8427 DOCREV CUR MEDS BY ELIG CLIN: HCPCS | Performed by: FAMILY MEDICINE

## 2023-03-09 NOTE — PROGRESS NOTES
[facility]    History of Present Illness:   Yoandy Nice is a 76 y.o. male with history of HTN, CAD, Carotid artery disease, GERD, Diabetes, CKD, Prostate, HLD  CC: Medicare Wellness, Follow up Chronic conditions  History provided by patient and Records    HPI:  Coronary Artery Disease Follow up: Today patient reports he is feeling well and overall his symptoms are controlled on his current medications. he  has not had a history of acute myocardial infarction in the past.  Prior management has included:   - Coronary stenting: YES  - Coronary bypass: NO      he has had CHF   09/23/22    ECHO ADULT COMPLETE 09/23/2022 9/23/2022    Interpretation Summary    Left Ventricle: Normal left ventricular systolic function with an estimated EF of 55 - 60%. Left ventricle size is normal. Normal wall thickness. Normal wall motion. Grade I diastolic dysfunction. Aortic Valve: Tricuspid valve. Calcified cusp. Mild stenosis of the aortic valve. AV mean gradient is 10 mmHg. AV peak gradient is 20 mmHg. AV peak velocity is 2.2 m/s. AV area by continuity VTI is 1.8 cm2. Signed by: Araceli Mckenzie DO on 9/23/2022  1:22 PM     Key CAD CHF Meds               rosuvastatin (CRESTOR) 40 mg tablet (Taking) TAKE ONE TABLET BY MOUTH EVERY DAY    furosemide (LASIX) 20 mg tablet (Taking) TAKE ONE TABLET BY MOUTH DAILY    felodipine (PLENDIL SR) 10 mg 24 hr tablet (Taking) TAKE ONE TABLET BY MOUTH DAILY FOR high BLOOD PRESSURE    ranolazine ER (RANEXA) 500 mg SR tablet (Taking) TAKE ONE TABLET BY MOUTH TWICE DAILY    lisinopriL (PRINIVIL, ZESTRIL) 20 mg tablet (Taking) TAKE ONE TABLET BY MOUTH EVERY DAY    isosorbide mononitrate ER (IMDUR) 60 mg CR tablet (Taking) TAKE ONE TABLET BY MOUTH EVERY DAY    clopidogreL (PLAVIX) 75 mg tab (Taking) TAKE ONE TABLET BY MOUTH EVERY DAY    nitroglycerin (NITROSTAT) 0.3 mg SL tablet (Taking) DISSOLVE ONE TABLET UNDER THE TONGUE every 5 minutes AS needed FOR CHEST PAIN. aspirin delayed-release 81 mg tablet (Taking) Take 1 Tab by mouth daily. omega-3 fatty acids-vitamin e 1,000 mg cap (Taking) Take 1 Cap by mouth. he is on a statin  he is on antiplatelet therapy. he is on a beta blocker. he is on a regular Nitrate therapy. he does use Nitroglycerin as needed for chest pain. Residual symptoms of CAD include occasional pains in the left chest, though has been moving stuff at home. Patient denies any current dyspnea, exertional chest pressure/discomfort. Risk factors are controlled or at goal.  Primary risk factors include diabetes, elevated cholesterol and hypertension    Diabetes Follow up: Overall the patient feels well with good energy level. Current Medications:   Key Antihyperglycemic Medications       Patient is on no antihyperglycemic meds. .   Insulin dependence: NO              Frequency of home glucose testing: prn              Blood Sugar range at home: N/A                  Last eye exam: In past 12 months. Last foot exam: This year.               Polyuria, polyphagia or polydipsia: NO              Retinopathy: NO              Neuropathy SX: NO              Low blood sugar symptoms: NO              Dietary compliance: compliant most of the time              Medication compliance: compliant most of the time              On ASA: YES              Tobacco Use: NO              Depression: NO     Wt Readings from Last 3 Encounters:   03/09/23 210 lb (95.3 kg)   01/30/23 201 lb 9.6 oz (91.4 kg)   10/19/22 209 lb (94.8 kg)      Lab Results   Component Value Date/Time    Hemoglobin A1c 7.0 (H) 10/19/2022 08:55 AM    Hemoglobin A1c (POC) 5.8 04/20/2015 08:44 AM      Lab Results   Component Value Date/Time    Microalbumin/Creat ratio (mg/g creat) 27 10/19/2022 08:55 AM    Microalbumin,urine random 1.79 10/19/2022 08:55 AM    Microalbumin urine (POC) 10 10/04/2010 01:54 PM       Lab Results   Component Value Date/Time    Creatinine (POC) 1.0 02/24/2017 09:20 AM    Creatinine 1.82 (H) 10/19/2022 08:55 AM         Chronic Kidney Disease:  Patient reports overall doing well, patient denies any current complaints at this time. - Etiology: HTN/DM   - Symptoms: None  - CKD Stage: III  - Nephrologist: None  - Current Treatments: renal diet  - Dialysis Status: na  - Cardiovascular risk factor reduction including hypertension    Gastroesophageal Reflux:  Current control of Symptoms: Controlled  Primary symptoms: heartburn  Hiatal Hernia: No  Current Medications: Protonix  The patient has no history melena or bright red blood in the stools. The patient avoids high dose aspirin and NSAID therapy. The patient is aware of diet changes needed, elevating the head of the bed and appropriate use of antacids. Health Maintenance  Health Maintenance Due   Topic Date Due    Shingles Vaccine (1 of 2) Never done    DTaP/Tdap/Td series (2 - Td or Tdap) 06/23/2021    COVID-19 Vaccine (4 - Booster for Moderna series) 12/23/2021    Flu Vaccine (1) 08/01/2022       Past Medical, Family, and Social History:     Current Outpatient Medications on File Prior to Visit   Medication Sig Dispense Refill    rosuvastatin (CRESTOR) 40 mg tablet TAKE ONE TABLET BY MOUTH EVERY DAY 90 Tablet 1    furosemide (LASIX) 20 mg tablet TAKE ONE TABLET BY MOUTH DAILY 90 Tablet 1    felodipine (PLENDIL SR) 10 mg 24 hr tablet TAKE ONE TABLET BY MOUTH DAILY FOR high BLOOD PRESSURE 90 Tablet 1    pantoprazole (PROTONIX) 40 mg tablet TAKE ONE TABLET BY MOUTH DAILY 90 Tablet 1    ranolazine ER (RANEXA) 500 mg SR tablet TAKE ONE TABLET BY MOUTH TWICE DAILY 180 Tablet 1    benzonatate (TESSALON) 200 mg capsule Take 1 Capsule by mouth three (3) times daily as needed for Cough. 60 Capsule 1    albuterol (PROVENTIL VENTOLIN) 2.5 mg /3 mL (0.083 %) nebu 3 mL by Nebulization route every six (6) hours as needed for Wheezing or Shortness of Breath.  40 Each 1    lisinopriL (PRINIVIL, ZESTRIL) 20 mg tablet TAKE ONE TABLET BY MOUTH EVERY DAY 90 Tablet 1    isosorbide mononitrate ER (IMDUR) 60 mg CR tablet TAKE ONE TABLET BY MOUTH EVERY DAY 90 Tablet 1    famotidine (PEPCID) 40 mg tablet TAKE ONE TABLET BY MOUTH NIGHTLY 90 Tablet 1    clopidogreL (PLAVIX) 75 mg tab TAKE ONE TABLET BY MOUTH EVERY DAY 90 Tablet 2    nitroglycerin (NITROSTAT) 0.3 mg SL tablet DISSOLVE ONE TABLET UNDER THE TONGUE every 5 minutes AS needed FOR CHEST PAIN. 100 Tablet 0    EPINEPHrine (EpiPen 2-Francisco Javier) 0.3 mg/0.3 mL injection INJECT INTRAMUSCULARLY AS NEEDED FOR ONE DOSE. TAKE WITH 50MG OF BENADRYL AND CALL 911. Dispense Generic 1 Syringe 3    triamcinolone acetonide (KENALOG) 0.1 % topical cream Apply  to affected area two (2) times a day. use thin layer to poison ivy 453 g 0    diclofenac (VOLTAREN) 1 % gel Apply 4 g to affected area four (4) times daily. Painful shoulder. 100 g 3    meclizine (ANTIVERT) 25 mg chewable tablet Take  by mouth three (3) times daily as needed. aspirin delayed-release 81 mg tablet Take 1 Tab by mouth daily. 30 Tab 0    Peak Flow Meter eric Use prior and post nebulizer treatment 1 Device 0    omega-3 fatty acids-vitamin e 1,000 mg cap Take 1 Cap by mouth. co-enzyme Q-10 (CO Q-10) 100 mg capsule Take 100 mg by mouth daily. No current facility-administered medications on file prior to visit.        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.21    Type 2 diabetes with nephropathy (HCC) E11.21    CKD stage 3 due to type 2 diabetes mellitus (HCC) E11.22, N18.30    Heart murmur R01.1    Age-related nuclear cataract of both eyes H25.13    PVD (posterior vitreous detachment), left eye H43.812    History of angina Z86.79    CAD (coronary artery disease) I25.10    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 Cardiovascular:  Negative for chest pain and palpitations. Gastrointestinal:  Negative for abdominal pain, nausea and vomiting. Neurological:  Negative for dizziness and headaches. Objective:   Visit Vitals  /76 (BP 1 Location: Left arm, BP Patient Position: Sitting, BP Cuff Size: Adult)   Pulse (!) 58   Temp 97 °F (36.1 °C) (Rectal)   Resp 18   Ht 5' 11\" (1.803 m)   Wt 210 lb (95.3 kg)   SpO2 100%   BMI 29.29 kg/m²        Physical Exam  Vitals and nursing note reviewed. Constitutional:       Appearance: Normal appearance. HENT:      Head: Normocephalic and atraumatic. Cardiovascular:      Rate and Rhythm: Normal rate and regular rhythm. Pulses: Normal pulses. Heart sounds: Murmur heard. Pulmonary:      Effort: Pulmonary effort is normal.      Breath sounds: Normal breath sounds. Abdominal:      General: Abdomen is flat. Bowel sounds are normal.      Palpations: Abdomen is soft. Musculoskeletal:         General: Normal range of motion. Cervical back: Normal range of motion and neck supple. Skin:     General: Skin is warm and dry. Neurological:      Mental Status: He is alert.      Diabetic foot exam:     Left Foot:   Visual Exam: normal    Pulse DP: 2+ (normal)   Filament test: normal sensation    Vibratory sensation: normal      Right Foot:   Visual Exam: normal    Pulse DP: 2+ (normal)   Filament test: normal sensation    Vibratory sensation: normal       Pertinent Labs/Studies:      Assessment and orders:       ICD-10-CM ICD-9-CM    1. Coronary artery disease of native artery of native heart with stable angina pectoris ([facility]  I25.118 414.01 CBC W/O DIFF     413.0 METABOLIC PANEL, COMPREHENSIVE      2. Controlled type 2 diabetes mellitus with diabetic nephropathy, without long-term current use of insulin ([facility])  E11.21 250.40 LIPID PANEL     583.81 HEMOGLOBIN A1C WITH EAG       DIABETES FOOT EXAM      3. Stage 3 chronic kidney disease, unspecified whether stage 3a or 3b CKD ([facility])  N18.30 585.3       4. Gastroesophageal reflux disease with esophagitis without hemorrhage  K21.00 530.81      530.10       5. Pure hypercholesterolemia  E78.00 272.0 LIPID PANEL      6. Vitamin D deficiency  E55.9 268.9 VITAMIN D, 25 HYDROXY      7. Heart murmur  R01.1 785. 2         Diagnoses and all orders for this visit:    1. Coronary artery disease of native artery of native heart with stable angina pectoris ([facility])  -     CBC W/O DIFF; Future  -     METABOLIC PANEL, COMPREHENSIVE; Future    2. Controlled type 2 diabetes mellitus with diabetic nephropathy, without long-term current use of insulin ([facility]  -     LIPID PANEL; Future  -     HEMOGLOBIN A1C WITH EAG; Future  -      DIABETES FOOT EXAM    3. Stage 3 chronic kidney disease, unspecified whether stage 3a or 3b CKD ([facility]    4. Gastroesophageal reflux disease with esophagitis without hemorrhage    5. Pure hypercholesterolemia  -     LIPID PANEL; Future    6. Vitamin D deficiency  -     VITAMIN D, 25 HYDROXY; Future    7. Heart murmur    Follow-up and Dispositions    Return in about 3 months (around 6/9/2023).            I have discussed the diagnosis with the patient and the intended plan as seen in the above orders. Social history, medical history, and labs were reviewed. The patient has received an after-visit summary and questions were answered concerning future plans. I have discussed medication side effects and warnings with the patient as well.     MD YONI House & BALBIR SALDIVAR [facility]  03/09/23

## 2023-03-09 NOTE — PROGRESS NOTES
This is the Subsequent Medicare Annual Wellness Exam, performed 12 months or more after the Initial AWV or the last Subsequent AWV    I have reviewed the patient's medical history in detail and updated the computerized patient record. History     Past Medical History:   Diagnosis Date    Amaurosis fugax of left eye 5/6/2012    Arthritis     OSTEO    CAD (coronary artery disease) 2012    CAROTID LEFT     Cancer ([facility] [address] 2013    PROSTATE    Chronic pain     DJD lumbar    Diabetes ([facility] [address] 5/22/2010    Frequent nocturnal awakening     urinary frequency    GERD (gastroesophageal reflux disease) 5/22/2010    Hyperlipidemia 5/22/2010    Hypertension 5/22/2010    Kidney stone 5/22/2010    Nodular goiter     1.3 cm right , FNA 6/15    Obesity (BMI 30-39. 9)     Psychiatric disorder     ANXIETY    Vertigo       Past Surgical History:   Procedure Laterality Date    COLONOSCOPY  3/3/2016         EGD  3/3/2016         HX HEENT      tonsillectomy    HX MOHS PROCEDURES  2010    right    HX ORTHOPAEDIC      coccyx removed    HX UROLOGICAL  2010    left lithotripsy. basket  extraction,ureteral stent    HX VASECTOMY      [place] UNLISTED NEUROLOGICAL/NEUROMUSCULAR DX PX  2011    Steroid injections in epidural    [place] UNLISTED PROCEDURE CARDIAC SURGERY  04/2019    3 stents placed    [place] UNLISTED PROCEDURE VASCULAR SURGERY  2012    LEFT CAROTID     Allergies   Allergen Reactions    Bee Sting [Sting, Bee] Anaphylaxis    Vytorin 10-10 [Ezetimibe-Simvastatin] Myalgia    Amlodipine Other (comments)     Creepy crawly sensation, twitches    Lipitor [Atorvastatin] Myalgia     Family History   Problem Relation Age of Onset    Diabetes Mother     Obesity Mother     Heart Disease Mother     Stroke Father     Heart Disease Sister     Obesity Sister     Diabetes Sister      Social History     Tobacco Use    Smoking status: Former     Packs/day: 0.50     Years: 4.00     Pack years: 2.00     Types: Cigarettes     Quit date: 4/16/1966     Years since quitting: 56.9    Smokeless tobacco: Never   Substance Use Topics    Alcohol use: No     Depression Risk Factor Screening:     3 most recent PHQ Screens 3/9/2023   Little interest or pleasure in doing things Not at all   Feeling down, depressed, irritable, or hopeless Not at all   Total Score PHQ 2 0     Alcohol Risk Factor Screening:    Do you average more than 1 drink per night or more than 7 drinks a week: No    In the past three months have you have had more than 4 drinks containing alcohol on one occasion: No  Functional Ability and Level of Safety:   Hearing Loss  Hearing is good. Activities of Daily Living  The home contains: handrails and grab bars  Patient does total self care  ADL Assessment 3/27/2017   Feeding yourself No Help Needed   Getting from bed to chair No Help Needed   Getting dressed No Help Needed   Bathing or showering No Help Needed   Walk across the room (includes cane/walker) No Help Needed   Using the telphone No Help Needed   Taking your medications No Help Needed   Preparing meals No Help Needed   Managing money (expenses/bills) No Help Needed   Moderately strenuous housework (laundry) No Help Needed   Shopping for personal items (toiletries/medicines) No Help Needed   Shopping for groceries No Help Needed   Driving No Help Needed   Climbing a flight of stairs No Help Needed   Getting to places beyond walking distances No Help Needed       Ambulation: with no difficulty    Fall Risk  Fall Risk Assessment, last 12 mths 3/9/2023   Able to walk? Yes   Fall in past 12 months? 0   Do you feel unsteady? 0   Are you worried about falling 0       Abuse Screen  Abuse Screening Questionnaire 3/8/2022   Do you ever feel afraid of your partner? N   Are you in a relationship with someone who physically or mentally threatens you? N   Is it safe for you to go home?  Y     Cognitive Screening   Evaluation of Cognitive Function:  Has your family/caregiver stated any concerns about your memory: no  Normal  Mini Mental State Exam 3/9/2023   What is the Year 1   What is the Season 1   What is the Date 1   What is the Day 1   What is the Month 1   Where are we State 1   Where are we Country 1   Where are we [address] [place] [address] or [place] 1   Where are we Floor 1   Name three objects, then ask the patient to say them 3   Serial sevens Subtract 7 from 100 in increments 5   Ask for the three objects repeated above 3   Name a pencil 1   Name a watch 1   Have the patient repeat this phrase \"No ifs, ands, or buts\" 1   Three stage command: Take the paper in your right hand 1   Fold the paper in half 1   Put the paper on the floor 1   Read and obey the followin 1EQ 1   Have the patient write a sentence 1   Have the patient copy a figure 1   Mini Mental Score 30   Some recent data might be hidden     Patient Care Team   Patient Care Team:  King Gomez MD as PCP - General (Family Medicine)  King Gomez MD as PCP - [facility]  Annmarie Spann MD (General and Vascular Surgery)  Sonya Jaime MD (Neurology)  Gopi Castano MD as Surgeon (Urology)  Fabien Potter MD (Endocrinology Physician)  King Maegan MD (Dermatology Physician)  Yari Houser DO as Physician (Cardiovascular Disease Physician)  Cecelia Franks MD (Ophthalmology)  Assessment/Plan   Education and counseling provided:  Are appropriate based on today's review and evaluation    Diagnoses and all orders for this visit:    1. Medicare annual wellness visit, subsequent    2. Coronary artery disease of native artery of native heart with stable angina pectoris (HCC)  -     CBC W/O DIFF; Future  -     METABOLIC PANEL, COMPREHENSIVE; Future    3. Controlled type 2 diabetes mellitus with diabetic nephropathy, without long-term current use of insulin ([facility] [address]  -     LIPID PANEL; Future  -     HEMOGLOBIN A1C WITH EAG;  Future  -      DIABETES FOOT EXAM    4. Stage 3 chronic kidney disease, unspecified whether stage 3a or 3b CKD ([facility] [address]    5. Gastroesophageal reflux disease with esophagitis without hemorrhage    6. Pure hypercholesterolemia  -     LIPID PANEL; Future    7. Vitamin D deficiency  -     VITAMIN D, 25 HYDROXY; Future    8.  Heart murmur      Health Maintenance Topics with due status: Overdue       Topic Date Due    Shingles Vaccine Never done    DTaP/Tdap/Td series 06/23/2021    COVID-19 Vaccine 12/23/2021    Flu Vaccine 08/01/2022     Health Maintenance Topics with due status: Not Due       Topic Last Completion Date    A1C test (Diabetic or Prediabetic) 10/19/2022    Lipid Screen 10/19/2022    Eye Exam Retinal or Dilated 12/02/2022    Depression Screen 01/30/2023    Medicare Yearly Exam 03/09/2023    Foot Exam Q1 03/09/2023     Health Maintenance Topics with due status: Completed       Topic Last Completion Date    Pneumococcal 65+ years 06/08/2020    Hepatitis C Screening 11/03/2021

## 2023-03-09 NOTE — PROGRESS NOTES
1. Have you been to the ER, urgent care clinic since your last visit? Hospitalized since your last visit? No    2. Have you seen or consulted any other health care providers outside of the [address] [place] [address] since your last visit? Including any pap smears or colon screening.  No      Health Maintenance Due   Topic Date Due    Shingles Vaccine (1 of 2) Never done    DTaP/Tdap/Td series (2 - Td or Tdap) 06/23/2021    COVID-19 Vaccine (4 - Booster for Moderna series) 12/23/2021    Flu Vaccine (1) 08/01/2022    Foot Exam Q1  11/04/2022    Medicare Yearly Exam  03/09/2023

## 2023-03-10 LAB
25(OH)D3 SERPL-MCNC: 20.6 NG/ML (ref 30–100)
ALBUMIN SERPL-MCNC: 3.8 G/DL (ref 3.5–5)
ALBUMIN/GLOB SERPL: 1.5 (ref 1.1–2.2)
ALP SERPL-CCNC: 55 U/L (ref 45–117)
ALT SERPL-CCNC: 23 U/L (ref 12–78)
ANION GAP SERPL CALC-SCNC: 4 MMOL/L (ref 5–15)
AST SERPL-CCNC: 18 U/L (ref 15–37)
BILIRUB SERPL-MCNC: 0.4 MG/DL (ref 0.2–1)
BUN SERPL-MCNC: 26 MG/DL (ref 6–20)
BUN/CREAT SERPL: 16 (ref 12–20)
CALCIUM SERPL-MCNC: 9.2 MG/DL (ref 8.5–10.1)
CHLORIDE SERPL-SCNC: 108 MMOL/L (ref 97–108)
CHOLEST SERPL-MCNC: 176 MG/DL
CO2 SERPL-SCNC: 28 MMOL/L (ref 21–32)
CREAT SERPL-MCNC: 1.67 MG/DL (ref 0.7–1.3)
ERYTHROCYTE [DISTWIDTH] IN BLOOD BY AUTOMATED COUNT: 14.5 % (ref 11.5–14.5)
EST. AVERAGE GLUCOSE BLD GHB EST-MCNC: 148 MG/DL
GLOBULIN SER CALC-MCNC: 2.6 G/DL (ref 2–4)
GLUCOSE SERPL-MCNC: 152 MG/DL (ref 65–100)
HBA1C MFR BLD: 6.8 % (ref 4–5.6)
HCT VFR BLD AUTO: 42.2 % (ref 36.6–50.3)
HDLC SERPL-MCNC: 76 MG/DL
HDLC SERPL: 2.3 (ref 0–5)
HGB BLD-MCNC: 12.7 G/DL (ref 12.1–17)
LDLC SERPL CALC-MCNC: 82.8 MG/DL (ref 0–100)
MCH RBC QN AUTO: 29.4 PG (ref 26–34)
MCHC RBC AUTO-ENTMCNC: 30.1 G/DL (ref 30–36.5)
MCV RBC AUTO: 97.7 FL (ref 80–99)
NRBC # BLD: 0 K/UL (ref 0–0.01)
NRBC BLD-RTO: 0 PER 100 WBC
PLATELET # BLD AUTO: 222 K/UL (ref 150–400)
PMV BLD AUTO: 10.1 FL (ref 8.9–12.9)
POTASSIUM SERPL-SCNC: 5.4 MMOL/L (ref 3.5–5.1)
PROT SERPL-MCNC: 6.4 G/DL (ref 6.4–8.2)
RBC # BLD AUTO: 4.32 M/UL (ref 4.1–5.7)
SODIUM SERPL-SCNC: 140 MMOL/L (ref 136–145)
TRIGL SERPL-MCNC: 86 MG/DL (ref ?–150)
VLDLC SERPL CALC-MCNC: 17.2 MG/DL
WBC # BLD AUTO: 7.4 K/UL (ref 4.1–11.1)

## 2023-03-31 ENCOUNTER — OFFICE VISIT (OUTPATIENT)
Dept: CARDIOLOGY CLINIC | Age: 78
End: 2023-03-31

## 2023-03-31 VITALS
HEART RATE: 54 BPM | OXYGEN SATURATION: 94 % | SYSTOLIC BLOOD PRESSURE: 110 MMHG | BODY MASS INDEX: 29.26 KG/M2 | DIASTOLIC BLOOD PRESSURE: 86 MMHG | HEIGHT: 71 IN | WEIGHT: 209 LBS

## 2023-03-31 DIAGNOSIS — I10 ESSENTIAL HYPERTENSION: ICD-10-CM

## 2023-03-31 DIAGNOSIS — E11.21 TYPE 2 DIABETES WITH NEPHROPATHY (HCC): ICD-10-CM

## 2023-03-31 DIAGNOSIS — E11.22 CKD STAGE 3 DUE TO TYPE 2 DIABETES MELLITUS (HCC): ICD-10-CM

## 2023-03-31 DIAGNOSIS — R42 DIZZINESS: ICD-10-CM

## 2023-03-31 DIAGNOSIS — I35.0 AORTIC VALVE STENOSIS, ETIOLOGY OF CARDIAC VALVE DISEASE UNSPECIFIED: ICD-10-CM

## 2023-03-31 DIAGNOSIS — N18.30 CKD STAGE 3 DUE TO TYPE 2 DIABETES MELLITUS (HCC): ICD-10-CM

## 2023-03-31 DIAGNOSIS — I25.118 CORONARY ARTERY DISEASE OF NATIVE ARTERY OF NATIVE HEART WITH STABLE ANGINA PECTORIS (HCC): Primary | ICD-10-CM

## 2023-03-31 NOTE — LETTER
3/31/2023    Patient: Tatiana Merchant   YOB: 1945   Date of Visit: 3/31/2023     Stephanie Saunders MD  [address] [address]  [address] [facility] [address]    Dear Stephanie Saunders MD,      Thank you for referring Mr. Ashley Borja to [address] [place] [address] for evaluation. My notes for this consultation are attached. If you have questions, please do not hesitate to call me. I look forward to following your patient along with you.       Sincerely,    Haylee Saldana, DO

## 2023-03-31 NOTE — PROGRESS NOTES
Chief Complaint   Patient presents with    Follow-up     6 mo      Vitals:    03/31/23 1257   BP: 110/86   BP 1 Location: Left upper arm   BP Patient Position: Sitting   Pulse: (!) 54   Height: 5' 11\" (1.803 m)   Weight: 209 lb (94.8 kg)   SpO2: 94%       Chest pain denied     SOB denied     Palpitations denied     Swelling in hands/feet denied     Dizziness denied     Recent hospital stays denied     Refills denied     Pt doesn't know the name of his meds states, everything is still the same as last time.

## 2023-03-31 NOTE — PROGRESS NOTES
Audreyjerri Sunshine, DO  [facility], [address] [place] [address], [address] [place] [address]    Office [phone],[facility] [phone]           Hanane Jenkins is a 76 y.o. presents for f/u of CAD      Assessment/Recommendations:      Coronary artery disease - stable angina symptoms  Distal LAD  filling via L-->L collaterals   Lcx stenting 4/2019. 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 Repeat echo fall 2024. Hypertension- stable, cont current therapy    Diabetes-  Per Alessandra Arriola MD     Lab Results   Component Value Date/Time    Hemoglobin A1c 6.8 (H) 03/09/2023 08:55 AM    Hemoglobin A1c (POC) 5.8 04/20/2015 08:44 AM       Hyperlipidemia   Lab Results   Component Value Date/Time    Cholesterol, total 176 03/09/2023 08:55 AM    HDL Cholesterol 76 03/09/2023 08:55 AM    LDL, calculated 82.8 03/09/2023 08:55 AM    VLDL, calculated 17.2 03/09/2023 08:55 AM    Triglyceride 86 03/09/2023 08:55 AM    CHOL/HDL Ratio 2.3 03/09/2023 08:55 AM     - cont statin therapy      GERD- PPI      Carotid artery stenosis- s/p carotid endarterectomy on aspirin and statin. followed by  Lori Colon MD       Positional related dizziness- d/c hydralazine. Cont hydration. Consider decreasing his nitrate therapy if he continues to have dizziness symptoms. Primary Care Physician- Alessandra Arriola MD    F/u 6 months sooner as needed        Subjective:  76 y.o. male presents for follow-up. Rare stable angina chest pains. No adverse bleeding with DAPT. Dizziness with positional changes.     Past Medical History:   Diagnosis Date    Amaurosis fugax of left eye 5/6/2012    Arthritis     OSTEO CAD (coronary artery disease) 2012    CAROTID LEFT     Cancer ([facility] [address] 2013    PROSTATE    Chronic pain     DJD lumbar    Diabetes ([facility] [address] 5/22/2010    Frequent nocturnal awakening     urinary frequency    GERD (gastroesophageal reflux disease) 5/22/2010    Hyperlipidemia 5/22/2010    Hypertension 5/22/2010    Kidney stone 5/22/2010    Nodular goiter     1.3 cm right , FNA 6/15    Obesity (BMI 30-39. 9)     Psychiatric disorder     ANXIETY    Vertigo         Past Surgical History:   Procedure Laterality Date    COLONOSCOPY  3/3/2016         EGD  3/3/2016         HX HEENT      tonsillectomy    HX MOHS PROCEDURES  2010    right    HX ORTHOPAEDIC      coccyx removed    HX UROLOGICAL  2010    left lithotripsy. basket  extraction,ureteral stent    HX VASECTOMY      [place] UNLISTED NEUROLOGICAL/NEUROMUSCULAR DX PX  2011    Steroid injections in epidural    [place] UNLISTED PROCEDURE CARDIAC SURGERY  04/2019    3 stents placed    [place] UNLISTED PROCEDURE VASCULAR SURGERY  2012    LEFT CAROTID         Current Outpatient Medications:     rosuvastatin (CRESTOR) 40 mg tablet, TAKE ONE TABLET BY MOUTH EVERY DAY, Disp: 90 Tablet, Rfl: 1    furosemide (LASIX) 20 mg tablet, TAKE ONE TABLET BY MOUTH DAILY, Disp: 90 Tablet, Rfl: 1    felodipine (PLENDIL SR) 10 mg 24 hr tablet, TAKE ONE TABLET BY MOUTH DAILY FOR high BLOOD PRESSURE, Disp: 90 Tablet, Rfl: 1    pantoprazole (PROTONIX) 40 mg tablet, TAKE ONE TABLET BY MOUTH DAILY, Disp: 90 Tablet, Rfl: 1    ranolazine ER (RANEXA) 500 mg SR tablet, TAKE ONE TABLET BY MOUTH TWICE DAILY, Disp: 180 Tablet, Rfl: 1    benzonatate (TESSALON) 200 mg capsule, Take 1 Capsule by mouth three (3) times daily as needed for Cough. , Disp: 60 Capsule, Rfl: 1    albuterol (PROVENTIL VENTOLIN) 2.5 mg /3 mL (0.083 %) nebu, 3 mL by Nebulization route every six (6) hours as needed for Wheezing or Shortness of Breath., Disp: 40 Each, Rfl: 1    lisinopriL (PRINIVIL, ZESTRIL) 20 mg tablet, TAKE ONE TABLET BY MOUTH EVERY DAY, Disp: 90 Tablet, Rfl: 1    isosorbide mononitrate ER (IMDUR) 60 mg CR tablet, TAKE ONE TABLET BY MOUTH EVERY DAY, Disp: 90 Tablet, Rfl: 1    famotidine (PEPCID) 40 mg tablet, TAKE ONE TABLET BY MOUTH NIGHTLY, Disp: 90 Tablet, Rfl: 1    clopidogreL (PLAVIX) 75 mg tab, TAKE ONE TABLET BY MOUTH EVERY DAY, Disp: 90 Tablet, Rfl: 2    nitroglycerin (NITROSTAT) 0.3 mg SL tablet, DISSOLVE ONE TABLET UNDER THE TONGUE every 5 minutes AS needed FOR CHEST PAIN., Disp: 100 Tablet, Rfl: 0    EPINEPHrine (EpiPen 2-Francisco Javier) 0.3 mg/0.3 mL injection, INJECT INTRAMUSCULARLY AS NEEDED FOR ONE DOSE. TAKE WITH 50MG OF BENADRYL AND CALL 911. Dispense Generic, Disp: 1 Syringe, Rfl: 3    triamcinolone acetonide (KENALOG) 0.1 % topical cream, Apply  to affected area two (2) times a day. use thin layer to poison ivy, Disp: 453 g, Rfl: 0    diclofenac (VOLTAREN) 1 % gel, Apply 4 g to affected area four (4) times daily. Painful shoulder., Disp: 100 g, Rfl: 3    meclizine (ANTIVERT) 25 mg chewable tablet, Take  by mouth three (3) times daily as needed. , Disp: , Rfl:     aspirin delayed-release 81 mg tablet, Take 1 Tab by mouth daily. , Disp: 30 Tab, Rfl: 0    Peak Flow Meter eric, Use prior and post nebulizer treatment, Disp: 1 Device, Rfl: 0    omega-3 fatty acids-vitamin e 1,000 mg cap, Take 1 Cap by mouth., Disp: , Rfl:     co-enzyme Q-10 (CO Q-10) 100 mg capsule, Take 100 mg by mouth daily. , Disp: , Rfl:     Allergies   Allergen Reactions    Bee Sting [Sting, Bee] Anaphylaxis    Vytorin 10-10 [Ezetimibe-Simvastatin] Myalgia    Amlodipine Other (comments)     Creepy crawly sensation, twitches    Lipitor [Atorvastatin] Myalgia        Family History   Problem Relation Age of Onset    Diabetes Mother     Obesity Mother     Heart Disease Mother     Stroke Father     Heart Disease Sister     Obesity Sister     Diabetes Sister    Mother  of a heart attack at age 62  Father had a stroke in his 76s    Social History     Tobacco Use    Smoking status: Former     Packs/day: 0.50     Years: 4.00     Pack years: 2.00     Types: Cigarettes     Quit date: 1966     Years since quittin.9    Smokeless tobacco: Never   Substance Use Topics    Alcohol use: No    Drug use: No       Review of Symptoms:  Pertinent Positive: very mild exertional angina  Pertinent Negative: No shortness of breath orthopnea PND. All Other systems reviewed and are negative for a Comprehensive ROS (10+)    Physical Exam    Blood pressure 110/86, pulse (!) 54, height 5' 11\" (1.803 m), weight 209 lb (94.8 kg), SpO2 94 %. Constitutional:  well-developed and well-nourished. No distress. HENT: Normocephalic. Eyes: No scleral icterus. Neck:  Neck supple. No JVD present. Pulmonary/Chest: Effort normal and breath sounds normal. No respiratory distress, wheezes or rales. Cardiovascular: Normal rate, regular rhythm, S1 S2 .  2/6 systolic murmur   extremities:  Normal muscle tone  Abdominal:   No abnormal distension. Neurological:  Moving all extremities, cranial nerves appear grossly intact. Skin: Skin is not cold. Not diaphoretic. No erythema. Psychiatric:  Grossly normal mood and affect. Intact insight. Objective Data:     Lipids 19 - , HDL 64, LDL 76, , VLDL 22    Echo 2018: Normal LVEF, mild AS, mild MR/TR    Event monitor-2019 to 2019. Rare ventricular ectopy, 6 supraventricular runs of ventricular tachycardia the longest being 4 beats. Supraventricular ectopy involving 8.2% of beats.     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 Post-dilated with 3.0NC just inside distal stent edge and 3.75NC proximally at high pressure           08/24/20   CARDIAC PROCEDURE 08/24/2020 8/24/2020    Narrative · Multivessel CAD  · Distal anomalous RCA stenting with 4.0x23mm Xience Freida REINALDO deployed   at 20 [address]IND[address]. Post-dilated with 4.5 NC balloon at 18 [address]INDY. Findings:   L Main: large caliber, normal  LAD: large caliber, tubular 70% mid-vessel disease,  distal LAD  LCx: large caliber, previously placed proximal and mid-Lcx stents widely   patent, several small caliber OM branches. 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 Post-dilated with 4.5 NC   balloon at 18 WINDY. karon 3 flow w/o evidence of dissection or perforation       Signed by: Mukul Rowell DO     01/20/21   ECHO ADULT COMPLETE 01/20/2021 1/20/2021    Narrative · LV: Calculated LVEF is 60%. Visually measured ejection fraction. Normal   cavity size, wall thickness and systolic function (ejection fraction   normal). Wall motion: normal. Mild (grade 1) left ventricular diastolic   dysfunction. · AV: Aortic valve leaflet calcification present. Aortic valve mean   gradient is 15.2 mmHg. Aortic valve area is 1.8 cm2. Mild aortic valve   stenosis is present. · LA: Mildly dilated left atrium.         Signed by: Mukul Rowell DO     09/23/22    ECHO ADULT COMPLETE 09/23/2022 9/23/2022    Interpretation Summary    Left Ventricle: Normal left ventricular systolic function with an estimated EF of 55 - 60%. Left ventricle size is normal. Normal wall thickness. Normal wall motion. Grade I diastolic dysfunction. Aortic Valve: Tricuspid valve. Calcified cusp. Mild stenosis of the aortic valve. AV mean gradient is 10 mmHg. AV peak gradient is 20 mmHg. AV peak velocity is 2.2 m/s. AV area by continuity VTI is 1.8 cm2.     Signed by: Tess Macias DO on 9/23/2022  1:22 PM

## 2023-04-06 ENCOUNTER — TELEPHONE (OUTPATIENT)
Dept: FAMILY MEDICINE CLINIC | Age: 78
End: 2023-04-06

## 2023-04-06 ENCOUNTER — OFFICE VISIT (OUTPATIENT)
Dept: FAMILY MEDICINE CLINIC | Age: 78
End: 2023-04-06
Payer: MEDICARE

## 2023-04-06 PROCEDURE — G8536 NO DOC ELDER MAL SCRN: HCPCS | Performed by: FAMILY MEDICINE

## 2023-04-06 PROCEDURE — G8427 DOCREV CUR MEDS BY ELIG CLIN: HCPCS | Performed by: FAMILY MEDICINE

## 2023-04-06 PROCEDURE — 87804 INFLUENZA ASSAY W/OPTIC: CPT | Performed by: FAMILY MEDICINE

## 2023-04-06 PROCEDURE — 94640 AIRWAY INHALATION TREATMENT: CPT | Performed by: FAMILY MEDICINE

## 2023-04-06 PROCEDURE — G8510 SCR DEP NEG, NO PLAN REQD: HCPCS | Performed by: FAMILY MEDICINE

## 2023-04-06 PROCEDURE — 3074F SYST BP LT 130 MM HG: CPT | Performed by: FAMILY MEDICINE

## 2023-04-06 PROCEDURE — 3078F DIAST BP <80 MM HG: CPT | Performed by: FAMILY MEDICINE

## 2023-04-06 PROCEDURE — 1101F PT FALLS ASSESS-DOCD LE1/YR: CPT | Performed by: FAMILY MEDICINE

## 2023-04-06 PROCEDURE — 1123F ACP DISCUSS/DSCN MKR DOCD: CPT | Performed by: FAMILY MEDICINE

## 2023-04-06 PROCEDURE — 99213 OFFICE O/P EST LOW 20 MIN: CPT | Performed by: FAMILY MEDICINE

## 2023-04-06 PROCEDURE — G8417 CALC BMI ABV UP PARAM F/U: HCPCS | Performed by: FAMILY MEDICINE

## 2023-04-06 NOTE — PROGRESS NOTES
[facility]    History of Present Illness:   Daryle Schlossman is a 76 y.o. male here for   Chief Complaint   Patient presents with    Cough     Congestion, dyspnea on exertion or after coughing which subsides quickly. Reports lingering since COVID+ about a month ago. HPI:  Patient here with cough, congestion and shortness of breath with exertion. Symptoms started 2 days ago. Cov19 positive on 2/1/23. Given nebulizer to use and he did feel better. Has been trying albuterol neb, it is helping some. He ran a low-grade fever last night of 99. He did take another COVID test which was negative. He has been very congested. He has a history of diabetes but is diet controlled now. Lab Results   Component Value Date/Time    Hemoglobin A1c 6.8 (H) 03/09/2023 08:55 AM    Hemoglobin A1c (POC) 5.8 04/20/2015 08:44 AM     On 3/31/23-he saw his cardiologist and complained of  Positional related dizziness- d/c hydralazine. Cont hydration. Consider decreasing his nitrate therapy if he continues to have dizziness symptoms. Health Maintenance  Health Maintenance Due   Topic Date Due    Shingles Vaccine (1 of 2) Never done    DTaP/Tdap/Td series (2 - Td or Tdap) 06/23/2021    COVID-19 Vaccine (4 - Booster for Carlos Watts series) 12/23/2021       Past Medical, Family, and Social History:     Past Medical History:   Diagnosis Date    Amaurosis fugax of left eye 5/6/2012    Arthritis     OSTEO    CAD (coronary artery disease) 2012    CAROTID LEFT     Cancer ([address] 2013    PROSTATE    Chronic pain     DJD lumbar    Diabetes ([facility] 5/22/2010    Frequent nocturnal awakening     urinary frequency    GERD (gastroesophageal reflux disease) 5/22/2010    Hyperlipidemia 5/22/2010    Hypertension 5/22/2010    Kidney stone 5/22/2010    Nodular goiter     1.3 cm right , FNA 6/15    Obesity (BMI 30-39. 9)     Psychiatric disorder     ANXIETY    Vertigo       Past Surgical History:   Procedure Laterality Date COLONOSCOPY  3/3/2016         EGD  3/3/2016         HX HEENT      tonsillectomy    HX MOHS PROCEDURES  2010    right    HX ORTHOPAEDIC      coccyx removed    HX UROLOGICAL  2010    left lithotripsy. basket  extraction,ureteral stent    HX VASECTOMY      ME UNLISTED NEUROLOGICAL/NEUROMUSCULAR DX PX  2011    Steroid injections in epidural    ME UNLISTED PROCEDURE CARDIAC SURGERY  04/2019    3 stents placed    ME UNLISTED PROCEDURE VASCULAR SURGERY  2012    LEFT CAROTID       Current Outpatient Medications on File Prior to Visit   Medication Sig Dispense Refill    rosuvastatin (CRESTOR) 40 mg tablet TAKE ONE TABLET BY MOUTH EVERY DAY 90 Tablet 1    furosemide (LASIX) 20 mg tablet TAKE ONE TABLET BY MOUTH DAILY 90 Tablet 1    felodipine (PLENDIL SR) 10 mg 24 hr tablet TAKE ONE TABLET BY MOUTH DAILY FOR high BLOOD PRESSURE 90 Tablet 1    pantoprazole (PROTONIX) 40 mg tablet TAKE ONE TABLET BY MOUTH DAILY 90 Tablet 1    ranolazine ER (RANEXA) 500 mg SR tablet TAKE ONE TABLET BY MOUTH TWICE DAILY 180 Tablet 1    benzonatate (TESSALON) 200 mg capsule Take 1 Capsule by mouth three (3) times daily as needed for Cough. 60 Capsule 1    albuterol (PROVENTIL VENTOLIN) 2.5 mg /3 mL (0.083 %) nebu 3 mL by Nebulization route every six (6) hours as needed for Wheezing or Shortness of Breath. 40 Each 1    lisinopriL (PRINIVIL, ZESTRIL) 20 mg tablet TAKE ONE TABLET BY MOUTH EVERY DAY 90 Tablet 1    isosorbide mononitrate ER (IMDUR) 60 mg CR tablet TAKE ONE TABLET BY MOUTH EVERY DAY 90 Tablet 1    famotidine (PEPCID) 40 mg tablet TAKE ONE TABLET BY MOUTH NIGHTLY 90 Tablet 1    clopidogreL (PLAVIX) 75 mg tab TAKE ONE TABLET BY MOUTH EVERY DAY 90 Tablet 2    nitroglycerin (NITROSTAT) 0.3 mg SL tablet DISSOLVE ONE TABLET UNDER THE TONGUE every 5 minutes AS needed FOR CHEST PAIN. 100 Tablet 0    EPINEPHrine (EpiPen 2-Francisco Javier) 0.3 mg/0.3 mL injection INJECT INTRAMUSCULARLY AS NEEDED FOR ONE DOSE. TAKE WITH 50MG OF BENADRYL AND CALL 911.  Dispense Generic 1 Syringe 3    triamcinolone acetonide (KENALOG) 0.1 % topical cream Apply  to affected area two (2) times a day. use thin layer to poison ivy 453 g 0    diclofenac (VOLTAREN) 1 % gel Apply 4 g to affected area four (4) times daily. Painful shoulder. 100 g 3    meclizine (ANTIVERT) 25 mg chewable tablet Take  by mouth three (3) times daily as needed. aspirin delayed-release 81 mg tablet Take 1 Tab by mouth daily. 30 Tab 0    Peak Flow Meter eric Use prior and post nebulizer treatment 1 Device 0    omega-3 fatty acids-vitamin e 1,000 mg cap Take 1 Capsule by mouth. co-enzyme Q-10 (CO Q-10) 100 mg capsule Take 1 Capsule by mouth daily. No current facility-administered medications on file prior to visit.        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.21    Type 2 diabetes with nephropathy (HCC) E11.21    CKD stage 3 due to type 2 diabetes mellitus (HCC) E11.22, N18.30    Heart murmur R01.1    Age-related nuclear cataract of both eyes H25.13    PVD (posterior vitreous detachment), left eye H43.812    History of angina Z86.79    CAD (coronary artery disease) I25.10    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 (HCC) I25.118    History of prostate cancer Z85.46    Chronic renal disease, stage III N18.30       Social History     Socioeconomic History    Marital status:    Tobacco Use    Smoking status: Former     Packs/day: 0.50     Years: 4.00     Pack years: 2.00     Types: Cigarettes     Quit date: 1966     Years since quittin.0    Smokeless tobacco: Never   Substance and Sexual Activity Alcohol use: No    Drug use: No    Sexual activity: Yes     Partners: Female     Social Determinants of Health     Financial Resource Strain: Low Risk     Difficulty of Paying Living Expenses: Not hard at all   Food Insecurity: No Food Insecurity    Worried About [address] in the Last Year: Never true    [address] in the Last Year: Never true   Transportation Needs: No Transportation Needs    Lack of Transportation (Medical): No    Lack of Transportation (Non-Medical): No   Housing Stability: Unknown    Unable to Pay for Housing in the Last Year: No    Unstable Housing in the Last Year: No        Review of Systems   Review of Systems   Constitutional:  Negative for chills and fever. Neurological:  Positive for dizziness.      Objective:   Visit Vitals  BP (!) 107/57 (BP 1 Location: Right arm, BP Patient Position: Sitting, BP Cuff Size: Adult)   Pulse 63   Temp 98.1 °F (36.7 °C) (Oral)   Resp 19   Ht 5' 11\" (1.803 m)   Wt 208 lb (94.3 kg)   SpO2 97%   BMI 29.01 kg/m²      Vitals:    04/06/23 1417 04/06/23 1513 04/06/23 1514 04/06/23 1515   BP: (!) 107/57      BP 2:  119/72 125/75 127/67   BP 1 Location: Right arm Right arm Right arm Right arm   BP Patient Position: Sitting Lying Sitting Standing   BP Cuff Size: Adult Adult Adult Adult   Pulse: 63      Pulse 2:  61 59 63   Temp: 98.1 °F (36.7 °C)      TempSrc: Oral      Resp: 19      Height: 5' 11\" (1.803 m)      Weight: 208 lb (94.3 kg)      SpO2: 97%             Physical Exam  PHYSICAL EXAM:  Gen: Pt sitting in chair, in NAD  Head: Normocephalic, atraumatic  Eyes: Sclera anicteric, EOM grossly intact,  Ears: TM's pearly with good light reflex b/l  Throat: MMM, normal lips, tongue, teeth and gums  CVS: Normal S1, S2, no m/r/g  Resp: coarse/decreased BS, no rhonchi, wheezes or rales  Neuro: Alert, oriented, appropriate    Pertinent Labs/Studies:  3 most recent PHQ Screens 4/6/2023   Little interest or pleasure in doing things Not at all   Feeling down, depressed, irritable, or hopeless Not at all   Total Score PHQ 2 0    Testing preformed onsite at today's visit:  Results for orders placed or performed in visit on 04/06/23   AMB POC RAPID INFLUENZA TEST   Result Value Ref Range    VALID INTERNAL CONTROL POC Yes     Influenza A Ag POC Negative Negative    Influenza B Ag POC Negative Negative         Assessment and orders:       ICD-10-CM ICD-9-CM    1. Acute cough  R05.1 786. 2 AMB POC RAPID INFLUENZA TEST      2. Fever, unspecified fever cause  R50.9 780.60 AMB POC RAPID INFLUENZA TEST      3. Bronchitis  J40 490 albuterol-ipratropium (DUO-NEB) 2.5 MG-0.5 MG/3 ML      predniSONE (DELTASONE) 20 mg tablet      ipratropium (ATROVENT) 0.02 % soln        Diagnoses and all orders for this visit:    1. Acute cough  -     AMB POC RAPID INFLUENZA TEST    2. Fever, unspecified fever cause  -     AMB POC RAPID INFLUENZA TEST    3. Bronchitis  -     albuterol-ipratropium (DUO-NEB) 2.5 MG-0.5 MG/3 ML  -     predniSONE (DELTASONE) 20 mg tablet; Take 1 Tablet by mouth two (2) times a day for 5 days. -     ipratropium (ATROVENT) 0.02 % soln; 2.5 mL by Nebulization route every six (6) hours as needed for Wheezing. Follow-up and Dispositions    Return if symptoms worsen or fail to improve. the following changes in treatment are made: Given duo neb here with some improvement in symptoms so will add Atrovent to use along with albuterol at home. We will do short burst of prednisone due to former smoking status. Advised to monitor fingerstick blood sugars and if greater than 250 to cut back prednisone to one tablet daily. reviewed medications and side effects in detail    I have discussed the diagnosis with the patient and the intended plan as seen in the above orders. Social history, medical history, and labs were reviewed. The patient has received an after-visit summary and questions were answered concerning future plans.   I have discussed medication side effects and warnings with the patient as well. Patient/guardian verbalized understanding and accepts plan & risks. Please note that this dictation was completed with 1bib, the computer voice recognition software. Quite often unanticipated grammatical, syntax, homophones, and other interpretive errors are inadvertently transcribed by the computer software. Please disregard these errors. Please excuse any errors that have escaped final proofreading. Thank you.      MD YONI Flanagan & BALBIR SALDIVAR [facility]  04/06/23

## 2023-04-06 NOTE — TELEPHONE ENCOUNTER
Patient seen in office by other provider.     MD YONI Hwang & BALBIR SALDIVAR [facility]  04/06/23

## 2023-04-06 NOTE — PROGRESS NOTES
Chief Complaint   Patient presents with    Cough     Congestion, dyspnea on exertion or after coughing which subsides quickly. Reports lingering since COVID+ about a month ago. 1. \"Have you been to the ER, urgent care clinic since your last visit? Hospitalized since your last visit? \" No    2. \"Have you seen or consulted any other health care providers outside of the [address] [place] [address] since your last visit? \" No     3. For patients aged 39-70: Has the patient had a colonoscopy / FIT/ Cologuard? NA - based on age      If the patient is female:    4. For patients aged 41-77: Has the patient had a mammogram within the past 2 years? NA - based on age or sex      11. For patients aged 21-65: Has the patient had a pap smear?  NA - based on age or sex    Health Maintenance Due   Topic Date Due    Shingles Vaccine (1 of 2) Never done    DTaP/Tdap/Td series (2 - Td or Tdap) 06/23/2021    COVID-19 Vaccine (4 - Booster for Fritz Quiroz series) 12/23/2021

## 2023-04-20 DIAGNOSIS — I10 ESSENTIAL HYPERTENSION: ICD-10-CM

## 2023-04-20 RX ORDER — LISINOPRIL 20 MG/1
TABLET ORAL
Qty: 90 TABLET | Refills: 3 | Status: SHIPPED | OUTPATIENT
Start: 2023-04-20

## 2023-04-20 NOTE — TELEPHONE ENCOUNTER
Refill per VO of Dr. Edward Andujar.  Houston Nichole:    Last appt:  3/31/2023    Future Appointments   Date Time Provider Hank Rubio   6/9/2023  8:20 AM Zach Black MD [facility] BS AMB   10/2/2023 10:20 AM Shirline Hodgkin, DO [facility] BS AMB       Requested Prescriptions     Pending Prescriptions Disp Refills    lisinopriL (PRINIVIL, ZESTRIL) 20 mg tablet [Pharmacy Med Name: lisinopril 20 mg tablet] 90 Tablet 1     Sig: TAKE ONE TABLET BY MOUTH EVERY DAY

## 2023-06-09 ENCOUNTER — OFFICE VISIT (OUTPATIENT)
Facility: CLINIC | Age: 78
End: 2023-06-09

## 2023-06-09 VITALS
SYSTOLIC BLOOD PRESSURE: 111 MMHG | HEIGHT: 71 IN | WEIGHT: 208 LBS | OXYGEN SATURATION: 97 % | DIASTOLIC BLOOD PRESSURE: 65 MMHG | BODY MASS INDEX: 29.12 KG/M2 | RESPIRATION RATE: 18 BRPM | HEART RATE: 59 BPM | TEMPERATURE: 97.2 F

## 2023-06-09 DIAGNOSIS — R01.1 HEART MURMUR: ICD-10-CM

## 2023-06-09 DIAGNOSIS — E11.21 TYPE 2 DIABETES WITH NEPHROPATHY (HCC): ICD-10-CM

## 2023-06-09 DIAGNOSIS — N18.30 CKD STAGE 3 DUE TO TYPE 2 DIABETES MELLITUS (HCC): ICD-10-CM

## 2023-06-09 DIAGNOSIS — E11.21 CONTROLLED TYPE 2 DIABETES MELLITUS WITH DIABETIC NEPHROPATHY, WITHOUT LONG-TERM CURRENT USE OF INSULIN (HCC): ICD-10-CM

## 2023-06-09 DIAGNOSIS — E11.22 CKD STAGE 3 DUE TO TYPE 2 DIABETES MELLITUS (HCC): ICD-10-CM

## 2023-06-09 DIAGNOSIS — K21.9 GASTROESOPHAGEAL REFLUX DISEASE WITHOUT ESOPHAGITIS: ICD-10-CM

## 2023-06-09 DIAGNOSIS — R06.2 WHEEZING: ICD-10-CM

## 2023-06-09 DIAGNOSIS — E55.9 VITAMIN D DEFICIENCY: ICD-10-CM

## 2023-06-09 DIAGNOSIS — I25.118 CORONARY ARTERY DISEASE OF NATIVE ARTERY OF NATIVE HEART WITH STABLE ANGINA PECTORIS (HCC): Primary | ICD-10-CM

## 2023-06-09 DIAGNOSIS — I10 PRIMARY HYPERTENSION: ICD-10-CM

## 2023-06-09 DIAGNOSIS — N18.30 STAGE 3 CHRONIC KIDNEY DISEASE, UNSPECIFIED WHETHER STAGE 3A OR 3B CKD (HCC): ICD-10-CM

## 2023-06-09 DIAGNOSIS — E78.2 MIXED HYPERLIPIDEMIA: ICD-10-CM

## 2023-06-09 DIAGNOSIS — J40 BRONCHITIS: ICD-10-CM

## 2023-06-09 DIAGNOSIS — Z85.46 HISTORY OF PROSTATE CANCER: ICD-10-CM

## 2023-06-09 RX ORDER — ALBUTEROL SULFATE 90 UG/1
2 AEROSOL, METERED RESPIRATORY (INHALATION) 4 TIMES DAILY PRN
Qty: 18 G | Refills: 5 | Status: SHIPPED | OUTPATIENT
Start: 2023-06-09

## 2023-06-09 RX ORDER — AMOXICILLIN AND CLAVULANATE POTASSIUM 875; 125 MG/1; MG/1
1 TABLET, FILM COATED ORAL 2 TIMES DAILY
Qty: 14 TABLET | Refills: 0 | Status: SHIPPED | OUTPATIENT
Start: 2023-06-09 | End: 2023-06-16

## 2023-06-09 SDOH — ECONOMIC STABILITY: HOUSING INSECURITY
IN THE LAST 12 MONTHS, WAS THERE A TIME WHEN YOU DID NOT HAVE A STEADY PLACE TO SLEEP OR SLEPT IN A SHELTER (INCLUDING NOW)?: NO

## 2023-06-09 SDOH — ECONOMIC STABILITY: INCOME INSECURITY: HOW HARD IS IT FOR YOU TO PAY FOR THE VERY BASICS LIKE FOOD, HOUSING, MEDICAL CARE, AND HEATING?: NOT HARD AT ALL

## 2023-06-09 SDOH — ECONOMIC STABILITY: FOOD INSECURITY: WITHIN THE PAST 12 MONTHS, THE FOOD YOU BOUGHT JUST DIDN'T LAST AND YOU DIDN'T HAVE MONEY TO GET MORE.: NEVER TRUE

## 2023-06-09 SDOH — ECONOMIC STABILITY: FOOD INSECURITY: WITHIN THE PAST 12 MONTHS, YOU WORRIED THAT YOUR FOOD WOULD RUN OUT BEFORE YOU GOT MONEY TO BUY MORE.: NEVER TRUE

## 2023-06-09 ASSESSMENT — ANXIETY QUESTIONNAIRES
3. WORRYING TOO MUCH ABOUT DIFFERENT THINGS: 0
7. FEELING AFRAID AS IF SOMETHING AWFUL MIGHT HAPPEN: 0
4. TROUBLE RELAXING: 0
1. FEELING NERVOUS, ANXIOUS, OR ON EDGE: 0
GAD7 TOTAL SCORE: 0
IF YOU CHECKED OFF ANY PROBLEMS ON THIS QUESTIONNAIRE, HOW DIFFICULT HAVE THESE PROBLEMS MADE IT FOR YOU TO DO YOUR WORK, TAKE CARE OF THINGS AT HOME, OR GET ALONG WITH OTHER PEOPLE: NOT DIFFICULT AT ALL
5. BEING SO RESTLESS THAT IT IS HARD TO SIT STILL: 0
6. BECOMING EASILY ANNOYED OR IRRITABLE: 0
2. NOT BEING ABLE TO STOP OR CONTROL WORRYING: 0

## 2023-06-09 ASSESSMENT — PATIENT HEALTH QUESTIONNAIRE - PHQ9
SUM OF ALL RESPONSES TO PHQ QUESTIONS 1-9: 0
SUM OF ALL RESPONSES TO PHQ9 QUESTIONS 1 & 2: 0
2. FEELING DOWN, DEPRESSED OR HOPELESS: 0
SUM OF ALL RESPONSES TO PHQ QUESTIONS 1-9: 0
SUM OF ALL RESPONSES TO PHQ QUESTIONS 1-9: 0
1. LITTLE INTEREST OR PLEASURE IN DOING THINGS: 0
SUM OF ALL RESPONSES TO PHQ QUESTIONS 1-9: 0

## 2023-06-09 ASSESSMENT — ENCOUNTER SYMPTOMS
COUGH: 1
WHEEZING: 1

## 2023-06-09 NOTE — PROGRESS NOTES
1. \"Have you been to the ER, urgent care clinic since your last visit? Hospitalized since your last visit? \" no    2. \"Have you seen or consulted any other health care providers outside of the [address] [place] [address] since your last visit? \" no     3. For patients aged 39-70: Has the patient had a colonoscopy / FIT/ Cologuard?  na
MG EC tablet Take by mouth daily      benzonatate (TESSALON) 200 MG capsule Take by mouth 3 times daily as needed      clopidogrel (PLAVIX) 75 MG tablet TAKE ONE TABLET BY MOUTH EVERY DAY      coenzyme Q10 100 MG CAPS capsule Take by mouth daily      diclofenac sodium (VOLTAREN) 1 % GEL Apply topically 4 times daily      EPINEPHrine (EPIPEN) 0.3 MG/0.3ML SOAJ injection INJECT INTRAMUSCULARLY AS NEEDED FOR ONE DOSE. TAKE WITH 50MG OF BENADRYL AND CALL 911. Dispense Generic      famotidine (PEPCID) 40 MG tablet TAKE ONE TABLET BY MOUTH NIGHTLY      felodipine (PLENDIL) 10 MG extended release tablet TAKE ONE TABLET BY MOUTH DAILY FOR high BLOOD PRESSURE      furosemide (LASIX) 20 MG tablet TAKE ONE TABLET BY MOUTH DAILY      isosorbide mononitrate (IMDUR) 60 MG extended release tablet TAKE ONE TABLET BY MOUTH EVERY DAY      lisinopril (PRINIVIL;ZESTRIL) 20 MG tablet TAKE ONE TABLET BY MOUTH EVERY DAY      meclizine (ANTIVERT) 25 MG CHEW Take by mouth 3 times daily as needed      nitroGLYCERIN (NITROSTAT) 0.3 MG SL tablet DISSOLVE ONE TABLET UNDER THE TONGUE every 5 minutes AS needed FOR CHEST PAIN. pantoprazole (PROTONIX) 40 MG tablet TAKE ONE TABLET BY MOUTH DAILY      ranolazine (RANEXA) 500 MG extended release tablet TAKE ONE TABLET BY MOUTH TWICE DAILY      rosuvastatin (CRESTOR) 40 MG tablet TAKE ONE TABLET BY MOUTH EVERY DAY      triamcinolone (KENALOG) 0.1 % cream Apply topically 2 times daily       No current facility-administered medications on file prior to visit.        Patient Active Problem List   Diagnosis    Type 2 diabetes with nephropathy (HCC)    Hyperlipidemia    Kidney stone    Age-related nuclear cataract of both eyes    Bone spur    Allergy to bee sting    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 (HCC)    History of angina    CAD (coronary artery disease)    Vitamin D deficiency    History of prostate cancer    PVD (posterior vitreous detachment), left eye    Controlled type 2

## 2023-06-10 LAB
25(OH)D3 SERPL-MCNC: 36.9 NG/ML (ref 30–100)
ALBUMIN SERPL-MCNC: 3.9 G/DL (ref 3.5–5)
ALBUMIN/GLOB SERPL: 1.5 (ref 1.1–2.2)
ALP SERPL-CCNC: 62 U/L (ref 45–117)
ALT SERPL-CCNC: 22 U/L (ref 12–78)
ANION GAP SERPL CALC-SCNC: 6 MMOL/L (ref 5–15)
AST SERPL-CCNC: 13 U/L (ref 15–37)
BILIRUB SERPL-MCNC: 0.5 MG/DL (ref 0.2–1)
BUN SERPL-MCNC: 26 MG/DL (ref 6–20)
BUN/CREAT SERPL: 17 (ref 12–20)
CALCIUM SERPL-MCNC: 9.6 MG/DL (ref 8.5–10.1)
CHLORIDE SERPL-SCNC: 109 MMOL/L (ref 97–108)
CHOLEST SERPL-MCNC: 178 MG/DL
CO2 SERPL-SCNC: 27 MMOL/L (ref 21–32)
CREAT SERPL-MCNC: 1.49 MG/DL (ref 0.7–1.3)
ERYTHROCYTE [DISTWIDTH] IN BLOOD BY AUTOMATED COUNT: 13 % (ref 11.5–14.5)
EST. AVERAGE GLUCOSE BLD GHB EST-MCNC: 148 MG/DL
GLOBULIN SER CALC-MCNC: 2.6 G/DL (ref 2–4)
GLUCOSE SERPL-MCNC: 173 MG/DL (ref 65–100)
HBA1C MFR BLD: 6.8 % (ref 4–5.6)
HCT VFR BLD AUTO: 44.1 % (ref 36.6–50.3)
HDLC SERPL-MCNC: 63 MG/DL
HDLC SERPL: 2.8 (ref 0–5)
HGB BLD-MCNC: 13.4 G/DL (ref 12.1–17)
LDLC SERPL CALC-MCNC: 85.4 MG/DL (ref 0–100)
MCH RBC QN AUTO: 29.3 PG (ref 26–34)
MCHC RBC AUTO-ENTMCNC: 30.4 G/DL (ref 30–36.5)
MCV RBC AUTO: 96.3 FL (ref 80–99)
NRBC # BLD: 0 K/UL (ref 0–0.01)
NRBC BLD-RTO: 0 PER 100 WBC
PLATELET # BLD AUTO: 216 K/UL (ref 150–400)
PMV BLD AUTO: 10.5 FL (ref 8.9–12.9)
POTASSIUM SERPL-SCNC: 4.4 MMOL/L (ref 3.5–5.1)
PROT SERPL-MCNC: 6.5 G/DL (ref 6.4–8.2)
RBC # BLD AUTO: 4.58 M/UL (ref 4.1–5.7)
SODIUM SERPL-SCNC: 142 MMOL/L (ref 136–145)
TRIGL SERPL-MCNC: 148 MG/DL
VIT B12 SERPL-MCNC: 491 PG/ML (ref 193–986)
VLDLC SERPL CALC-MCNC: 29.6 MG/DL
WBC # BLD AUTO: 7 K/UL (ref 4.1–11.1)

## 2023-06-11 LAB
PSA FREE MFR SERPL: NORMAL %
PSA FREE SERPL-MCNC: <0.02 NG/ML
PSA SERPL-MCNC: <0.1 NG/ML (ref 0–4)

## 2023-06-28 RX ORDER — FAMOTIDINE 40 MG/1
TABLET, FILM COATED ORAL
Qty: 90 TABLET | Refills: 1 | Status: SHIPPED | OUTPATIENT
Start: 2023-06-28

## 2023-06-28 RX ORDER — ISOSORBIDE MONONITRATE 60 MG/1
TABLET, EXTENDED RELEASE ORAL
Qty: 90 TABLET | Refills: 3 | Status: SHIPPED | OUTPATIENT
Start: 2023-06-28

## 2023-07-10 RX ORDER — EPINEPHRINE 0.3 MG/.3ML
INJECTION SUBCUTANEOUS
Qty: 2 EACH | Refills: 3 | Status: SHIPPED | OUTPATIENT
Start: 2023-07-10

## 2023-08-08 RX ORDER — RANOLAZINE 500 MG/1
TABLET, EXTENDED RELEASE ORAL
Qty: 180 TABLET | Refills: 1 | Status: SHIPPED | OUTPATIENT
Start: 2023-08-08

## 2023-08-15 RX ORDER — CLOPIDOGREL BISULFATE 75 MG/1
TABLET ORAL
Qty: 90 TABLET | Refills: 2 | Status: SHIPPED | OUTPATIENT
Start: 2023-08-15

## 2023-08-15 NOTE — TELEPHONE ENCOUNTER
Refill per VO of Dr. Vibha Perez.  Enid Fulton:    Last appt:  3/31/2023    Future Appointments   Date Time Provider [address] [place] [address]   10/2/2023 10:20 AM DO NELLY Don AMB       Requested Prescriptions     Pending Prescriptions Disp Refills    clopidogrel (PLAVIX) 75 MG tablet [Pharmacy Med Name: clopidogrel 75 mg tablet] 90 tablet 2     Sig: TAKE ONE TABLET BY MOUTH EVERY DAY

## 2023-08-25 DIAGNOSIS — I10 ESSENTIAL (PRIMARY) HYPERTENSION: ICD-10-CM

## 2023-08-25 RX ORDER — FELODIPINE 10 MG/1
TABLET, EXTENDED RELEASE ORAL
Qty: 90 TABLET | Refills: 1 | Status: SHIPPED | OUTPATIENT
Start: 2023-08-25

## 2023-08-29 DIAGNOSIS — K21.00 GASTRO-ESOPHAGEAL REFLUX DISEASE WITH ESOPHAGITIS, WITHOUT BLEEDING: ICD-10-CM

## 2023-08-29 RX ORDER — PANTOPRAZOLE SODIUM 40 MG/1
TABLET, DELAYED RELEASE ORAL
Qty: 90 TABLET | Refills: 1 | Status: SHIPPED | OUTPATIENT
Start: 2023-08-29

## 2023-09-05 DIAGNOSIS — I10 ESSENTIAL (PRIMARY) HYPERTENSION: ICD-10-CM

## 2023-09-05 RX ORDER — FUROSEMIDE 20 MG/1
TABLET ORAL
Qty: 90 TABLET | Refills: 1 | Status: SHIPPED | OUTPATIENT
Start: 2023-09-05

## 2023-09-21 ENCOUNTER — OFFICE VISIT (OUTPATIENT)
Facility: CLINIC | Age: 78
End: 2023-09-21
Payer: MEDICARE

## 2023-09-21 VITALS
BODY MASS INDEX: 29.26 KG/M2 | WEIGHT: 209 LBS | TEMPERATURE: 98.3 F | OXYGEN SATURATION: 99 % | HEIGHT: 71 IN | HEART RATE: 66 BPM | SYSTOLIC BLOOD PRESSURE: 129 MMHG | DIASTOLIC BLOOD PRESSURE: 70 MMHG | RESPIRATION RATE: 20 BRPM

## 2023-09-21 DIAGNOSIS — K21.9 GASTROESOPHAGEAL REFLUX DISEASE WITHOUT ESOPHAGITIS: ICD-10-CM

## 2023-09-21 DIAGNOSIS — N18.30 STAGE 3 CHRONIC KIDNEY DISEASE, UNSPECIFIED WHETHER STAGE 3A OR 3B CKD (HCC): ICD-10-CM

## 2023-09-21 DIAGNOSIS — I25.118 CORONARY ARTERY DISEASE OF NATIVE ARTERY OF NATIVE HEART WITH STABLE ANGINA PECTORIS (HCC): Primary | ICD-10-CM

## 2023-09-21 DIAGNOSIS — E11.21 TYPE 2 DIABETES WITH NEPHROPATHY (HCC): ICD-10-CM

## 2023-09-21 DIAGNOSIS — E78.2 MIXED HYPERLIPIDEMIA: ICD-10-CM

## 2023-09-21 DIAGNOSIS — I10 PRIMARY HYPERTENSION: ICD-10-CM

## 2023-09-21 DIAGNOSIS — E11.21 CONTROLLED TYPE 2 DIABETES MELLITUS WITH DIABETIC NEPHROPATHY, WITHOUT LONG-TERM CURRENT USE OF INSULIN (HCC): ICD-10-CM

## 2023-09-21 DIAGNOSIS — E55.9 VITAMIN D DEFICIENCY: ICD-10-CM

## 2023-09-21 PROCEDURE — 99214 OFFICE O/P EST MOD 30 MIN: CPT | Performed by: FAMILY MEDICINE

## 2023-09-21 PROCEDURE — 3074F SYST BP LT 130 MM HG: CPT | Performed by: FAMILY MEDICINE

## 2023-09-21 PROCEDURE — G8427 DOCREV CUR MEDS BY ELIG CLIN: HCPCS | Performed by: FAMILY MEDICINE

## 2023-09-21 PROCEDURE — 1123F ACP DISCUSS/DSCN MKR DOCD: CPT | Performed by: FAMILY MEDICINE

## 2023-09-21 PROCEDURE — G8419 CALC BMI OUT NRM PARAM NOF/U: HCPCS | Performed by: FAMILY MEDICINE

## 2023-09-21 PROCEDURE — 3044F HG A1C LEVEL LT 7.0%: CPT | Performed by: FAMILY MEDICINE

## 2023-09-21 PROCEDURE — 3078F DIAST BP <80 MM HG: CPT | Performed by: FAMILY MEDICINE

## 2023-09-21 PROCEDURE — 1036F TOBACCO NON-USER: CPT | Performed by: FAMILY MEDICINE

## 2023-09-21 ASSESSMENT — ENCOUNTER SYMPTOMS
CHEST TIGHTNESS: 0
ABDOMINAL PAIN: 0

## 2023-09-22 LAB
ALBUMIN SERPL-MCNC: 3.8 G/DL (ref 3.5–5)
ALBUMIN/GLOB SERPL: 1.5 (ref 1.1–2.2)
ALP SERPL-CCNC: 57 U/L (ref 45–117)
ALT SERPL-CCNC: 26 U/L (ref 12–78)
ANION GAP SERPL CALC-SCNC: 4 MMOL/L (ref 5–15)
AST SERPL-CCNC: 16 U/L (ref 15–37)
BILIRUB SERPL-MCNC: 0.5 MG/DL (ref 0.2–1)
BUN SERPL-MCNC: 22 MG/DL (ref 6–20)
BUN/CREAT SERPL: 15 (ref 12–20)
CALCIUM SERPL-MCNC: 9.6 MG/DL (ref 8.5–10.1)
CHLORIDE SERPL-SCNC: 107 MMOL/L (ref 97–108)
CHOLEST SERPL-MCNC: 179 MG/DL
CO2 SERPL-SCNC: 28 MMOL/L (ref 21–32)
CREAT SERPL-MCNC: 1.48 MG/DL (ref 0.7–1.3)
ERYTHROCYTE [DISTWIDTH] IN BLOOD BY AUTOMATED COUNT: 14.3 % (ref 11.5–14.5)
EST. AVERAGE GLUCOSE BLD GHB EST-MCNC: 148 MG/DL
GLOBULIN SER CALC-MCNC: 2.6 G/DL (ref 2–4)
GLUCOSE SERPL-MCNC: 173 MG/DL (ref 65–100)
HBA1C MFR BLD: 6.8 % (ref 4–5.6)
HCT VFR BLD AUTO: 38.9 % (ref 36.6–50.3)
HDLC SERPL-MCNC: 67 MG/DL
HDLC SERPL: 2.7 (ref 0–5)
HGB BLD-MCNC: 12.4 G/DL (ref 12.1–17)
LDLC SERPL CALC-MCNC: 76 MG/DL (ref 0–100)
MCH RBC QN AUTO: 29.7 PG (ref 26–34)
MCHC RBC AUTO-ENTMCNC: 31.9 G/DL (ref 30–36.5)
MCV RBC AUTO: 93.3 FL (ref 80–99)
NRBC # BLD: 0 K/UL (ref 0–0.01)
NRBC BLD-RTO: 0 PER 100 WBC
PLATELET # BLD AUTO: 199 K/UL (ref 150–400)
PMV BLD AUTO: 9.9 FL (ref 8.9–12.9)
POTASSIUM SERPL-SCNC: 4.3 MMOL/L (ref 3.5–5.1)
PROT SERPL-MCNC: 6.4 G/DL (ref 6.4–8.2)
RBC # BLD AUTO: 4.17 M/UL (ref 4.1–5.7)
SODIUM SERPL-SCNC: 139 MMOL/L (ref 136–145)
TRIGL SERPL-MCNC: 180 MG/DL
VLDLC SERPL CALC-MCNC: 36 MG/DL
WBC # BLD AUTO: 6 K/UL (ref 4.1–11.1)

## 2023-09-25 DIAGNOSIS — E78.00 PURE HYPERCHOLESTEROLEMIA, UNSPECIFIED: ICD-10-CM

## 2023-09-25 RX ORDER — ROSUVASTATIN CALCIUM 40 MG/1
TABLET, COATED ORAL
Qty: 90 TABLET | Refills: 1 | Status: SHIPPED | OUTPATIENT
Start: 2023-09-25

## 2023-10-02 ENCOUNTER — OFFICE VISIT (OUTPATIENT)
Age: 78
End: 2023-10-02
Payer: MEDICARE

## 2023-10-02 VITALS
HEIGHT: 71 IN | OXYGEN SATURATION: 95 % | SYSTOLIC BLOOD PRESSURE: 130 MMHG | DIASTOLIC BLOOD PRESSURE: 88 MMHG | BODY MASS INDEX: 29.12 KG/M2 | WEIGHT: 208 LBS | HEART RATE: 56 BPM

## 2023-10-02 DIAGNOSIS — I35.0 NONRHEUMATIC AORTIC (VALVE) STENOSIS: ICD-10-CM

## 2023-10-02 DIAGNOSIS — I25.118 ATHEROSCLEROTIC HEART DISEASE OF NATIVE CORONARY ARTERY WITH OTHER FORMS OF ANGINA PECTORIS (HCC): Primary | ICD-10-CM

## 2023-10-02 DIAGNOSIS — I10 ESSENTIAL (PRIMARY) HYPERTENSION: ICD-10-CM

## 2023-10-02 PROCEDURE — G8484 FLU IMMUNIZE NO ADMIN: HCPCS | Performed by: STUDENT IN AN ORGANIZED HEALTH CARE EDUCATION/TRAINING PROGRAM

## 2023-10-02 PROCEDURE — 93005 ELECTROCARDIOGRAM TRACING: CPT | Performed by: STUDENT IN AN ORGANIZED HEALTH CARE EDUCATION/TRAINING PROGRAM

## 2023-10-02 PROCEDURE — 93010 ELECTROCARDIOGRAM REPORT: CPT | Performed by: STUDENT IN AN ORGANIZED HEALTH CARE EDUCATION/TRAINING PROGRAM

## 2023-10-02 PROCEDURE — 99214 OFFICE O/P EST MOD 30 MIN: CPT | Performed by: STUDENT IN AN ORGANIZED HEALTH CARE EDUCATION/TRAINING PROGRAM

## 2023-10-02 PROCEDURE — G8427 DOCREV CUR MEDS BY ELIG CLIN: HCPCS | Performed by: STUDENT IN AN ORGANIZED HEALTH CARE EDUCATION/TRAINING PROGRAM

## 2023-10-02 PROCEDURE — 3075F SYST BP GE 130 - 139MM HG: CPT | Performed by: STUDENT IN AN ORGANIZED HEALTH CARE EDUCATION/TRAINING PROGRAM

## 2023-10-02 PROCEDURE — 3079F DIAST BP 80-89 MM HG: CPT | Performed by: STUDENT IN AN ORGANIZED HEALTH CARE EDUCATION/TRAINING PROGRAM

## 2023-10-02 PROCEDURE — 1123F ACP DISCUSS/DSCN MKR DOCD: CPT | Performed by: STUDENT IN AN ORGANIZED HEALTH CARE EDUCATION/TRAINING PROGRAM

## 2023-10-02 PROCEDURE — G8419 CALC BMI OUT NRM PARAM NOF/U: HCPCS | Performed by: STUDENT IN AN ORGANIZED HEALTH CARE EDUCATION/TRAINING PROGRAM

## 2023-10-02 PROCEDURE — 1036F TOBACCO NON-USER: CPT | Performed by: STUDENT IN AN ORGANIZED HEALTH CARE EDUCATION/TRAINING PROGRAM

## 2023-10-02 NOTE — PROGRESS NOTES
Yoandy Swift,   [address]  [facility]    Office [phone],[facility] [phone]           Driss Khan is a 78 y.o. presents for f/u of CAD      Assessment/Recommendations:      Coronary artery disease - stable angina symptoms  Distal LAD  filling via L-->L collaterals   Lcx stenting 4/2019. 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 Repeat echo fall 2024. Hypertension- stable, cont current therapy    Diabetes-  Per Becky Weaver MD     Hyperlipidemia   - cont statin therapy      GERD- PPI      Carotid artery stenosis- s/p carotid endarterectomy on aspirin and statin. followed by  Sudheer Hernandes MD       Positional related dizziness- previously d/c hydralazine with improvement in dizziness symptoms. Primary Care Physician- Becky Weaver MD    F/u 6 months sooner as needed        Subjective:  Clinically doing. No significant angina. No adverse bleeding. Dizziness improved with d/c hydralazine      Past Medical History:   Diagnosis Date    Amaurosis fugax of left eye 5/6/2012    Arthritis     OSTEO    CAD (coronary artery disease) 2012    CAROTID LEFT     Cancer ([address]) 2013    PROSTATE    Chronic pain     DJD lumbar    Diabetes ([address]) 5/22/2010    Frequent nocturnal awakening     urinary frequency    GERD (gastroesophageal reflux disease) 5/22/2010    Hyperlipidemia 5/22/2010    Hypertension 5/22/2010    Kidney stone 5/22/2010    Nodular goiter     1.3 cm right , FNA 6/15    Obesity (BMI 30-39. 9)     Psychiatric disorder     ANXIETY    Vertigo         Past Surgical History:   Procedure Laterality Date    COLONOSCOPY

## 2023-10-02 NOTE — PROGRESS NOTES
Pollo Pantoja is a 78 y.o. male    had no chief complaint listed for this encounter. Vitals:    10/02/23 1046   BP: 130/88   Site: Left Upper Arm   Position: Sitting   Pulse: 56   SpO2: 95%   Weight: 208 lb (94.3 kg)   Height: 5' 11\" (1.803 m)    PT STATES ALL HIS MEDICATIONS ARE THE SAME AS LAST TIME BUT HE DOES NOT REMEMBER WHAT HE IS TAKING BUT THE PHARMACY IS CORRECT     Chest pain NO    SOB YES     Dizziness NO    Swelling SOMETIMES IN HIS ANKLES    Palpitations NO    Refills NO    Covid Vaccinated YES       1. Have you been to the ER, urgent care clinic since your last visit? Hospitalized since your last visit? NO    2. Have you seen or consulted any other health care providers outside of the [address] [place] [address] since your last visit? Include any pap smears or colon screening.  NO

## 2023-10-11 ENCOUNTER — TELEPHONE (OUTPATIENT)
Facility: CLINIC | Age: 78
End: 2023-10-11

## 2023-10-11 DIAGNOSIS — B96.89 ACUTE BACTERIAL SINUSITIS: Primary | ICD-10-CM

## 2023-10-11 DIAGNOSIS — J01.90 ACUTE BACTERIAL SINUSITIS: Primary | ICD-10-CM

## 2023-10-11 RX ORDER — ALBUTEROL SULFATE 2.5 MG/3ML
2.5 SOLUTION RESPIRATORY (INHALATION) 4 TIMES DAILY PRN
Qty: 120 EACH | Refills: 3 | Status: SHIPPED | OUTPATIENT
Start: 2023-10-11 | End: 2023-10-12 | Stop reason: SDUPTHER

## 2023-10-11 RX ORDER — AMOXICILLIN AND CLAVULANATE POTASSIUM 875; 125 MG/1; MG/1
1 TABLET, FILM COATED ORAL 2 TIMES DAILY
Qty: 14 TABLET | Refills: 0 | Status: SHIPPED | OUTPATIENT
Start: 2023-10-11 | End: 2023-10-18

## 2023-10-11 NOTE — TELEPHONE ENCOUNTER
Called patient nad patient has nasal congestion, pain, pressure, and cough. Similar symptoms with previous Sinusitis. Trial Augmentin and Albuterol.     MD JANNA Cho & GERARDO ELIAS [facility]  10/11/23

## 2023-10-11 NOTE — TELEPHONE ENCOUNTER
Asking Dr. Kamron Cooper to please call him in something. He has the same chest cold he gets in the beginning of the season. Asking for a call please.

## 2023-10-12 DIAGNOSIS — B96.89 ACUTE BACTERIAL SINUSITIS: ICD-10-CM

## 2023-10-12 DIAGNOSIS — J01.90 ACUTE BACTERIAL SINUSITIS: ICD-10-CM

## 2023-10-12 DIAGNOSIS — J40 BRONCHITIS: Primary | ICD-10-CM

## 2023-10-12 RX ORDER — ALBUTEROL SULFATE 2.5 MG/3ML
2.5 SOLUTION RESPIRATORY (INHALATION) 4 TIMES DAILY PRN
Qty: 120 EACH | Refills: 3 | Status: SHIPPED | OUTPATIENT
Start: 2023-10-12

## 2024-01-02 RX ORDER — FAMOTIDINE 40 MG/1
TABLET, FILM COATED ORAL
Qty: 90 TABLET | Refills: 1 | Status: SHIPPED | OUTPATIENT
Start: 2024-01-02

## 2024-01-12 ENCOUNTER — TELEPHONE (OUTPATIENT)
Facility: CLINIC | Age: 79
End: 2024-01-12

## 2024-01-12 NOTE — TELEPHONE ENCOUNTER
----- Message from Shira Elizabeth sent at 1/12/2024 11:50 AM EST -----  Subject: Message to Provider    QUESTIONS  Information for Provider? Pt states he never received a mailed copy of his   most recent lab results. He is requesting for them to be mailed again if   possible. [facility] did confirm that the address on file is correct.   ---------------------------------------------------------------------------  --------------  CALL BACK INFO  [phone]; OK to leave message on voicemail  ---------------------------------------------------------------------------  --------------  SCRIPT ANSWERS  Relationship to Patient? Self

## 2024-01-18 RX ORDER — NITROGLYCERIN 0.3 MG/1
TABLET SUBLINGUAL
Qty: 25 TABLET | Refills: 2 | Status: SHIPPED | OUTPATIENT
Start: 2024-01-18

## 2024-01-18 NOTE — TELEPHONE ENCOUNTER
Refill per VO of Dr. SUSANNE Patel, OD:    Last appt:  10/2/2023    Future Appointments   Date Time Provider Department Center   3/21/2024  9:00 AM Rich Jiménez MD [facility] BS AMB   4/8/2024 10:00 AM Tashi Patel DO [facility] BS AMB       Requested Prescriptions     Pending Prescriptions Disp Refills    nitroGLYCERIN (NITROSTAT) 0.3 MG SL tablet [Pharmacy Med Name: nitroglycerin 0.3 mg sublingual tablet] 25 tablet 2     Sig: DISSOLVE ONE TABLET UNDER THE TONGUE every 5 minutes AS needed FOR CHEST PAIN.

## 2024-02-08 RX ORDER — RANOLAZINE 500 MG/1
TABLET, EXTENDED RELEASE ORAL
Qty: 180 TABLET | Refills: 1 | Status: SHIPPED | OUTPATIENT
Start: 2024-02-08

## 2024-02-19 DIAGNOSIS — K21.00 GASTRO-ESOPHAGEAL REFLUX DISEASE WITH ESOPHAGITIS, WITHOUT BLEEDING: ICD-10-CM

## 2024-02-19 DIAGNOSIS — I10 ESSENTIAL (PRIMARY) HYPERTENSION: ICD-10-CM

## 2024-02-19 RX ORDER — FELODIPINE 10 MG/1
TABLET, EXTENDED RELEASE ORAL
Qty: 90 TABLET | Refills: 1 | Status: SHIPPED | OUTPATIENT
Start: 2024-02-19

## 2024-02-19 RX ORDER — FUROSEMIDE 20 MG/1
TABLET ORAL
Qty: 90 TABLET | Refills: 1 | Status: SHIPPED | OUTPATIENT
Start: 2024-02-19

## 2024-02-19 RX ORDER — PANTOPRAZOLE SODIUM 40 MG/1
TABLET, DELAYED RELEASE ORAL
Qty: 90 TABLET | Refills: 1 | Status: SHIPPED | OUTPATIENT
Start: 2024-02-19

## 2024-03-05 ENCOUNTER — TELEPHONE (OUTPATIENT)
Facility: CLINIC | Age: 79
End: 2024-03-05

## 2024-03-05 DIAGNOSIS — G89.29 CHRONIC LEFT-SIDED LOW BACK PAIN WITH LEFT-SIDED SCIATICA: Primary | ICD-10-CM

## 2024-03-05 DIAGNOSIS — M54.42 CHRONIC LEFT-SIDED LOW BACK PAIN WITH LEFT-SIDED SCIATICA: Primary | ICD-10-CM

## 2024-03-05 NOTE — TELEPHONE ENCOUNTER
Pt would like to get Dr Jiménez's opinion and recommendation for an Orthopaedic Dr. Pt would like a referral placed as well. Please advise.

## 2024-03-06 NOTE — TELEPHONE ENCOUNTER
Pt states he would like his referral to go to Dr Alex Jaramillo, phone # [phone] located on [facility] [facility]. Please advise.

## 2024-03-06 NOTE — TELEPHONE ENCOUNTER
Informed pt per Dr Jiménez  I can send in a referral related to the back, but will need another appointment for a hip referral I think.  Also will need to get X-ray of the hip as well.  I would recommend focusing on the back first though, likely the root cause.    Pt agreed

## 2024-03-06 NOTE — TELEPHONE ENCOUNTER
Attempted to call unsuccessful message left  Need to per Dr Jiménez- clarify  is related to his back specifically?  Or is it another joint issue.

## 2024-03-11 RX ORDER — LISINOPRIL 20 MG/1
20 TABLET ORAL DAILY
Qty: 90 TABLET | Refills: 2 | Status: SHIPPED | OUTPATIENT
Start: 2024-03-11 | End: 2024-03-13 | Stop reason: SDUPTHER

## 2024-03-11 RX ORDER — ISOSORBIDE MONONITRATE 60 MG/1
60 TABLET, EXTENDED RELEASE ORAL DAILY
Qty: 90 TABLET | Refills: 2 | Status: SHIPPED | OUTPATIENT
Start: 2024-03-11 | End: 2024-03-13 | Stop reason: SDUPTHER

## 2024-03-11 RX ORDER — CLOPIDOGREL BISULFATE 75 MG/1
75 TABLET ORAL DAILY
Qty: 90 TABLET | Refills: 2 | Status: SHIPPED | OUTPATIENT
Start: 2024-03-11 | End: 2024-03-13 | Stop reason: SDUPTHER

## 2024-03-11 NOTE — TELEPHONE ENCOUNTER
Refill per VO of Dr. SUSANNE Patel, OD:    Last appt:  10/2/2023    Future Appointments   Date Time Provider Department Center   3/21/2024  9:00 AM Rich Jiménez MD [facility] BS AMB   4/8/2024 10:00 AM Tashi Patel DO [facility] BS AMB       Requested Prescriptions     Pending Prescriptions Disp Refills    clopidogrel (PLAVIX) 75 MG tablet 90 tablet 2     Sig: Take 1 tablet by mouth daily    isosorbide mononitrate (IMDUR) 60 MG extended release tablet 90 tablet 2     Sig: Take 1 tablet by mouth daily    lisinopril (PRINIVIL;ZESTRIL) 20 MG tablet 90 tablet 2     Sig: Take 1 tablet by mouth daily

## 2024-03-12 DIAGNOSIS — E78.00 PURE HYPERCHOLESTEROLEMIA, UNSPECIFIED: ICD-10-CM

## 2024-03-12 DIAGNOSIS — I10 ESSENTIAL (PRIMARY) HYPERTENSION: ICD-10-CM

## 2024-03-12 DIAGNOSIS — K21.00 GASTRO-ESOPHAGEAL REFLUX DISEASE WITH ESOPHAGITIS, WITHOUT BLEEDING: ICD-10-CM

## 2024-03-12 RX ORDER — FUROSEMIDE 20 MG/1
20 TABLET ORAL DAILY
Qty: 90 TABLET | Refills: 1 | Status: SHIPPED | OUTPATIENT
Start: 2024-03-12 | End: 2024-03-13 | Stop reason: SDUPTHER

## 2024-03-12 RX ORDER — PANTOPRAZOLE SODIUM 40 MG/1
40 TABLET, DELAYED RELEASE ORAL DAILY
Qty: 90 TABLET | Refills: 1 | Status: SHIPPED | OUTPATIENT
Start: 2024-03-12 | End: 2024-03-13 | Stop reason: SDUPTHER

## 2024-03-12 RX ORDER — FELODIPINE 10 MG/1
TABLET, EXTENDED RELEASE ORAL
Qty: 90 TABLET | Refills: 1 | Status: SHIPPED | OUTPATIENT
Start: 2024-03-12 | End: 2024-03-13 | Stop reason: SDUPTHER

## 2024-03-12 RX ORDER — RANOLAZINE 500 MG/1
500 TABLET, EXTENDED RELEASE ORAL 2 TIMES DAILY
Qty: 180 TABLET | Refills: 1 | Status: SHIPPED | OUTPATIENT
Start: 2024-03-12 | End: 2024-03-13 | Stop reason: SDUPTHER

## 2024-03-12 RX ORDER — FAMOTIDINE 40 MG/1
40 TABLET, FILM COATED ORAL NIGHTLY
Qty: 90 TABLET | Refills: 1 | Status: SHIPPED | OUTPATIENT
Start: 2024-03-12 | End: 2024-03-13 | Stop reason: SDUPTHER

## 2024-03-12 RX ORDER — ROSUVASTATIN CALCIUM 40 MG/1
40 TABLET, COATED ORAL DAILY
Qty: 90 TABLET | Refills: 1 | Status: SHIPPED | OUTPATIENT
Start: 2024-03-12 | End: 2024-03-13 | Stop reason: SDUPTHER

## 2024-03-13 DIAGNOSIS — K21.00 GASTRO-ESOPHAGEAL REFLUX DISEASE WITH ESOPHAGITIS, WITHOUT BLEEDING: ICD-10-CM

## 2024-03-13 DIAGNOSIS — I10 ESSENTIAL (PRIMARY) HYPERTENSION: ICD-10-CM

## 2024-03-13 DIAGNOSIS — E78.00 PURE HYPERCHOLESTEROLEMIA, UNSPECIFIED: ICD-10-CM

## 2024-03-13 RX ORDER — CLOPIDOGREL BISULFATE 75 MG/1
75 TABLET ORAL DAILY
Qty: 90 TABLET | Refills: 2 | Status: SHIPPED | OUTPATIENT
Start: 2024-03-13

## 2024-03-13 RX ORDER — PANTOPRAZOLE SODIUM 40 MG/1
40 TABLET, DELAYED RELEASE ORAL DAILY
Qty: 90 TABLET | Refills: 1 | Status: SHIPPED | OUTPATIENT
Start: 2024-03-13

## 2024-03-13 RX ORDER — ISOSORBIDE MONONITRATE 60 MG/1
60 TABLET, EXTENDED RELEASE ORAL DAILY
Qty: 90 TABLET | Refills: 2 | Status: SHIPPED | OUTPATIENT
Start: 2024-03-13

## 2024-03-13 RX ORDER — FELODIPINE 10 MG/1
TABLET, EXTENDED RELEASE ORAL
Qty: 90 TABLET | Refills: 1 | Status: SHIPPED | OUTPATIENT
Start: 2024-03-13

## 2024-03-13 RX ORDER — FUROSEMIDE 20 MG/1
20 TABLET ORAL DAILY
Qty: 90 TABLET | Refills: 1 | Status: SHIPPED | OUTPATIENT
Start: 2024-03-13

## 2024-03-13 RX ORDER — FAMOTIDINE 40 MG/1
40 TABLET, FILM COATED ORAL NIGHTLY
Qty: 90 TABLET | Refills: 1 | Status: SHIPPED | OUTPATIENT
Start: 2024-03-13

## 2024-03-13 RX ORDER — LISINOPRIL 20 MG/1
20 TABLET ORAL DAILY
Qty: 90 TABLET | Refills: 2 | Status: SHIPPED | OUTPATIENT
Start: 2024-03-13

## 2024-03-13 RX ORDER — ROSUVASTATIN CALCIUM 40 MG/1
40 TABLET, COATED ORAL DAILY
Qty: 90 TABLET | Refills: 1 | Status: SHIPPED | OUTPATIENT
Start: 2024-03-13

## 2024-03-13 RX ORDER — RANOLAZINE 500 MG/1
500 TABLET, EXTENDED RELEASE ORAL 2 TIMES DAILY
Qty: 180 TABLET | Refills: 1 | Status: SHIPPED | OUTPATIENT
Start: 2024-03-13

## 2024-03-13 NOTE — TELEPHONE ENCOUNTER
Refill per VO of Dr. SUSANNE Patel, OD:    Last appt:  10/2/2023    Future Appointments   Date Time Provider Department Center   3/21/2024  9:00 AM Rich Jiménez MD [facility] BS AMB   4/8/2024 10:00 AM Sancho Patel DO [facility] BS AMB       Requested Prescriptions     Signed Prescriptions Disp Refills    lisinopril (PRINIVIL;ZESTRIL) 20 MG tablet 90 tablet 2     Sig: Take 1 tablet by mouth daily     Authorizing Provider: SANCHO PATEL     Ordering User: JAZ GUZMAN    isosorbide mononitrate (IMDUR) 60 MG extended release tablet 90 tablet 2     Sig: Take 1 tablet by mouth daily     Authorizing Provider: SANCHO PATEL     Ordering User: JAZ GUZMAN    clopidogrel (PLAVIX) 75 MG tablet 90 tablet 2     Sig: Take 1 tablet by mouth daily     Authorizing Provider: SANCHO PATEL     Ordering User: JAZ GUZMAN

## 2024-03-21 ENCOUNTER — OFFICE VISIT (OUTPATIENT)
Facility: CLINIC | Age: 79
End: 2024-03-21

## 2024-03-21 VITALS
BODY MASS INDEX: 30.15 KG/M2 | OXYGEN SATURATION: 98 % | DIASTOLIC BLOOD PRESSURE: 75 MMHG | TEMPERATURE: 97.5 F | WEIGHT: 215.4 LBS | RESPIRATION RATE: 18 BRPM | HEIGHT: 71 IN | HEART RATE: 52 BPM | SYSTOLIC BLOOD PRESSURE: 131 MMHG

## 2024-03-21 DIAGNOSIS — E11.21 CONTROLLED TYPE 2 DIABETES MELLITUS WITH DIABETIC NEPHROPATHY, WITHOUT LONG-TERM CURRENT USE OF INSULIN (HCC): ICD-10-CM

## 2024-03-21 DIAGNOSIS — E55.9 VITAMIN D DEFICIENCY: ICD-10-CM

## 2024-03-21 DIAGNOSIS — Z00.00 MEDICARE ANNUAL WELLNESS VISIT, SUBSEQUENT: Primary | ICD-10-CM

## 2024-03-21 DIAGNOSIS — I25.118 CORONARY ARTERY DISEASE OF NATIVE ARTERY OF NATIVE HEART WITH STABLE ANGINA PECTORIS (HCC): ICD-10-CM

## 2024-03-21 DIAGNOSIS — E78.2 MIXED HYPERLIPIDEMIA: ICD-10-CM

## 2024-03-21 DIAGNOSIS — E11.22 CKD STAGE 3 DUE TO TYPE 2 DIABETES MELLITUS (HCC): ICD-10-CM

## 2024-03-21 DIAGNOSIS — N18.30 CKD STAGE 3 DUE TO TYPE 2 DIABETES MELLITUS (HCC): ICD-10-CM

## 2024-03-21 DIAGNOSIS — N18.30 STAGE 3 CHRONIC KIDNEY DISEASE, UNSPECIFIED WHETHER STAGE 3A OR 3B CKD (HCC): ICD-10-CM

## 2024-03-21 DIAGNOSIS — K21.9 GASTROESOPHAGEAL REFLUX DISEASE WITHOUT ESOPHAGITIS: ICD-10-CM

## 2024-03-21 PROBLEM — H25.13 AGE-RELATED NUCLEAR CATARACT OF BOTH EYES: Status: RESOLVED | Noted: 2018-03-27 | Resolved: 2024-03-21

## 2024-03-21 ASSESSMENT — PATIENT HEALTH QUESTIONNAIRE - PHQ9
SUM OF ALL RESPONSES TO PHQ9 QUESTIONS 1 & 2: 0
SUM OF ALL RESPONSES TO PHQ QUESTIONS 1-9: 0
SUM OF ALL RESPONSES TO PHQ QUESTIONS 1-9: 0
1. LITTLE INTEREST OR PLEASURE IN DOING THINGS: NOT AT ALL
SUM OF ALL RESPONSES TO PHQ QUESTIONS 1-9: 0
SUM OF ALL RESPONSES TO PHQ QUESTIONS 1-9: 0
2. FEELING DOWN, DEPRESSED OR HOPELESS: NOT AT ALL

## 2024-03-21 ASSESSMENT — LIFESTYLE VARIABLES
HOW OFTEN DO YOU HAVE A DRINK CONTAINING ALCOHOL: NEVER
HOW MANY STANDARD DRINKS CONTAINING ALCOHOL DO YOU HAVE ON A TYPICAL DAY: PATIENT DOES NOT DRINK

## 2024-03-21 ASSESSMENT — ENCOUNTER SYMPTOMS
ABDOMINAL PAIN: 0
CHEST TIGHTNESS: 0

## 2024-03-21 NOTE — PROGRESS NOTES
1. \"Have you been to the ER, urgent care clinic since your last visit?  Hospitalized since your last visit?\" no    2. \"Have you seen or consulted any other health care providers outside of the [facility] since your last visit?\" no         Health Maintenance Due   Topic Date Due    Respiratory Syncytial Virus (RSV) Pregnant or age 60 yrs+ (1 - 1-dose 60+ series) Never done    COVID-19 Vaccine (4 - 2023-24 season) 09/01/2023    Diabetic retinal exam  12/02/2023    Annual Wellness Visit (Medicare)  03/09/2024    Diabetic foot exam  03/09/2024      
[facility]    History of Present Illness:   Juan Carlos Snow is a 78 y.o. male with history of HTN, CAD, Carotid artery disease, GERD, Diabetes, CKD, Prostate, HLD   CC: Medicare Wellness  History provided by patient and Records    HPI:  Coronary Artery Disease Follow up:  Today patient reports he is feeling well and overall his symptoms are controlled on his current medications.  he  has not had a history of acute myocardial infarction in the past.  Prior management has included:   - Coronary stenting: YES  - Coronary bypass: NO      he has had CHF      he is on a statin  he is on antiplatelet therapy.  he is on a beta blocker.  he is on a regular Nitrate therapy.  he does use Nitroglycerin as needed for chest pain.     Residual symptoms of CAD include occasional pains in the left chest, though has been moving stuff at home.  Patient denies any current dyspnea, exertional chest pressure/discomfort.  Risk factors are controlled or at goal.  Primary risk factors include diabetes, elevated cholesterol and hypertension    Chronic Kidney Disease:  Patient reports overall doing well, patient denies any current complaints at this time.  - Etiology: HTN/DM   - Symptoms: None  - CKD Stage: III  - Nephrologist: None  - Current Treatments: renal diet  - Dialysis Status: na  - Cardiovascular risk factor reduction including hypertension     Gastroesophageal Reflux:  Current control of Symptoms: Controlled  Primary symptoms: heartburn  Hiatal Hernia: No  Current Medications: Protonix  The patient has no history melena or bright red blood in the stools.  The patient avoids high dose aspirin and NSAID therapy.  The patient is aware of diet changes needed, elevating the head of the bed and appropriate use of antacids.      Diabetes Follow up: Overall the patient feels well with good energy level.     Current Medications: This patient does not have an active medication from one of the medication groupers.. 
nebulization 4 times daily as needed for Wheezing  Rich Jiménez MD   EPINEPHrine (EPIPEN) 0.3 MG/0.3ML SOAJ injection INJECT INTRAMUSCULARLY AS NEEDED FOR ONE DOSE. TAKE WITH 50MG OF BENADRYL AND CALL 911.  Rich Jiménez MD   albuterol sulfate HFA (VENTOLIN HFA) 108 (90 Base) MCG/ACT inhaler Inhale 2 puffs into the lungs 4 times daily as needed for Wheezing  Rich Jiménez MD   Peak Flow Meter ASHER Use prior and post nebulizer treatment  Automatic Reconciliation, Ar   albuterol (PROVENTIL) (2.5 MG/3ML) 0.083% nebulizer solution Inhale into the lungs every 6 hours as needed  Automatic Reconciliation, Ar   aspirin 81 MG EC tablet Take by mouth daily  Automatic Reconciliation, Ar   benzonatate (TESSALON) 200 MG capsule Take by mouth 3 times daily as needed  Automatic Reconciliation, Ar   coenzyme Q10 100 MG CAPS capsule Take by mouth daily  Automatic Reconciliation, Ar   diclofenac sodium (VOLTAREN) 1 % GEL Apply topically 4 times daily  Automatic Reconciliation, Ar   meclizine (ANTIVERT) 25 MG CHEW Take by mouth 3 times daily as needed  Automatic Reconciliation, Ar   triamcinolone (KENALOG) 0.1 % cream Apply topically 2 times daily  Automatic Reconciliation, Ar       CareTeam (Including outside providers/suppliers regularly involved in providing care):   Patient Care Team:  Rich Jiménez MD as PCP - General  Rich Jiménez MD as PCP - Empaneled Provider  Roby Reyes MD as Surgeon  Tashi Patel DO as Physician     Reviewed and updated this visit:  Tobacco  Allergies  Meds  Problems  Med Hx  Surg Hx  Soc Hx  Fam Hx

## 2024-03-21 NOTE — PATIENT INSTRUCTIONS
of the most important things you can do to protect your heart. It is never too late to quit. Try to avoid secondhand smoke too.     Stay at a weight that's healthy for you. Talk to your doctor if you need help losing weight.     Try to get 7 to 9 hours of sleep each night.     Limit alcohol to 2 drinks a day for men and 1 drink a day for women. Too much alcohol can cause health problems.     Manage other health problems such as diabetes, high blood pressure, and high cholesterol. If you think you may have a problem with alcohol or drug use, talk to your doctor.   Medicines    Take your medicines exactly as prescribed. Call your doctor if you think you are having a problem with your medicine.     If your doctor recommends aspirin, take the amount directed each day. Make sure you take aspirin and not another kind of pain reliever, such as acetaminophen (Tylenol).   When should you call for help?   Call 911 if you have symptoms of a heart attack. These may include:    Chest pain or pressure, or a strange feeling in the chest.     Sweating.     Shortness of breath.     Pain, pressure, or a strange feeling in the back, neck, jaw, or upper belly or in one or both shoulders or arms.     Lightheadedness or sudden weakness.     A fast or irregular heartbeat.   After you call 911, the  may tell you to chew 1 adult-strength or 2 to 4 low-dose aspirin. Wait for an ambulance. Do not try to drive yourself.  Watch closely for changes in your health, and be sure to contact your doctor if you have any problems.  Where can you learn more?  Go to https://www.[employer].net/patientEd and enter F075 to learn more about \"A Healthy Heart: Care Instructions.\"  Current as of: June 24, 2023               Content Version: 14.0  © 6850-0994 Healthwise, Incorporated.   Care instructions adapted under license by [employer]). If you have questions about a medical condition or this instruction, always ask your healthcare professional.

## 2024-03-22 LAB
25(OH)D3 SERPL-MCNC: 28.7 NG/ML (ref 30–100)
ALBUMIN SERPL-MCNC: 3.8 G/DL (ref 3.5–5)
ALBUMIN/GLOB SERPL: 1.4 (ref 1.1–2.2)
ALP SERPL-CCNC: 70 U/L (ref 45–117)
ALT SERPL-CCNC: 25 U/L (ref 12–78)
ANION GAP SERPL CALC-SCNC: 6 MMOL/L (ref 5–15)
AST SERPL-CCNC: 15 U/L (ref 15–37)
BILIRUB SERPL-MCNC: 0.4 MG/DL (ref 0.2–1)
BUN SERPL-MCNC: 23 MG/DL (ref 6–20)
BUN/CREAT SERPL: 15 (ref 12–20)
CALCIUM SERPL-MCNC: 9.3 MG/DL (ref 8.5–10.1)
CHLORIDE SERPL-SCNC: 108 MMOL/L (ref 97–108)
CHOLEST SERPL-MCNC: 176 MG/DL
CO2 SERPL-SCNC: 26 MMOL/L (ref 21–32)
CREAT SERPL-MCNC: 1.55 MG/DL (ref 0.7–1.3)
ERYTHROCYTE [DISTWIDTH] IN BLOOD BY AUTOMATED COUNT: 13.1 % (ref 11.5–14.5)
EST. AVERAGE GLUCOSE BLD GHB EST-MCNC: 154 MG/DL
GLOBULIN SER CALC-MCNC: 2.8 G/DL (ref 2–4)
GLUCOSE SERPL-MCNC: 166 MG/DL (ref 65–100)
HBA1C MFR BLD: 7 % (ref 4–5.6)
HCT VFR BLD AUTO: 40.1 % (ref 36.6–50.3)
HDLC SERPL-MCNC: 69 MG/DL
HDLC SERPL: 2.6 (ref 0–5)
HGB BLD-MCNC: 12.5 G/DL (ref 12.1–17)
LDLC SERPL CALC-MCNC: 80.8 MG/DL (ref 0–100)
MCH RBC QN AUTO: 29.5 PG (ref 26–34)
MCHC RBC AUTO-ENTMCNC: 31.2 G/DL (ref 30–36.5)
MCV RBC AUTO: 94.6 FL (ref 80–99)
NRBC # BLD: 0 K/UL (ref 0–0.01)
NRBC BLD-RTO: 0 PER 100 WBC
PLATELET # BLD AUTO: 225 K/UL (ref 150–400)
PMV BLD AUTO: 10.7 FL (ref 8.9–12.9)
POTASSIUM SERPL-SCNC: 4.8 MMOL/L (ref 3.5–5.1)
PROT SERPL-MCNC: 6.6 G/DL (ref 6.4–8.2)
RBC # BLD AUTO: 4.24 M/UL (ref 4.1–5.7)
SODIUM SERPL-SCNC: 140 MMOL/L (ref 136–145)
TRIGL SERPL-MCNC: 131 MG/DL
VLDLC SERPL CALC-MCNC: 26.2 MG/DL
WBC # BLD AUTO: 6.5 K/UL (ref 4.1–11.1)

## 2024-03-25 DIAGNOSIS — E78.00 PURE HYPERCHOLESTEROLEMIA, UNSPECIFIED: ICD-10-CM

## 2024-03-25 RX ORDER — ROSUVASTATIN CALCIUM 40 MG/1
40 TABLET, COATED ORAL DAILY
Qty: 90 TABLET | Refills: 1 | OUTPATIENT
Start: 2024-03-25

## 2024-04-02 ENCOUNTER — HOSPITAL ENCOUNTER (OUTPATIENT)
Facility: HOSPITAL | Age: 79
Discharge: HOME | End: 2024-04-05
Attending: STUDENT IN AN ORGANIZED HEALTH CARE EDUCATION/TRAINING PROGRAM
Payer: MEDICARE

## 2024-04-02 DIAGNOSIS — M54.16 LUMBAR RADICULOPATHY: ICD-10-CM

## 2024-04-02 PROCEDURE — 72148 MRI LUMBAR SPINE W/O DYE: CPT

## 2024-04-03 DIAGNOSIS — E78.00 PURE HYPERCHOLESTEROLEMIA, UNSPECIFIED: ICD-10-CM

## 2024-04-03 RX ORDER — ROSUVASTATIN CALCIUM 40 MG/1
40 TABLET, COATED ORAL DAILY
Qty: 90 TABLET | Refills: 1 | Status: SHIPPED | OUTPATIENT
Start: 2024-04-03

## 2024-04-08 ENCOUNTER — OFFICE VISIT (OUTPATIENT)
Age: 79
End: 2024-04-08
Payer: MEDICARE

## 2024-04-08 VITALS
WEIGHT: 211.8 LBS | HEIGHT: 71 IN | BODY MASS INDEX: 29.65 KG/M2 | SYSTOLIC BLOOD PRESSURE: 116 MMHG | HEART RATE: 55 BPM | OXYGEN SATURATION: 98 % | DIASTOLIC BLOOD PRESSURE: 60 MMHG | RESPIRATION RATE: 18 BRPM

## 2024-04-08 DIAGNOSIS — I35.0 NONRHEUMATIC AORTIC VALVE STENOSIS: Primary | ICD-10-CM

## 2024-04-08 DIAGNOSIS — I25.118 ATHEROSCLEROTIC HEART DISEASE OF NATIVE CORONARY ARTERY WITH OTHER FORMS OF ANGINA PECTORIS (HCC): ICD-10-CM

## 2024-04-08 PROCEDURE — 3074F SYST BP LT 130 MM HG: CPT | Performed by: STUDENT IN AN ORGANIZED HEALTH CARE EDUCATION/TRAINING PROGRAM

## 2024-04-08 PROCEDURE — G8417 CALC BMI ABV UP PARAM F/U: HCPCS | Performed by: STUDENT IN AN ORGANIZED HEALTH CARE EDUCATION/TRAINING PROGRAM

## 2024-04-08 PROCEDURE — G8427 DOCREV CUR MEDS BY ELIG CLIN: HCPCS | Performed by: STUDENT IN AN ORGANIZED HEALTH CARE EDUCATION/TRAINING PROGRAM

## 2024-04-08 PROCEDURE — 1036F TOBACCO NON-USER: CPT | Performed by: STUDENT IN AN ORGANIZED HEALTH CARE EDUCATION/TRAINING PROGRAM

## 2024-04-08 PROCEDURE — 3078F DIAST BP <80 MM HG: CPT | Performed by: STUDENT IN AN ORGANIZED HEALTH CARE EDUCATION/TRAINING PROGRAM

## 2024-04-08 PROCEDURE — 99214 OFFICE O/P EST MOD 30 MIN: CPT | Performed by: STUDENT IN AN ORGANIZED HEALTH CARE EDUCATION/TRAINING PROGRAM

## 2024-04-08 PROCEDURE — 1123F ACP DISCUSS/DSCN MKR DOCD: CPT | Performed by: STUDENT IN AN ORGANIZED HEALTH CARE EDUCATION/TRAINING PROGRAM

## 2024-04-08 NOTE — PROGRESS NOTES
had concerns including Coronary Artery Disease, Hypertension, and Other (NAVS).    Vitals:    04/08/24 0925   BP: 116/60   Site: Left Upper Arm   Position: Sitting   Pulse: 55   Resp: 18   SpO2: 98%   Weight: 96.1 kg (211 lb 12.8 oz)   Height: 1.803 m (5' 11\")        Chest pain No    Refills No        1. Have you been to the ER, urgent care clinic since your last visit? No       Hospitalized since your last visit? No       Where?        Date?  
PM

## 2024-06-21 ENCOUNTER — OFFICE VISIT (OUTPATIENT)
Facility: CLINIC | Age: 79
End: 2024-06-21

## 2024-06-21 VITALS
HEART RATE: 52 BPM | WEIGHT: 207 LBS | TEMPERATURE: 97.4 F | HEIGHT: 71 IN | OXYGEN SATURATION: 98 % | BODY MASS INDEX: 28.98 KG/M2 | DIASTOLIC BLOOD PRESSURE: 70 MMHG | RESPIRATION RATE: 14 BRPM | SYSTOLIC BLOOD PRESSURE: 130 MMHG

## 2024-06-21 DIAGNOSIS — E78.2 MIXED HYPERLIPIDEMIA: ICD-10-CM

## 2024-06-21 DIAGNOSIS — E55.9 VITAMIN D DEFICIENCY: ICD-10-CM

## 2024-06-21 DIAGNOSIS — K21.9 GASTROESOPHAGEAL REFLUX DISEASE WITHOUT ESOPHAGITIS: ICD-10-CM

## 2024-06-21 DIAGNOSIS — R35.1 NOCTURIA: ICD-10-CM

## 2024-06-21 DIAGNOSIS — E11.22 CKD STAGE 3 DUE TO TYPE 2 DIABETES MELLITUS (HCC): ICD-10-CM

## 2024-06-21 DIAGNOSIS — N18.30 CKD STAGE 3 DUE TO TYPE 2 DIABETES MELLITUS (HCC): ICD-10-CM

## 2024-06-21 DIAGNOSIS — N18.30 STAGE 3 CHRONIC KIDNEY DISEASE, UNSPECIFIED WHETHER STAGE 3A OR 3B CKD (HCC): ICD-10-CM

## 2024-06-21 DIAGNOSIS — Z12.5 PROSTATE CANCER SCREENING: ICD-10-CM

## 2024-06-21 DIAGNOSIS — E11.21 CONTROLLED TYPE 2 DIABETES MELLITUS WITH DIABETIC NEPHROPATHY, WITHOUT LONG-TERM CURRENT USE OF INSULIN (HCC): ICD-10-CM

## 2024-06-21 DIAGNOSIS — I25.118 CORONARY ARTERY DISEASE OF NATIVE ARTERY OF NATIVE HEART WITH STABLE ANGINA PECTORIS (HCC): Primary | ICD-10-CM

## 2024-06-21 SDOH — ECONOMIC STABILITY: FOOD INSECURITY: WITHIN THE PAST 12 MONTHS, THE FOOD YOU BOUGHT JUST DIDN'T LAST AND YOU DIDN'T HAVE MONEY TO GET MORE.: NEVER TRUE

## 2024-06-21 SDOH — ECONOMIC STABILITY: FOOD INSECURITY: WITHIN THE PAST 12 MONTHS, YOU WORRIED THAT YOUR FOOD WOULD RUN OUT BEFORE YOU GOT MONEY TO BUY MORE.: NEVER TRUE

## 2024-06-21 SDOH — ECONOMIC STABILITY: INCOME INSECURITY: HOW HARD IS IT FOR YOU TO PAY FOR THE VERY BASICS LIKE FOOD, HOUSING, MEDICAL CARE, AND HEATING?: NOT HARD AT ALL

## 2024-06-21 ASSESSMENT — ENCOUNTER SYMPTOMS
CHEST TIGHTNESS: 0
BACK PAIN: 1
APNEA: 0

## 2024-06-21 NOTE — PROGRESS NOTES
No chief complaint on file.   3 month follow up     \"Have you been to the ER, urgent care clinic since your last visit?  Hospitalized since your last visit?\"    NO    “Have you seen or consulted any other health care providers outside of [facility] since your last visit?”    [facility] [place]             Click Here for Release of Records Request     Health Maintenance Due   Topic Date Due    Respiratory Syncytial Virus (RSV) Pregnant or age 60 yrs+ (1 - 1-dose 60+ series) Never done    COVID-19 Vaccine (4 - 2023-24 season) 09/01/2023    Diabetic retinal exam  12/02/2023    Diabetic foot exam  03/09/2024    Prostate Specific Antigen (PSA) Screening or Monitoring  06/09/2024       
([facility])  E11.21 Hemoglobin A1C      4. CKD stage 3 due to type 2 diabetes mellitus ([facility])  E11.22 Comprehensive Metabolic Panel    N18.30 CBC      5. Stage 3 chronic kidney disease, unspecified whether stage 3a or 3b CKD ([facility])  N18.30       6. Mixed hyperlipidemia  E78.2 Lipid Panel      7. Vitamin D deficiency  E55.9 Vitamin D 25 Hydroxy      8. Prostate cancer screening  Z12.5 Total PSA with Free PSA Reflex      9. Nocturia  R35.1 Total PSA with Free PSA Reflex          1. Coronary artery disease of native artery of native heart with stable angina pectoris ([facility])  Stable overall    2. Gastroesophageal reflux disease without esophagitis  Stable    3. Controlled type 2 diabetes mellitus with diabetic nephropathy, without long-term current use of insulin ([facility])  Discussed with patient today that the goal for their diabetes is to have a HgA1C<7 and ideally as close to 6.5 as possible.  We discussed diet and medications.  The goal for the cholesterol LDL is less than 70 and HDL>40.  Patient is aware of the need for yearly eye exams and to take care of their feet daily.  Discussed with patient that blood pressure should be less than 130/80 and watching salt intake is very important.    - Hemoglobin A1C; Future    4. CKD stage 3 due to type 2 diabetes mellitus ([facility])  Labs  - Comprehensive Metabolic Panel; Future  - CBC; Future    5. Stage 3 chronic kidney disease, unspecified whether stage 3a or 3b CKD ([facility])    6. Mixed hyperlipidemia  The patient is aware of our goal to reduce or eliminate the long term problems (such as strokes and heart attacks) related to poorly controlled Triglycerides, LDL, Cholesterol.   - Lipid Panel; Future    7. Vitamin D deficiency  - Vitamin D 25 Hydroxy; Future       Follow-up and Dispositions    Return in about 3 months (around 9/21/2024).           I have discussed the diagnosis with the patient and the intended plan as seen in the above orders.  Social history, medical history, and labs were

## 2024-06-22 LAB
25(OH)D3 SERPL-MCNC: 33.4 NG/ML (ref 30–100)
ALBUMIN SERPL-MCNC: 4.2 G/DL (ref 3.5–5)
ALBUMIN/GLOB SERPL: 1.6 (ref 1.1–2.2)
ALP SERPL-CCNC: 69 U/L (ref 45–117)
ALT SERPL-CCNC: 18 U/L (ref 12–78)
ANION GAP SERPL CALC-SCNC: 6 MMOL/L (ref 5–15)
AST SERPL-CCNC: 13 U/L (ref 15–37)
BILIRUB SERPL-MCNC: 0.5 MG/DL (ref 0.2–1)
BUN SERPL-MCNC: 28 MG/DL (ref 6–20)
BUN/CREAT SERPL: 19 (ref 12–20)
CALCIUM SERPL-MCNC: 9.4 MG/DL (ref 8.5–10.1)
CHLORIDE SERPL-SCNC: 107 MMOL/L (ref 97–108)
CHOLEST SERPL-MCNC: 167 MG/DL
CO2 SERPL-SCNC: 26 MMOL/L (ref 21–32)
CREAT SERPL-MCNC: 1.5 MG/DL (ref 0.7–1.3)
ERYTHROCYTE [DISTWIDTH] IN BLOOD BY AUTOMATED COUNT: 13.9 % (ref 11.5–14.5)
EST. AVERAGE GLUCOSE BLD GHB EST-MCNC: 151 MG/DL
GLOBULIN SER CALC-MCNC: 2.6 G/DL (ref 2–4)
GLUCOSE SERPL-MCNC: 168 MG/DL (ref 65–100)
HBA1C MFR BLD: 6.9 % (ref 4–5.6)
HCT VFR BLD AUTO: 41.8 % (ref 36.6–50.3)
HDLC SERPL-MCNC: 74 MG/DL
HDLC SERPL: 2.3 (ref 0–5)
HGB BLD-MCNC: 13.2 G/DL (ref 12.1–17)
LDLC SERPL CALC-MCNC: 65.2 MG/DL (ref 0–100)
MCH RBC QN AUTO: 30.3 PG (ref 26–34)
MCHC RBC AUTO-ENTMCNC: 31.6 G/DL (ref 30–36.5)
MCV RBC AUTO: 95.9 FL (ref 80–99)
NRBC # BLD: 0 K/UL (ref 0–0.01)
NRBC BLD-RTO: 0 PER 100 WBC
PLATELET # BLD AUTO: 193 K/UL (ref 150–400)
PMV BLD AUTO: 10.4 FL (ref 8.9–12.9)
POTASSIUM SERPL-SCNC: 4.6 MMOL/L (ref 3.5–5.1)
PROT SERPL-MCNC: 6.8 G/DL (ref 6.4–8.2)
RBC # BLD AUTO: 4.36 M/UL (ref 4.1–5.7)
SODIUM SERPL-SCNC: 139 MMOL/L (ref 136–145)
TRIGL SERPL-MCNC: 139 MG/DL
VLDLC SERPL CALC-MCNC: 27.8 MG/DL
WBC # BLD AUTO: 7.1 K/UL (ref 4.1–11.1)

## 2024-06-25 LAB
PSA SERPL-MCNC: <0.1 NG/ML (ref 0–4)
REFLEX CRITERIA: NORMAL

## 2024-06-28 RX ORDER — FAMOTIDINE 40 MG/1
40 TABLET, FILM COATED ORAL NIGHTLY
Qty: 90 TABLET | Refills: 1 | Status: SHIPPED | OUTPATIENT
Start: 2024-06-28

## 2024-08-07 RX ORDER — RANOLAZINE 500 MG/1
500 TABLET, EXTENDED RELEASE ORAL 2 TIMES DAILY
Qty: 180 TABLET | Refills: 1 | Status: SHIPPED | OUTPATIENT
Start: 2024-08-07

## 2024-08-15 DIAGNOSIS — I10 ESSENTIAL (PRIMARY) HYPERTENSION: ICD-10-CM

## 2024-08-15 DIAGNOSIS — K21.00 GASTRO-ESOPHAGEAL REFLUX DISEASE WITH ESOPHAGITIS, WITHOUT BLEEDING: ICD-10-CM

## 2024-08-16 RX ORDER — FUROSEMIDE 20 MG/1
20 TABLET ORAL DAILY
Qty: 90 TABLET | Refills: 1 | Status: SHIPPED | OUTPATIENT
Start: 2024-08-16

## 2024-08-16 RX ORDER — FELODIPINE 10 MG/1
TABLET, EXTENDED RELEASE ORAL
Qty: 90 TABLET | Refills: 1 | Status: SHIPPED | OUTPATIENT
Start: 2024-08-16

## 2024-08-16 RX ORDER — PANTOPRAZOLE SODIUM 40 MG/1
40 TABLET, DELAYED RELEASE ORAL DAILY
Qty: 90 TABLET | Refills: 1 | Status: SHIPPED | OUTPATIENT
Start: 2024-08-16

## 2024-09-23 ENCOUNTER — OFFICE VISIT (OUTPATIENT)
Facility: CLINIC | Age: 79
End: 2024-09-23
Payer: MEDICARE

## 2024-09-23 VITALS
WEIGHT: 205 LBS | SYSTOLIC BLOOD PRESSURE: 129 MMHG | BODY MASS INDEX: 28.7 KG/M2 | TEMPERATURE: 96.7 F | HEART RATE: 58 BPM | HEIGHT: 71 IN | OXYGEN SATURATION: 97 % | RESPIRATION RATE: 20 BRPM | DIASTOLIC BLOOD PRESSURE: 72 MMHG

## 2024-09-23 DIAGNOSIS — N18.30 STAGE 3 CHRONIC KIDNEY DISEASE, UNSPECIFIED WHETHER STAGE 3A OR 3B CKD (HCC): ICD-10-CM

## 2024-09-23 DIAGNOSIS — E11.21 CONTROLLED TYPE 2 DIABETES MELLITUS WITH DIABETIC NEPHROPATHY, WITHOUT LONG-TERM CURRENT USE OF INSULIN (HCC): ICD-10-CM

## 2024-09-23 DIAGNOSIS — I10 PRIMARY HYPERTENSION: ICD-10-CM

## 2024-09-23 DIAGNOSIS — E78.2 MIXED HYPERLIPIDEMIA: ICD-10-CM

## 2024-09-23 DIAGNOSIS — E55.9 VITAMIN D DEFICIENCY: ICD-10-CM

## 2024-09-23 DIAGNOSIS — E11.22 CKD STAGE 3 DUE TO TYPE 2 DIABETES MELLITUS (HCC): ICD-10-CM

## 2024-09-23 DIAGNOSIS — K21.9 GASTROESOPHAGEAL REFLUX DISEASE WITHOUT ESOPHAGITIS: ICD-10-CM

## 2024-09-23 DIAGNOSIS — N18.30 CKD STAGE 3 DUE TO TYPE 2 DIABETES MELLITUS (HCC): ICD-10-CM

## 2024-09-23 DIAGNOSIS — Z85.46 HISTORY OF PROSTATE CANCER: ICD-10-CM

## 2024-09-23 DIAGNOSIS — I25.118 CORONARY ARTERY DISEASE OF NATIVE ARTERY OF NATIVE HEART WITH STABLE ANGINA PECTORIS (HCC): Primary | ICD-10-CM

## 2024-09-23 PROCEDURE — 3044F HG A1C LEVEL LT 7.0%: CPT | Performed by: FAMILY MEDICINE

## 2024-09-23 PROCEDURE — G8427 DOCREV CUR MEDS BY ELIG CLIN: HCPCS | Performed by: FAMILY MEDICINE

## 2024-09-23 PROCEDURE — 3074F SYST BP LT 130 MM HG: CPT | Performed by: FAMILY MEDICINE

## 2024-09-23 PROCEDURE — 3078F DIAST BP <80 MM HG: CPT | Performed by: FAMILY MEDICINE

## 2024-09-23 PROCEDURE — G8417 CALC BMI ABV UP PARAM F/U: HCPCS | Performed by: FAMILY MEDICINE

## 2024-09-23 PROCEDURE — 1036F TOBACCO NON-USER: CPT | Performed by: FAMILY MEDICINE

## 2024-09-23 PROCEDURE — 99214 OFFICE O/P EST MOD 30 MIN: CPT | Performed by: FAMILY MEDICINE

## 2024-09-23 PROCEDURE — 1123F ACP DISCUSS/DSCN MKR DOCD: CPT | Performed by: FAMILY MEDICINE

## 2024-09-23 ASSESSMENT — ENCOUNTER SYMPTOMS
BACK PAIN: 1
CHEST TIGHTNESS: 0
ABDOMINAL PAIN: 0
APNEA: 0
ABDOMINAL DISTENTION: 0

## 2024-09-24 LAB
25(OH)D3 SERPL-MCNC: 31.8 NG/ML (ref 30–100)
ALBUMIN SERPL-MCNC: 3.8 G/DL (ref 3.5–5)
ALBUMIN/GLOB SERPL: 1.7 (ref 1.1–2.2)
ALP SERPL-CCNC: 62 U/L (ref 45–117)
ALT SERPL-CCNC: 23 U/L (ref 12–78)
ANION GAP SERPL CALC-SCNC: 6 MMOL/L (ref 2–12)
AST SERPL-CCNC: 16 U/L (ref 15–37)
BILIRUB SERPL-MCNC: 0.5 MG/DL (ref 0.2–1)
BUN SERPL-MCNC: 23 MG/DL (ref 6–20)
BUN/CREAT SERPL: 16 (ref 12–20)
CALCIUM SERPL-MCNC: 9.5 MG/DL (ref 8.5–10.1)
CHLORIDE SERPL-SCNC: 107 MMOL/L (ref 97–108)
CHOLEST SERPL-MCNC: 172 MG/DL
CO2 SERPL-SCNC: 26 MMOL/L (ref 21–32)
CREAT SERPL-MCNC: 1.46 MG/DL (ref 0.7–1.3)
ERYTHROCYTE [DISTWIDTH] IN BLOOD BY AUTOMATED COUNT: 13 % (ref 11.5–14.5)
EST. AVERAGE GLUCOSE BLD GHB EST-MCNC: 154 MG/DL
GLOBULIN SER CALC-MCNC: 2.3 G/DL (ref 2–4)
GLUCOSE SERPL-MCNC: 154 MG/DL (ref 65–100)
HBA1C MFR BLD: 7 % (ref 4–5.6)
HCT VFR BLD AUTO: 39 % (ref 36.6–50.3)
HDLC SERPL-MCNC: 58 MG/DL
HDLC SERPL: 3 (ref 0–5)
HGB BLD-MCNC: 12.4 G/DL (ref 12.1–17)
LDLC SERPL CALC-MCNC: 74.6 MG/DL (ref 0–100)
MCH RBC QN AUTO: 29.7 PG (ref 26–34)
MCHC RBC AUTO-ENTMCNC: 31.8 G/DL (ref 30–36.5)
MCV RBC AUTO: 93.5 FL (ref 80–99)
NRBC # BLD: 0 K/UL (ref 0–0.01)
NRBC BLD-RTO: 0 PER 100 WBC
PLATELET # BLD AUTO: 193 K/UL (ref 150–400)
PMV BLD AUTO: 10.4 FL (ref 8.9–12.9)
POTASSIUM SERPL-SCNC: 4.3 MMOL/L (ref 3.5–5.1)
PROT SERPL-MCNC: 6.1 G/DL (ref 6.4–8.2)
RBC # BLD AUTO: 4.17 M/UL (ref 4.1–5.7)
SODIUM SERPL-SCNC: 139 MMOL/L (ref 136–145)
TRIGL SERPL-MCNC: 197 MG/DL
VLDLC SERPL CALC-MCNC: 39.4 MG/DL
WBC # BLD AUTO: 6.4 K/UL (ref 4.1–11.1)

## 2024-10-08 DIAGNOSIS — E78.00 PURE HYPERCHOLESTEROLEMIA, UNSPECIFIED: ICD-10-CM

## 2024-10-08 RX ORDER — ROSUVASTATIN CALCIUM 40 MG/1
40 TABLET, COATED ORAL DAILY
Qty: 90 TABLET | Refills: 1 | Status: SHIPPED | OUTPATIENT
Start: 2024-10-08

## 2024-10-14 ENCOUNTER — ANCILLARY PROCEDURE (OUTPATIENT)
Age: 79
End: 2024-10-14
Payer: MEDICARE

## 2024-10-14 ENCOUNTER — OFFICE VISIT (OUTPATIENT)
Age: 79
End: 2024-10-14
Payer: MEDICARE

## 2024-10-14 VITALS
SYSTOLIC BLOOD PRESSURE: 160 MMHG | BODY MASS INDEX: 28.64 KG/M2 | HEIGHT: 71 IN | WEIGHT: 204.6 LBS | HEART RATE: 53 BPM | DIASTOLIC BLOOD PRESSURE: 98 MMHG

## 2024-10-14 VITALS
HEART RATE: 52 BPM | BODY MASS INDEX: 28.56 KG/M2 | WEIGHT: 204 LBS | SYSTOLIC BLOOD PRESSURE: 160 MMHG | DIASTOLIC BLOOD PRESSURE: 80 MMHG | OXYGEN SATURATION: 98 % | HEIGHT: 71 IN

## 2024-10-14 DIAGNOSIS — I10 ESSENTIAL (PRIMARY) HYPERTENSION: ICD-10-CM

## 2024-10-14 DIAGNOSIS — I35.0 NONRHEUMATIC AORTIC VALVE STENOSIS: ICD-10-CM

## 2024-10-14 DIAGNOSIS — I25.118 ATHEROSCLEROTIC HEART DISEASE OF NATIVE CORONARY ARTERY WITH OTHER FORMS OF ANGINA PECTORIS (HCC): Primary | ICD-10-CM

## 2024-10-14 LAB
ECHO AO ASC DIAM: 4 CM
ECHO AO ASCENDING AORTA INDEX: 1.88 CM/M2
ECHO AO ROOT DIAM: 3.7 CM
ECHO AO ROOT INDEX: 1.74 CM/M2
ECHO AV AREA PEAK VELOCITY: 1.7 CM2
ECHO AV AREA VTI: 1.5 CM2
ECHO AV AREA/BSA PEAK VELOCITY: 0.8 CM2/M2
ECHO AV AREA/BSA VTI: 0.7 CM2/M2
ECHO AV MEAN GRADIENT: 18 MMHG
ECHO AV MEAN VELOCITY: 2 M/S
ECHO AV PEAK GRADIENT: 32 MMHG
ECHO AV PEAK VELOCITY: 2.9 M/S
ECHO AV VELOCITY RATIO: 0.48
ECHO AV VTI: 72.8 CM
ECHO BSA: 2.15 M2
ECHO LA DIAMETER INDEX: 2.21 CM/M2
ECHO LA DIAMETER: 4.7 CM
ECHO LA TO AORTIC ROOT RATIO: 1.27
ECHO LA VOL A-L A2C: 111 ML (ref 18–58)
ECHO LA VOL A-L A4C: 80 ML (ref 18–58)
ECHO LA VOL MOD A2C: 102 ML (ref 18–58)
ECHO LA VOL MOD A4C: 76 ML (ref 18–58)
ECHO LA VOLUME AREA LENGTH: 98 ML
ECHO LA VOLUME INDEX A-L A2C: 52 ML/M2 (ref 16–34)
ECHO LA VOLUME INDEX A-L A4C: 38 ML/M2 (ref 16–34)
ECHO LA VOLUME INDEX AREA LENGTH: 46 ML/M2 (ref 16–34)
ECHO LA VOLUME INDEX MOD A2C: 48 ML/M2 (ref 16–34)
ECHO LA VOLUME INDEX MOD A4C: 36 ML/M2 (ref 16–34)
ECHO LV E' LATERAL VELOCITY: 7.7 CM/S
ECHO LV E' SEPTAL VELOCITY: 5.6 CM/S
ECHO LV EDV A2C: 141 ML
ECHO LV EDV A4C: 112 ML
ECHO LV EDV BP: 130 ML (ref 67–155)
ECHO LV EDV INDEX A4C: 53 ML/M2
ECHO LV EDV INDEX BP: 61 ML/M2
ECHO LV EDV NDEX A2C: 66 ML/M2
ECHO LV EJECTION FRACTION A2C: 56 %
ECHO LV EJECTION FRACTION A4C: 66 %
ECHO LV EJECTION FRACTION BIPLANE: 63 % (ref 55–100)
ECHO LV ESV A2C: 62 ML
ECHO LV ESV A4C: 38 ML
ECHO LV ESV BP: 49 ML (ref 22–58)
ECHO LV ESV INDEX A2C: 29 ML/M2
ECHO LV ESV INDEX A4C: 18 ML/M2
ECHO LV ESV INDEX BP: 23 ML/M2
ECHO LV FRACTIONAL SHORTENING: 30 % (ref 28–44)
ECHO LV INTERNAL DIMENSION DIASTOLE INDEX: 2.35 CM/M2
ECHO LV INTERNAL DIMENSION DIASTOLIC: 5 CM (ref 4.2–5.9)
ECHO LV INTERNAL DIMENSION SYSTOLIC INDEX: 1.64 CM/M2
ECHO LV INTERNAL DIMENSION SYSTOLIC: 3.5 CM
ECHO LV IVSD: 1 CM (ref 0.6–1)
ECHO LV MASS 2D: 182 G (ref 88–224)
ECHO LV MASS INDEX 2D: 85.4 G/M2 (ref 49–115)
ECHO LV POSTERIOR WALL DIASTOLIC: 1 CM (ref 0.6–1)
ECHO LV RELATIVE WALL THICKNESS RATIO: 0.4
ECHO LVOT AREA: 3.5 CM2
ECHO LVOT AV VTI INDEX: 0.41
ECHO LVOT DIAM: 2.1 CM
ECHO LVOT MEAN GRADIENT: 3 MMHG
ECHO LVOT PEAK GRADIENT: 7 MMHG
ECHO LVOT PEAK VELOCITY: 1.4 M/S
ECHO LVOT STROKE VOLUME INDEX: 48.8 ML/M2
ECHO LVOT SV: 103.9 ML
ECHO LVOT VTI: 30 CM
ECHO MV A VELOCITY: 0.85 M/S
ECHO MV AREA PHT: 2.5 CM2
ECHO MV AREA VTI: 3.1 CM2
ECHO MV E DECELERATION TIME (DT): 307 MS
ECHO MV E VELOCITY: 0.57 M/S
ECHO MV E/A RATIO: 0.67
ECHO MV E/E' LATERAL: 7.4
ECHO MV E/E' RATIO (AVERAGED): 8.79
ECHO MV E/E' SEPTAL: 10.18
ECHO MV LVOT VTI INDEX: 1.11
ECHO MV MAX VELOCITY: 0.9 M/S
ECHO MV MEAN GRADIENT: 1 MMHG
ECHO MV MEAN VELOCITY: 0.5 M/S
ECHO MV PEAK GRADIENT: 4 MMHG
ECHO MV PRESSURE HALF TIME (PHT): 89 MS
ECHO MV VTI: 33.3 CM
ECHO RA AREA 4C: 21.5 CM2
ECHO RA END SYSTOLIC VOLUME APICAL 4 CHAMBER INDEX BSA: 29 ML/M2
ECHO RA VOLUME: 61 ML
ECHO RV INTERNAL DIMENSION: 4.1 CM
ECHO RV TAPSE: 2.5 CM (ref 1.7–?)

## 2024-10-14 PROCEDURE — G8484 FLU IMMUNIZE NO ADMIN: HCPCS | Performed by: STUDENT IN AN ORGANIZED HEALTH CARE EDUCATION/TRAINING PROGRAM

## 2024-10-14 PROCEDURE — 93010 ELECTROCARDIOGRAM REPORT: CPT | Performed by: STUDENT IN AN ORGANIZED HEALTH CARE EDUCATION/TRAINING PROGRAM

## 2024-10-14 PROCEDURE — 93005 ELECTROCARDIOGRAM TRACING: CPT | Performed by: STUDENT IN AN ORGANIZED HEALTH CARE EDUCATION/TRAINING PROGRAM

## 2024-10-14 PROCEDURE — 99214 OFFICE O/P EST MOD 30 MIN: CPT | Performed by: STUDENT IN AN ORGANIZED HEALTH CARE EDUCATION/TRAINING PROGRAM

## 2024-10-14 PROCEDURE — 93306 TTE W/DOPPLER COMPLETE: CPT | Performed by: STUDENT IN AN ORGANIZED HEALTH CARE EDUCATION/TRAINING PROGRAM

## 2024-10-14 PROCEDURE — 1036F TOBACCO NON-USER: CPT | Performed by: STUDENT IN AN ORGANIZED HEALTH CARE EDUCATION/TRAINING PROGRAM

## 2024-10-14 PROCEDURE — G8428 CUR MEDS NOT DOCUMENT: HCPCS | Performed by: STUDENT IN AN ORGANIZED HEALTH CARE EDUCATION/TRAINING PROGRAM

## 2024-10-14 PROCEDURE — 3077F SYST BP >= 140 MM HG: CPT | Performed by: STUDENT IN AN ORGANIZED HEALTH CARE EDUCATION/TRAINING PROGRAM

## 2024-10-14 PROCEDURE — 1123F ACP DISCUSS/DSCN MKR DOCD: CPT | Performed by: STUDENT IN AN ORGANIZED HEALTH CARE EDUCATION/TRAINING PROGRAM

## 2024-10-14 PROCEDURE — 3079F DIAST BP 80-89 MM HG: CPT | Performed by: STUDENT IN AN ORGANIZED HEALTH CARE EDUCATION/TRAINING PROGRAM

## 2024-10-14 PROCEDURE — G8417 CALC BMI ABV UP PARAM F/U: HCPCS | Performed by: STUDENT IN AN ORGANIZED HEALTH CARE EDUCATION/TRAINING PROGRAM

## 2024-10-14 NOTE — PROGRESS NOTES
had concerns including Echo today with [place], Coronary Artery Disease, and NAVS.    Vitals:    10/14/24 1105 10/14/24 1129   BP: (!) 160/98 (!) 160/80   Site: Left Upper Arm Left Upper Arm   Position: Sitting Sitting   Pulse: 52    SpO2: 98%    Weight: 92.5 kg (204 lb)    Height: 1.803 m (5' 11\")         Chest pain No    Refills No        1. Have you been to the ER, urgent care clinic since your last visit? No       Hospitalized since your last visit? No       Where?        Date?

## 2024-10-14 NOTE — PROGRESS NOTES
[place]  [address] [address], [address]  [place] [address]    Office [phone],Fax [phone]           Juan Carlos TSERING Gwendolyn is a 79 y.o. presents for f/u of CAD      Assessment/Recommendations:      Coronary artery disease - stable angina symptoms  Distal LAD  filling via L-->L collaterals   Lcx stenting 4/2019.  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 followed by  Mat Dyson MD     Positional related dizziness- previously d/c hydralazine with improvement in dizziness symptoms.          Primary Care Physician- Rich Jiménez MD    F/u 6 months sooner as needed        Subjective:    Stable angina symptoms.  Reports short episodes of heart fluttering after recent spinal injections.  Blood pressure has been running slightly higher since his recent steroid injection    Past Medical History:   Diagnosis Date    Amaurosis fugax of left eye 5/6/2012    Arthritis     OSTEO    CAD (coronary artery disease) 2012    CAROTID LEFT     Cancer (HCC) 2013    PROSTATE    Chronic pain     DJD lumbar    Diabetes (HCC) 5/22/2010    Frequent nocturnal awakening     urinary frequency    GERD (gastroesophageal reflux disease) 5/22/2010    Hyperlipidemia 5/22/2010    Hypertension 5/22/2010    Kidney stone 5/22/2010    Nodular goiter     1.3 cm right , FNA

## 2024-11-14 RX ORDER — LISINOPRIL 20 MG/1
20 TABLET ORAL DAILY
Qty: 90 TABLET | Refills: 3 | Status: SHIPPED | OUTPATIENT
Start: 2024-11-14

## 2024-11-14 RX ORDER — ISOSORBIDE MONONITRATE 60 MG/1
60 TABLET, EXTENDED RELEASE ORAL DAILY
Qty: 90 TABLET | Refills: 3 | Status: SHIPPED | OUTPATIENT
Start: 2024-11-14

## 2024-11-14 NOTE — TELEPHONE ENCOUNTER
Refill per VO of Dr. SUSANNE Patel, OD:    Last appt:  10/14/2024    Future Appointments   Date Time Provider Department Center   12/23/2024  8:00 AM Rich Jiménez MD [facility]   4/15/2025  9:40 AM Tashi Patel DO [facility]       Requested Prescriptions     Pending Prescriptions Disp Refills    isosorbide mononitrate (IMDUR) 60 MG extended release tablet [Pharmacy Med Name: isosorbide mononitrate ER 60 mg tablet,extended release 24 hr] 90 tablet 2     Sig: TAKE 1 TABLET BY MOUTH EVERY DAY    lisinopril (PRINIVIL;ZESTRIL) 20 MG tablet [Pharmacy Med Name: lisinopril 20 mg tablet] 90 tablet 2     Sig: Take 1 tablet by mouth daily

## 2024-12-23 ENCOUNTER — OFFICE VISIT (OUTPATIENT)
Facility: CLINIC | Age: 79
End: 2024-12-23

## 2024-12-23 VITALS
SYSTOLIC BLOOD PRESSURE: 127 MMHG | BODY MASS INDEX: 29.26 KG/M2 | RESPIRATION RATE: 17 BRPM | WEIGHT: 209 LBS | TEMPERATURE: 96.9 F | OXYGEN SATURATION: 98 % | DIASTOLIC BLOOD PRESSURE: 70 MMHG | HEIGHT: 71 IN | HEART RATE: 54 BPM

## 2024-12-23 DIAGNOSIS — N18.30 STAGE 3 CHRONIC KIDNEY DISEASE, UNSPECIFIED WHETHER STAGE 3A OR 3B CKD (HCC): ICD-10-CM

## 2024-12-23 DIAGNOSIS — Z85.46 HISTORY OF PROSTATE CANCER: ICD-10-CM

## 2024-12-23 DIAGNOSIS — E11.21 CONTROLLED TYPE 2 DIABETES MELLITUS WITH DIABETIC NEPHROPATHY, WITHOUT LONG-TERM CURRENT USE OF INSULIN (HCC): ICD-10-CM

## 2024-12-23 DIAGNOSIS — K21.9 GASTROESOPHAGEAL REFLUX DISEASE WITHOUT ESOPHAGITIS: ICD-10-CM

## 2024-12-23 DIAGNOSIS — I25.118 CORONARY ARTERY DISEASE OF NATIVE HEART WITH STABLE ANGINA PECTORIS, UNSPECIFIED VESSEL OR LESION TYPE (HCC): Primary | ICD-10-CM

## 2024-12-23 DIAGNOSIS — E11.22 CKD STAGE 3 DUE TO TYPE 2 DIABETES MELLITUS (HCC): ICD-10-CM

## 2024-12-23 DIAGNOSIS — E55.9 VITAMIN D DEFICIENCY: ICD-10-CM

## 2024-12-23 DIAGNOSIS — E78.2 MIXED HYPERLIPIDEMIA: ICD-10-CM

## 2024-12-23 DIAGNOSIS — G89.29 CHRONIC LEFT-SIDED LOW BACK PAIN WITHOUT SCIATICA: ICD-10-CM

## 2024-12-23 DIAGNOSIS — I10 PRIMARY HYPERTENSION: ICD-10-CM

## 2024-12-23 DIAGNOSIS — N18.30 CKD STAGE 3 DUE TO TYPE 2 DIABETES MELLITUS (HCC): ICD-10-CM

## 2024-12-23 DIAGNOSIS — R42 VERTIGO: ICD-10-CM

## 2024-12-23 DIAGNOSIS — M54.50 CHRONIC LEFT-SIDED LOW BACK PAIN WITHOUT SCIATICA: ICD-10-CM

## 2024-12-23 RX ORDER — CYCLOBENZAPRINE HCL 5 MG
5 TABLET ORAL 2 TIMES DAILY PRN
Qty: 30 TABLET | Refills: 0 | Status: SHIPPED | OUTPATIENT
Start: 2024-12-23

## 2024-12-23 RX ORDER — MECLIZINE HCL 12.5 MG 12.5 MG/1
12.5 TABLET ORAL 3 TIMES DAILY PRN
Qty: 30 TABLET | Refills: 0 | Status: SHIPPED | OUTPATIENT
Start: 2024-12-23

## 2024-12-23 ASSESSMENT — ENCOUNTER SYMPTOMS
BACK PAIN: 1
APNEA: 0
CHEST TIGHTNESS: 0
ABDOMINAL PAIN: 0
ABDOMINAL DISTENTION: 0

## 2024-12-23 NOTE — PROGRESS NOTES
[facility]    History of Present Illness:   Juan Carlos Snow is a 79 y.o. male with history of  HTN, CAD, Carotid artery disease, GERD, Diabetes, CKD, Prostate, HLD   CC: Follow up  History provided by patient and Records    HPI:  Low back pain: Patient has had recent ablation of the low back and notes still having cramping pains in the right low back pain.  Patient reports has had a shot in the back prior to that that has helped with the sciatica.  Is taking 2 tylenol in the morning, help at least mildly, and will take another Tylenol at night.  Worst with prolonged sitting and pressure, can develop some cramping sensation with prolonged walking as well.  Is yousuf to do golf okay.    Coronary Artery Disease Follow up:  Today patient reports he is feeling well and overall his symptoms are controlled on his current medications.  he  has not had a history of acute myocardial infarction in the past.  Prior management has included:   - Coronary stenting: YES  - Coronary bypass: NO      he has had CHF      he is on a statin  he is on antiplatelet therapy.  he is on a beta blocker.  he is on a regular Nitrate therapy.  he does use Nitroglycerin as needed for chest pain.     Residual symptoms of CAD include occasional pains in the left chest, though has been moving stuff at home.  Patient denies any current dyspnea, exertional chest pressure/discomfort.  Risk factors are controlled or at goal.  Primary risk factors include diabetes, elevated cholesterol and hypertension    Chronic Kidney Disease:  Patient reports overall doing well, patient denies any current complaints at this time.  - Etiology: HTN/DM   - Symptoms: None  - CKD Stage: III  - Nephrologist: None  - Current Treatments: renal diet  - Dialysis Status: na  - Cardiovascular risk factor reduction including hypertension    Gastroesophageal Reflux:  Current control of Symptoms: Controlled  Primary symptoms: heartburn  Hiatal Hernia: No  Current

## 2024-12-23 NOTE — PROGRESS NOTES
\"Have you been to the ER, urgent care clinic since your last visit?  Hospitalized since your last visit?\"    NO    “Have you seen or consulted any other health care providers outside of [facility] since your last visit?”    NO            Click Here for Release of Records Request     Health Maintenance Due   Topic Date Due    DTaP/Tdap/Td vaccine (2 - Td or Tdap) 06/23/2021    Diabetic Alb to Cr ratio (uACR) test  10/19/2023    Diabetic retinal exam  12/02/2023    Diabetic foot exam  03/09/2024

## 2024-12-24 LAB
25(OH)D3 SERPL-MCNC: 28.7 NG/ML (ref 30–100)
ALBUMIN SERPL-MCNC: 4.1 G/DL (ref 3.5–5)
ALBUMIN/GLOB SERPL: 1.6 (ref 1.1–2.2)
ALP SERPL-CCNC: 67 U/L (ref 45–117)
ALT SERPL-CCNC: 21 U/L (ref 12–78)
ANION GAP SERPL CALC-SCNC: 6 MMOL/L (ref 2–12)
AST SERPL-CCNC: 22 U/L (ref 15–37)
BILIRUB SERPL-MCNC: 0.3 MG/DL (ref 0.2–1)
BUN SERPL-MCNC: 26 MG/DL (ref 6–20)
BUN/CREAT SERPL: 18 (ref 12–20)
CALCIUM SERPL-MCNC: 9.7 MG/DL (ref 8.5–10.1)
CHLORIDE SERPL-SCNC: 107 MMOL/L (ref 97–108)
CHOLEST SERPL-MCNC: 184 MG/DL
CO2 SERPL-SCNC: 26 MMOL/L (ref 21–32)
COMMENT:: NORMAL
CREAT SERPL-MCNC: 1.45 MG/DL (ref 0.7–1.3)
CREAT UR-MCNC: 79.4 MG/DL
ERYTHROCYTE [DISTWIDTH] IN BLOOD BY AUTOMATED COUNT: 13.5 % (ref 11.5–14.5)
EST. AVERAGE GLUCOSE BLD GHB EST-MCNC: 160 MG/DL
GLOBULIN SER CALC-MCNC: 2.6 G/DL (ref 2–4)
GLUCOSE SERPL-MCNC: 181 MG/DL (ref 65–100)
HBA1C MFR BLD: 7.2 % (ref 4–5.6)
HCT VFR BLD AUTO: 41.6 % (ref 36.6–50.3)
HDLC SERPL-MCNC: 73 MG/DL
HDLC SERPL: 2.5 (ref 0–5)
HGB BLD-MCNC: 13 G/DL (ref 12.1–17)
LDLC SERPL CALC-MCNC: 76.4 MG/DL (ref 0–100)
MCH RBC QN AUTO: 30.1 PG (ref 26–34)
MCHC RBC AUTO-ENTMCNC: 31.3 G/DL (ref 30–36.5)
MCV RBC AUTO: 96.3 FL (ref 80–99)
MICROALBUMIN UR-MCNC: 3.48 MG/DL
MICROALBUMIN/CREAT UR-RTO: 44 MG/G (ref 0–30)
NRBC # BLD: 0 K/UL (ref 0–0.01)
NRBC BLD-RTO: 0 PER 100 WBC
PLATELET # BLD AUTO: 222 K/UL (ref 150–400)
PMV BLD AUTO: 10.2 FL (ref 8.9–12.9)
POTASSIUM SERPL-SCNC: 4.7 MMOL/L (ref 3.5–5.1)
PROT SERPL-MCNC: 6.7 G/DL (ref 6.4–8.2)
RBC # BLD AUTO: 4.32 M/UL (ref 4.1–5.7)
SODIUM SERPL-SCNC: 139 MMOL/L (ref 136–145)
SPECIMEN HOLD: NORMAL
TRIGL SERPL-MCNC: 173 MG/DL
VLDLC SERPL CALC-MCNC: 34.6 MG/DL
WBC # BLD AUTO: 6.9 K/UL (ref 4.1–11.1)

## 2024-12-30 RX ORDER — FAMOTIDINE 40 MG/1
40 TABLET, FILM COATED ORAL NIGHTLY
Qty: 90 TABLET | Refills: 1 | Status: SHIPPED | OUTPATIENT
Start: 2024-12-30

## 2025-01-07 DIAGNOSIS — M54.50 CHRONIC LEFT-SIDED LOW BACK PAIN WITHOUT SCIATICA: ICD-10-CM

## 2025-01-07 DIAGNOSIS — R42 VERTIGO: ICD-10-CM

## 2025-01-07 DIAGNOSIS — G89.29 CHRONIC LEFT-SIDED LOW BACK PAIN WITHOUT SCIATICA: ICD-10-CM

## 2025-01-07 RX ORDER — CYCLOBENZAPRINE HCL 5 MG
5 TABLET ORAL 2 TIMES DAILY PRN
Qty: 180 TABLET | Refills: 1 | Status: SHIPPED | OUTPATIENT
Start: 2025-01-07

## 2025-01-07 RX ORDER — MECLIZINE HCL 12.5 MG 12.5 MG/1
12.5 TABLET ORAL 3 TIMES DAILY PRN
Qty: 90 TABLET | Refills: 1 | Status: SHIPPED | OUTPATIENT
Start: 2025-01-07

## 2025-01-07 NOTE — TELEPHONE ENCOUNTER
meclizine (ANTIVERT) 12.5 MG tablet   cyclobenzaprine (FLEXERIL) 5 MG tablet   Please send to [facility]

## 2025-02-06 RX ORDER — RANOLAZINE 500 MG/1
500 TABLET, EXTENDED RELEASE ORAL 2 TIMES DAILY
Qty: 180 TABLET | Refills: 1 | Status: SHIPPED | OUTPATIENT
Start: 2025-02-06

## 2025-02-10 DIAGNOSIS — K21.00 GASTRO-ESOPHAGEAL REFLUX DISEASE WITH ESOPHAGITIS, WITHOUT BLEEDING: ICD-10-CM

## 2025-02-10 RX ORDER — PANTOPRAZOLE SODIUM 40 MG/1
40 TABLET, DELAYED RELEASE ORAL DAILY
Qty: 90 TABLET | Refills: 1 | Status: SHIPPED | OUTPATIENT
Start: 2025-02-10

## 2025-02-10 RX ORDER — CLOPIDOGREL BISULFATE 75 MG/1
75 TABLET ORAL DAILY
Qty: 90 TABLET | Refills: 0 | Status: SHIPPED | OUTPATIENT
Start: 2025-02-10

## 2025-02-13 DIAGNOSIS — I10 ESSENTIAL (PRIMARY) HYPERTENSION: ICD-10-CM

## 2025-02-13 RX ORDER — FELODIPINE 10 MG/1
TABLET, EXTENDED RELEASE ORAL
Qty: 90 TABLET | Refills: 1 | Status: SHIPPED | OUTPATIENT
Start: 2025-02-13

## 2025-02-13 RX ORDER — FUROSEMIDE 20 MG/1
20 TABLET ORAL DAILY
Qty: 90 TABLET | Refills: 1 | Status: SHIPPED | OUTPATIENT
Start: 2025-02-13

## 2025-02-14 DIAGNOSIS — R42 VERTIGO: ICD-10-CM

## 2025-02-14 RX ORDER — MECLIZINE HCL 12.5 MG 12.5 MG/1
12.5 TABLET ORAL 3 TIMES DAILY PRN
Qty: 90 TABLET | Refills: 1 | Status: SHIPPED | OUTPATIENT
Start: 2025-02-14

## 2025-03-28 ENCOUNTER — OFFICE VISIT (OUTPATIENT)
Facility: CLINIC | Age: 80
End: 2025-03-28

## 2025-03-28 VITALS
RESPIRATION RATE: 18 BRPM | WEIGHT: 211.4 LBS | TEMPERATURE: 97.7 F | HEIGHT: 71 IN | BODY MASS INDEX: 29.6 KG/M2 | HEART RATE: 52 BPM | DIASTOLIC BLOOD PRESSURE: 64 MMHG | SYSTOLIC BLOOD PRESSURE: 116 MMHG

## 2025-03-28 DIAGNOSIS — E11.21 CONTROLLED TYPE 2 DIABETES MELLITUS WITH DIABETIC NEPHROPATHY, WITHOUT LONG-TERM CURRENT USE OF INSULIN (HCC): ICD-10-CM

## 2025-03-28 DIAGNOSIS — R42 VERTIGO: ICD-10-CM

## 2025-03-28 DIAGNOSIS — I25.118 CORONARY ARTERY DISEASE OF NATIVE HEART WITH STABLE ANGINA PECTORIS, UNSPECIFIED VESSEL OR LESION TYPE: ICD-10-CM

## 2025-03-28 DIAGNOSIS — Z00.00 MEDICARE ANNUAL WELLNESS VISIT, SUBSEQUENT: Primary | ICD-10-CM

## 2025-03-28 DIAGNOSIS — N18.30 CKD STAGE 3 DUE TO TYPE 2 DIABETES MELLITUS (HCC): ICD-10-CM

## 2025-03-28 DIAGNOSIS — E11.22 CKD STAGE 3 DUE TO TYPE 2 DIABETES MELLITUS (HCC): ICD-10-CM

## 2025-03-28 DIAGNOSIS — E78.2 MIXED HYPERLIPIDEMIA: ICD-10-CM

## 2025-03-28 DIAGNOSIS — E55.9 VITAMIN D DEFICIENCY: ICD-10-CM

## 2025-03-28 DIAGNOSIS — I10 PRIMARY HYPERTENSION: ICD-10-CM

## 2025-03-28 DIAGNOSIS — K21.9 GASTROESOPHAGEAL REFLUX DISEASE WITHOUT ESOPHAGITIS: ICD-10-CM

## 2025-03-28 RX ORDER — MECLIZINE HCL 12.5 MG 12.5 MG/1
12.5 TABLET ORAL 3 TIMES DAILY PRN
Qty: 90 TABLET | Refills: 1 | Status: SHIPPED | OUTPATIENT
Start: 2025-03-28

## 2025-03-28 SDOH — ECONOMIC STABILITY: FOOD INSECURITY: WITHIN THE PAST 12 MONTHS, THE FOOD YOU BOUGHT JUST DIDN'T LAST AND YOU DIDN'T HAVE MONEY TO GET MORE.: NEVER TRUE

## 2025-03-28 SDOH — ECONOMIC STABILITY: FOOD INSECURITY: WITHIN THE PAST 12 MONTHS, YOU WORRIED THAT YOUR FOOD WOULD RUN OUT BEFORE YOU GOT MONEY TO BUY MORE.: NEVER TRUE

## 2025-03-28 ASSESSMENT — ENCOUNTER SYMPTOMS
ABDOMINAL PAIN: 0
ABDOMINAL DISTENTION: 0
BACK PAIN: 1
APNEA: 0
CHEST TIGHTNESS: 0

## 2025-03-28 ASSESSMENT — PATIENT HEALTH QUESTIONNAIRE - PHQ9
1. LITTLE INTEREST OR PLEASURE IN DOING THINGS: NOT AT ALL
SUM OF ALL RESPONSES TO PHQ QUESTIONS 1-9: 0
SUM OF ALL RESPONSES TO PHQ QUESTIONS 1-9: 0
2. FEELING DOWN, DEPRESSED OR HOPELESS: NOT AT ALL
SUM OF ALL RESPONSES TO PHQ QUESTIONS 1-9: 0
SUM OF ALL RESPONSES TO PHQ QUESTIONS 1-9: 0

## 2025-03-28 ASSESSMENT — LIFESTYLE VARIABLES
HOW MANY STANDARD DRINKS CONTAINING ALCOHOL DO YOU HAVE ON A TYPICAL DAY: PATIENT DOES NOT DRINK
HOW OFTEN DO YOU HAVE A DRINK CONTAINING ALCOHOL: NEVER

## 2025-03-28 NOTE — PROGRESS NOTES
Medicare Annual Wellness Visit    Juan Carlos Snow is here for Medicare AWV    Assessment & Plan   Medicare annual wellness visit, subsequent  Coronary artery disease of native heart with stable angina pectoris, unspecified vessel or lesion type  -     Comprehensive Metabolic Panel; Future  -     CBC; Future  CKD stage 3 due to type 2 diabetes mellitus (HCC)  Primary hypertension  Gastroesophageal reflux disease without esophagitis  Controlled type 2 diabetes mellitus with diabetic nephropathy, without long-term current use of insulin (HCC)  -     Hemoglobin A1C; Future  -     Albumin/Creatinine Ratio, Urine; Future  Mixed hyperlipidemia  -     Lipid Panel; Future  Vitamin D deficiency  -     Vitamin D 25 Hydroxy; Future       Return in about 3 months (around 6/28/2025).     Subjective     Patient's complete Health Risk Assessment and screening values have been reviewed and are found in Flowsheets. The following problems were reviewed today and where indicated follow up appointments were made and/or referrals ordered.    Positive Risk Factor Screenings with Interventions:              Inactivity:  On average, how many days per week do you engage in moderate to strenuous exercise (like a brisk walk)?: 0 days (!) Abnormal  On average, how many minutes do you engage in exercise at this level?: 0 min  Interventions:  Patient is actvie and doing well    Poor Eating Habits/Diet:  Do you eat balanced/healthy meals regularly?: (!) No  Interventions:  Discussed diet     Dentist Screen:  Have you seen the dentist within the past year?: (!) No    Intervention:  Advised to schedule with their dentist     Vision Screen:  Do you have difficulty driving, watching TV, or doing any of your daily activities because of your eyesight?: No  Have you had an eye exam within the past year?: (!) No  Interventions:   Patient encouraged to make appointment with their eye specialist      Advanced Directives:  Do you have a Living Will?: (!) 
\"Have you been to the ER, urgent care clinic since your last visit?  Hospitalized since your last visit?\"    NO    “Have you seen or consulted any other health care providers outside our system since your last visit?”    NO      “Have you had a diabetic eye exam?”    NO     Date of last diabetic eye exam: 12/2/2022            
Running Out of Food in the Last Year: Never true     Ran Out of Food in the Last Year: Never true   Transportation Needs: No Transportation Needs (3/28/2025)    PRAPARE - Transportation     Lack of Transportation (Medical): No     Lack of Transportation (Non-Medical): No   Physical Activity: Inactive (3/28/2025)    Exercise Vital Sign     Days of Exercise per Week: 0 days     Minutes of Exercise per Session: 0 min   Housing Stability: Low Risk  (3/28/2025)    Housing Stability Vital Sign     Unable to Pay for Housing in the Last Year: No     Number of Times Moved in the Last Year: 0     Homeless in the Last Year: No        Review of Systems   Review of Systems   Constitutional:  Negative for activity change and appetite change.   HENT:  Negative for congestion and dental problem.    Respiratory:  Negative for apnea and chest tightness.    Cardiovascular:  Positive for leg swelling. Negative for chest pain.   Gastrointestinal:  Negative for abdominal distention and abdominal pain.   Musculoskeletal:  Positive for back pain.         Objective:   /64 (BP Site: Right Upper Arm, Patient Position: Sitting, BP Cuff Size: Large Adult)   Pulse 52   Temp 97.7 °F (36.5 °C) (Oral)   Resp 18   Ht 1.803 m (5' 11\")   Wt 95.9 kg (211 lb 6.4 oz)   BMI 29.48 kg/m²      Physical Exam  Vitals and nursing note reviewed.   Constitutional:       Appearance: Normal appearance.   HENT:      Head: Normocephalic and atraumatic.   Cardiovascular:      Rate and Rhythm: Normal rate and regular rhythm.      Pulses: Normal pulses.      Heart sounds: Normal heart sounds.   Pulmonary:      Effort: Pulmonary effort is normal.      Breath sounds: Normal breath sounds.   Abdominal:      General: Abdomen is flat. Bowel sounds are normal.      Palpations: Abdomen is soft.   Musculoskeletal:      Cervical back: Normal range of motion and neck supple.   Skin:     General: Skin is warm and dry.   Neurological:      Mental Status: He is alert.

## 2025-03-31 DIAGNOSIS — E78.00 PURE HYPERCHOLESTEROLEMIA, UNSPECIFIED: ICD-10-CM

## 2025-03-31 DIAGNOSIS — M54.50 CHRONIC LEFT-SIDED LOW BACK PAIN WITHOUT SCIATICA: ICD-10-CM

## 2025-03-31 DIAGNOSIS — G89.29 CHRONIC LEFT-SIDED LOW BACK PAIN WITHOUT SCIATICA: ICD-10-CM

## 2025-03-31 RX ORDER — ROSUVASTATIN CALCIUM 40 MG/1
40 TABLET, COATED ORAL DAILY
Qty: 90 TABLET | Refills: 1 | Status: SHIPPED | OUTPATIENT
Start: 2025-03-31

## 2025-03-31 RX ORDER — CYCLOBENZAPRINE HCL 5 MG
5 TABLET ORAL 2 TIMES DAILY PRN
Qty: 180 TABLET | Refills: 1 | Status: SHIPPED | OUTPATIENT
Start: 2025-03-31

## 2025-04-15 ENCOUNTER — OFFICE VISIT (OUTPATIENT)
Age: 80
End: 2025-04-15
Payer: MEDICARE

## 2025-04-15 VITALS
HEART RATE: 54 BPM | RESPIRATION RATE: 18 BRPM | HEIGHT: 71 IN | OXYGEN SATURATION: 97 % | BODY MASS INDEX: 29.62 KG/M2 | WEIGHT: 211.6 LBS | DIASTOLIC BLOOD PRESSURE: 78 MMHG | SYSTOLIC BLOOD PRESSURE: 138 MMHG

## 2025-04-15 DIAGNOSIS — I35.0 NONRHEUMATIC AORTIC VALVE STENOSIS: ICD-10-CM

## 2025-04-15 DIAGNOSIS — I10 ESSENTIAL (PRIMARY) HYPERTENSION: ICD-10-CM

## 2025-04-15 DIAGNOSIS — I25.118 ATHEROSCLEROTIC HEART DISEASE OF NATIVE CORONARY ARTERY WITH OTHER FORMS OF ANGINA PECTORIS: Primary | ICD-10-CM

## 2025-04-15 PROCEDURE — 3075F SYST BP GE 130 - 139MM HG: CPT | Performed by: STUDENT IN AN ORGANIZED HEALTH CARE EDUCATION/TRAINING PROGRAM

## 2025-04-15 PROCEDURE — 3078F DIAST BP <80 MM HG: CPT | Performed by: STUDENT IN AN ORGANIZED HEALTH CARE EDUCATION/TRAINING PROGRAM

## 2025-04-15 PROCEDURE — 99214 OFFICE O/P EST MOD 30 MIN: CPT | Performed by: STUDENT IN AN ORGANIZED HEALTH CARE EDUCATION/TRAINING PROGRAM

## 2025-04-15 PROCEDURE — 1126F AMNT PAIN NOTED NONE PRSNT: CPT | Performed by: STUDENT IN AN ORGANIZED HEALTH CARE EDUCATION/TRAINING PROGRAM

## 2025-04-15 PROCEDURE — G8427 DOCREV CUR MEDS BY ELIG CLIN: HCPCS | Performed by: STUDENT IN AN ORGANIZED HEALTH CARE EDUCATION/TRAINING PROGRAM

## 2025-04-15 PROCEDURE — 1036F TOBACCO NON-USER: CPT | Performed by: STUDENT IN AN ORGANIZED HEALTH CARE EDUCATION/TRAINING PROGRAM

## 2025-04-15 PROCEDURE — 1159F MED LIST DOCD IN RCRD: CPT | Performed by: STUDENT IN AN ORGANIZED HEALTH CARE EDUCATION/TRAINING PROGRAM

## 2025-04-15 PROCEDURE — 1123F ACP DISCUSS/DSCN MKR DOCD: CPT | Performed by: STUDENT IN AN ORGANIZED HEALTH CARE EDUCATION/TRAINING PROGRAM

## 2025-04-15 PROCEDURE — G8417 CALC BMI ABV UP PARAM F/U: HCPCS | Performed by: STUDENT IN AN ORGANIZED HEALTH CARE EDUCATION/TRAINING PROGRAM

## 2025-04-15 PROCEDURE — G2211 COMPLEX E/M VISIT ADD ON: HCPCS | Performed by: STUDENT IN AN ORGANIZED HEALTH CARE EDUCATION/TRAINING PROGRAM

## 2025-04-15 NOTE — PROGRESS NOTES
had concerns including Coronary Artery Disease, Hypertension, and Other (NAVS).    Vitals:    04/15/25 0943   BP: 138/78   BP Site: Left Upper Arm   Patient Position: Sitting   BP Cuff Size: Medium Adult   Pulse: 54   Resp: 18   SpO2: 97%   Weight: 96 kg (211 lb 9.6 oz)   Height: 1.803 m (5' 11\")        Chest pain No    Refills No        1. Have you been to the ER, urgent care clinic since your last visit? No       Hospitalized since your last visit? No       Where?        Date?

## 2025-04-15 NOTE — PROGRESS NOTES
[place]  [address] [address], [address]  [place] [address]    Office [phone],Fax [phone]           Juan Carlos Snow is a 80 y.o. presents for f/u of CAD      Assessment/Recommendations:      Coronary artery disease - stable angina symptoms  Distal LAD  filling via L-->L collaterals   Lcx stenting 4/2019.  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 followed by  Mat Dyson MD     Positional related dizziness- previously d/c hydralazine with improvement in dizziness symptoms.          Primary Care Physician- Rich Jiménez MD    F/u 6 months sooner as needed        Subjective:    No anginal chest pain symptoms.  Occasional short fluttering sensation in his chest.  No adverse bleeding with DAPT.      Past Medical History:   Diagnosis Date    Amaurosis fugax of left eye 5/6/2012    Arthritis     OSTEO    CAD (coronary artery disease) 2012    CAROTID LEFT     Cancer (HCC) 2013    PROSTATE    Chronic pain     DJD lumbar    Diabetes (HCC) 5/22/2010    Frequent nocturnal awakening     urinary frequency    GERD (gastroesophageal reflux disease) 5/22/2010    Hyperlipidemia 5/22/2010    Hypertension 5/22/2010    Kidney stone 5/22/2010    Nodular goiter     1.3 cm right , FNA 6/15    Obesity (BMI 30-39.9)     Psychiatric disorder     ANXIETY    Vertigo

## 2025-06-30 ENCOUNTER — OFFICE VISIT (OUTPATIENT)
Facility: CLINIC | Age: 80
End: 2025-06-30

## 2025-06-30 VITALS
DIASTOLIC BLOOD PRESSURE: 73 MMHG | TEMPERATURE: 97.3 F | HEART RATE: 52 BPM | SYSTOLIC BLOOD PRESSURE: 151 MMHG | HEIGHT: 71 IN | RESPIRATION RATE: 18 BRPM | BODY MASS INDEX: 28.98 KG/M2 | WEIGHT: 207 LBS | OXYGEN SATURATION: 97 %

## 2025-06-30 DIAGNOSIS — M54.50 CHRONIC LEFT-SIDED LOW BACK PAIN WITHOUT SCIATICA: ICD-10-CM

## 2025-06-30 DIAGNOSIS — E11.21 CONTROLLED TYPE 2 DIABETES MELLITUS WITH DIABETIC NEPHROPATHY, WITHOUT LONG-TERM CURRENT USE OF INSULIN (HCC): ICD-10-CM

## 2025-06-30 DIAGNOSIS — I10 PRIMARY HYPERTENSION: ICD-10-CM

## 2025-06-30 DIAGNOSIS — G89.29 CHRONIC LEFT-SIDED LOW BACK PAIN WITHOUT SCIATICA: ICD-10-CM

## 2025-06-30 DIAGNOSIS — N18.30 STAGE 3 CHRONIC KIDNEY DISEASE, UNSPECIFIED WHETHER STAGE 3A OR 3B CKD (HCC): ICD-10-CM

## 2025-06-30 DIAGNOSIS — E78.2 MIXED HYPERLIPIDEMIA: ICD-10-CM

## 2025-06-30 DIAGNOSIS — N18.30 CKD STAGE 3 DUE TO TYPE 2 DIABETES MELLITUS (HCC): ICD-10-CM

## 2025-06-30 DIAGNOSIS — E55.9 VITAMIN D DEFICIENCY: ICD-10-CM

## 2025-06-30 DIAGNOSIS — R42 VERTIGO: ICD-10-CM

## 2025-06-30 DIAGNOSIS — I25.118 CORONARY ARTERY DISEASE OF NATIVE ARTERY OF NATIVE HEART WITH STABLE ANGINA PECTORIS: Primary | ICD-10-CM

## 2025-06-30 DIAGNOSIS — R01.1 HEART MURMUR: ICD-10-CM

## 2025-06-30 DIAGNOSIS — E11.22 CKD STAGE 3 DUE TO TYPE 2 DIABETES MELLITUS (HCC): ICD-10-CM

## 2025-06-30 DIAGNOSIS — K21.9 GASTROESOPHAGEAL REFLUX DISEASE WITHOUT ESOPHAGITIS: ICD-10-CM

## 2025-06-30 RX ORDER — MECLIZINE HCL 12.5 MG 12.5 MG/1
12.5 TABLET ORAL 3 TIMES DAILY PRN
Qty: 90 TABLET | Refills: 1 | Status: SHIPPED | OUTPATIENT
Start: 2025-06-30

## 2025-06-30 RX ORDER — CYCLOBENZAPRINE HCL 5 MG
5 TABLET ORAL 2 TIMES DAILY PRN
Qty: 180 TABLET | Refills: 1 | Status: SHIPPED | OUTPATIENT
Start: 2025-06-30

## 2025-06-30 ASSESSMENT — ENCOUNTER SYMPTOMS
ABDOMINAL PAIN: 0
ABDOMINAL DISTENTION: 0
BACK PAIN: 0
APNEA: 0
CHEST TIGHTNESS: 0

## 2025-06-30 NOTE — PROGRESS NOTES
[facility]    History of Present Illness:   Juan Carlos Snow is a 80 y.o. male with history of HTN, CAD, Carotid artery disease, GERD, Diabetes, CKD, Prostate, HLD   CC: Follow up   History provided by patient and Records    HPI:  Coronary Artery Disease Follow up:  Today patient reports he is feeling well and overall his symptoms are controlled on his current medications.  he  has not had a history of acute myocardial infarction in the past.  Prior management has included:   - Coronary stenting: YES  - Coronary bypass: NO      he has had CHF      he is on a statin  he is on antiplatelet therapy.  he is on a beta blocker.  he is on a regular Nitrate therapy.  he does use Nitroglycerin as needed for chest pain. (Had t use 2-3 weeks ago.)     Residual symptoms of CAD include occasional pains in the left chest, though has been moving stuff at home.  Patient denies any current dyspnea, exertional chest pressure/discomfort.  Risk factors are controlled or at goal.  Primary risk factors include diabetes, elevated cholesterol and hypertension    Low Back Pain: Patient is seeing Ortho, has done multiple surgeries/procedures.  Persistent back pains noted.    Chronic Kidney Disease:  Patient reports overall doing well, patient denies any current complaints at this time.  - Etiology: HTN/DM   - Symptoms: None  - CKD Stage: III  - Nephrologist: None  - Current Treatments: renal diet  - Dialysis Status: na  - Cardiovascular risk factor reduction including hypertension     Gastroesophageal Reflux:  Current control of Symptoms: Controlled  Primary symptoms: heartburn  Hiatal Hernia: No  Current Medications: Protonix  The patient has no history melena or bright red blood in the stools.  The patient avoids high dose aspirin and NSAID therapy.  The patient is aware of diet changes needed, elevating the head of the bed and appropriate use of antacids.      Diabetes Follow up: Overall the patient feels well with

## 2025-06-30 NOTE — PROGRESS NOTES
Have you been to the ER, urgent care clinic since your last visit?  Hospitalized since your last visit?   no    Have you seen or consulted any other health care providers outside our system since your last visit?   no      “Have you had a diabetic eye exam?”    due    Date of last diabetic eye exam: 12/2/2022

## 2025-07-02 ENCOUNTER — RESULTS FOLLOW-UP (OUTPATIENT)
Facility: CLINIC | Age: 80
End: 2025-07-02

## 2025-07-02 LAB
25(OH)D3 SERPL-MCNC: 30.6 NG/ML (ref 30–100)
ALBUMIN SERPL-MCNC: 4 G/DL (ref 3.5–5)
ALBUMIN/GLOB SERPL: 1.6 (ref 1.1–2.2)
ALP SERPL-CCNC: 69 U/L (ref 45–117)
ALT SERPL-CCNC: 22 U/L (ref 12–78)
ANION GAP SERPL CALC-SCNC: 6 MMOL/L (ref 2–12)
AST SERPL-CCNC: 17 U/L (ref 15–37)
BILIRUB SERPL-MCNC: 0.5 MG/DL (ref 0.2–1)
BUN SERPL-MCNC: 19 MG/DL (ref 6–20)
BUN/CREAT SERPL: 13 (ref 12–20)
CALCIUM SERPL-MCNC: 9.5 MG/DL (ref 8.5–10.1)
CHLORIDE SERPL-SCNC: 107 MMOL/L (ref 97–108)
CHOLEST SERPL-MCNC: 186 MG/DL
CO2 SERPL-SCNC: 27 MMOL/L (ref 21–32)
CREAT SERPL-MCNC: 1.5 MG/DL (ref 0.7–1.3)
CREAT UR-MCNC: 56.3 MG/DL
ERYTHROCYTE [DISTWIDTH] IN BLOOD BY AUTOMATED COUNT: 13.2 % (ref 11.5–14.5)
EST. AVERAGE GLUCOSE BLD GHB EST-MCNC: 163 MG/DL
GLOBULIN SER CALC-MCNC: 2.5 G/DL (ref 2–4)
GLUCOSE SERPL-MCNC: 162 MG/DL (ref 65–100)
HBA1C MFR BLD: 7.3 % (ref 4–5.6)
HCT VFR BLD AUTO: 43.1 % (ref 36.6–50.3)
HDLC SERPL-MCNC: 66 MG/DL
HDLC SERPL: 2.8 (ref 0–5)
HGB BLD-MCNC: 13.3 G/DL (ref 12.1–17)
LDLC SERPL CALC-MCNC: 83 MG/DL (ref 0–100)
MCH RBC QN AUTO: 29.9 PG (ref 26–34)
MCHC RBC AUTO-ENTMCNC: 30.9 G/DL (ref 30–36.5)
MCV RBC AUTO: 96.9 FL (ref 80–99)
MICROALBUMIN UR-MCNC: 1.36 MG/DL
MICROALBUMIN/CREAT UR-RTO: 24 MG/G (ref 0–30)
NRBC # BLD: 0 K/UL (ref 0–0.01)
NRBC BLD-RTO: 0 PER 100 WBC
PLATELET # BLD AUTO: 205 K/UL (ref 150–400)
PMV BLD AUTO: 10.6 FL (ref 8.9–12.9)
POTASSIUM SERPL-SCNC: 4.3 MMOL/L (ref 3.5–5.1)
PROT SERPL-MCNC: 6.5 G/DL (ref 6.4–8.2)
RBC # BLD AUTO: 4.45 M/UL (ref 4.1–5.7)
SODIUM SERPL-SCNC: 140 MMOL/L (ref 136–145)
TRIGL SERPL-MCNC: 185 MG/DL
VLDLC SERPL CALC-MCNC: 37 MG/DL
WBC # BLD AUTO: 6.9 K/UL (ref 4.1–11.1)

## 2025-07-02 RX ORDER — FAMOTIDINE 40 MG/1
40 TABLET, FILM COATED ORAL NIGHTLY
Qty: 90 TABLET | Refills: 1 | Status: SHIPPED | OUTPATIENT
Start: 2025-07-02

## 2025-07-10 RX ORDER — RANOLAZINE 500 MG/1
500 TABLET, EXTENDED RELEASE ORAL 2 TIMES DAILY
Qty: 180 TABLET | Refills: 1 | Status: SHIPPED | OUTPATIENT
Start: 2025-07-10

## 2025-07-30 RX ORDER — CLOPIDOGREL BISULFATE 75 MG/1
75 TABLET ORAL DAILY
Qty: 90 TABLET | Refills: 1 | Status: SHIPPED | OUTPATIENT
Start: 2025-07-30

## 2025-07-30 NOTE — TELEPHONE ENCOUNTER
Requested Prescriptions     Signed Prescriptions Disp Refills    clopidogrel (PLAVIX) 75 MG tablet 90 tablet 1     Sig: TAKE 1 TABLET BY MOUTH EVERY DAY     Authorizing Provider: SANCHO PATEL     Ordering User: FARRUKH SIMPSON A     Verbal order for refill per Dr Patel

## 2025-08-22 DIAGNOSIS — I10 ESSENTIAL (PRIMARY) HYPERTENSION: ICD-10-CM

## 2025-08-22 RX ORDER — FELODIPINE 10 MG/1
TABLET, EXTENDED RELEASE ORAL
Qty: 90 TABLET | Refills: 1 | Status: SHIPPED | OUTPATIENT
Start: 2025-08-22

## 2025-08-25 DIAGNOSIS — K21.00 GASTRO-ESOPHAGEAL REFLUX DISEASE WITH ESOPHAGITIS, WITHOUT BLEEDING: ICD-10-CM

## 2025-08-25 DIAGNOSIS — I10 ESSENTIAL (PRIMARY) HYPERTENSION: ICD-10-CM

## 2025-08-25 RX ORDER — FUROSEMIDE 20 MG/1
20 TABLET ORAL DAILY
Qty: 90 TABLET | Refills: 1 | Status: SHIPPED | OUTPATIENT
Start: 2025-08-25

## 2025-08-25 RX ORDER — PANTOPRAZOLE SODIUM 40 MG/1
40 TABLET, DELAYED RELEASE ORAL DAILY
Qty: 90 TABLET | Refills: 1 | Status: SHIPPED | OUTPATIENT
Start: 2025-08-25

## (undated) LAB
FLUAV+FLUBV AG NOSE QL IA.RAPID: NEGATIVE
FLUAV+FLUBV AG NOSE QL IA.RAPID: NEGATIVE
VALID INTERNAL CONTROL?: YES

## (undated) DEVICE — Device: Brand: ASAHI SION BLUE

## (undated) DEVICE — TUBING PRSS MON L12IN PVC RIG NONEXPANDING M TO FEM CONN

## (undated) DEVICE — RUNTHROUGH NS EXTRA FLOPPY PTCA GUIDEWIRE: Brand: RUNTHROUGH

## (undated) DEVICE — GUIDE CATH CONVEY 5FR JR4 -- 39228-686

## (undated) DEVICE — CATH GUID COR EB35 7FR 100CM -- LAUNCHER

## (undated) DEVICE — CATHETER GUID AL1 5 FRX100 CM SM ATRAUM SFT CONVEY

## (undated) DEVICE — PACK PROCEDURE SURG HRT CATH

## (undated) DEVICE — TREK CORONARY DILATATION CATHETER 2.75 MM X 20 MM / RAPID-EXCHANGE: Brand: TREK

## (undated) DEVICE — TREK CORONARY DILATATION CATHETER 3.50 MM X 12 MM / RAPID-EXCHANGE: Brand: TREK

## (undated) DEVICE — MEDI-TRACE CADENCE ADULT, DEFIBRILLATION ELECTRODE -RTS  (10 PR/PK) - PHILIPS: Brand: MEDI-TRACE CADENCE

## (undated) DEVICE — Device: Brand: EAGLE EYE PLATINUM RX DIGITAL IVUS CATHETER

## (undated) DEVICE — NC TREK CORONARY DILATATION CATHETER 3.75 MM X 12 MM / RAPID-EXCHANGE: Brand: NC TREK

## (undated) DEVICE — PRESSURE GUIDEWIRE: Brand: COMET

## (undated) DEVICE — SYRINGE MED 20ML STD CLR PLAS LUERLOCK TIP N CTRL DISP

## (undated) DEVICE — Device: Brand: FIELDER XT

## (undated) DEVICE — TUBING PRSS MON L6IN PVC M FEM CONN

## (undated) DEVICE — PROCEDURE KIT FLUID MGMT CUST MAINFOLD STRL

## (undated) DEVICE — NC TREK CORONARY DILATATION CATHETER 3.0 MM X 12 MM / RAPID-EXCHANGE: Brand: NC TREK

## (undated) DEVICE — SUPPORT WRST AD W3.5XL9IN DIA14.5IN ART SFT ADJ HK AND LOOP

## (undated) DEVICE — ANGIOGRAPHIC CATHETER: Brand: IMPULSE™

## (undated) DEVICE — GLIDESHEATH SLENDER STAINLESS STEEL KIT: Brand: GLIDESHEATH SLENDER

## (undated) DEVICE — SYRINGE ANGIO 10ML RED FLAT GRP FIX M LUER CONN MEDALLION

## (undated) DEVICE — TR BAND RADIAL ARTERY COMPRESSION DEVICE: Brand: TR BAND

## (undated) DEVICE — Device

## (undated) DEVICE — FIXED CORE WIRE GUIDE SAFE-T-J, CURVED: Brand: COOK

## (undated) DEVICE — DEVICE INFL W/ HEM VLV TORQ

## (undated) DEVICE — GUIDE CATH CONVEY 5FR JL3.5 -- 39228-661

## (undated) DEVICE — DEVICE INFL 20ML 30ATM DGT FLD DISPNS SYR W ACCESSPLUS BLU

## (undated) DEVICE — COPILOT BLEEDBACK CONTROL VALVE: Brand: COPILOT

## (undated) DEVICE — SYR 10ML LUER LOK 1/5ML GRAD --

## (undated) DEVICE — VALVE ANGIO ID0.11IN HEMSTAT MTL GUID WIRE INSRT TOOL AND

## (undated) DEVICE — GUIDE EXTENSION CATHETER: Brand: GUIDEZILLA™ II

## (undated) DEVICE — TREK CORONARY DILATATION CATHETER 2.50 MM X 12 MM / RAPID-EXCHANGE: Brand: TREK

## (undated) DEVICE — SPECIAL PROCEDURE DRAPE 32" X 34": Brand: SPECIAL PROCEDURE DRAPE

## (undated) DEVICE — CATH GUID COR AL10 6FR 100CM -- LAUNCHER

## (undated) DEVICE — CATH DIAG WR5 EXPO 5FRX100CM -- EXPO

## (undated) DEVICE — GUIDE CATH CONVEY 5FR RB -- 39228-769